# Patient Record
Sex: MALE | Race: BLACK OR AFRICAN AMERICAN | NOT HISPANIC OR LATINO | URBAN - METROPOLITAN AREA
[De-identification: names, ages, dates, MRNs, and addresses within clinical notes are randomized per-mention and may not be internally consistent; named-entity substitution may affect disease eponyms.]

---

## 2019-11-21 ENCOUNTER — EMERGENCY (EMERGENCY)
Facility: HOSPITAL | Age: 74
LOS: 1 days | Discharge: HOME | End: 2019-11-21
Attending: EMERGENCY MEDICINE | Admitting: EMERGENCY MEDICINE
Payer: SELF-PAY

## 2019-11-21 VITALS
HEART RATE: 93 BPM | DIASTOLIC BLOOD PRESSURE: 72 MMHG | RESPIRATION RATE: 16 BRPM | OXYGEN SATURATION: 100 % | TEMPERATURE: 98 F | SYSTOLIC BLOOD PRESSURE: 115 MMHG

## 2019-11-21 LAB
ALBUMIN SERPL ELPH-MCNC: 4.6 G/DL — SIGNIFICANT CHANGE UP (ref 3.3–5)
ALP SERPL-CCNC: 94 U/L — SIGNIFICANT CHANGE UP (ref 40–120)
ALT FLD-CCNC: 15 U/L — SIGNIFICANT CHANGE UP (ref 4–41)
AMPHET UR-MCNC: NEGATIVE — SIGNIFICANT CHANGE UP
ANION GAP SERPL CALC-SCNC: 18 MMO/L — HIGH (ref 7–14)
APAP SERPL-MCNC: < 15 UG/ML — LOW (ref 15–25)
APPEARANCE UR: CLEAR — SIGNIFICANT CHANGE UP
AST SERPL-CCNC: 23 U/L — SIGNIFICANT CHANGE UP (ref 4–40)
BARBITURATES UR SCN-MCNC: NEGATIVE — SIGNIFICANT CHANGE UP
BASE EXCESS BLDV CALC-SCNC: -6.8 MMOL/L — SIGNIFICANT CHANGE UP
BASOPHILS # BLD AUTO: 0.01 K/UL — SIGNIFICANT CHANGE UP (ref 0–0.2)
BASOPHILS NFR BLD AUTO: 0.1 % — SIGNIFICANT CHANGE UP (ref 0–2)
BENZODIAZ UR-MCNC: NEGATIVE — SIGNIFICANT CHANGE UP
BILIRUB SERPL-MCNC: 0.3 MG/DL — SIGNIFICANT CHANGE UP (ref 0.2–1.2)
BILIRUB UR-MCNC: NEGATIVE — SIGNIFICANT CHANGE UP
BLOOD GAS VENOUS - CREATININE: 1.42 MG/DL — HIGH (ref 0.5–1.3)
BLOOD UR QL VISUAL: NEGATIVE — SIGNIFICANT CHANGE UP
BUN SERPL-MCNC: 31 MG/DL — HIGH (ref 7–23)
CALCIUM SERPL-MCNC: 9.8 MG/DL — SIGNIFICANT CHANGE UP (ref 8.4–10.5)
CANNABINOIDS UR-MCNC: NEGATIVE — SIGNIFICANT CHANGE UP
CHLORIDE BLDV-SCNC: 106 MMOL/L — SIGNIFICANT CHANGE UP (ref 96–108)
CHLORIDE SERPL-SCNC: 101 MMOL/L — SIGNIFICANT CHANGE UP (ref 98–107)
CO2 SERPL-SCNC: 17 MMOL/L — LOW (ref 22–31)
COCAINE METAB.OTHER UR-MCNC: NEGATIVE — SIGNIFICANT CHANGE UP
COLOR SPEC: COLORLESS — SIGNIFICANT CHANGE UP
CREAT SERPL-MCNC: 1.47 MG/DL — HIGH (ref 0.5–1.3)
EOSINOPHIL # BLD AUTO: 0.14 K/UL — SIGNIFICANT CHANGE UP (ref 0–0.5)
EOSINOPHIL NFR BLD AUTO: 1.9 % — SIGNIFICANT CHANGE UP (ref 0–6)
ETHANOL BLD-MCNC: 309 MG/DL — HIGH
GAS PNL BLDV: 139 MMOL/L — SIGNIFICANT CHANGE UP (ref 136–146)
GLUCOSE BLDV-MCNC: 50 MG/DL — LOW (ref 70–99)
GLUCOSE SERPL-MCNC: 53 MG/DL — LOW (ref 70–99)
GLUCOSE UR-MCNC: 300 — HIGH
HCO3 BLDV-SCNC: 18 MMOL/L — LOW (ref 20–27)
HCT VFR BLD CALC: 34.1 % — LOW (ref 39–50)
HCT VFR BLDV CALC: 36.7 % — LOW (ref 39–51)
HGB BLD-MCNC: 11.2 G/DL — LOW (ref 13–17)
HGB BLDV-MCNC: 11.9 G/DL — LOW (ref 13–17)
IMM GRANULOCYTES NFR BLD AUTO: 0.4 % — SIGNIFICANT CHANGE UP (ref 0–1.5)
KETONES UR-MCNC: NEGATIVE — SIGNIFICANT CHANGE UP
LACTATE BLDV-MCNC: 5.5 MMOL/L — CRITICAL HIGH (ref 0.5–2)
LEUKOCYTE ESTERASE UR-ACNC: NEGATIVE — SIGNIFICANT CHANGE UP
LYMPHOCYTES # BLD AUTO: 1.31 K/UL — SIGNIFICANT CHANGE UP (ref 1–3.3)
LYMPHOCYTES # BLD AUTO: 17.4 % — SIGNIFICANT CHANGE UP (ref 13–44)
MCHC RBC-ENTMCNC: 32.8 % — SIGNIFICANT CHANGE UP (ref 32–36)
MCHC RBC-ENTMCNC: 33.7 PG — SIGNIFICANT CHANGE UP (ref 27–34)
MCV RBC AUTO: 102.7 FL — HIGH (ref 80–100)
METHADONE UR-MCNC: NEGATIVE — SIGNIFICANT CHANGE UP
MONOCYTES # BLD AUTO: 0.52 K/UL — SIGNIFICANT CHANGE UP (ref 0–0.9)
MONOCYTES NFR BLD AUTO: 6.9 % — SIGNIFICANT CHANGE UP (ref 2–14)
NEUTROPHILS # BLD AUTO: 5.52 K/UL — SIGNIFICANT CHANGE UP (ref 1.8–7.4)
NEUTROPHILS NFR BLD AUTO: 73.3 % — SIGNIFICANT CHANGE UP (ref 43–77)
NITRITE UR-MCNC: NEGATIVE — SIGNIFICANT CHANGE UP
NRBC # FLD: 0 K/UL — SIGNIFICANT CHANGE UP (ref 0–0)
OPIATES UR-MCNC: NEGATIVE — SIGNIFICANT CHANGE UP
OXYCODONE UR-MCNC: NEGATIVE — SIGNIFICANT CHANGE UP
PCO2 BLDV: 41 MMHG — SIGNIFICANT CHANGE UP (ref 41–51)
PCP UR-MCNC: NEGATIVE — SIGNIFICANT CHANGE UP
PH BLDV: 7.28 PH — LOW (ref 7.32–7.43)
PH UR: 5.5 — SIGNIFICANT CHANGE UP (ref 5–8)
PLATELET # BLD AUTO: 298 K/UL — SIGNIFICANT CHANGE UP (ref 150–400)
PMV BLD: 8.9 FL — SIGNIFICANT CHANGE UP (ref 7–13)
PO2 BLDV: 37 MMHG — SIGNIFICANT CHANGE UP (ref 35–40)
POTASSIUM BLDV-SCNC: 3.6 MMOL/L — SIGNIFICANT CHANGE UP (ref 3.4–4.5)
POTASSIUM SERPL-MCNC: 3.8 MMOL/L — SIGNIFICANT CHANGE UP (ref 3.5–5.3)
POTASSIUM SERPL-SCNC: 3.8 MMOL/L — SIGNIFICANT CHANGE UP (ref 3.5–5.3)
PROT SERPL-MCNC: 7.7 G/DL — SIGNIFICANT CHANGE UP (ref 6–8.3)
PROT UR-MCNC: NEGATIVE — SIGNIFICANT CHANGE UP
RBC # BLD: 3.32 M/UL — LOW (ref 4.2–5.8)
RBC # FLD: 11.9 % — SIGNIFICANT CHANGE UP (ref 10.3–14.5)
SALICYLATES SERPL-MCNC: < 5 MG/DL — LOW (ref 15–30)
SAO2 % BLDV: 53.1 % — LOW (ref 60–85)
SODIUM SERPL-SCNC: 136 MMOL/L — SIGNIFICANT CHANGE UP (ref 135–145)
SP GR SPEC: 1.01 — SIGNIFICANT CHANGE UP (ref 1–1.04)
TROPONIN T, HIGH SENSITIVITY: 8 NG/L — SIGNIFICANT CHANGE UP (ref ?–14)
UROBILINOGEN FLD QL: NORMAL — SIGNIFICANT CHANGE UP
WBC # BLD: 7.53 K/UL — SIGNIFICANT CHANGE UP (ref 3.8–10.5)
WBC # FLD AUTO: 7.53 K/UL — SIGNIFICANT CHANGE UP (ref 3.8–10.5)

## 2019-11-21 PROCEDURE — 71045 X-RAY EXAM CHEST 1 VIEW: CPT | Mod: 26

## 2019-11-21 PROCEDURE — 99285 EMERGENCY DEPT VISIT HI MDM: CPT

## 2019-11-21 RX ORDER — SODIUM CHLORIDE 9 MG/ML
1000 INJECTION, SOLUTION INTRAVENOUS
Refills: 0 | Status: DISCONTINUED | OUTPATIENT
Start: 2019-11-21 | End: 2019-11-22

## 2019-11-21 RX ORDER — DEXTROSE 50 % IN WATER 50 %
50 SYRINGE (ML) INTRAVENOUS ONCE
Refills: 0 | Status: COMPLETED | OUTPATIENT
Start: 2019-11-21 | End: 2019-11-21

## 2019-11-21 RX ORDER — SODIUM CHLORIDE 9 MG/ML
500 INJECTION INTRAMUSCULAR; INTRAVENOUS; SUBCUTANEOUS ONCE
Refills: 0 | Status: COMPLETED | OUTPATIENT
Start: 2019-11-21 | End: 2019-11-21

## 2019-11-21 RX ADMIN — SODIUM CHLORIDE 500 MILLILITER(S): 9 INJECTION INTRAMUSCULAR; INTRAVENOUS; SUBCUTANEOUS at 23:52

## 2019-11-21 RX ADMIN — Medication 50 MILLILITER(S): at 21:37

## 2019-11-21 NOTE — ED PROVIDER NOTE - PHYSICAL EXAMINATION
Dr Rodríguez  pt alert to name, AOB, no sign of head/facial trauma. no bruising of chest/back or abdomen. no lac. nontender chest wall. normal S1-S2 poor inspiratory effort. no retractions. soft nt abdomen. no grimace or response to deep palpation. Dr Rodríguez  pt alert to name, AOB, no sign of head/facial trauma. no bruising of chest/back or abdomen. no lac. nontender chest wall. normal S1-S2 poor inspiratory effort. no retractions. soft nt abdomen. no grimace or response to deep palpation.    PHYSICAL EXAM:   General: +alcohol on breath  HEENT: airway patent, +L beating nystagmus, not cooperating with looking to R  Cardiovascular: regular rate   Respiratory:  nonlabored respirations  Abdominal: soft, nontender, nondistended, no rebound, guarding or rigidity  Neuro: Nystagmus as above, +RUE drift, +b/l lower extremity drift.   Psychiatric: perseverating on leaving and asking why he is here  -Christal Gleason PGY-2 Dr Rodríguez  pt alert to name, AOB, no sign of head/facial trauma. no lac of tongue/lips no bruising of chest/back or abdomen. no lac. nontender chest wall. normal S1-S2 poor inspiratory effort. no retractions. soft nt abdomen. no grimace or response to deep palpation. moves all ext without difficulty. strong  bl. follows simple commands. cursing at staff.     PHYSICAL EXAM:   General: +alcohol on breath  HEENT: airway patent, +L beating nystagmus, not cooperating with looking to R  Cardiovascular: regular rate   Respiratory:  nonlabored respirations  Abdominal: soft, nontender, nondistended, no rebound, guarding or rigidity  Neuro: Nystagmus as above, +RUE drift, +b/l lower extremity drift.   Psychiatric: perseverating on leaving and asking why he is here  -Christal Gleason PGY-2

## 2019-11-21 NOTE — ED PROVIDER NOTE - OBJECTIVE STATEMENT
Dr Rodríguez  73yo M hx of DM, HTN, BIBEMS AMS found in street. pt FS 53 altered, asking why he is here. unable to provide any further hx. no family w pt. pt denies any complaints. neg ROS will medicate w dextrose and re assess . Dr Rodríguez  73yo M hx of DM, HTN, BIBEMS AMS found in street. pt FS 53 altered, asking why he is here. unable to provide any further hx. no family w pt. pt denies any complaints. neg ROS will medicate w dextrose and re assess.    Christal Gleason MD PGY-2 75 yo F with hx DM, HTN BIBEMS for hypoglycemia. Reportedly per triage note, pt hypoglycemic to 40s, found lying on street, given oral glucose, with rpt FS 60, FS in triage 58  and unable to swallow juice. Patient appears intoxicated and is a poor historian at this time, perseverating on asking why he is here and if he can go home. Not endorsing pain at this time. +alcohol on breath

## 2019-11-21 NOTE — ED ADULT NURSE NOTE - CHIEF COMPLAINT QUOTE
pt arrives by EMS founding lying in the street. pt fs in field noted to be in 40s given oral glucose rpt fs in field 60. pt arrives aa0x2 no complaints of pain but not answering questions appropriately fs in triage 58 pt unable to swallow juice. Charge RN aware. ekg in progress

## 2019-11-21 NOTE — ED ADULT NURSE NOTE - NSIMPLEMENTINTERV_GEN_ALL_ED
Implemented All Fall Risk Interventions:  [place] to call system. Call bell, personal items and telephone within reach. Instruct patient to call for assistance. Room bathroom lighting operational. Non-slip footwear when patient is off stretcher. Physically safe environment: no spills, clutter or unnecessary equipment. Stretcher in lowest position, wheels locked, appropriate side rails in place. Provide visual cue, wrist band, yellow gown, etc. Monitor gait and stability. Monitor for mental status changes and reorient to person, place, and time. Review medications for side effects contributing to fall risk. Reinforce activity limits and safety measures with patient and family.

## 2019-11-21 NOTE — ED PROVIDER NOTE - NSFOLLOWUPINSTRUCTIONS_ED_ALL_ED_FT
You were seen in the Emergency Department (ED) for hypoglycemia.     Please take your daily medications as prescribed.    Please follow up with your primary care doctor at [facility] in the next 72 hours.    Please return to the ED if you experience any new or concerning symptoms, such as: confusion, chest pain, difficulty breathing, passing out, unable to eat or drink, unable to move or feel part of your body, fever, chills.     Thank you for choosing a [facility] ED.

## 2019-11-21 NOTE — ED PROVIDER NOTE - PROGRESS NOTE DETAILS
DW Daughter Monica, [phone], states he drinks ETOH daily, hx of DM and HTN. NKDA. Will not come to hospital to pick him up. DW Daughter Monica, [phone], states he drinks ETOH daily, hx of DM and HTN. NKDA. Will not come to hospital to pick him up. He drinks daily, comes home late, non adherent to meds as per family. pt placed on observation for safety. pending ct to be done, repeat lactic and sober up. Endorsed to Dr Patel Christal Gleason MD PGY-2 pt now getting up, requiring enhanced supervision, trying to get out of bed. states he does not know why he is here. lactate trending down, pt awake enough to PO challenge, will give food and drink and repeat lactate. Simran Upton M.D. Resident  Pt signed out to me pending re-eval for ETOH. PT lactate downtrending with fluids, given he is pain free, AOx3, with no complaints, likely due to dehydration. Pt now awake, tolerating PO, AOx3, no tremors, or tongue fasciculations, able to stand and ambulate normally without assistance. States today is Friday and payday, so he needs to get to work in [employer] at 8:30AM in order to get pain. Pt states he feels fine, would like to leave, and will follow up with his PMD at [facility].

## 2019-11-21 NOTE — ED ADULT NURSE NOTE - OBJECTIVE STATEMENT
received pt to room 27 aox1 brought in by EMS for hypogkly received pt to room 27 aox1 brought in by EMS for hypoglycemia, found on street altered. Pt was given oral glucose by EMS FS in triage 58 pt appears intoxicative refusing to cooperate with interview, repeats same statement that he wants to leave. Pt breaths equal and unlabored VSS 20 G IV L arm placed labs sent pt medicated as per order rpt  EKG completed pt placed on enhanced for safety climbing out of bed belongings collected. PCA bedside will continue to monitor

## 2019-11-21 NOTE — ED PROVIDER NOTE - PATIENT PORTAL LINK FT
You can access the FollowMyHealth Patient Portal offered by [facility] by registering at the following website: [email or web address]. By joining [employer]’s FollowMyHealth portal, you will also be able to view your health information using other applications (apps) compatible with our system.

## 2019-11-21 NOTE — ED ADULT TRIAGE NOTE - CHIEF COMPLAINT QUOTE
pt arrives from lying int he street by EMS. pt fs in field noted to be in 40s given oral glucose rpt fs in field 60. pt arrives aa0x2 no complaints of pain but not answering questions appropriately fs in triage 58 pt unable to swallow juice. Charge RN aware. ekg in progress pt arrives by EMS founding lying in the street. pt fs in field noted to be in 40s given oral glucose rpt fs in field 60. pt arrives aa0x2 no complaints of pain but not answering questions appropriately fs in triage 58 pt unable to swallow juice. Charge RN aware. ekg in progress

## 2019-11-21 NOTE — ED PROVIDER NOTE - ATTENDING CONTRIBUTION TO CARE
Attending Statement: I have personally seen and examined this patient. I have fully participated in the care of this patient. I have reviewed all pertinent clinical information, including history physical exam, plan and the Resident's note and agree except as noted   see HPI/pe

## 2019-11-22 VITALS
OXYGEN SATURATION: 99 % | TEMPERATURE: 97 F | HEART RATE: 94 BPM | SYSTOLIC BLOOD PRESSURE: 131 MMHG | DIASTOLIC BLOOD PRESSURE: 79 MMHG | RESPIRATION RATE: 18 BRPM

## 2019-11-22 LAB
BASE EXCESS BLDV CALC-SCNC: -7 MMOL/L — SIGNIFICANT CHANGE UP
BASE EXCESS BLDV CALC-SCNC: -7.8 MMOL/L — SIGNIFICANT CHANGE UP
BLOOD GAS VENOUS - CREATININE: 1.22 MG/DL — SIGNIFICANT CHANGE UP (ref 0.5–1.3)
BLOOD GAS VENOUS - CREATININE: 1.43 MG/DL — HIGH (ref 0.5–1.3)
BLOOD GAS VENOUS - FIO2: 21 — SIGNIFICANT CHANGE UP
BLOOD GAS VENOUS - FIO2: 21 — SIGNIFICANT CHANGE UP
CHLORIDE BLDV-SCNC: 109 MMOL/L — HIGH (ref 96–108)
CHLORIDE BLDV-SCNC: 113 MMOL/L — HIGH (ref 96–108)
GAS PNL BLDV: 142 MMOL/L — SIGNIFICANT CHANGE UP (ref 136–146)
GAS PNL BLDV: 142 MMOL/L — SIGNIFICANT CHANGE UP (ref 136–146)
GLUCOSE BLDV-MCNC: 89 MG/DL — SIGNIFICANT CHANGE UP (ref 70–99)
GLUCOSE BLDV-MCNC: 97 MG/DL — SIGNIFICANT CHANGE UP (ref 70–99)
HCO3 BLDV-SCNC: 17 MMOL/L — LOW (ref 20–27)
HCO3 BLDV-SCNC: 18 MMOL/L — LOW (ref 20–27)
HCT VFR BLDV CALC: 36.8 % — LOW (ref 39–51)
HCT VFR BLDV CALC: 37.2 % — LOW (ref 39–51)
HGB BLDV-MCNC: 12 G/DL — LOW (ref 13–17)
HGB BLDV-MCNC: 12.1 G/DL — LOW (ref 13–17)
LACTATE BLDV-MCNC: 4 MMOL/L — CRITICAL HIGH (ref 0.5–2)
LACTATE BLDV-MCNC: 5.2 MMOL/L — CRITICAL HIGH (ref 0.5–2)
LIDOCAIN IGE QN: 93.6 U/L — HIGH (ref 7–60)
PCO2 BLDV: 42 MMHG — SIGNIFICANT CHANGE UP (ref 41–51)
PCO2 BLDV: 42 MMHG — SIGNIFICANT CHANGE UP (ref 41–51)
PH BLDV: 7.26 PH — LOW (ref 7.32–7.43)
PH BLDV: 7.27 PH — LOW (ref 7.32–7.43)
PO2 BLDV: 35 MMHG — SIGNIFICANT CHANGE UP (ref 35–40)
PO2 BLDV: 44 MMHG — HIGH (ref 35–40)
POTASSIUM BLDV-SCNC: 3.6 MMOL/L — SIGNIFICANT CHANGE UP (ref 3.4–4.5)
POTASSIUM BLDV-SCNC: 3.6 MMOL/L — SIGNIFICANT CHANGE UP (ref 3.4–4.5)
SAO2 % BLDV: 50.4 % — LOW (ref 60–85)
SAO2 % BLDV: 63.8 % — SIGNIFICANT CHANGE UP (ref 60–85)

## 2019-11-22 PROCEDURE — 70450 CT HEAD/BRAIN W/O DYE: CPT | Mod: 26

## 2019-11-22 PROCEDURE — 72125 CT NECK SPINE W/O DYE: CPT | Mod: 26

## 2019-11-22 RX ORDER — SODIUM CHLORIDE 9 MG/ML
1000 INJECTION INTRAMUSCULAR; INTRAVENOUS; SUBCUTANEOUS ONCE
Refills: 0 | Status: COMPLETED | OUTPATIENT
Start: 2019-11-22 | End: 2019-11-22

## 2019-11-22 RX ORDER — SODIUM CHLORIDE 9 MG/ML
1000 INJECTION, SOLUTION INTRAVENOUS
Refills: 0 | Status: DISCONTINUED | OUTPATIENT
Start: 2019-11-22 | End: 2019-11-28

## 2019-11-22 RX ADMIN — SODIUM CHLORIDE 75 MILLILITER(S): 9 INJECTION, SOLUTION INTRAVENOUS at 03:42

## 2019-11-22 RX ADMIN — SODIUM CHLORIDE 75 MILLILITER(S): 9 INJECTION, SOLUTION INTRAVENOUS at 00:48

## 2019-11-22 RX ADMIN — Medication 2 MILLIGRAM(S): at 00:19

## 2019-11-22 RX ADMIN — SODIUM CHLORIDE 1000 MILLILITER(S): 9 INJECTION INTRAMUSCULAR; INTRAVENOUS; SUBCUTANEOUS at 04:23

## 2019-11-22 NOTE — ED ADULT NURSE REASSESSMENT NOTE - NS ED NURSE REASSESS COMMENT FT1
pt becoming increasingly agitated agressive with staff and attempting to elope and pull out IV, MD aware pt medicated as per order

## 2021-07-09 ENCOUNTER — INPATIENT (INPATIENT)
Facility: HOSPITAL | Age: 76
LOS: 19 days | Discharge: NURSING, REHAB OR LONG-TERM CARE | DRG: 82 | End: 2021-07-29
Attending: INTERNAL MEDICINE | Admitting: SURGERY
Payer: MEDICARE

## 2021-07-09 VITALS
OXYGEN SATURATION: 96 % | HEART RATE: 84 BPM | SYSTOLIC BLOOD PRESSURE: 166 MMHG | RESPIRATION RATE: 18 BRPM | HEIGHT: 69 IN | WEIGHT: 169.98 LBS | TEMPERATURE: 98 F | DIASTOLIC BLOOD PRESSURE: 100 MMHG

## 2021-07-09 DIAGNOSIS — S06.6X9A TRAUMATIC SUBARACHNOID HEMORRHAGE WITH LOSS OF CONSCIOUSNESS OF UNSPECIFIED DURATION, INITIAL ENCOUNTER: ICD-10-CM

## 2021-07-09 LAB
ALBUMIN SERPL ELPH-MCNC: 4.4 G/DL — SIGNIFICANT CHANGE UP (ref 3.3–5)
ALP SERPL-CCNC: 127 U/L — HIGH (ref 40–120)
ALT FLD-CCNC: 13 U/L — SIGNIFICANT CHANGE UP (ref 10–45)
ANION GAP SERPL CALC-SCNC: 16 MMOL/L — SIGNIFICANT CHANGE UP (ref 5–17)
APTT BLD: 27.9 SEC — SIGNIFICANT CHANGE UP (ref 27.5–35.5)
AST SERPL-CCNC: 29 U/L — SIGNIFICANT CHANGE UP (ref 10–40)
BASE EXCESS BLDV CALC-SCNC: -3.7 MMOL/L — LOW (ref -2–2)
BASOPHILS # BLD AUTO: 0.03 K/UL — SIGNIFICANT CHANGE UP (ref 0–0.2)
BASOPHILS NFR BLD AUTO: 0.4 % — SIGNIFICANT CHANGE UP (ref 0–2)
BILIRUB SERPL-MCNC: 0.2 MG/DL — SIGNIFICANT CHANGE UP (ref 0.2–1.2)
BLD GP AB SCN SERPL QL: NEGATIVE — SIGNIFICANT CHANGE UP
BUN SERPL-MCNC: 19 MG/DL — SIGNIFICANT CHANGE UP (ref 7–23)
CA-I SERPL-SCNC: 1.14 MMOL/L — SIGNIFICANT CHANGE UP (ref 1.12–1.3)
CALCIUM SERPL-MCNC: 9.6 MG/DL — SIGNIFICANT CHANGE UP (ref 8.4–10.5)
CHLORIDE BLDV-SCNC: 109 MMOL/L — HIGH (ref 96–108)
CHLORIDE SERPL-SCNC: 103 MMOL/L — SIGNIFICANT CHANGE UP (ref 96–108)
CO2 BLDV-SCNC: 24 MMOL/L — SIGNIFICANT CHANGE UP (ref 22–30)
CO2 SERPL-SCNC: 17 MMOL/L — LOW (ref 22–31)
CREAT SERPL-MCNC: 1.23 MG/DL — SIGNIFICANT CHANGE UP (ref 0.5–1.3)
EOSINOPHIL # BLD AUTO: 0.28 K/UL — SIGNIFICANT CHANGE UP (ref 0–0.5)
EOSINOPHIL NFR BLD AUTO: 3.8 % — SIGNIFICANT CHANGE UP (ref 0–6)
ETHANOL SERPL-MCNC: 333 MG/DL — HIGH (ref 0–10)
GAS PNL BLDV: 138 MMOL/L — SIGNIFICANT CHANGE UP (ref 135–145)
GAS PNL BLDV: SIGNIFICANT CHANGE UP
GLUCOSE BLDV-MCNC: 201 MG/DL — HIGH (ref 70–99)
GLUCOSE SERPL-MCNC: 194 MG/DL — HIGH (ref 70–99)
HCO3 BLDV-SCNC: 22 MMOL/L — SIGNIFICANT CHANGE UP (ref 21–29)
HCT VFR BLD CALC: 35.1 % — LOW (ref 39–50)
HCT VFR BLDA CALC: 37 % — LOW (ref 39–50)
HGB BLD CALC-MCNC: 12 G/DL — LOW (ref 13–17)
HGB BLD-MCNC: 11.5 G/DL — LOW (ref 13–17)
IMM GRANULOCYTES NFR BLD AUTO: 0.3 % — SIGNIFICANT CHANGE UP (ref 0–1.5)
INR BLD: 1.04 RATIO — SIGNIFICANT CHANGE UP (ref 0.88–1.16)
LACTATE BLDV-MCNC: 3.8 MMOL/L — HIGH (ref 0.7–2)
LIDOCAIN IGE QN: 63 U/L — HIGH (ref 7–60)
LYMPHOCYTES # BLD AUTO: 2.93 K/UL — SIGNIFICANT CHANGE UP (ref 1–3.3)
LYMPHOCYTES # BLD AUTO: 39.5 % — SIGNIFICANT CHANGE UP (ref 13–44)
MCHC RBC-ENTMCNC: 32.8 GM/DL — SIGNIFICANT CHANGE UP (ref 32–36)
MCHC RBC-ENTMCNC: 32.8 PG — SIGNIFICANT CHANGE UP (ref 27–34)
MCV RBC AUTO: 100 FL — SIGNIFICANT CHANGE UP (ref 80–100)
MONOCYTES # BLD AUTO: 0.77 K/UL — SIGNIFICANT CHANGE UP (ref 0–0.9)
MONOCYTES NFR BLD AUTO: 10.4 % — SIGNIFICANT CHANGE UP (ref 2–14)
NEUTROPHILS # BLD AUTO: 3.39 K/UL — SIGNIFICANT CHANGE UP (ref 1.8–7.4)
NEUTROPHILS NFR BLD AUTO: 45.6 % — SIGNIFICANT CHANGE UP (ref 43–77)
NRBC # BLD: 0 /100 WBCS — SIGNIFICANT CHANGE UP (ref 0–0)
PCO2 BLDV: 47 MMHG — SIGNIFICANT CHANGE UP (ref 35–50)
PH BLDV: 7.3 — LOW (ref 7.35–7.45)
PLATELET # BLD AUTO: 256 K/UL — SIGNIFICANT CHANGE UP (ref 150–400)
PO2 BLDV: 39 MMHG — SIGNIFICANT CHANGE UP (ref 25–45)
POTASSIUM BLDV-SCNC: 3.9 MMOL/L — SIGNIFICANT CHANGE UP (ref 3.5–5.3)
POTASSIUM SERPL-MCNC: 5 MMOL/L — SIGNIFICANT CHANGE UP (ref 3.5–5.3)
POTASSIUM SERPL-SCNC: 5 MMOL/L — SIGNIFICANT CHANGE UP (ref 3.5–5.3)
PROT SERPL-MCNC: 7.5 G/DL — SIGNIFICANT CHANGE UP (ref 6–8.3)
PROTHROM AB SERPL-ACNC: 12.5 SEC — SIGNIFICANT CHANGE UP (ref 10.6–13.6)
RBC # BLD: 3.51 M/UL — LOW (ref 4.2–5.8)
RBC # FLD: 12 % — SIGNIFICANT CHANGE UP (ref 10.3–14.5)
RH IG SCN BLD-IMP: POSITIVE — SIGNIFICANT CHANGE UP
SAO2 % BLDV: 59 % — LOW (ref 67–88)
SODIUM SERPL-SCNC: 136 MMOL/L — SIGNIFICANT CHANGE UP (ref 135–145)
WBC # BLD: 7.42 K/UL — SIGNIFICANT CHANGE UP (ref 3.8–10.5)
WBC # FLD AUTO: 7.42 K/UL — SIGNIFICANT CHANGE UP (ref 3.8–10.5)

## 2021-07-09 PROCEDURE — 99291 CRITICAL CARE FIRST HOUR: CPT

## 2021-07-09 PROCEDURE — 72125 CT NECK SPINE W/O DYE: CPT | Mod: 26,MA

## 2021-07-09 PROCEDURE — 72170 X-RAY EXAM OF PELVIS: CPT | Mod: 26

## 2021-07-09 PROCEDURE — 70450 CT HEAD/BRAIN W/O DYE: CPT | Mod: 26,MA

## 2021-07-09 PROCEDURE — 93010 ELECTROCARDIOGRAM REPORT: CPT

## 2021-07-09 PROCEDURE — 71045 X-RAY EXAM CHEST 1 VIEW: CPT | Mod: 26

## 2021-07-09 RX ORDER — SODIUM CHLORIDE 9 MG/ML
1000 INJECTION INTRAMUSCULAR; INTRAVENOUS; SUBCUTANEOUS ONCE
Refills: 0 | Status: COMPLETED | OUTPATIENT
Start: 2021-07-09 | End: 2021-07-09

## 2021-07-09 RX ADMIN — SODIUM CHLORIDE 2000 MILLILITER(S): 9 INJECTION INTRAMUSCULAR; INTRAVENOUS; SUBCUTANEOUS at 22:48

## 2021-07-09 NOTE — ED PROVIDER NOTE - CONSULTANT FREE TEXT FOR MDM DISCUSSED CASE WITH QUESTION
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
## 2021-07-09 NOTE — ED PROVIDER NOTE - CLINICAL SUMMARY MEDICAL DECISION MAKING FREE TEXT BOX
Concern for head bleed/intox, vitals reassuring, primary intact, GCS 10, pt trauma 2, will get CT's, Xrays, tox. Reassess.
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
## 2021-07-09 NOTE — H&P ADULT - NSHPPHYSICALEXAM_GEN_ALL_CORE
General: intoxicated, NAD  HEENT: Normocephalic, left external ear canal blood noted; posterior scalp laceration, right orbital swelling, EOMI, PEERL  Neck: Soft, midline trachea, C-collar in place  Chest: No chest wall tenderness, thorax stable, no ecchymosis.   Cardiac: S1, S2, RRR.   Respiratory: Bilateral breath sounds, clear and equal bilaterally.   Abdomen: Soft, non-distended, non-tender, no rebound, no guarding, no ecchymosis.   Pelvis: Stable, non-tender.   Ext: palp radial b/l UE, b/l DP palp in Lower Extrem.   Back: no TTP, no palpable stepoff/deformity, no abrasions.   Rectal: No abhi blood

## 2021-07-09 NOTE — CONSULT NOTE ADULT - SUBJECTIVE AND OBJECTIVE BOX
HISTORY OF PRESENT ILLNESS:  TITO LINDA is a 76y Male with unknown PMHx who was [place] today [place] bus stop. A Level II trauma was activated upon his arrival due to his altered mental status.     PAST MEDICAL HISTORY:       PAST SURGICAL HISTORY:       FAMILY HISTORY:       CODE STATUS:       HOME MEDICATIONS:    ALLERGIES:   Allergy Status Unknown      VITAL SIGNS:  ICU Vital Signs Last 24 Hrs  T(C): 36.5 (09 Jul 2021 22:47), Max: 36.7 (09 Jul 2021 22:09)  T(F): 97.7 (09 Jul 2021 22:47), Max: 98.1 (09 Jul 2021 22:09)  HR: 90 (09 Jul 2021 22:47) (84 - 90)  BP: 171/107 (09 Jul 2021 22:47) (166/100 - 171/107)  BP(mean): --  ABP: --  ABP(mean): --  RR: 20 (09 Jul 2021 22:47) (18 - 20)  SpO2: 99% (09 Jul 2021 22:47) (96% - 99%)      NEURO  Exam:  Medications:      RESPIRATORY  Exam:   Mechanical Ventilation:     Labs:    Medications:      CARDIOVASCULAR  Exam:  Cardiac Rhythm:  Labs:   VBG - ( 09 Jul 2021 22:33 )  pH: 7.30  /  pCO2: 47    /  pO2: 39    / HCO3: 22    / Base Excess: -3.7  /  SaO2: 59     Lactate: 3.8              Medications:       GI/NUTRITION  Exam:  Diet:      GENITOURINARY/RENAL  Exam:   Meds:       I/Os:      Weight:  Weight (kg): 77.1 (07-09 @ 22:09)    Labs:          [ ] Muñoz catheter, indication: urine output monitoring in critically ill patient    HEMATOLOGIC  [ ] VTE Prophylaxis:    Medications:      Labs:                        11.5   7.42  )-----------( 256      ( 09 Jul 2021 22:33 )             35.1     PT/INR - ( 09 Jul 2021 22:33 )   PT: 12.5 sec;   INR: 1.04 ratio         PTT - ( 09 Jul 2021 22:33 )  PTT:27.9 sec    Transfusion: [ ] PRBC	[ ] Platelets	[ ] FFP	[ ] Cryoprecipitate      INFECTIOUS DISEASES:  Medications:      Recent Cultures:  RECENT CULTURES:      ENDOCRINE  Glucose: CAPILLARY BLOOD GLUCOSE      POCT Blood Glucose.: 227 mg/dL (09 Jul 2021 22:12)    Medications:         PATIENT CARE ACCESS DEVICES:  [ ] Peripheral IV  [ ] Central Venous Line	[ ] R	[ ] L	[ ] IJ	[ ] Fem	[ ] SC	Placed:   [ ] Arterial Line		[ ] R	[ ] L	[ ] Fem	[ ] Rad	[ ] Ax	Placed:   [ ] PICC:					[ ] Mediport  [ ] Urinary Catheter, Date Placed:   [x] Necessity of urinary, arterial, and venous catheters discussed    OTHER MEDICATIONS:     IMAGING STUDIES: HISTORY OF PRESENT ILLNESS:  TITO LINDA is a 76y Male with Hx DM2 and alcohol abuse who was found down today at a bus stop. A Level II trauma was activated upon his arrival due to his altered mental status. GCS on arrival was 10 (E3, V2, M5). Primary survey intact. Secondary survey was significant for blood at the L external ear canal, a posterior scalp laceration, and R orbital swelling. CT scan on admission demonstrated bifrontal SAH, a R SDH, a L parietal SAH with pneumocephalus, a L possible parietal non-displaced skull fracture, and a C6 inferior endplate Fx into disc space with air into the canal. SICU consulted for q1 neuro checks in the setting of intracranial hemorrhages.     PAST MEDICAL HISTORY:   - DM (diabetes mellitus)  - Alcohol abuse    PAST SURGICAL HISTORY:   - None    CODE STATUS:   - Full code    HOME MEDICATIONS:  · 	Janumet XR 50 mg-1000 mg oral tablet, extended release: Last Dose Taken:  , 1 tab(s) orally once a day  · 	enalapril 20 mg oral tablet: Last Dose Taken:  , 1 tab(s) orally once a day  · 	Vitamin D2 50,000 intl units (1.25 mg) oral capsule: Last Dose Taken:  , 1 cap(s) orally once a day  · 	glipiZIDE 10 mg oral tablet: Last Dose Taken:  , 1 tab(s) orally once a day  · 	atorvastatin 40 mg oral tablet: Last Dose Taken:  , 1 tab(s) orally once a day  · 	timolol hemihydrate 0.5% ophthalmic solution: Last Dose Taken:  , 1 drop(s) to each affected eye 2 times a day    ALLERGIES:   Allergy Status Unknown      PHYSICAL EXAM:  VITAL SIGNS:  ICU Vital Signs Last 24 Hrs  T(C): 36.5 (09 Jul 2021 22:47), Max: 36.7 (09 Jul 2021 22:09)  T(F): 97.7 (09 Jul 2021 22:47), Max: 98.1 (09 Jul 2021 22:09)  HR: 90 (09 Jul 2021 22:47) (84 - 90)  BP: 171/107 (09 Jul 2021 22:47) (166/100 - 171/107)  RR: 20 (09 Jul 2021 22:47) (18 - 20)  SpO2: 99% (09 Jul 2021 22:47) (96% - 99%)      NEURO  Exam:  Medications:      RESPIRATORY  Exam:   Mechanical Ventilation:     Labs:    Medications:      CARDIOVASCULAR  Exam:  Cardiac Rhythm:  Labs:   VBG - ( 09 Jul 2021 22:33 )  pH: 7.30  /  pCO2: 47    /  pO2: 39    / HCO3: 22    / Base Excess: -3.7  /  SaO2: 59     Lactate: 3.8              Medications:       GI/NUTRITION  Exam:  Diet:      GENITOURINARY/RENAL  Exam:   Meds:       I/Os:      Weight:  Weight (kg): 77.1 (07-09 @ 22:09)    Labs:          [ ] Muñoz catheter, indication: urine output monitoring in critically ill patient    HEMATOLOGIC  [ ] VTE Prophylaxis:    Medications:      Labs:                        11.5   7.42  )-----------( 256      ( 09 Jul 2021 22:33 )             35.1     PT/INR - ( 09 Jul 2021 22:33 )   PT: 12.5 sec;   INR: 1.04 ratio         PTT - ( 09 Jul 2021 22:33 )  PTT:27.9 sec    Transfusion: [ ] PRBC	[ ] Platelets	[ ] FFP	[ ] Cryoprecipitate      INFECTIOUS DISEASES:  Medications:      Recent Cultures:  RECENT CULTURES:      ENDOCRINE  Glucose: CAPILLARY BLOOD GLUCOSE      POCT Blood Glucose.: 227 mg/dL (09 Jul 2021 22:12)    Medications:         PATIENT CARE ACCESS DEVICES:  [ ] Peripheral IV  [ ] Central Venous Line	[ ] R	[ ] L	[ ] IJ	[ ] Fem	[ ] SC	Placed:   [ ] Arterial Line		[ ] R	[ ] L	[ ] Fem	[ ] Rad	[ ] Ax	Placed:   [ ] PICC:					[ ] Mediport  [ ] Urinary Catheter, Date Placed:   [x] Necessity of urinary, arterial, and venous catheters discussed    OTHER MEDICATIONS:     IMAGING STUDIES: HISTORY OF PRESENT ILLNESS:  TITO LINDA is a 76y Male with Hx DM2 and alcohol abuse who was found down today at a bus stop. A Level II trauma was activated upon his arrival due to his altered mental status. GCS on arrival was 10 (E3, V2, M5). Primary survey intact. Secondary survey was significant for blood at the L external ear canal, a posterior scalp laceration, and R orbital swelling. CT scan on admission demonstrated bifrontal SAH, a R SDH, a L parietal SAH with pneumocephalus, a L possible parietal non-displaced skull fracture, and a C6 inferior endplate Fx into disc space with air into the canal. SICU consulted for q1 neuro checks in the setting of intracranial hemorrhages.       PAST MEDICAL HISTORY:   - DM (diabetes mellitus)  - Alcohol abuse    PAST SURGICAL HISTORY:   - None    CODE STATUS:   - Full code    HOME MEDICATIONS:  · 	Janumet XR 50 mg-1000 mg oral tablet, extended release: Last Dose Taken:  , 1 tab(s) orally once a day  · 	enalapril 20 mg oral tablet: Last Dose Taken:  , 1 tab(s) orally once a day  · 	Vitamin D2 50,000 intl units (1.25 mg) oral capsule: Last Dose Taken:  , 1 cap(s) orally once a day  · 	glipiZIDE 10 mg oral tablet: Last Dose Taken:  , 1 tab(s) orally once a day  · 	atorvastatin 40 mg oral tablet: Last Dose Taken:  , 1 tab(s) orally once a day  · 	timolol hemihydrate 0.5% ophthalmic solution: Last Dose Taken:  , 1 drop(s) to each affected eye 2 times a day    ALLERGIES:   Allergy Status Unknown      PHYSICAL EXAM:  VITAL SIGNS:  ICU Vital Signs Last 24 Hrs  T(C): 36.5 (09 Jul 2021 22:47), Max: 36.7 (09 Jul 2021 22:09)  T(F): 97.7 (09 Jul 2021 22:47), Max: 98.1 (09 Jul 2021 22:09)  HR: 90 (09 Jul 2021 22:47) (84 - 90)  BP: 171/107 (09 Jul 2021 22:47) (166/100 - 171/107)  RR: 20 (09 Jul 2021 22:47) (18 - 20)  SpO2: 99% (09 Jul 2021 22:47) (96% - 99%)      NEURO  Exam: intoxicated, belligerent, no acute distress  Medications:     levETIRAcetam  IVPB 500 milliGRAM(s) IV Intermittent every 12 hours    RESPIRATORY  Exam: speaking in complete sentences without increased WOB  Mechanical Ventilation:   Labs:  Medications:      CARDIOVASCULAR  Exam:  Cardiac Rhythm:  Labs:   VBG - ( 09 Jul 2021 22:33 )  pH: 7.30  /  pCO2: 47    /  pO2: 39    / HCO3: 22    / Base Excess: -3.7  /  SaO2: 59     Lactate: 3.8              Medications:       GI/NUTRITION  Exam:  Diet:      GENITOURINARY/RENAL  Exam:   Meds:   folic acid Injectable 1 milliGRAM(s) IV Push daily  lactated ringers. 1000 milliLiter(s) IV Continuous <Continuous>  thiamine Injectable 100 milliGRAM(s) IV Push daily      I/Os:    07-09 @ 07:01  -  07-10 @ 00:46  --------------------------------------------------------  IN:    Lactated Ringers: 250 mL  Total IN: 250 mL    OUT:  Total OUT: 0 mL    Total NET: 250 mL          Weight:  Weight (kg): 77.1 (07-09 @ 22:09)    Labs:  07-09    136  |  103  |  19  ----------------------------<  194<H>  5.0   |  17<L>  |  1.23    Ca    9.6      09 Jul 2021 22:33    TPro  7.5  /  Alb  4.4  /  TBili  0.2  /  DBili  x   /  AST  29  /  ALT  13  /  AlkPhos  127<H>  07-09      [ ] Muñoz catheter, indication: urine output monitoring in critically ill patient    HEMATOLOGIC  [ ] VTE Prophylaxis:    Medications:      Labs:                        11.1   10.57 )-----------( 237      ( 10 Jul 2021 00:14 )             34.1     PT/INR - ( 09 Jul 2021 22:33 )   PT: 12.5 sec;   INR: 1.04 ratio         PTT - ( 09 Jul 2021 22:33 )  PTT:27.9 sec    Transfusion: [ ] PRBC	[ ] Platelets	[ ] FFP	[ ] Cryoprecipitate      INFECTIOUS DISEASES:  Medications:      Recent Cultures:  RECENT CULTURES:      ENDOCRINE  Glucose: CAPILLARY BLOOD GLUCOSE      POCT Blood Glucose.: 227 mg/dL (09 Jul 2021 22:12)    Medications:   insulin lispro (ADMELOG) corrective regimen sliding scale   SubCutaneous every 6 hours        PATIENT CARE ACCESS DEVICES:  [ ] Peripheral IV  [ ] Central Venous Line	[ ] R	[ ] L	[ ] IJ	[ ] Fem	[ ] SC	Placed:   [ ] Arterial Line		[ ] R	[ ] L	[ ] Fem	[ ] Rad	[ ] Ax	Placed:   [ ] PICC:					[ ] Mediport  [ ] Urinary Catheter, Date Placed:   [x] Necessity of urinary, arterial, and venous catheters discussed    OTHER MEDICATIONS:     IMAGING STUDIES:      Prelim   - L lamboid suture non-displaced fracture  - small focus of pneumocephalus in adjacent subdural space  - Coup-contracoup injury   - SDH 0.7cm maximum thickness  - hemorrhagic contusions in bilateral frontal lobes  - C-spine fracture; L posterolateral C6  - Small focus of air in spine -- vacuum phenomenon    HISTORY OF PRESENT ILLNESS:  TITO LINDA is a 76y Male with Hx DM2 and alcohol abuse who was found down today at a bus stop. A Level II trauma was activated upon his arrival due to his altered mental status. GCS on arrival was 10 (E3, V2, M5). Primary survey intact. Secondary survey was significant for blood at the L external ear canal, a posterior scalp laceration, and R orbital swelling. CT scan on admission demonstrated bifrontal SAH, a R SDH, a L parietal SAH with pneumocephalus, a L possible parietal non-displaced skull fracture, and a C6 inferior endplate Fx into disc space with air into the canal. SICU consulted for q1 neuro checks in the setting of intracranial hemorrhages.       PAST MEDICAL HISTORY:   - DM (diabetes mellitus)  - Alcohol abuse    PAST SURGICAL HISTORY:   - None    CODE STATUS:   - Full code    HOME MEDICATIONS:  · 	Janumet XR 50 mg-1000 mg oral tablet, extended release: Last Dose Taken:  , 1 tab(s) orally once a day  · 	enalapril 20 mg oral tablet: Last Dose Taken:  , 1 tab(s) orally once a day  · 	Vitamin D2 50,000 intl units (1.25 mg) oral capsule: Last Dose Taken:  , 1 cap(s) orally once a day  · 	glipiZIDE 10 mg oral tablet: Last Dose Taken:  , 1 tab(s) orally once a day  · 	atorvastatin 40 mg oral tablet: Last Dose Taken:  , 1 tab(s) orally once a day  · 	timolol hemihydrate 0.5% ophthalmic solution: Last Dose Taken:  , 1 drop(s) to each affected eye 2 times a day    ALLERGIES:   Allergy Status Unknown      PHYSICAL EXAM:  VITAL SIGNS:  ICU Vital Signs Last 24 Hrs  T(C): 36.5 (09 Jul 2021 22:47), Max: 36.7 (09 Jul 2021 22:09)  T(F): 97.7 (09 Jul 2021 22:47), Max: 98.1 (09 Jul 2021 22:09)  HR: 90 (09 Jul 2021 22:47) (84 - 90)  BP: 171/107 (09 Jul 2021 22:47) (166/100 - 171/107)  RR: 20 (09 Jul 2021 22:47) (18 - 20)  SpO2: 99% (09 Jul 2021 22:47) (96% - 99%)      NEURO  Exam: intoxicated, belligerent, no acute distress  Medications:     levETIRAcetam  IVPB 500 milliGRAM(s) IV Intermittent every 12 hours    RESPIRATORY  Exam: speaking in complete sentences without increased WOB on 2L NC  Labs:  Medications:      CARDIOVASCULAR  Exam: normal rate and rhythm noted on   Cardiac Rhythm:  Labs:   VBG - ( 09 Jul 2021 22:33 )  pH: 7.30  /  pCO2: 47    /  pO2: 39    / HCO3: 22    / Base Excess: -3.7  /  SaO2: 59     Lactate: 3.8              Medications:       GI/NUTRITION  Exam:  Diet:      GENITOURINARY/RENAL  Exam:   Meds:   folic acid Injectable 1 milliGRAM(s) IV Push daily  lactated ringers. 1000 milliLiter(s) IV Continuous <Continuous>  thiamine Injectable 100 milliGRAM(s) IV Push daily      I/Os:    07-09 @ 07:01  -  07-10 @ 00:46  --------------------------------------------------------  IN:    Lactated Ringers: 250 mL  Total IN: 250 mL    OUT:  Total OUT: 0 mL    Total NET: 250 mL          Weight:  Weight (kg): 77.1 (07-09 @ 22:09)    Labs:  07-09    136  |  103  |  19  ----------------------------<  194<H>  5.0   |  17<L>  |  1.23    Ca    9.6      09 Jul 2021 22:33    TPro  7.5  /  Alb  4.4  /  TBili  0.2  /  DBili  x   /  AST  29  /  ALT  13  /  AlkPhos  127<H>  07-09      [ ] Muñoz catheter, indication: urine output monitoring in critically ill patient    HEMATOLOGIC  [ ] VTE Prophylaxis:    Medications:      Labs:                        11.1   10.57 )-----------( 237      ( 10 Jul 2021 00:14 )             34.1     PT/INR - ( 09 Jul 2021 22:33 )   PT: 12.5 sec;   INR: 1.04 ratio         PTT - ( 09 Jul 2021 22:33 )  PTT:27.9 sec    Transfusion: [ ] PRBC	[ ] Platelets	[ ] FFP	[ ] Cryoprecipitate      INFECTIOUS DISEASES:  Medications:      Recent Cultures:  RECENT CULTURES:      ENDOCRINE  Glucose: CAPILLARY BLOOD GLUCOSE      POCT Blood Glucose.: 227 mg/dL (09 Jul 2021 22:12)    Medications:   insulin lispro (ADMELOG) corrective regimen sliding scale   SubCutaneous every 6 hours        PATIENT CARE ACCESS DEVICES:  [ ] Peripheral IV  [ ] Central Venous Line	[ ] R	[ ] L	[ ] IJ	[ ] Fem	[ ] SC	Placed:   [ ] Arterial Line		[ ] R	[ ] L	[ ] Fem	[ ] Rad	[ ] Ax	Placed:   [ ] PICC:					[ ] Mediport  [ ] Urinary Catheter, Date Placed:   [x] Necessity of urinary, arterial, and venous catheters discussed    OTHER MEDICATIONS:     IMAGING STUDIES:      Prelim   - L lamboid suture non-displaced fracture  - small focus of pneumocephalus in adjacent subdural space  - Coup-contracoup injury   - SDH 0.7cm maximum thickness  - hemorrhagic contusions in bilateral frontal lobes  - C-spine fracture; L posterolateral C6  - Small focus of air in spine -- vacuum phenomenon    HISTORY OF PRESENT ILLNESS:  TITO LINDA is a 76y Male with Hx DM2 and alcohol abuse who was found down today at a bus stop. A Level II trauma was activated upon his arrival due to his altered mental status. GCS on arrival was 10 (E3, V2, M5). Primary survey intact. Secondary survey was significant for blood at the L external ear canal, a posterior scalp laceration, and R orbital swelling. CT scan on admission demonstrated bifrontal SAH, a R SDH, a L parietal SAH with pneumocephalus, a L possible parietal non-displaced skull fracture, and a C6 inferior endplate Fx into disc space with air into the canal. SICU consulted for q1 neuro checks in the setting of intracranial hemorrhages.       PAST MEDICAL HISTORY:   - DM (diabetes mellitus)  - Alcohol abuse    PAST SURGICAL HISTORY:   - None    CODE STATUS:   - Full code    HOME MEDICATIONS:  · 	Janumet XR 50 mg-1000 mg oral tablet, extended release: Last Dose Taken:  , 1 tab(s) orally once a day  · 	enalapril 20 mg oral tablet: Last Dose Taken:  , 1 tab(s) orally once a day  · 	Vitamin D2 50,000 intl units (1.25 mg) oral capsule: Last Dose Taken:  , 1 cap(s) orally once a day  · 	glipiZIDE 10 mg oral tablet: Last Dose Taken:  , 1 tab(s) orally once a day  · 	atorvastatin 40 mg oral tablet: Last Dose Taken:  , 1 tab(s) orally once a day  · 	timolol hemihydrate 0.5% ophthalmic solution: Last Dose Taken:  , 1 drop(s) to each affected eye 2 times a day    ALLERGIES:   Allergy Status Unknown      PHYSICAL EXAM:  VITAL SIGNS:  ICU Vital Signs Last 24 Hrs  T(C): 36.5 (09 Jul 2021 22:47), Max: 36.7 (09 Jul 2021 22:09)  T(F): 97.7 (09 Jul 2021 22:47), Max: 98.1 (09 Jul 2021 22:09)  HR: 90 (09 Jul 2021 22:47) (84 - 90)  BP: 171/107 (09 Jul 2021 22:47) (166/100 - 171/107)  RR: 20 (09 Jul 2021 22:47) (18 - 20)  SpO2: 99% (09 Jul 2021 22:47) (96% - 99%)      NEURO  Exam: intoxicated, belligerent, no acute distress; R orbital swelling noted; posterior scalp lacteration; EOMI, PERRL  Medications:     levETIRAcetam  IVPB 500 milliGRAM(s) IV Intermittent every 12 hours    RESPIRATORY  Exam: speaking in complete sentences without increased WOB on 2L NC  Labs: none  Medications: none      CARDIOVASCULAR  Exam: normal rate and rhythm noted on cardiac monitor  Cardiac Rhythm: normal sinus rhythm  Labs:   VBG - ( 09 Jul 2021 22:33 )  pH: 7.30  /  pCO2: 47    /  pO2: 39    / HCO3: 22    / Base Excess: -3.7  /  SaO2: 59     Lactate: 3.8    Medications: none      GI/NUTRITION  Exam: abd soft, non-distended, non-tender  Diet: NPO      GENITOURINARY/RENAL  Exam: condom cath in place  Meds:   folic acid Injectable 1 milliGRAM(s) IV Push daily  lactated ringers. 1000 milliLiter(s) IV Continuous <Continuous>  thiamine Injectable 100 milliGRAM(s) IV Push daily    I/Os:  07-09 @ 07:01  -  07-10 @ 00:46  --------------------------------------------------------  IN:    Lactated Ringers: 250 mL  Total IN: 250 mL    OUT:  Total OUT: 0 mL  Total NET: 250 mL    Weight (kg): 77.1 (07-09 @ 22:09)    Labs:    136  |  103  |  19  ----------------------------<  194<H>  5.0   |  17<L>  |  1.23    Ca    9.6      09 Jul 2021 22:33    TPro  7.5  /  Alb  4.4  /  TBili  0.2  /  DBili  x   /  AST  29  /  ALT  13  /  AlkPhos  127<H>  07-09    [ ] Muñoz catheter, indication: urine output monitoring in critically ill patient      HEMATOLOGIC  [ ] VTE Prophylaxis: none due to active brain bleeds  Medications: none    Labs:             11.1   10.57 )-----------( 237      ( 10 Jul 2021 00:14 )             34.1     PT/INR - ( 09 Jul 2021 22:33 )   PT: 12.5 sec;   INR: 1.04 ratio    PTT - ( 09 Jul 2021 22:33 )  PTT:27.9 sec    Transfusion: [ ] PRBC	[ ] Platelets	[ ] FFP	[ ] Cryoprecipitate      INFECTIOUS DISEASES:  Medications: none  Recent Cultures: none      ENDOCRINE  POCT Blood Glucose.: 227 mg/dL (09 Jul 2021 22:12)  Medications:      insulin lispro (ADMELOG) corrective regimen sliding scale   SubCutaneous every 6 hours        PATIENT CARE ACCESS DEVICES:  [2] Peripheral IV  [ ] Central Venous Line	[ ] R	[ ] L	[ ] IJ	[ ] Fem	[ ] SC	Placed:   [ ] Arterial Line		[ ] R	[ ] L	[ ] Fem	[ ] Rad	[ ] Ax	Placed:   [ ] PICC:					[ ] Mediport  [ ] Urinary Catheter, Date Placed:   [x] Necessity of urinary, arterial, and venous catheters discussed    OTHER MEDICATIONS:     IMAGING STUDIES:  CTH - 7/9:     Prelim   - L lamboid suture non-displaced fracture  - small focus of pneumocephalus in adjacent subdural space  - Coup-contracoup injury   - SDH 0.7cm maximum thickness  - hemorrhagic contusions in bilateral frontal lobes  - C-spine fracture; L posterolateral C6  - Small focus of air in spine -- vacuum phenomenon    HISTORY OF PRESENT ILLNESS:  TITO LINDA is a 76y Male with Hx DM2 and alcohol abuse who was found down today at a bus stop. A Level II trauma was activated upon his arrival due to his altered mental status. GCS on arrival was 10 (E3, V2, M5). Primary survey intact. Secondary survey was significant for blood at the L external ear canal, a posterior scalp laceration, and R orbital swelling. CT scan on admission demonstrated bifrontal SAH, a R SDH, a L parietal SAH with pneumocephalus, a L possible parietal non-displaced skull fracture, and a C6 inferior endplate Fx into disc space with air into the canal. SICU consulted for q1 neuro checks in the setting of intracranial hemorrhages.       PAST MEDICAL HISTORY:   - DM (diabetes mellitus)  - Alcohol abuse    PAST SURGICAL HISTORY:   - None    CODE STATUS:   - Full code    HOME MEDICATIONS:  · 	Janumet XR 50 mg-1000 mg oral tablet, extended release: Last Dose Taken:  , 1 tab(s) orally once a day  · 	enalapril 20 mg oral tablet: Last Dose Taken:  , 1 tab(s) orally once a day  · 	Vitamin D2 50,000 intl units (1.25 mg) oral capsule: Last Dose Taken:  , 1 cap(s) orally once a day  · 	glipiZIDE 10 mg oral tablet: Last Dose Taken:  , 1 tab(s) orally once a day  · 	atorvastatin 40 mg oral tablet: Last Dose Taken:  , 1 tab(s) orally once a day  · 	timolol hemihydrate 0.5% ophthalmic solution: Last Dose Taken:  , 1 drop(s) to each affected eye 2 times a day    ALLERGIES:   Allergy Status Unknown      PHYSICAL EXAM:  VITAL SIGNS:  ICU Vital Signs Last 24 Hrs  T(C): 36.5 (09 Jul 2021 22:47), Max: 36.7 (09 Jul 2021 22:09)  T(F): 97.7 (09 Jul 2021 22:47), Max: 98.1 (09 Jul 2021 22:09)  HR: 90 (09 Jul 2021 22:47) (84 - 90)  BP: 171/107 (09 Jul 2021 22:47) (166/100 - 171/107)  RR: 20 (09 Jul 2021 22:47) (18 - 20)  SpO2: 99% (09 Jul 2021 22:47) (96% - 99%)      NEURO  Exam: intoxicated, belligerent, no acute distress; R orbital swelling noted; posterior scalp lacteration; EOMI, PERRL  Medications:     levETIRAcetam  IVPB 500 milliGRAM(s) IV Intermittent every 12 hours    RESPIRATORY  Exam: speaking in complete sentences without increased WOB on 2L NC  Labs: none  Medications: none      CARDIOVASCULAR  Exam: normal rate and rhythm noted on cardiac monitor  Cardiac Rhythm: normal sinus rhythm  Labs:   VBG - ( 09 Jul 2021 22:33 )  pH: 7.30  /  pCO2: 47    /  pO2: 39    / HCO3: 22    / Base Excess: -3.7  /  SaO2: 59     Lactate: 3.8    Medications: none      GI/NUTRITION  Exam: abd soft, non-distended, non-tender  Diet: NPO      GENITOURINARY/RENAL  Exam: condom cath in place  Meds:   folic acid Injectable 1 milliGRAM(s) IV Push daily  lactated ringers. 1000 milliLiter(s) IV Continuous <Continuous>  thiamine Injectable 100 milliGRAM(s) IV Push daily    I/Os:  07-09 @ 07:01  -  07-10 @ 00:46  --------------------------------------------------------  IN:    Lactated Ringers: 250 mL  Total IN: 250 mL    OUT:  Total OUT: 0 mL  Total NET: 250 mL    Weight (kg): 77.1 (07-09 @ 22:09)    Labs:    136  |  103  |  19  ----------------------------<  194<H>  5.0   |  17<L>  |  1.23    Ca    9.6      09 Jul 2021 22:33    TPro  7.5  /  Alb  4.4  /  TBili  0.2  /  DBili  x   /  AST  29  /  ALT  13  /  AlkPhos  127<H>  07-09    [ ] Muñoz catheter, indication: urine output monitoring in critically ill patient      HEMATOLOGIC  [ ] VTE Prophylaxis: none due to active brain bleeds  Medications: none    Labs:             11.1   10.57 )-----------( 237      ( 10 Jul 2021 00:14 )             34.1     PT/INR - ( 09 Jul 2021 22:33 )   PT: 12.5 sec;   INR: 1.04 ratio    PTT - ( 09 Jul 2021 22:33 )  PTT:27.9 sec    Transfusion: [ ] PRBC	[ ] Platelets	[ ] FFP	[ ] Cryoprecipitate      INFECTIOUS DISEASES:  Medications: none  Recent Cultures: none      ENDOCRINE  POCT Blood Glucose.: 227 mg/dL (09 Jul 2021 22:12)  Medications:      insulin lispro (ADMELOG) corrective regimen sliding scale   SubCutaneous every 6 hours        PATIENT CARE ACCESS DEVICES:  [2] Peripheral IV  [ ] Central Venous Line	[ ] R	[ ] L	[ ] IJ	[ ] Fem	[ ] SC	Placed:   [ ] Arterial Line		[ ] R	[ ] L	[ ] Fem	[ ] Rad	[ ] Ax	Placed:   [ ] PICC:					[ ] Mediport  [ ] Urinary Catheter, Date Placed:   [x] Necessity of urinary, arterial, and venous catheters discussed    OTHER MEDICATIONS:     IMAGING STUDIES:  CTH / CT C-Spine - 7/9:    FINDINGS:    HEAD:  Small foci of pneumocephalus noted in the right posterior parietal and occipital subdural space (3:14-18). There is a nondisplaced calvarial fracture along the left lamboid suture (4:15-17) with small overlying scalp hematoma.    Subdural hemorrhage in the region the right frontal and temporal lobes with maximal thickness measuring 0.7 cm. There is question of trace effacement of the frontal horn of the right lateral ventricle. There are subarachnoid hemorrhage involving the bilateral frontal lobes, anterior right temporal lobe, and left anterior temporal lobe/sylvian fissure. Also subarachnoid hemorrhage located at the left posterior parietal/occipital lobe at the site of calvarial fracture.    There is no midline shift central herniation or hydrocephalus. There is cerebral volume loss with secondary prominence of ventricles and sulci. There is mild patchy white matter hypoattenuation which is nonspecific in etiology but likely related to chronic microvascular changes.    The visualized paranasal sinuses are and mastoid air cells are clear. The orbits are unremarkable.    CERVICAL SPINE:  Possible nondisplaced fracture through the left posterolateral inferior endplate of C6 (6:34). There is an adjacent small foci of air in the anterior spinal canal, likely secondary to fracture through C6-C7 disc with vacuum phenomenon.    There is no evidence for traumatic subluxation. The prevertebral soft tissues are unremarkable. The vertebral body heights are maintained. Multilevel degenerative osteoarthritis and degenerative disc disease are present. Findings include facet joint arthrosis, intervertebral disc height space narrowing, and hypertrophic osteophytes at multiple levels. There is multilevel neuroforaminal narrowing. There is no spinal canal narrowing. The intraspinal contents are difficult to evaulate on CT.    Visualized portions of the lungs are clear. Benign intramuscular lipoma in the right cervical neck paraspinal musculature. Otherwise the surrounding structures of the neck are unremarkable.      IMPRESSION:  Right frontal and temporal subdural hemorrhage with maximum thickness of 0.7 cm. Bilateral frontal lobe and temporal lobe subarachnoid hemorrhages. Small subarachnoid hemorrhage in the left posterior parietal/occipital lobe. No significant mass effect or midline shift. Basilar cisterns are preserved.    Nondisplaced mildly depressed calvarial fracture along the left lambdoid suture with small foci of adjacent pneumocephalus and overlying small scalp hematoma.    Possible nondisplaced fracture along the left posterolateral inferior endplate of C6. There is a small adjacent foci of air in the anterior spinal canal, likely secondary to fracture through C6/C7 disc space with vacuum phenomenon. Cervical degenerative spondylosis, as described above.

## 2021-07-09 NOTE — H&P ADULT - NSHPSOCIALHISTORY_GEN_ALL_CORE
No cigarette smoking, heavy alcohol use (3-4 glasses of white rum + beer), marijuana use per daughter Monica Huizar

## 2021-07-09 NOTE — H&P ADULT - NSHPLABSRESULTS_GEN_ALL_CORE
LABS:                        11.5   7.42  )-----------( 256      ( 09 Jul 2021 22:33 )             35.1     07-09    136  |  103  |  19  ----------------------------<  194<H>  5.0   |  17<L>  |  1.23    Ca    9.6      09 Jul 2021 22:33    TPro  7.5  /  Alb  4.4  /  TBili  0.2  /  DBili  x   /  AST  29  /  ALT  13  /  AlkPhos  127<H>  07-09    PT/INR - ( 09 Jul 2021 22:33 )   PT: 12.5 sec;   INR: 1.04 ratio    PTT - ( 09 Jul 2021 22:33 )  PTT:27.9 sec

## 2021-07-09 NOTE — ED ADULT TRIAGE NOTE - CHIEF COMPLAINT QUOTE
[place], possible [place]. Patient snoring during amb. triage. Laceration on back of head and blood on left ear.
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
## 2021-07-09 NOTE — ED ADULT NURSE NOTE - NSIMPLEMENTINTERV_GEN_ALL_ED
Implemented All Fall with Harm Risk Interventions:  [place] to call system. Call bell, personal items and telephone within reach. Instruct patient to call for assistance. Room bathroom lighting operational. Non-slip footwear when patient is off stretcher. Physically safe environment: no spills, clutter or unnecessary equipment. Stretcher in lowest position, wheels locked, appropriate side rails in place. Provide visual cue, wrist band, yellow gown, etc. Monitor gait and stability. Monitor for mental status changes and reorient to person, place, and time. Review medications for side effects contributing to fall risk. Reinforce activity limits and safety measures with patient and family. Provide visual clues: red socks.

## 2021-07-09 NOTE — ED PROVIDER NOTE - PHYSICAL EXAMINATION
GENERAL: not arousable  HEENT: in c collar, sluggishly reactive R pupil, blood coming from external L ear canal  CARDIAC: regular rate and regular rhythm, 2+ pulses all extremities  PULM: clear to ascultation bilaterally, sats 99% on RA, no increased work of breathing  GI: abdomen nondistended, soft  NEURO: GCS 10 (3/2/5), R pupil sluggishly reactive, otherwise hard to get exam   MSK: no visible deformities, no peripheral edema, no stepoffs to spine  SKIN: small lac to posterior aspect head  PSYCH: appropriate mood and affect

## 2021-07-10 LAB
A1C WITH ESTIMATED AVERAGE GLUCOSE RESULT: 7.3 % — HIGH (ref 4–5.6)
AMMONIA BLD-MCNC: 19 UMOL/L — SIGNIFICANT CHANGE UP (ref 11–55)
AMPHET UR-MCNC: NEGATIVE — SIGNIFICANT CHANGE UP
ANION GAP SERPL CALC-SCNC: 15 MMOL/L — SIGNIFICANT CHANGE UP (ref 5–17)
ANION GAP SERPL CALC-SCNC: 16 MMOL/L — SIGNIFICANT CHANGE UP (ref 5–17)
ANION GAP SERPL CALC-SCNC: 20 MMOL/L — HIGH (ref 5–17)
APPEARANCE UR: CLEAR — SIGNIFICANT CHANGE UP
APTT BLD: 27.5 SEC — SIGNIFICANT CHANGE UP (ref 27.5–35.5)
BARBITURATES UR SCN-MCNC: NEGATIVE — SIGNIFICANT CHANGE UP
BENZODIAZ UR-MCNC: NEGATIVE — SIGNIFICANT CHANGE UP
BILIRUB UR-MCNC: NEGATIVE — SIGNIFICANT CHANGE UP
BLD GP AB SCN SERPL QL: NEGATIVE — SIGNIFICANT CHANGE UP
BUN SERPL-MCNC: 16 MG/DL — SIGNIFICANT CHANGE UP (ref 7–23)
BUN SERPL-MCNC: 17 MG/DL — SIGNIFICANT CHANGE UP (ref 7–23)
BUN SERPL-MCNC: 19 MG/DL — SIGNIFICANT CHANGE UP (ref 7–23)
CALCIUM SERPL-MCNC: 8.6 MG/DL — SIGNIFICANT CHANGE UP (ref 8.4–10.5)
CALCIUM SERPL-MCNC: 8.9 MG/DL — SIGNIFICANT CHANGE UP (ref 8.4–10.5)
CALCIUM SERPL-MCNC: 9.2 MG/DL — SIGNIFICANT CHANGE UP (ref 8.4–10.5)
CHLORIDE SERPL-SCNC: 100 MMOL/L — SIGNIFICANT CHANGE UP (ref 96–108)
CHLORIDE SERPL-SCNC: 101 MMOL/L — SIGNIFICANT CHANGE UP (ref 96–108)
CHLORIDE SERPL-SCNC: 103 MMOL/L — SIGNIFICANT CHANGE UP (ref 96–108)
CK SERPL-CCNC: 136 U/L — SIGNIFICANT CHANGE UP (ref 30–200)
CO2 SERPL-SCNC: 14 MMOL/L — LOW (ref 22–31)
CO2 SERPL-SCNC: 16 MMOL/L — LOW (ref 22–31)
CO2 SERPL-SCNC: 16 MMOL/L — LOW (ref 22–31)
COCAINE METAB.OTHER UR-MCNC: NEGATIVE — SIGNIFICANT CHANGE UP
COLOR SPEC: COLORLESS — SIGNIFICANT CHANGE UP
COVID-19 SPIKE DOMAIN AB INTERP: POSITIVE
COVID-19 SPIKE DOMAIN ANTIBODY RESULT: 158 U/ML — HIGH
CREAT SERPL-MCNC: 0.93 MG/DL — SIGNIFICANT CHANGE UP (ref 0.5–1.3)
CREAT SERPL-MCNC: 1 MG/DL — SIGNIFICANT CHANGE UP (ref 0.5–1.3)
CREAT SERPL-MCNC: 1.09 MG/DL — SIGNIFICANT CHANGE UP (ref 0.5–1.3)
DIFF PNL FLD: NEGATIVE — SIGNIFICANT CHANGE UP
ESTIMATED AVERAGE GLUCOSE: 163 MG/DL — HIGH (ref 68–114)
GAS PNL BLDA: SIGNIFICANT CHANGE UP
GLUCOSE BLDC GLUCOMTR-MCNC: 118 MG/DL — HIGH (ref 70–99)
GLUCOSE BLDC GLUCOMTR-MCNC: 141 MG/DL — HIGH (ref 70–99)
GLUCOSE BLDC GLUCOMTR-MCNC: 99 MG/DL — SIGNIFICANT CHANGE UP (ref 70–99)
GLUCOSE SERPL-MCNC: 111 MG/DL — HIGH (ref 70–99)
GLUCOSE SERPL-MCNC: 200 MG/DL — HIGH (ref 70–99)
GLUCOSE SERPL-MCNC: 221 MG/DL — HIGH (ref 70–99)
GLUCOSE UR QL: ABNORMAL
HCT VFR BLD CALC: 32.1 % — LOW (ref 39–50)
HCT VFR BLD CALC: 34 % — LOW (ref 39–50)
HCT VFR BLD CALC: 34.1 % — LOW (ref 39–50)
HCV AB S/CO SERPL IA: 0.11 S/CO — SIGNIFICANT CHANGE UP (ref 0–0.99)
HCV AB SERPL-IMP: SIGNIFICANT CHANGE UP
HEPARINASE TEG R TIME: 4.3 MIN — SIGNIFICANT CHANGE UP (ref 4.3–8.3)
HGB BLD-MCNC: 10.2 G/DL — LOW (ref 13–17)
HGB BLD-MCNC: 11.1 G/DL — LOW (ref 13–17)
HGB BLD-MCNC: 11.3 G/DL — LOW (ref 13–17)
INR BLD: 1.07 RATIO — SIGNIFICANT CHANGE UP (ref 0.88–1.16)
KETONES UR-MCNC: SIGNIFICANT CHANGE UP
LACTATE SERPL-SCNC: 2.7 MMOL/L — HIGH (ref 0.7–2)
LEUKOCYTE ESTERASE UR-ACNC: NEGATIVE — SIGNIFICANT CHANGE UP
MAGNESIUM SERPL-MCNC: 1.3 MG/DL — LOW (ref 1.6–2.6)
MAGNESIUM SERPL-MCNC: 1.6 MG/DL — SIGNIFICANT CHANGE UP (ref 1.6–2.6)
MAGNESIUM SERPL-MCNC: 1.6 MG/DL — SIGNIFICANT CHANGE UP (ref 1.6–2.6)
MCHC RBC-ENTMCNC: 31.8 GM/DL — LOW (ref 32–36)
MCHC RBC-ENTMCNC: 32.2 PG — SIGNIFICANT CHANGE UP (ref 27–34)
MCHC RBC-ENTMCNC: 32.6 GM/DL — SIGNIFICANT CHANGE UP (ref 32–36)
MCHC RBC-ENTMCNC: 32.9 PG — SIGNIFICANT CHANGE UP (ref 27–34)
MCHC RBC-ENTMCNC: 33.1 PG — SIGNIFICANT CHANGE UP (ref 27–34)
MCHC RBC-ENTMCNC: 33.2 GM/DL — SIGNIFICANT CHANGE UP (ref 32–36)
MCV RBC AUTO: 101.2 FL — HIGH (ref 80–100)
MCV RBC AUTO: 101.3 FL — HIGH (ref 80–100)
MCV RBC AUTO: 99.7 FL — SIGNIFICANT CHANGE UP (ref 80–100)
METHADONE UR-MCNC: NEGATIVE — SIGNIFICANT CHANGE UP
NITRITE UR-MCNC: NEGATIVE — SIGNIFICANT CHANGE UP
NRBC # BLD: 0 /100 WBCS — SIGNIFICANT CHANGE UP (ref 0–0)
OPIATES UR-MCNC: NEGATIVE — SIGNIFICANT CHANGE UP
OXYCODONE UR-MCNC: NEGATIVE — SIGNIFICANT CHANGE UP
PCP SPEC-MCNC: SIGNIFICANT CHANGE UP
PCP UR-MCNC: NEGATIVE — SIGNIFICANT CHANGE UP
PH UR: 6.5 — SIGNIFICANT CHANGE UP (ref 5–8)
PHOSPHATE SERPL-MCNC: 2.3 MG/DL — LOW (ref 2.5–4.5)
PHOSPHATE SERPL-MCNC: 2.9 MG/DL — SIGNIFICANT CHANGE UP (ref 2.5–4.5)
PHOSPHATE SERPL-MCNC: 3.6 MG/DL — SIGNIFICANT CHANGE UP (ref 2.5–4.5)
PLATELET # BLD AUTO: 231 K/UL — SIGNIFICANT CHANGE UP (ref 150–400)
PLATELET # BLD AUTO: 237 K/UL — SIGNIFICANT CHANGE UP (ref 150–400)
PLATELET # BLD AUTO: 259 K/UL — SIGNIFICANT CHANGE UP (ref 150–400)
PLATELET MAPPING ACTF MAX AMPLITUDE: 7.8 MM — SIGNIFICANT CHANGE UP (ref 2–19)
PLATELET MAPPING ADP MAXIMUM AMPLITUDE: 19.9 MM (ref 45–69)
PLATELET MAPPING ADP PERCENT INHIBITION: 77.2 % (ref 0–17)
PLATELET MAPPING HKH MAXIMUM AMPLITUDE: 60.8 MM — SIGNIFICANT CHANGE UP (ref 53–68)
POTASSIUM SERPL-MCNC: 3.8 MMOL/L — SIGNIFICANT CHANGE UP (ref 3.5–5.3)
POTASSIUM SERPL-MCNC: 4.3 MMOL/L — SIGNIFICANT CHANGE UP (ref 3.5–5.3)
POTASSIUM SERPL-MCNC: 4.8 MMOL/L — SIGNIFICANT CHANGE UP (ref 3.5–5.3)
POTASSIUM SERPL-SCNC: 3.8 MMOL/L — SIGNIFICANT CHANGE UP (ref 3.5–5.3)
POTASSIUM SERPL-SCNC: 4.3 MMOL/L — SIGNIFICANT CHANGE UP (ref 3.5–5.3)
POTASSIUM SERPL-SCNC: 4.8 MMOL/L — SIGNIFICANT CHANGE UP (ref 3.5–5.3)
PROT UR-MCNC: NEGATIVE — SIGNIFICANT CHANGE UP
PROTHROM AB SERPL-ACNC: 12.8 SEC — SIGNIFICANT CHANGE UP (ref 10.6–13.6)
RAPIDTEG MAXIMUM AMPLITUDE: 60.3 MM — SIGNIFICANT CHANGE UP (ref 52–70)
RBC # BLD: 3.17 M/UL — LOW (ref 4.2–5.8)
RBC # BLD: 3.37 M/UL — LOW (ref 4.2–5.8)
RBC # BLD: 3.41 M/UL — LOW (ref 4.2–5.8)
RBC # FLD: 11.9 % — SIGNIFICANT CHANGE UP (ref 10.3–14.5)
RBC # FLD: 11.9 % — SIGNIFICANT CHANGE UP (ref 10.3–14.5)
RBC # FLD: 12.1 % — SIGNIFICANT CHANGE UP (ref 10.3–14.5)
RH IG SCN BLD-IMP: POSITIVE — SIGNIFICANT CHANGE UP
SARS-COV-2 IGG+IGM SERPL QL IA: 158 U/ML — HIGH
SARS-COV-2 IGG+IGM SERPL QL IA: POSITIVE
SARS-COV-2 RNA SPEC QL NAA+PROBE: SIGNIFICANT CHANGE UP
SODIUM SERPL-SCNC: 132 MMOL/L — LOW (ref 135–145)
SODIUM SERPL-SCNC: 134 MMOL/L — LOW (ref 135–145)
SODIUM SERPL-SCNC: 135 MMOL/L — SIGNIFICANT CHANGE UP (ref 135–145)
SP GR SPEC: 1.01 — LOW (ref 1.01–1.02)
TEG FUNCTIONAL FIBRINOGEN: 17.9 MM — SIGNIFICANT CHANGE UP (ref 15–32)
TEG MAXIMUM AMPLITUDE: 59.6 MM — SIGNIFICANT CHANGE UP (ref 52–69)
TEG REACTION TIME: 4.2 MIN (ref 4.6–9.1)
THC UR QL: NEGATIVE — SIGNIFICANT CHANGE UP
UROBILINOGEN FLD QL: NEGATIVE — SIGNIFICANT CHANGE UP
WBC # BLD: 10.57 K/UL — HIGH (ref 3.8–10.5)
WBC # BLD: 9.68 K/UL — SIGNIFICANT CHANGE UP (ref 3.8–10.5)
WBC # BLD: 9.92 K/UL — SIGNIFICANT CHANGE UP (ref 3.8–10.5)
WBC # FLD AUTO: 10.57 K/UL — HIGH (ref 3.8–10.5)
WBC # FLD AUTO: 9.68 K/UL — SIGNIFICANT CHANGE UP (ref 3.8–10.5)
WBC # FLD AUTO: 9.92 K/UL — SIGNIFICANT CHANGE UP (ref 3.8–10.5)

## 2021-07-10 PROCEDURE — 99253 IP/OBS CNSLTJ NEW/EST LOW 45: CPT

## 2021-07-10 PROCEDURE — 99221 1ST HOSP IP/OBS SF/LOW 40: CPT

## 2021-07-10 PROCEDURE — 70450 CT HEAD/BRAIN W/O DYE: CPT | Mod: 26

## 2021-07-10 PROCEDURE — 99291 CRITICAL CARE FIRST HOUR: CPT

## 2021-07-10 RX ORDER — INSULIN LISPRO 100/ML
VIAL (ML) SUBCUTANEOUS EVERY 6 HOURS
Refills: 0 | Status: DISCONTINUED | OUTPATIENT
Start: 2021-07-10 | End: 2021-07-10

## 2021-07-10 RX ORDER — CHLORHEXIDINE GLUCONATE 213 G/1000ML
1 SOLUTION TOPICAL
Refills: 0 | Status: DISCONTINUED | OUTPATIENT
Start: 2021-07-10 | End: 2021-07-11

## 2021-07-10 RX ORDER — HYDRALAZINE HCL 50 MG
5 TABLET ORAL ONCE
Refills: 0 | Status: COMPLETED | OUTPATIENT
Start: 2021-07-10 | End: 2021-07-10

## 2021-07-10 RX ORDER — ATORVASTATIN CALCIUM 80 MG/1
40 TABLET, FILM COATED ORAL AT BEDTIME
Refills: 0 | Status: DISCONTINUED | OUTPATIENT
Start: 2021-07-10 | End: 2021-07-29

## 2021-07-10 RX ORDER — SODIUM CHLORIDE 9 MG/ML
1000 INJECTION, SOLUTION INTRAVENOUS
Refills: 0 | Status: DISCONTINUED | OUTPATIENT
Start: 2021-07-10 | End: 2021-07-10

## 2021-07-10 RX ORDER — THIAMINE MONONITRATE (VIT B1) 100 MG
100 TABLET ORAL DAILY
Refills: 0 | Status: DISCONTINUED | OUTPATIENT
Start: 2021-07-10 | End: 2021-07-16

## 2021-07-10 RX ORDER — TIMOLOL 0.5 %
1 DROPS OPHTHALMIC (EYE) AT BEDTIME
Refills: 0 | Status: DISCONTINUED | OUTPATIENT
Start: 2021-07-10 | End: 2021-07-29

## 2021-07-10 RX ORDER — ONDANSETRON 8 MG/1
4 TABLET, FILM COATED ORAL ONCE
Refills: 0 | Status: COMPLETED | OUTPATIENT
Start: 2021-07-10 | End: 2021-07-10

## 2021-07-10 RX ORDER — PHENOBARBITAL 60 MG
32.4 TABLET ORAL EVERY 8 HOURS
Refills: 0 | Status: DISCONTINUED | OUTPATIENT
Start: 2021-07-10 | End: 2021-07-10

## 2021-07-10 RX ORDER — DEXMEDETOMIDINE HYDROCHLORIDE IN 0.9% SODIUM CHLORIDE 4 UG/ML
0.3 INJECTION INTRAVENOUS
Qty: 200 | Refills: 0 | Status: DISCONTINUED | OUTPATIENT
Start: 2021-07-10 | End: 2021-07-12

## 2021-07-10 RX ORDER — SODIUM CHLORIDE 9 MG/ML
1000 INJECTION INTRAMUSCULAR; INTRAVENOUS; SUBCUTANEOUS
Refills: 0 | Status: DISCONTINUED | OUTPATIENT
Start: 2021-07-10 | End: 2021-07-10

## 2021-07-10 RX ORDER — MAGNESIUM SULFATE 500 MG/ML
2 VIAL (ML) INJECTION ONCE
Refills: 0 | Status: COMPLETED | OUTPATIENT
Start: 2021-07-10 | End: 2021-07-10

## 2021-07-10 RX ORDER — DEXMEDETOMIDINE HYDROCHLORIDE IN 0.9% SODIUM CHLORIDE 4 UG/ML
0.2 INJECTION INTRAVENOUS
Qty: 200 | Refills: 0 | Status: DISCONTINUED | OUTPATIENT
Start: 2021-07-10 | End: 2021-07-10

## 2021-07-10 RX ORDER — PHENOBARBITAL 60 MG
32.4 TABLET ORAL EVERY 8 HOURS
Refills: 0 | Status: DISCONTINUED | OUTPATIENT
Start: 2021-07-10 | End: 2021-07-11

## 2021-07-10 RX ORDER — VALPROIC ACID (AS SODIUM SALT) 250 MG/5ML
500 SOLUTION, ORAL ORAL EVERY 8 HOURS
Refills: 0 | Status: DISCONTINUED | OUTPATIENT
Start: 2021-07-10 | End: 2021-07-10

## 2021-07-10 RX ORDER — CHLORHEXIDINE GLUCONATE 213 G/1000ML
1 SOLUTION TOPICAL
Refills: 0 | Status: DISCONTINUED | OUTPATIENT
Start: 2021-07-10 | End: 2021-07-13

## 2021-07-10 RX ORDER — INSULIN LISPRO 100/ML
VIAL (ML) SUBCUTANEOUS
Refills: 0 | Status: DISCONTINUED | OUTPATIENT
Start: 2021-07-10 | End: 2021-07-29

## 2021-07-10 RX ORDER — LEVETIRACETAM 250 MG/1
500 TABLET, FILM COATED ORAL EVERY 12 HOURS
Refills: 0 | Status: DISCONTINUED | OUTPATIENT
Start: 2021-07-10 | End: 2021-07-10

## 2021-07-10 RX ORDER — SODIUM CHLORIDE 9 MG/ML
1000 INJECTION, SOLUTION INTRAVENOUS
Refills: 0 | Status: DISCONTINUED | OUTPATIENT
Start: 2021-07-10 | End: 2021-07-11

## 2021-07-10 RX ORDER — FOLIC ACID 0.8 MG
1 TABLET ORAL DAILY
Refills: 0 | Status: DISCONTINUED | OUTPATIENT
Start: 2021-07-10 | End: 2021-07-25

## 2021-07-10 RX ADMIN — SODIUM CHLORIDE 100 MILLILITER(S): 9 INJECTION, SOLUTION INTRAVENOUS at 10:25

## 2021-07-10 RX ADMIN — SODIUM CHLORIDE 100 MILLILITER(S): 9 INJECTION, SOLUTION INTRAVENOUS at 17:20

## 2021-07-10 RX ADMIN — DEXMEDETOMIDINE HYDROCHLORIDE IN 0.9% SODIUM CHLORIDE 4.8 MICROGRAM(S)/KG/HR: 4 INJECTION INTRAVENOUS at 10:25

## 2021-07-10 RX ADMIN — Medication 1 MILLIGRAM(S): at 11:33

## 2021-07-10 RX ADMIN — Medication 4: at 05:51

## 2021-07-10 RX ADMIN — Medication 32.4 MILLIGRAM(S): at 13:25

## 2021-07-10 RX ADMIN — SODIUM CHLORIDE 100 MILLILITER(S): 9 INJECTION, SOLUTION INTRAVENOUS at 13:11

## 2021-07-10 RX ADMIN — Medication 32.4 MILLIGRAM(S): at 22:44

## 2021-07-10 RX ADMIN — ATORVASTATIN CALCIUM 40 MILLIGRAM(S): 80 TABLET, FILM COATED ORAL at 22:44

## 2021-07-10 RX ADMIN — Medication 100 MILLIGRAM(S): at 11:33

## 2021-07-10 RX ADMIN — LEVETIRACETAM 400 MILLIGRAM(S): 250 TABLET, FILM COATED ORAL at 01:11

## 2021-07-10 RX ADMIN — ONDANSETRON 4 MILLIGRAM(S): 8 TABLET, FILM COATED ORAL at 10:36

## 2021-07-10 RX ADMIN — Medication 1 DROP(S): at 22:44

## 2021-07-10 RX ADMIN — CHLORHEXIDINE GLUCONATE 1 APPLICATION(S): 213 SOLUTION TOPICAL at 22:44

## 2021-07-10 RX ADMIN — CHLORHEXIDINE GLUCONATE 1 APPLICATION(S): 213 SOLUTION TOPICAL at 05:31

## 2021-07-10 RX ADMIN — Medication 5 MILLIGRAM(S): at 17:05

## 2021-07-10 RX ADMIN — Medication 50 GRAM(S): at 22:43

## 2021-07-10 RX ADMIN — DEXMEDETOMIDINE HYDROCHLORIDE IN 0.9% SODIUM CHLORIDE 4.8 MICROGRAM(S)/KG/HR: 4 INJECTION INTRAVENOUS at 17:20

## 2021-07-10 NOTE — PROGRESS NOTE ADULT - SUBJECTIVE AND OBJECTIVE BOX
Tertiary Trauma Survey (TTS)    Patient remains confused and is asking to go home and drink. Has no complaints. States he is feeling well.    Vital Signs Last 24 Hrs  T(C): 36.8 (10 Jul 2021 15:00), Max: 36.8 (10 Jul 2021 15:00)  T(F): 98.2 (10 Jul 2021 15:00), Max: 98.2 (10 Jul 2021 15:00)  HR: 77 (10 Jul 2021 15:00) (77 - 98)  BP: 120/72 (10 Jul 2021 15:00) (110/63 - 173/103)  BP(mean): 85 (10 Jul 2021 15:00) (81 - 132)  RR: 16 (10 Jul 2021 15:00) (11 - 24)  SpO2: 97% (10 Jul 2021 15:00) (91% - 100%)  Drug Dosing Weight  Height (cm): 170.2 (2021 23:50)  Weight (kg): 64 (2021 23:50)  BMI (kg/m2): 22.1 (2021 23:50)  BSA (m2): 1.74 (2021 23:50)    Exam:  Eyes: EOMI  Neuro: Able to follow commands, GCS 14  Neck: C-collar in place. soft, supple. Trachea midline.  Chest: normal work of breathing, chest expansion  Pulm: comfortable, no accessory muscle use  Abd: Soft, NTND  Ext: WWP, no edema. No TTP                          10.2   9.68  )-----------( 231      ( 10 Jul 2021 05:38 )             32.1     07-10    135  |  101  |  16  ----------------------------<  200<H>  3.8   |  14<L>  |  1.00    Ca    8.6      10 Jul 2021 05:38  Phos  2.9     07-10  Mg     1.6     07-10    TPro  7.5  /  Alb  4.4  /  TBili  0.2  /  DBili  x   /  AST  29  /  ALT  13  /  AlkPhos  127<H>  07-09    PT/INR - ( 10 Jul 2021 05:38 )   PT: 12.8 sec;   INR: 1.07 ratio         PTT - ( 10 Jul 2021 05:38 )  PTT:27.5 sec  Urinalysis Basic - ( 10 Jul 2021 01:10 )    Color: Colorless / Appearance: Clear / S.009 / pH: x  Gluc: x / Ketone: Trace  / Bili: Negative / Urobili: Negative   Blood: x / Protein: Negative / Nitrite: Negative   Leuk Esterase: Negative / RBC: x / WBC x   Sq Epi: x / Non Sq Epi: x / Bacteria: x        List Injuries Identified to Date:    R SDH  Bilaterl SAH  Left temporal bone fracture  L ear laceration  Scalp lac  C6 endplate fracture      List Operative and Interventional Radiological Procedures:     RADIOLOGICAL FINDINGS REVIEW:  CXR:    Pelvis Films:     C-Spine Films:    T/L/S Spine Films:    Extremity Films:    Head CT:  Interval worsening with marked enlargement of the right holohemispheric subdural hematoma as compared to the initial study of 2021. Also there is bilateral extracerebral blood in the subarachnoid spaces anteriorly, which are more prominent as well.    A left temporal bone fracture is suggested with pneumocephalus in the left temporal bone region. High-resolution temporal bone imaging is therefore recommended as well.    C-Spine CT:    Neck CT:    Chest CT:    ABD/Pelvis CT:    Other:    Interpretation of Findings: 76M with worsening SDH, confused, but without localizing signs. Plan for drainage via [place] hole on urgent (vs emergent) basis unless neurologic deterioration. C6 neck fracture will require c-collar until MRI performed.    Care per NSx.  No additional trauma surgery intervention at this time  Please call if any questions/concerns    Marciano Reyes, PGY5  [phone]
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
## 2021-07-10 NOTE — CHART NOTE - NSCHARTNOTEFT_GEN_A_CORE
CAPRINI SCORE [CLOT] Score on Admission for     AGE RELATED RISK FACTORS                                                       MOBILITY RELATED FACTORS  [ ] Age 41-60 years                                            (1 Point)                  [ ] Bed rest                                                        (1 Point)  [x ] Age: 61-74 years                                           (2 Points)                 [ ] Plaster cast                                                   (2 Points)  [ ] Age= 75 years                                              (3 Points)                 [ ] Bed bound for more than 72 hours                 (2 Points)    DISEASE RELATED RISK FACTORS                                               GENDER SPECIFIC FACTORS  [ ] Edema in the lower extremities                       (1 Point)                  [ ] Pregnancy                                                     (1 Point)  [ ] Varicose veins                                               (1 Point)                  [ ] Post-partum < 6 weeks                                   (1 Point)             [ ] BMI > 25 Kg/m2                                            (1 Point)                  [ ] Hormonal therapy  or oral contraception          (1 Point)                 [ ] Sepsis (in the previous month)                        (1 Point)                  [ ] History of pregnancy complications                 (1 point)  [ ] Pneumonia or serious lung disease                                               [ ] Unexplained or recurrent                     (1 Point)           (in the previous month)                               (1 Point)  [ ] Abnormal pulmonary function test                     (1 Point)                 SURGERY RELATED RISK FACTORS (include planned surgeries)  [ ] Acute myocardial infarction                              (1 Point)                 [ ]  Section                                             (1 Point)  [ ] Congestive heart failure (in the previous month)  (1 Point)         [ ] Minor surgery                                                  (1 Point)   [ ] Inflammatory bowel disease                             (1 Point)                 [ ] Arthroscopic surgery                                        (2 Points)  [ ] Central venous access                                      (2 Points)                [x ] General surgery lasting more than 45 minutes   (2 Points)       [ ] Stroke (in the previous month)                          (5 Points)               [ ] Elective arthroplasty                                         (5 Points)            [ ] current or past malignancy                              (2 Points)                                                                                                       HEMATOLOGY RELATED FACTORS                                                 TRAUMA RELATED RISK FACTORS  [ ] Prior episodes of VTE                                     (3 Points)                [ ] Fracture of the hip, pelvis, or leg                       (5 Points)  [ ] Positive family history for VTE                         (3 Points)                 [ ] Acute spinal cord injury (in the previous month)  (5 Points)  [ ] Prothrombin 16294 A                                     (3 Points)                 [ ] Paralysis  (less than 1 month)                             (5 Points)  [ ] Factor V Leiden                                             (3 Points)                  [ ] Multiple Trauma within 1 month                        (5 Points)  [ ] Lupus anticoagulants                                     (3 Points)                                                           [ ] Anticardiolipin antibodies                               (3 Points)                                                       [ ] High homocysteine in the blood                      (3 Points)                                             [ ] Other congenital or acquired thrombophilia      (3 Points)                                                [ ] Heparin induced thrombocytopenia                  (3 Points)                                          Total Score [    4      ]    Risk:  Very low 0   Low 1 to 2   Moderate 3 to 4   High =5       VTE Prophylasix Recommednations:  [ x] mechanical pneumatic compression devices                                      [ ] contraindicated: _____________________  [ ] chemo prophylasix                                                                                   [x ] contraindicated ____Bleeding Risk________________    **** HIGH LIKELIHOOD DVT PRESENT ON ADMISSION  [x ] (please order LE dopplers within 24 hours of admission)

## 2021-07-10 NOTE — OCCUPATIONAL THERAPY INITIAL EVALUATION ADULT - PRECAUTIONS/LIMITATIONS, REHAB EVAL
Primary survey intact. Secondary survey was significant for blood at the L external ear canal, a posterior scalp laceration, and R orbital swelling. CT scan on admission demonstrated bifrontal SAH, a R SDH, a L parietal SAH with pneumocephalus, a L possible parietal non-displaced skull fracture, and a C6 inferior endplate Fx into disc space with air into the canal. SICU consulted for q1 neuro checks in the setting of intracranial hemorrhages./fall precautions Primary survey intact. Secondary survey was significant for blood at the L external ear canal, a posterior scalp laceration, and R orbital swelling. CT scan on admission demonstrated bifrontal SAH, a R SDH, a L parietal SAH with pneumocephalus, a L possible parietal non-displaced skull fracture, and a C6 inferior endplate Fx into disc space with air into the canal. SICU consulted for q1 neuro checks in the setting of intracranial hemorrhages./fall precautions/spinal precautions
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
## 2021-07-10 NOTE — OCCUPATIONAL THERAPY INITIAL EVALUATION ADULT - ADDITIONAL COMMENTS
CT brain/spine 7/9-Right frontal and temporal subdural hemorrhage with maximum thickness of 0.7 cm. Bilateral frontal lobe and temporal lobe subarachnoid hemorrhages. Small subarachnoid hemorrhage in the left posterior parietal/occipital lobe. No significant mass effect or midline shift. Basilar cisterns are preserved. Nondisplaced mildly depressed calvarial fracture along the left lambdoid suture with small foci of adjacent pneumocephalus and overlying small scalp hematoma. Possible nondisplaced fracture along the left posterolateral inferior endplate of C6. There is a small adjacent foci of air in the anterior spinal canal, likely secondary to fracture through C6/C7 disc space with vacuum phenomenon. Cervical degenerative spondylosis, as described above.  xray pelvis 7/9-No hip fractures or dislocations.

## 2021-07-10 NOTE — PROGRESS NOTE ADULT - SUBJECTIVE AND OBJECTIVE BOX
Patient seen and examined  Opening eyes to voice, localizing pain, confused words.  GCS=12  NAVARRETE  oxygenation ok   Hemodynamically stable    U-tox negative  Lactate=5.9    Repeat CT-head (to my eye) = Right SDH with significant expansion.  Now 10-11 mm of maximal width, holohemispheric, and with mass effect on right ventricle    - GCS stable to my exam  - Increased lactate with stable hemodynamics.  Will continue with fluid resuscitation  - Imaging with significant worsening of SDH.  Neurosurgical service aware.  Continued close monitoring and will need another CT-head in approximately 6 hours.
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
## 2021-07-10 NOTE — OCCUPATIONAL THERAPY INITIAL EVALUATION ADULT - COGNITIVE, VISUAL PERCEPTUAL, OT EVAL
Pt will be oriented x4 100% of the time within 2 weeks. Pt will recall 3/3 words after 5 min within 2 weeks.
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
## 2021-07-10 NOTE — CONSULT NOTE ADULT - SUBJECTIVE AND OBJECTIVE BOX
p ([phone])     HPI:  HPI:   Patient is a 76 year old male with DM2, not on AC, who presents as a Level 2 Trauma after found down at bus stop for altered mental status. Patient appears intoxicated, open eyes to voice, incomprehensible speech, localizing pain. History obtained from family due to patient's condition    Primary Survey:   A - airway intact  B - bilateral breath sounds and good chest rise  C - initial BP: 171/107 (07-09-21 @ 22:47), HR: 90 (07-09-21 @ 22:47) , palpable pulses in all extremities  D - GCS 10 (E3 V2 M5)  Exposure obtained    Secondary Survey:   General: intoxicated, NAD  HEENT: Normocephalic, left external ear canal blood noted; posterior scalp laceration, right orbital swelling, EOMI, PEERL  Neck: Soft, midline trachea, C-collar in place  Chest: No chest wall tenderness, thorax stable, no ecchymosis.   Cardiac: S1, S2, RRR.   Respiratory: Bilateral breath sounds, clear and equal bilaterally.   Abdomen: Soft, non-distended, non-tender, no rebound, no guarding, no ecchymosis.   Pelvis: Stable, non-tender.   Ext: palp radial b/l UE, b/l DP palp in Lower Extrem.   Back: no TTP, no palpable stepoff/deformity, no abrasions.   Rectal: No abhi blood (09 Jul 2021 23:16)      Exam: Awake, appears intoxicated, Pupils 5R B/L, EOV, says "I'm good man", follows some commands, NAVARRETE spontaneously. No head lac     CTH bifrontal tSAH & small contusions, R temporal contusion, R thin SDH, L parietal tSAH with pneumocephalus. L possible parietal nondisplaced skull fx. CT c-spine shows C6 inferior endplate fx into disc space with air into the canal.  --Anticoagulation:    =====================  PAST MEDICAL HISTORY   DM (diabetes mellitus)      PAST SURGICAL HISTORY         MEDICATIONS:  Antibiotics:    Neuro:    Other:  lactated ringers. 1000 milliLiter(s) IV Continuous <Continuous>      SOCIAL HISTORY:   Occupation:   Marital Status:     FAMILY HISTORY:      ROS: Negative except per HPI    LABS:  PT/INR - ( 09 Jul 2021 22:33 )   PT: 12.5 sec;   INR: 1.04 ratio         PTT - ( 09 Jul 2021 22:33 )  PTT:27.9 sec                        11.5   7.42  )-----------( 256      ( 09 Jul 2021 22:33 )             35.1     07-09    136  |  103  |  19  ----------------------------<  194<H>  5.0   |  17<L>  |  1.23    Ca    9.6      09 Jul 2021 22:33    TPro  7.5  /  Alb  4.4  /  TBili  0.2  /  DBili  x   /  AST  29  /  ALT  13  /  AlkPhos  127<H>  07-09

## 2021-07-10 NOTE — OCCUPATIONAL THERAPY INITIAL EVALUATION ADULT - NS ASR FOLLOW COMMAND OT EVAL
decreased attention span/100% of the time/able to follow single-step instructions/unable to follow multi-step instructions

## 2021-07-10 NOTE — PROGRESS NOTE ADULT - ASSESSMENT
76M DMT2, ETOH abuse admitted for traumatic ICH, SDH, SAH    -Neurocheck q2  -repeat CT in AM   -goal euglycemia 100-180  -supplement lytes  -high risk for ETOH withdrawal; on phenobarb 32.4 q8 for 7 days  -Precedex PRN  -start feeds consistent carb -reduce IVF to 50cc/hr   -DVT ppx on hold in setting of ICH, SCDs for now

## 2021-07-10 NOTE — PROGRESS NOTE ADULT - SUBJECTIVE AND OBJECTIVE BOX
INTERVAL HISTORY: HPI:  HPI:   Patient is a 76 year old male with DM2, not on AC, who presents as a Level 2 Trauma after found [place] bus stop for altered mental status. Patient appears intoxicated, open eyes to voice, incomprehensible speech, localizing pain. History obtained from family due to patient's condition    MEDICATIONS  (STANDING):  atorvastatin 40 milliGRAM(s) Oral at bedtime  chlorhexidine 2% Cloths 1 Application(s) Topical <User Schedule>  chlorhexidine 4% Liquid 1 Application(s) Topical <User Schedule>  dexMEDEtomidine Infusion 0.3 MICROgram(s)/kG/Hr (4.8 mL/Hr) IV Continuous <Continuous>  folic acid Injectable 1 milliGRAM(s) IV Push daily  insulin lispro (ADMELOG) corrective regimen sliding scale   SubCutaneous every 6 hours  lactated ringers. 1000 milliLiter(s) (100 mL/Hr) IV Continuous <Continuous>  PHENobarbital 32.4 milliGRAM(s) Oral every 8 hours  thiamine Injectable 100 milliGRAM(s) IV Push daily  timolol 0.25% Solution 1 Drop(s) Both EYES at bedtime    MEDICATIONS  (PRN):      Drug Dosing Weight  Height (cm): 170.2 (2021 23:50)  Weight (kg): 64 (2021 23:50)  BMI (kg/m2): 22.1 (2021 23:50)  BSA (m2): 1.74 (2021 23:50)    PAST MEDICAL & SURGICAL HISTORY:  DM (diabetes mellitus)        REVIEW OF SYSTEMS: [ ] Unable to Assess due to neurologic exam   [ ] All ROS addressed below are non-contributory, except:  Neuro: [ ] Headache [ ] Back pain [ ] Numbness [ ] Weakness [ ] Ataxia [ ] Dizziness [ ] Aphasia [ ] Dysarthria [ ] Visual disturbance  Resp: [ ] Shortness of breath/dyspnea, [ ] Orthopnea [ ] Cough  CV: [ ] Chest pain [ ] Palpitation [ ] Lightheadedness [ ] Syncope  Renal: [ ] Thirst [ ] Edema  GI: [ ] Nausea [ ] Emesis [ ] Abdominal pain [ ] Constipation [ ] Diarrhea  Hem: [ ] Hematemesis [ ] bright red blood per rectum  ID: [ ] Fever [ ] Chills [ ] Dysuria  ENT: [ ] Rhinorrhea    PHYSICAL EXAM:    General: No Acute Distress     Neurological: Awake, alert oriented to person, place and time, Following Commands, PERRL, EOMI, Face Symmetrical, Speech Fluent, Moving all extremities, Muscle Strength normal in all four extremities, No Drift, Sensation to Light Touch Intact    Pulmonary: Clear to Auscultation, No Rales, No Rhonchi, No Wheezes     Cardiovascular: S1, S2, Regular Rate and Rhythm     Gastrointestinal: Soft, Nontender, Nondistended     Extremities: No calf tenderness     Incision:     ICU Vital Signs Last 24 Hrs  T(C): 36.8 (10 Jul 2021 19:00), Max: 36.8 (10 Jul 2021 15:00)  T(F): 98.2 (10 Jul 2021 19:00), Max: 98.2 (10 Jul 2021 15:00)  HR: 86 (10 Jul 2021 20:00) (76 - 98)  BP: 142/74 (10 Jul 2021 20:00) (110/63 - 189/92)  BP(mean): 93 (10 Jul 2021 20:00) (80 - 132)  ABP: --  ABP(mean): --  RR: 19 (10 Jul 2021 20:00) (10 - 24)  SpO2: 99% (10 Jul 2021 20:00) (91% - 100%)    ABG - ( 10 Jul 2021 05:44 )  pH, Arterial: 7.30  pH, Blood: x     /  pCO2: 34    /  pO2: 92    / HCO3: 16    / Base Excess: -9.1  /  SaO2: 96                I&O's Detail    2021 07:01  -  10 Jul 2021 07:00  --------------------------------------------------------  IN:    Dexmedetomidine: 3.2 mL    IV PiggyBack: 100 mL    sodium chloride 0.9%: 875 mL  Total IN: 978.2 mL    OUT:    Incontinent per Condom Catheter (mL): 1100 mL  Total OUT: 1100 mL    Total NET: -121.8 mL      10 Jul 2021 07:01  -  10 Jul 2021 20:56  --------------------------------------------------------  IN:    Dexmedetomidine: 88 mL    Lactated Ringers: 1200 mL    Oral Fluid: 100 mL    Platelets - Single Donor: 225 mL    sodium chloride 0.9%: 250 mL  Total IN: 1863 mL    OUT:    Incontinent per Condom Catheter (mL): 850 mL  Total OUT: 850 mL    Total NET: 1013 mL      LABS:  CBC Full  -  ( 10 Jul 2021 20:05 )  WBC Count : 9.92 K/uL  RBC Count : 3.41 M/uL  Hemoglobin : 11.3 g/dL  Hematocrit : 34.0 %  Platelet Count - Automated : 259 K/uL  Mean Cell Volume : 99.7 fl  Mean Cell Hemoglobin : 33.1 pg  Mean Cell Hemoglobin Concentration : 33.2 gm/dL  Auto Neutrophil # : x  Auto Lymphocyte # : x  Auto Monocyte # : x  Auto Eosinophil # : x  Auto Basophil # : x  Auto Neutrophil % : x  Auto Lymphocyte % : x  Auto Monocyte % : x  Auto Eosinophil % : x  Auto Basophil % : x    07-10    132<L>  |  100  |  19  ----------------------------<  111<H>  4.3   |  16<L>  |  0.93    Ca    9.2      10 Jul 2021 20:05  Phos  3.6     07-10  Mg     1.3     07-10    TPro  7.5  /  Alb  4.4  /  TBili  0.2  /  DBili  x   /  AST  29  /  ALT  13  /  AlkPhos  127<H>  07-09    PT/INR - ( 10 Jul 2021 05:38 )   PT: 12.8 sec;   INR: 1.07 ratio         PTT - ( 10 Jul 2021 05:38 )  PTT:27.5 sec  Urinalysis Basic - ( 10 Jul 2021 01:10 )    Color: Colorless / Appearance: Clear / S.009 / pH: x  Gluc: x / Ketone: Trace  / Bili: Negative / Urobili: Negative   Blood: x / Protein: Negative / Nitrite: Negative   Leuk Esterase: Negative / RBC: x / WBC x   Sq Epi: x / Non Sq Epi: x / Bacteria: x        RADIOLOGY & ADDITIONAL STUDIES:  < from: CT Head No Cont (07.10.21 @ 08:26) >  FINDINGS:    CT HEAD from 7/10/2021 at 4:57 AM:    Right hyperdense extra-axial fluid collection measuring a maximum of 1 cm along the right parietal lobe, previously 0.7 cm and a maximum of 2.6 cm at in the temporal lobe, previously 0.7 cm. Progressive extension into the right tentorium and posterior dural falx. Bifrontal, bitemporal, and left parietal subarachnoid hemorrhages, mildly increased. New leftward 5 mm midline shift and increased mass effect on the right lateral ventricle.    Acute, nondisplaced fracture in the region of the left temporal bone.. Adjacent left parieto-occipital extra-axial pneumocephalus with interval extension into the left frontal extra-axial space. Left parietal soft tissue swelling of the scalp, unchanged.    CT HEAD from 8:11 AM:    Redemonstrated right subdural hematoma measuring a maximum of 2.6 cm in the right temporal lobe, unchanged. Associated mass effect on the right lateral ventricle and left ordered 5 mm midline shift, unchanged. Bifrontal, bitemporal and left parietal lobe subarachnoid hemorrhages, unchanged. Extra-axial pneumocephalus within the left parieto-occipital and frontal lobes, unchanged.    IMPRESSION:    Interval worsening with marked enlargement of the right holohemispheric subdural hematoma as compared to the initial study of 2021. Also there is bilateral extracerebral blood in the subarachnoid spaces anteriorly, which are more prominent as well.  A left temporal bone fracture is suggested with pneumocephalus in the left temporal bone region. High-resolution temporal bone imaging is therefore recommended as well.  < end of copied text >

## 2021-07-11 LAB
ANION GAP SERPL CALC-SCNC: 12 MMOL/L — SIGNIFICANT CHANGE UP (ref 5–17)
BUN SERPL-MCNC: 18 MG/DL — SIGNIFICANT CHANGE UP (ref 7–23)
CALCIUM SERPL-MCNC: 8.8 MG/DL — SIGNIFICANT CHANGE UP (ref 8.4–10.5)
CHLORIDE SERPL-SCNC: 99 MMOL/L — SIGNIFICANT CHANGE UP (ref 96–108)
CO2 SERPL-SCNC: 21 MMOL/L — LOW (ref 22–31)
CREAT SERPL-MCNC: 1.1 MG/DL — SIGNIFICANT CHANGE UP (ref 0.5–1.3)
GLUCOSE BLDC GLUCOMTR-MCNC: 137 MG/DL — HIGH (ref 70–99)
GLUCOSE BLDC GLUCOMTR-MCNC: 183 MG/DL — HIGH (ref 70–99)
GLUCOSE BLDC GLUCOMTR-MCNC: 225 MG/DL — HIGH (ref 70–99)
GLUCOSE SERPL-MCNC: 197 MG/DL — HIGH (ref 70–99)
HCT VFR BLD CALC: 28.3 % — LOW (ref 39–50)
HGB BLD-MCNC: 9.5 G/DL — LOW (ref 13–17)
MAGNESIUM SERPL-MCNC: 2.3 MG/DL — SIGNIFICANT CHANGE UP (ref 1.6–2.6)
MCHC RBC-ENTMCNC: 32.5 PG — SIGNIFICANT CHANGE UP (ref 27–34)
MCHC RBC-ENTMCNC: 33.6 GM/DL — SIGNIFICANT CHANGE UP (ref 32–36)
MCV RBC AUTO: 96.9 FL — SIGNIFICANT CHANGE UP (ref 80–100)
NRBC # BLD: 0 /100 WBCS — SIGNIFICANT CHANGE UP (ref 0–0)
PHOSPHATE SERPL-MCNC: 2.3 MG/DL — LOW (ref 2.5–4.5)
PLATELET # BLD AUTO: 245 K/UL — SIGNIFICANT CHANGE UP (ref 150–400)
POTASSIUM SERPL-MCNC: 3.9 MMOL/L — SIGNIFICANT CHANGE UP (ref 3.5–5.3)
POTASSIUM SERPL-SCNC: 3.9 MMOL/L — SIGNIFICANT CHANGE UP (ref 3.5–5.3)
RBC # BLD: 2.92 M/UL — LOW (ref 4.2–5.8)
RBC # FLD: 11.9 % — SIGNIFICANT CHANGE UP (ref 10.3–14.5)
SODIUM SERPL-SCNC: 132 MMOL/L — LOW (ref 135–145)
WBC # BLD: 7.53 K/UL — SIGNIFICANT CHANGE UP (ref 3.8–10.5)
WBC # FLD AUTO: 7.53 K/UL — SIGNIFICANT CHANGE UP (ref 3.8–10.5)

## 2021-07-11 PROCEDURE — 99222 1ST HOSP IP/OBS MODERATE 55: CPT

## 2021-07-11 PROCEDURE — 99291 CRITICAL CARE FIRST HOUR: CPT

## 2021-07-11 PROCEDURE — 70450 CT HEAD/BRAIN W/O DYE: CPT | Mod: 26

## 2021-07-11 RX ORDER — POLYETHYLENE GLYCOL 3350 17 G/17G
17 POWDER, FOR SOLUTION ORAL DAILY
Refills: 0 | Status: DISCONTINUED | OUTPATIENT
Start: 2021-07-11 | End: 2021-07-29

## 2021-07-11 RX ORDER — PHENOBARBITAL 60 MG
TABLET ORAL
Refills: 0 | Status: DISCONTINUED | OUTPATIENT
Start: 2021-07-11 | End: 2021-07-12

## 2021-07-11 RX ORDER — MAGNESIUM SULFATE 500 MG/ML
2 VIAL (ML) INJECTION ONCE
Refills: 0 | Status: COMPLETED | OUTPATIENT
Start: 2021-07-11 | End: 2021-07-11

## 2021-07-11 RX ORDER — HEPARIN SODIUM 5000 [USP'U]/ML
5000 INJECTION INTRAVENOUS; SUBCUTANEOUS EVERY 12 HOURS
Refills: 0 | Status: DISCONTINUED | OUTPATIENT
Start: 2021-07-11 | End: 2021-07-12

## 2021-07-11 RX ORDER — HALOPERIDOL DECANOATE 100 MG/ML
5 INJECTION INTRAMUSCULAR ONCE
Refills: 0 | Status: COMPLETED | OUTPATIENT
Start: 2021-07-11 | End: 2021-07-11

## 2021-07-11 RX ORDER — HALOPERIDOL DECANOATE 100 MG/ML
5 INJECTION INTRAMUSCULAR ONCE
Refills: 0 | Status: COMPLETED | OUTPATIENT
Start: 2021-07-11 | End: 2021-07-12

## 2021-07-11 RX ORDER — PHENOBARBITAL 60 MG
64.8 TABLET ORAL
Refills: 0 | Status: DISCONTINUED | OUTPATIENT
Start: 2021-07-12 | End: 2021-07-12

## 2021-07-11 RX ORDER — QUETIAPINE FUMARATE 200 MG/1
25 TABLET, FILM COATED ORAL ONCE
Refills: 0 | Status: DISCONTINUED | OUTPATIENT
Start: 2021-07-11 | End: 2021-07-11

## 2021-07-11 RX ORDER — PHENOBARBITAL 60 MG
64.8 TABLET ORAL THREE TIMES A DAY
Refills: 0 | Status: DISCONTINUED | OUTPATIENT
Start: 2021-07-11 | End: 2021-07-12

## 2021-07-11 RX ORDER — QUETIAPINE FUMARATE 200 MG/1
25 TABLET, FILM COATED ORAL EVERY 12 HOURS
Refills: 0 | Status: DISCONTINUED | OUTPATIENT
Start: 2021-07-11 | End: 2021-07-12

## 2021-07-11 RX ORDER — PHENOBARBITAL 60 MG
64.8 TABLET ORAL THREE TIMES A DAY
Refills: 0 | Status: DISCONTINUED | OUTPATIENT
Start: 2021-07-11 | End: 2021-07-11

## 2021-07-11 RX ADMIN — HALOPERIDOL DECANOATE 5 MILLIGRAM(S): 100 INJECTION INTRAMUSCULAR at 22:30

## 2021-07-11 RX ADMIN — QUETIAPINE FUMARATE 25 MILLIGRAM(S): 200 TABLET, FILM COATED ORAL at 16:28

## 2021-07-11 RX ADMIN — ATORVASTATIN CALCIUM 40 MILLIGRAM(S): 80 TABLET, FILM COATED ORAL at 22:58

## 2021-07-11 RX ADMIN — HEPARIN SODIUM 5000 UNIT(S): 5000 INJECTION INTRAVENOUS; SUBCUTANEOUS at 17:41

## 2021-07-11 RX ADMIN — Medication 64.8 MILLIGRAM(S): at 22:58

## 2021-07-11 RX ADMIN — SODIUM CHLORIDE 50 MILLILITER(S): 9 INJECTION, SOLUTION INTRAVENOUS at 06:24

## 2021-07-11 RX ADMIN — Medication 50 GRAM(S): at 11:51

## 2021-07-11 RX ADMIN — Medication 4: at 18:03

## 2021-07-11 RX ADMIN — Medication 4: at 23:01

## 2021-07-11 RX ADMIN — Medication 32.4 MILLIGRAM(S): at 06:22

## 2021-07-11 RX ADMIN — POLYETHYLENE GLYCOL 3350 17 GRAM(S): 17 POWDER, FOR SOLUTION ORAL at 12:31

## 2021-07-11 RX ADMIN — Medication 1 DROP(S): at 22:58

## 2021-07-11 RX ADMIN — Medication 2: at 08:24

## 2021-07-11 RX ADMIN — Medication 32.4 MILLIGRAM(S): at 13:27

## 2021-07-11 RX ADMIN — Medication 1 MILLIGRAM(S): at 11:41

## 2021-07-11 RX ADMIN — Medication 100 MILLIGRAM(S): at 11:41

## 2021-07-11 RX ADMIN — CHLORHEXIDINE GLUCONATE 1 APPLICATION(S): 213 SOLUTION TOPICAL at 22:57

## 2021-07-11 NOTE — BH CONSULTATION LIAISON ASSESSMENT NOTE - CURRENT MEDICATION
MEDICATIONS  (STANDING):  atorvastatin 40 milliGRAM(s) Oral at bedtime  chlorhexidine 4% Liquid 1 Application(s) Topical <User Schedule>  dexMEDEtomidine Infusion 0.3 MICROgram(s)/kG/Hr (4.8 mL/Hr) IV Continuous <Continuous>  folic acid Injectable 1 milliGRAM(s) IV Push daily  heparin   Injectable 5000 Unit(s) SubCutaneous every 12 hours  insulin lispro (ADMELOG) corrective regimen sliding scale   SubCutaneous Before meals and at bedtime  PHENobarbital 32.4 milliGRAM(s) Oral every 8 hours  polyethylene glycol 3350 17 Gram(s) Oral daily  QUEtiapine 25 milliGRAM(s) Oral every 12 hours  thiamine Injectable 100 milliGRAM(s) IV Push daily  timolol 0.25% Solution 1 Drop(s) Both EYES at bedtime    MEDICATIONS  (PRN):

## 2021-07-11 NOTE — PROGRESS NOTE ADULT - ASSESSMENT
ASSESSMENT: Traumatic brain injury (traumatic subarachnoid hemorrhage, subdural hematoma and contusions) with brain compression (clinically significant)    Neuro:  Monitor for worsening brain compression and need for decompression; with notable brain atrophy so conservative management may be sufficient  Agitation: monitor for EtOH w/d; phenobarb taper for withdrawal, dexmedetomidine, PRN antipsychotic; delirium precautions  Thiamine, folic acid    Pulm:  Aspiration precautions    CV:  -180mmHg, avoid hypotension  HLD: statin  HTN: Metoprolol     GI:  CCD diet, monitor for dysphagia    Renal:  Monitor Na+    Endo:  Euglycemia, RISS    Heme:  Monitor H/H    ID:  Monitor for fever    At risk of death/neuro decline: EtOH w/d     ASSESSMENT: Traumatic brain injury (traumatic subarachnoid hemorrhage, subdural hematoma and contusions) with brain compression (clinically significant)    Neuro:  Monitor for worsening brain compression and need for decompression; with notable brain atrophy so conservative management may be sufficient  Agitation: monitor for EtOH w/d; phenobarb taper for withdrawal, dexmedetomidine, PRN antipsychotic; delirium precautions  Seizure prophylaxis: phenobarbital, check level, check LFTs; monitor for seizures   Thiamine, folic acid    Pulm:  Aspiration precautions    CV:  -180mmHg, avoid hypotension  HLD: statin  HTN: Metoprolol     GI:  CCD diet, monitor for dysphagia    Renal:  Monitor Na+    Endo:  Euglycemia, RISS    Heme:  Monitor H/H    ID:  Monitor for fever    At risk of death/neuro decline: EtOH w/d

## 2021-07-11 NOTE — PHYSICAL THERAPY INITIAL EVALUATION ADULT - GENERAL OBSERVATIONS, REHAB EVAL
Pt received supine in bed, +tele, +IVL-disconnected by RN KATLIN Warren&Ox3, agreeable to physical therapy jay lazcano 30 min.

## 2021-07-11 NOTE — PHYSICAL THERAPY INITIAL EVALUATION ADULT - PERTINENT HX OF CURRENT PROBLEM, REHAB EVAL
75 y/o Male with Hx DM2 and alcohol abuse who was found down for unknown period of time. A Level II trauma was activated upon his arrival due to his altered mental status. GCS on arrival was 10. Primary survey intact. Secondary survey was significant for blood at the L external ear canal, a posterior scalp laceration, and R orbital swelling.

## 2021-07-11 NOTE — PROGRESS NOTE ADULT - SUBJECTIVE AND OBJECTIVE BOX
Patient seen and examined    T(C): 36.8 (07-10-21 @ 19:00), Max: 36.8 (07-10-21 @ 15:00)  HR: 74 (07-10-21 @ 23:00) (74 - 98)  BP: 122/71 (07-10-21 @ 23:00) (110/63 - 189/92)  RR: 14 (07-10-21 @ 23:00) (10 - 20)  SpO2: 100% (07-10-21 @ 23:00) (91% - 100%)    Overnight events:  christiano, exam stable  Exam:  Awake, Ox3, EOMI, pupils 4 R b/l, FC, NAVARRETE 5/5, no drift

## 2021-07-11 NOTE — BH CONSULTATION LIAISON ASSESSMENT NOTE - NSBHCONSULTRECOMMENDOTHER_PSY_A_CORE FT
Pt does not have capacity to leave AMA or refuse critical care    recommend Thiamine 500mg IV TID x9 doses, MVI, folate    Check TSH, B12, RPR, HIV    SW for substance abuse referral resources

## 2021-07-11 NOTE — BH CONSULTATION LIAISON ASSESSMENT NOTE - SUMMARY
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, no prior SA or psych admissions, alcohol abuse per chart, occasional cannabis use per pt, with PMH significant for DM2, not on AC, who presents as a Level 2 Trauma after found down at bus stop for AMS,  on arrival, intoxicated, found to have Right frontal and temporal subdural hemorrhage, b/l frontal lobe and temporal lobe subarachnoid hemorrhages, small subarachnoid hemorrhage in the left posterior parietal/occipital lobe, nondisplaced mildly depressed calvarial fracture along the left lambdoid suture with small foci of adjacent pneumocephalus and overlying small scalp hematoma, admitted to NSICU, psychiatry consulted to evaluate pt's capacity to leave AMA.  Pt AOx2 (off on date), limited cooperation, was unable to demonstrate understanding of his condition or risks of leaving despite being explained.  Pt does not have capacity to leave AMA at present time.  On phenobarbital per NSICU team, no current sx of withdrawal noted.  Denies SI/HI.

## 2021-07-11 NOTE — PHYSICAL THERAPY INITIAL EVALUATION ADULT - ADDITIONAL COMMENTS
Prior to admission pt reports being independent of all ADL's & functional mobility without AD. Pt resides in house with spouse and adult son. Prior to admission pt reports being independent of all ADL's & functional mobility without AD. Pt resides in house with spouse and adult son on first floor no steps to enter. (-) driving. (+) glasses. Pt works full time at [employer].

## 2021-07-11 NOTE — PROGRESS NOTE ADULT - ASSESSMENT
A/P:  traumatic subdural hematoma with midline shift and brain compression and contusion     Neuro: neuro checks q 1 hr, repeat CT head today AM ,  keppra 500 mg BID for seizure prophylaxis, urine tox neg   alcoholic monitor for DT, now on precedex & phenobarb, CIWA protocol   thiamine, multivitamins    no plan to operate at this point in time, will monitor if he deteriorates, might need emergent surgery   C-spine Possible nondisplaced fracture along the left posterolateral inferior endplate of C6.    Respiratory: RA     CV: NSR, TTE, -180 mmhg     Endocrine: DM , finger sticks q6hrs, ISS , keep sugar 120-180 mmhg     Heme/Onc: INR <1.5, Plt>100 s/p 1 units of platelets    DVT ppx: SCD, if CT head stable can start lovenox sq    Renal: LR @50 ml/hr    ID: afebrile    GI: NPO, colace     Social/Family: updated at bedside  Discharge planning: ICU    Code Status: [x] Full Code [] DNR [] DNI [] Goals of Care:   Disposition: [x] ICU [] Stroke Unit [] RCU []PCU []Floor [] Discharge Home     Patient at high risk for neurologic deterioration, seizures, brain herniation  critical care time, excluding procedures: 40 minutes                  A/P:  traumatic subdural hematoma with midline shift and brain compression and contusion     Neuro: neuro checks q 4 hr  repeat CT head today stable  pending official report    keppra 500 mg BID for seizure prophylaxis,  alcoholic monitor for DT, on  phenobarb for seizure prophylaxis   d/c precedex   thiamine, multivitamins    C-spine Possible nondisplaced fracture along the left posterolateral inferior endplate of C6., NS cleared his cervical spine and removed the collar   patients wants to go home, he couldn't tell me why he is in the hospital and doesn't understand the risk of going home, he needs PT  OT thinks he is unstable   it is unsfae to go home, will talk to his family     Respiratory: RA     CV: NSR  ECG sinus rhythm with first degree AV block   -180 mmhg     Endocrine: DM , finger sticks q6hrs, ISS , keep sugar 120-180 mmhg     Heme/Onc: INR <1.5, Plt>100   DVT ppx: SCD,  can start heparin 5000 units q 12 hr   , CT head stable     Renal:   SIADH , hyponatremia   IVL   will give 2 g of mag     ID: afebrile    GI: regular diet   colace     Social/Family: updated at bedside  Discharge planning: ICU    Code Status: [x] Full Code [] DNR [] DNI [] Goals of Care:   Disposition: [x] ICU [] Stroke Unit [] RCU []PCU []Floor [] Discharge Home     30 critical care time, at increased risk of hematoma expansion, brain herniation, DT

## 2021-07-11 NOTE — BH CONSULTATION LIAISON ASSESSMENT NOTE - NSBHCHARTREVIEWINVESTIGATE_PSY_A_CORE FT
< from: 12 Lead ECG (07.09.21 @ 22:18) >      Ventricular Rate 91 BPM    Atrial Rate 91 BPM    P-R Interval 314 ms    QRS Duration 96 ms    Q-T Interval 350 ms    QTC Calculation(Bazett) 430 ms    P Axis 66 degrees    R Axis 54 degrees    T Axis 71 degrees    Diagnosis Line SINUS RHYTHM WITH 1ST DEGREE A-V BLOCK  POSSIBLE LEFT ATRIAL ENLARGEMENT  NONSPECIFIC T WAVE ABNORMALITY  ABNORMAL ECG  NO PREVIOUS ECGS AVAILABLE  Confirmed by MD FELIPE JONATHAN ([id number]) on 7/10/2021 1:59:44 PM    < end of copied text >

## 2021-07-11 NOTE — BH CONSULTATION LIAISON ASSESSMENT NOTE - HPI (INCLUDE ILLNESS QUALITY, SEVERITY, DURATION, TIMING, CONTEXT, MODIFYING FACTORS, ASSOCIATED SIGNS AND SYMPTOMS)
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, no prior SA or psych admissions, alcohol abuse per chart, occasional cannabis use per pt, with PMH significant for DM2, not on AC, who presents as a Level 2 Trauma after found down at bus stop for AMS,  on arrival, intoxicated, found to have Right frontal and temporal subdural hemorrhage, b/l frontal lobe and temporal lobe subarachnoid hemorrhages, small subarachnoid hemorrhage in the left posterior parietal/occipital lobe, nondisplaced mildly depressed calvarial fracture along the left lambdoid suture with small foci of adjacent pneumocephalus and overlying small scalp hematoma, admitted to NSICU, psychiatry consulted to evaluate pt's capacity to leave AMA.    On interview pt is mildly agitated in bed, demanding a food tray, in restraints, limited cooperation with interview. Met with pt and Romeo VIVAS to discuss pt's plan of care.  Pt oriented to self, being in a hospital, off on date.  Pt was explained that he has ICH, needs ongoing ICU level monitoring with repeat imaging, as well as concerns for acute rehab/PT needs. He was explained risk of deterioration and death if leaving AMA.  Pt was dismissive of the above, stating "I feel fine, I know nothing is wrong with my head," insistent that he did not hit his head at the bus stop.  Pt was unable to verbalize understanding of the need for ongoing monitoring or show appreciation of consequences of leaving.  Pt minimizing ETOH use; states he only had "one beer," on day of arrival, denies h/o dependence or withdrawal.  Endorses occasional cannabis use.  Denies prior psych hx, mood d/o, or SIIP/HIIP, denies AVH or paranoia.  Lives with wife, has 8 kids, still works repairing cars.

## 2021-07-11 NOTE — PHYSICAL THERAPY INITIAL EVALUATION ADULT - DISCHARGE DISPOSITION, PT EVAL
Acute TBI Rehab; if home, pt would require 24/7 assist with ALL ADL's and functional mobility and home PT. Pt with decreased insight into deficits with decreased safety awareness and unsteady gait. Neurosx ORTEGA Manzano aware./rehabilitation facility

## 2021-07-11 NOTE — PHYSICAL THERAPY INITIAL EVALUATION ADULT - PRECAUTIONS/LIMITATIONS, REHAB EVAL
CONT:  CT scan on admission demonstrated bifrontal SAH, a R SDH, a L parietal SAH with pneumocephalus, a L possible parietal non-displaced skull fracture, and a C6 inferior endplate Fx into disc space with air into the canal. Ortho spine consulted for possible C6 fracture. Per ortho WBAT in cervical collar, may wear for comfort. Unclear regarding head strike per patient, but suspected given head trauma. Localizing pain to neck/head. Denies radiation of pain elsewhere. Denies pain down legs, denies numbness/tingling/paresthesias/weakness, denies incontinence of bowel/bladder.  Denies having any other pain elsewhere. Denies any previous orthopaedic history. No other orthopaedic concerns at this time.  CT head 7/11: Bilateral subdural and subarachnoid hemorrhages and hemorrhagic contusions, overall similar to prior exam./fall precautions

## 2021-07-11 NOTE — BH CONSULTATION LIAISON ASSESSMENT NOTE - NSBHCHARTREVIEWVS_PSY_A_CORE FT
Vital Signs Last 24 Hrs  T(C): 36.9 (11 Jul 2021 15:00), Max: 36.9 (11 Jul 2021 07:00)  T(F): 98.5 (11 Jul 2021 15:00), Max: 98.5 (11 Jul 2021 07:00)  HR: 96 (11 Jul 2021 16:00) (71 - 97)  BP: 151/90 (11 Jul 2021 16:00) (108/60 - 189/92)  BP(mean): 100 (11 Jul 2021 16:00) (73 - 121)  RR: 17 (11 Jul 2021 16:00) (12 - 22)  SpO2: 99% (11 Jul 2021 16:00) (98% - 100%)
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
## 2021-07-11 NOTE — BH CONSULTATION LIAISON ASSESSMENT NOTE - NSBHCHARTREVIEWLAB_PSY_A_CORE FT
11.3   9.92  )-----------( 259      ( 10 Jul 2021 20:05 )             34.0     07-10    132<L>  |  100  |  19  ----------------------------<  111<H>  4.3   |  16<L>  |  0.93    Ca    9.2      10 Jul 2021 20:05  Phos  3.6     07-10  Mg     1.3     07-10    TPro  7.5  /  Alb  4.4  /  TBili  0.2  /  DBili  x   /  AST  29  /  ALT  13  /  AlkPhos  127<H>  07-09    Urinalysis Basic - ( 10 Jul 2021 01:10 )    Color: Colorless / Appearance: Clear / S.009 / pH: x  Gluc: x / Ketone: Trace  / Bili: Negative / Urobili: Negative   Blood: x / Protein: Negative / Nitrite: Negative   Leuk Esterase: Negative / RBC: x / WBC x   Sq Epi: x / Non Sq Epi: x / Bacteria: x

## 2021-07-11 NOTE — BH CONSULTATION LIAISON ASSESSMENT NOTE - RISK ASSESSMENT
Risk factors: active substance abuse, acute medical issues    Protective factors: no current SIIP/HIIP, no h/o SA/SIB, no h/o psych admissions, engaged in work, domiciled, intact marriage, social supports    Pt is at chronically elevated risk mitigated by multiple protective factors and does not meet criteria for psychiatric admission.
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
## 2021-07-12 LAB
ALBUMIN SERPL ELPH-MCNC: 3.6 G/DL — SIGNIFICANT CHANGE UP (ref 3.3–5)
ALP SERPL-CCNC: 112 U/L — SIGNIFICANT CHANGE UP (ref 40–120)
ALT FLD-CCNC: 11 U/L — SIGNIFICANT CHANGE UP (ref 10–45)
ANION GAP SERPL CALC-SCNC: 16 MMOL/L — SIGNIFICANT CHANGE UP (ref 5–17)
AST SERPL-CCNC: 19 U/L — SIGNIFICANT CHANGE UP (ref 10–40)
BILIRUB DIRECT SERPL-MCNC: 0.1 MG/DL — SIGNIFICANT CHANGE UP (ref 0–0.2)
BILIRUB INDIRECT FLD-MCNC: 0.3 MG/DL — SIGNIFICANT CHANGE UP (ref 0.2–1)
BILIRUB SERPL-MCNC: 0.4 MG/DL — SIGNIFICANT CHANGE UP (ref 0.2–1.2)
BUN SERPL-MCNC: 13 MG/DL — SIGNIFICANT CHANGE UP (ref 7–23)
CALCIUM SERPL-MCNC: 9.2 MG/DL — SIGNIFICANT CHANGE UP (ref 8.4–10.5)
CHLORIDE SERPL-SCNC: 98 MMOL/L — SIGNIFICANT CHANGE UP (ref 96–108)
CO2 SERPL-SCNC: 18 MMOL/L — LOW (ref 22–31)
CREAT SERPL-MCNC: 1.04 MG/DL — SIGNIFICANT CHANGE UP (ref 0.5–1.3)
GLUCOSE BLDC GLUCOMTR-MCNC: 119 MG/DL — HIGH (ref 70–99)
GLUCOSE BLDC GLUCOMTR-MCNC: 140 MG/DL — HIGH (ref 70–99)
GLUCOSE BLDC GLUCOMTR-MCNC: 142 MG/DL — HIGH (ref 70–99)
GLUCOSE BLDC GLUCOMTR-MCNC: 198 MG/DL — HIGH (ref 70–99)
GLUCOSE BLDC GLUCOMTR-MCNC: 203 MG/DL — HIGH (ref 70–99)
GLUCOSE BLDC GLUCOMTR-MCNC: 231 MG/DL — HIGH (ref 70–99)
GLUCOSE BLDC GLUCOMTR-MCNC: 259 MG/DL — HIGH (ref 70–99)
GLUCOSE SERPL-MCNC: 139 MG/DL — HIGH (ref 70–99)
HCT VFR BLD CALC: 29.6 % — LOW (ref 39–50)
HCT VFR BLD CALC: 30.8 % — LOW (ref 39–50)
HGB BLD-MCNC: 10.1 G/DL — LOW (ref 13–17)
HGB BLD-MCNC: 9.7 G/DL — LOW (ref 13–17)
LACTATE SERPL-SCNC: 1.1 MMOL/L — SIGNIFICANT CHANGE UP (ref 0.7–2)
MAGNESIUM SERPL-MCNC: 1.9 MG/DL — SIGNIFICANT CHANGE UP (ref 1.6–2.6)
MCHC RBC-ENTMCNC: 31.9 PG — SIGNIFICANT CHANGE UP (ref 27–34)
MCHC RBC-ENTMCNC: 32.1 PG — SIGNIFICANT CHANGE UP (ref 27–34)
MCHC RBC-ENTMCNC: 32.8 GM/DL — SIGNIFICANT CHANGE UP (ref 32–36)
MCHC RBC-ENTMCNC: 32.8 GM/DL — SIGNIFICANT CHANGE UP (ref 32–36)
MCV RBC AUTO: 97.4 FL — SIGNIFICANT CHANGE UP (ref 80–100)
MCV RBC AUTO: 97.8 FL — SIGNIFICANT CHANGE UP (ref 80–100)
NRBC # BLD: 0 /100 WBCS — SIGNIFICANT CHANGE UP (ref 0–0)
NRBC # BLD: 0 /100 WBCS — SIGNIFICANT CHANGE UP (ref 0–0)
OSMOLALITY SERPL: 284 MOSMOL/KG — SIGNIFICANT CHANGE UP (ref 280–301)
PHENOBARB SERPL-MCNC: 6.2 UG/ML — LOW (ref 15–40)
PHOSPHATE SERPL-MCNC: 2.3 MG/DL — LOW (ref 2.5–4.5)
PLATELET # BLD AUTO: 227 K/UL — SIGNIFICANT CHANGE UP (ref 150–400)
PLATELET # BLD AUTO: 272 K/UL — SIGNIFICANT CHANGE UP (ref 150–400)
POTASSIUM SERPL-MCNC: 3.8 MMOL/L — SIGNIFICANT CHANGE UP (ref 3.5–5.3)
POTASSIUM SERPL-SCNC: 3.8 MMOL/L — SIGNIFICANT CHANGE UP (ref 3.5–5.3)
PROT SERPL-MCNC: 6.9 G/DL — SIGNIFICANT CHANGE UP (ref 6–8.3)
RBC # BLD: 3.04 M/UL — LOW (ref 4.2–5.8)
RBC # BLD: 3.15 M/UL — LOW (ref 4.2–5.8)
RBC # FLD: 11.9 % — SIGNIFICANT CHANGE UP (ref 10.3–14.5)
RBC # FLD: 12 % — SIGNIFICANT CHANGE UP (ref 10.3–14.5)
SODIUM SERPL-SCNC: 132 MMOL/L — LOW (ref 135–145)
WBC # BLD: 6.63 K/UL — SIGNIFICANT CHANGE UP (ref 3.8–10.5)
WBC # BLD: 8.04 K/UL — SIGNIFICANT CHANGE UP (ref 3.8–10.5)
WBC # FLD AUTO: 6.63 K/UL — SIGNIFICANT CHANGE UP (ref 3.8–10.5)
WBC # FLD AUTO: 8.04 K/UL — SIGNIFICANT CHANGE UP (ref 3.8–10.5)

## 2021-07-12 PROCEDURE — 93970 EXTREMITY STUDY: CPT | Mod: 26

## 2021-07-12 PROCEDURE — 99231 SBSQ HOSP IP/OBS SF/LOW 25: CPT | Mod: GC

## 2021-07-12 PROCEDURE — 93306 TTE W/DOPPLER COMPLETE: CPT | Mod: 26

## 2021-07-12 PROCEDURE — 99232 SBSQ HOSP IP/OBS MODERATE 35: CPT

## 2021-07-12 RX ORDER — PHENOBARBITAL 60 MG
64 TABLET ORAL
Refills: 0 | Status: DISCONTINUED | OUTPATIENT
Start: 2021-07-13 | End: 2021-07-13

## 2021-07-12 RX ORDER — HYDRALAZINE HCL 50 MG
10 TABLET ORAL ONCE
Refills: 0 | Status: COMPLETED | OUTPATIENT
Start: 2021-07-12 | End: 2021-07-12

## 2021-07-12 RX ORDER — SODIUM CHLORIDE 9 MG/ML
1000 INJECTION, SOLUTION INTRAVENOUS
Refills: 0 | Status: DISCONTINUED | OUTPATIENT
Start: 2021-07-12 | End: 2021-07-12

## 2021-07-12 RX ORDER — SODIUM CHLORIDE 9 MG/ML
1000 INJECTION INTRAMUSCULAR; INTRAVENOUS; SUBCUTANEOUS
Refills: 0 | Status: DISCONTINUED | OUTPATIENT
Start: 2021-07-12 | End: 2021-07-12

## 2021-07-12 RX ORDER — HYDRALAZINE HCL 50 MG
5 TABLET ORAL ONCE
Refills: 0 | Status: COMPLETED | OUTPATIENT
Start: 2021-07-12 | End: 2021-07-12

## 2021-07-12 RX ORDER — HALOPERIDOL DECANOATE 100 MG/ML
5 INJECTION INTRAMUSCULAR ONCE
Refills: 0 | Status: COMPLETED | OUTPATIENT
Start: 2021-07-12 | End: 2021-07-12

## 2021-07-12 RX ORDER — PHENOBARBITAL 60 MG
64 TABLET ORAL THREE TIMES A DAY
Refills: 0 | Status: DISCONTINUED | OUTPATIENT
Start: 2021-07-12 | End: 2021-07-12

## 2021-07-12 RX ORDER — PHENOBARBITAL 60 MG
32 TABLET ORAL
Refills: 0 | Status: DISCONTINUED | OUTPATIENT
Start: 2021-07-14 | End: 2021-07-16

## 2021-07-12 RX ORDER — POTASSIUM PHOSPHATE, MONOBASIC POTASSIUM PHOSPHATE, DIBASIC 236; 224 MG/ML; MG/ML
15 INJECTION, SOLUTION INTRAVENOUS ONCE
Refills: 0 | Status: COMPLETED | OUTPATIENT
Start: 2021-07-12 | End: 2021-07-12

## 2021-07-12 RX ADMIN — QUETIAPINE FUMARATE 25 MILLIGRAM(S): 200 TABLET, FILM COATED ORAL at 10:52

## 2021-07-12 RX ADMIN — Medication 1 MILLIGRAM(S): at 16:05

## 2021-07-12 RX ADMIN — Medication 5 MILLIGRAM(S): at 09:48

## 2021-07-12 RX ADMIN — Medication 1 MILLIGRAM(S): at 18:45

## 2021-07-12 RX ADMIN — Medication 4: at 09:37

## 2021-07-12 RX ADMIN — Medication 64 MILLIGRAM(S): at 21:13

## 2021-07-12 RX ADMIN — HEPARIN SODIUM 5000 UNIT(S): 5000 INJECTION INTRAVENOUS; SUBCUTANEOUS at 06:08

## 2021-07-12 RX ADMIN — Medication 64 MILLIGRAM(S): at 14:24

## 2021-07-12 RX ADMIN — HALOPERIDOL DECANOATE 5 MILLIGRAM(S): 100 INJECTION INTRAMUSCULAR at 04:01

## 2021-07-12 RX ADMIN — HEPARIN SODIUM 5000 UNIT(S): 5000 INJECTION INTRAVENOUS; SUBCUTANEOUS at 17:35

## 2021-07-12 RX ADMIN — POTASSIUM PHOSPHATE, MONOBASIC POTASSIUM PHOSPHATE, DIBASIC 62.5 MILLIMOLE(S): 236; 224 INJECTION, SOLUTION INTRAVENOUS at 00:49

## 2021-07-12 RX ADMIN — Medication 10 MILLIGRAM(S): at 04:15

## 2021-07-12 RX ADMIN — Medication 2: at 13:00

## 2021-07-12 RX ADMIN — SODIUM CHLORIDE 75 MILLILITER(S): 9 INJECTION INTRAMUSCULAR; INTRAVENOUS; SUBCUTANEOUS at 21:14

## 2021-07-12 RX ADMIN — ATORVASTATIN CALCIUM 40 MILLIGRAM(S): 80 TABLET, FILM COATED ORAL at 21:14

## 2021-07-12 RX ADMIN — Medication 1 DROP(S): at 21:14

## 2021-07-12 RX ADMIN — HALOPERIDOL DECANOATE 5 MILLIGRAM(S): 100 INJECTION INTRAMUSCULAR at 04:15

## 2021-07-12 RX ADMIN — Medication 100 MILLIGRAM(S): at 12:56

## 2021-07-12 RX ADMIN — Medication 64 MILLIGRAM(S): at 05:20

## 2021-07-12 RX ADMIN — Medication 1 MILLIGRAM(S): at 12:56

## 2021-07-12 RX ADMIN — CHLORHEXIDINE GLUCONATE 1 APPLICATION(S): 213 SOLUTION TOPICAL at 21:14

## 2021-07-12 RX ADMIN — Medication 1 MILLIGRAM(S): at 22:00

## 2021-07-12 NOTE — PROGRESS NOTE ADULT - ASSESSMENT
Patient seen and examined at bedside.  Exam:  Awake, Ox2, pupils 4 R b/l, FC, NAVARRETE strongly, agitated  HCT in am, today CTH read stable however some concern for interval increase in R frontal contusion  CIWA-on phenobarb  Collar for comfort per ortho

## 2021-07-12 NOTE — PATIENT PROFILE ADULT - NSPROPOAURINARYCATHETER_GEN_A_NUR
Hemal 18 FF     Patient:  Baby Boy Daina Madera PCP:  [facility] ([facility] - [facility]    MRN:  [id number] Hospital Provider:  Michael Adler Physician   Infant Name after D/C: Jaqueline Avalos Date of Note:  2021     YOB: 2019  2:03 PM  Birth Wt: Birth Weight: 8 lb 2.9 oz (3.71 kg) Most Recent Wt:  Weight - Scale: 7 lb 8.3 oz (3.41 kg) Percent loss since birth weight:  -8%    Information for the patient's mother:  Radha Simmons [[id number]]   39w0d       Birth Length:  Length: 20\" (50.8 cm) (Filed from Delivery Summary)  Birth Head Circumference:  Birth Head Circumference: 35.6 cm (14\")    Last Serum Bilirubin:   Total Bilirubin   Date/Time Value Ref Range Status   2021 06:00 AM 18.6 (HH) 0.0 - 10.3 mg/dL Final     Last Transcutaneous Bilirubin:   Time Taken: 1685 (21 0455)    Transcutaneous Bilirubin Result: 10.3     Screening and Immunization:   Hearing Screen:     Screening 1 Results: Right Ear Pass, Left Ear Pass                                            [place] Metabolic Screen:    PKU Form #: [id number] (21 1634)   Congenital Heart Screen 1:  Date: 21  Time: 1600  Pulse Ox Saturation of Right Hand: 98 %  Pulse Ox Saturation of Foot: 98 %  Difference (Right Hand-Foot): 0 %  Screening  Result: Pass  Congenital Heart Screen 2:  NA     Congenital Heart Screen 3: NA     Immunizations:   Immunization History   Administered Date(s) Administered    Hepatitis B Ped/Adol (Engerix-B, Recombivax HB) 2021         Maternal Data:    Information for the patient's mother:  Radha Simmons [[id number]]   28 y.o. Information for the patient's mother:  Radha Simmons [[id number]]   39w0d       /Para:   Information for the patient's mother:  Radha Simmons [[id number]]   [id number]        Prenatal History & Labs:   Information for the patient's mother:  Radha Simmons [[id number]]     Lab Results   Component Value Date    82 [address] RUIZ POS 2021 LABANTI NEG 2021    HBSAGI Non-reactive 2021    RUBELABIGG 300.0 2021      HIV:   Information for the patient's mother:  Rohini Lei [[id number]]     Lab Results   Component Value Date    HIVAG/AB Non-Reactive 2021      COVID-19:   Information for the patient's mother:  Rohini Lei [[id number]]     Lab Results   Component Value Date    COVID19 Not Detected 2021    COVID19 Not Detected 06/01/2020      Admission RPR:   Information for the patient's mother:  Rohini Lei [[id number]]     Lab Results   Component Value Date    [address] Non-Reactive 2021       Hepatitis C:   Information for the patient's mother:  Rohini Lei [[id number]]     Lab Results   Component Value Date    HCVABI Non-reactive 2021      GBS status:    Information for the patient's mother:  Rohini Lei [[id number]]     Lab Results   Component Value Date    GBSEXTERN not detected  2021             GBS treatment:  NA  GC and Chlamydia:   Information for the patient's mother:  Rohini Lei [[id number]]   No results found for: Griselda Bong, [facility], [address] [facility], [address] [facility], [address] [place] [address]     Maternal Toxicology:     Information for the patient's mother:  Rohini Lei [[id number]]     Lab Results   Component Value Date    [address] Neg 2021    BARBSCNU Neg 2021    LABBENZ Neg 2021    CANSU Neg 2021    BUPRENUR Neg 2021    COCAIMETSCRU Neg 2021    OPIATESCREENURINE Neg 2021    PHENCYCLIDINESCREENURINE Neg 2021    LABMETH Neg 2021    PROPOX Neg 2021      Information for the patient's mother:  Rohini Lei [[id number]]     Lab Results   Component Value Date    OXYCODONEUR Neg 2021      Information for the patient's mother:  Rohini Lei [[id number]]     Past Medical History:   Diagnosis Date    Anemia     iron     Infertility, female     Preeclampsia     1st baby       Other significant maternal history:  none  Maternal ultrasounds:  Normal per mother.  Information:  Information for the patient's mother:  Jayden Kang [[id number]]   Membrane Status: Intact (21 1229)     : 2021  2:03 PM   (ROM at delivery)       Delivery Method: , Low Transverse  Rupture date:  2021  Rupture time:  2:02 PM    Additional  Information:  Complications:  None   Information for the patient's mother:  Jayden Kang [[id number]]         Reason for  section (if applicable):Breech presentation    Apgars:   APGAR One: 8;  APGAR Five: 9;  APGAR Ten: N/A  Resuscitation: Bulb Suction [20]; Stimulation [25]    Objective:   Reviewed pregnancy & family history as well as nursing notes & vitals. Physical Exam:    Pulse 130   Temp 98.4 °F (36.9 °C)   Resp 48   Ht 20\" (50.8 cm) Comment: Filed from Delivery Summary  Wt 7 lb 8.3 oz (3.41 kg)   HC 35.6 cm (14\") Comment: Filed from Delivery Summary  BMI 13.21 kg/m²     Constitutional: VSS. Alert and appropriate to exam, vigorous. No distress. Head: Fontanelles are open, soft and flat. No facial anomaly noted. No significant molding present. Breech molding with small amount of reduntant tissue to posterior neck  Ears:  External ears normal.   Nose: Nostrils without airway obstruction. Nose appears visually straight   Mouth/Throat:  Mucous membranes are moist. No cleft palate palpated. Eyes: Red reflex is present bilaterally on admission exam.   Cardiovascular: Normal rate, regular rhythm, S1 & S2 normal.  Distal  pulses are palpable. No murmur noted. Pulmonary/Chest: Effort normal.  Breath sounds equal and normal. No respiratory distress - no nasal flaring, stridor, grunting or retraction. No chest deformity noted. Abdominal: Soft. Bowel sounds are normal. No tenderness. No distension, mass or organomegaly. Umbilicus appears grossly normal     Genitourinary: Normal male external genitalia.     Musculoskeletal: Normal ROM. Neg- [address]. Clavicles & spine intact. Neurological: . Tone normal for gestation. Suck & root normal. Symmetric and full [place]. Symmetric grasp & movement. Skin:  Skin is warm & dry. Capillary refill less than 3 seconds. No cyanosis or pallor. Jaundice present to abdomen    Recent Labs:   Recent Results (from the past 120 hour(s))   SPECIMEN REJECTION    Collection Time: 21  4:57 PM   Result Value Ref Range    Rejected Test bilfn     Reason for Rejection see below    Bilirubin Total Direct & Indirect    Collection Time: 21  5:15 PM   Result Value Ref Range    Total Bilirubin 8.4 (H) 0.0 - 5.1 mg/dL    Bilirubin, Direct 0.4 0.0 - 0.6 mg/dL    Bilirubin, Indirect 8.0 0.6 - 10.5 mg/dL   Bilirubin Total Direct & Indirect    Collection Time: 21  5:00 AM   Result Value Ref Range    Total Bilirubin 12.6 (H) 0.0 - 7.2 mg/dL    Bilirubin, Direct 0.3 0.0 - 0.6 mg/dL    Bilirubin, Indirect 12.3 (H) 0.6 - 10.5 mg/dL   Bilirubin, Total    Collection Time: 21  6:25 PM   Result Value Ref Range    Total Bilirubin 14.9 (H) 0.0 - 7.2 mg/dL   Bilirubin Total Direct & Indirect    Collection Time: 21  6:03 AM   Result Value Ref Range    Total Bilirubin 15.8 (HH) 0.0 - 10.3 mg/dL    Bilirubin, Direct 0.6 0.0 - 0.6 mg/dL    Bilirubin, Indirect 15.2 (H) 0.6 - 10.5 mg/dL   Bilirubin, Total    Collection Time: 21  4:30 PM   Result Value Ref Range    Total Bilirubin 17.2 (HH) 0.0 - 10.3 mg/dL   Bilirubin, Total    Collection Time: 21  6:00 AM   Result Value Ref Range    Total Bilirubin 18.6 (HH) 0.0 - 10.3 mg/dL      Medications   Vitamin K and Erythromycin Opthalmic Ointment given at delivery.     Assessment:     Patient Active Problem List   Diagnosis Code     infant of 44 completed weeks of gestation Z39.4    Liveborn infant, born in hospital, delivered by  Z38.01    [place] affected by breech presentation P01.7     hyperbilirubinemia P59.9       Feeding Method: Feeding Method Used: Breastfeeding, latching 15-40 min Q3H, now supplementing with 15-30mL EBM after latching. Mom worked with [facility] with shallow latch and bruised nipples, latch improved with repositioning. Mom has copious milk and milk is in. Discussed minimum output requirements, signs of dehydration and when to call pediatrician. Urine output:  x7 established   Stool output:  x5 established  Percent weight change from birth:  -8%    Maternal labs pending: None    MBT A+, NAZIA neg. TSB 8.4 at 27 hours (HIRZ, LL 12)  TSB 12.6 at 39 hours (HRZ, LL 13.9, ROR 0.35) - will start bili blanket and recheck at 1800 and 0600  TSB 14.9 at 52 hours (HRZ, LL 15.6, ROR 0.18) - on biliblanket  TSB 15.8 at 64 hours (HRZ, LL 16.8, ROR 0.075) - trial off biliblanket  TSB 17.2 at 74 hours (HRZ, LL 17.8, ROR 0.14) - restart biliblanket, start supplement with 15-20mL EBM after latches  TSB 18.6 at 88 hours (HRZ, LL 19.1, ROR 0.1) - start overhead light x 1, continue supplement  Will recheck TSB tonight and in the AM and wean phototherapy as tolerated     Breech - hips stable on exam, recommend hip US at 4-6 weeks. Plan:   Excess nuchal skin - this can be a marker for genetic abnormality. Infant otherwise well appearing with no other markers. Recommend close follow-up with pediatrician. Parents are Lithuanian speaking but decline   NCA book given and reviewed. Questions answered. Routine  care. [facility] , appointment scheduled for  - encouraged parents to reschedule for 12/10.      Kurtis Prasad MD no
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
## 2021-07-12 NOTE — DIETITIAN INITIAL EVALUATION ADULT. - WEIGHT FOR BMI (LBS)
Kidney Stone with Pain    The sharp cramping pain on either side of your lower back and nausea/vomiting that you have are because of a small stone that has formed in the kidney. It is now passing down a narrow tube (ureter) on its way to your bladder. Once the stone reaches your bladder, the pain will often stop. But it may come back as the stone continues to pass out of the bladder and through the urethra. The stone may pass in your urine stream in one piece. The size may be 1/16 inch to 1/4 inch (1 mm to 6 mm). Or, the stone may break up into chong fragments that you may not even notice.  Once you have had a kidney stone, you are at risk of getting another one in the future. There are 4 types of kidney stones. Eighty percent are calcium stones--mostly calcium oxalate but also some with calcium phosphate. The other 3 types include uric acid stones, struvite stones (from a preceding infection), and rarely, cystine stones.  Most stones will pass on their own, but may take from a few hours to a few days. Sometimes the stone is too large to pass by itself. In that case, the healthcare provider will need to use other ways to remove the stone. These techniques include:  · Lithotripsy. This uses ultrasound waves to break up the stone.  · Ureteroscopy. This pushes a basket-like instrument through the urethra and bladder and into the ureter to pull out the stone.  · Various types of direct surgery through the skin  Home care  The following are general care guidelines:  · Drink plenty of fluids. This means at least 12, 8-ounce glasses of fluid--mostly water--a day.  · Each time you urinate, do so in a jar. Pour the urine from the jar through the strainer and into the toilet. Continue doing this until 24 hours after your pain stops. By then, if there was a kidney stone, it should pass from your bladder. Some stones dissolve into sand-like particles and pass right through the strainer. In that case, you won’t ever see a  stone.  · Save any stone that you find in the strainer and bring it to your healthcare provider to look at. It may be possible to stop certain types of stones from forming. For this reason, it is important to know what kind of stone you have.  · Try to stay as active as possible. This will help the stone pass. Don't stay in bed unless your pain keeps you from getting up. You may notice a red, pink, or brown color to your urine. This is normal while passing a kidney stone.  · If you develop pain, you may take ibuprofen or naproxen for pain, unless another medicine was prescribed. If you have chronic liver or kidney disease, talk with your healthcare provider before taking these medicines. Also talk with your provider if you've had a stomach ulcer or GI bleeding.  Preventing stones  Each year for the next 5 to 7 years, you are at risk that a new stone will form. Your risk is a 50% chance over this time period. The risk is higher if you have a family history of kidney stones or have certain chronic illnesses like hypertension, obesity, or diabetes. But you can make changes to your lifestyle and diet that can lower your risk for another stone.  Most kidney stones are made of calcium. The following is advice for preventing another calcium stone. If you don’t know the type of stone you have, follow this advice until the cause of your stone is found.  Things that help:  · The most important thing you can do is to drink plenty of fluids each day. See home care above.   · Eat foods that contain phytates. These include wheat, rice, rye, barley, and beans. Phytates are substances that may lower your risk for any type of stone to form.  · Eat more fruits and vegetables. Choose those that are high in potassium.  · Eat foods high in natural citrate like fruit and low-sugar fruit juices.  · Having too little calcium in your diet can put you at risk for calcium kidney stones. Eat a normal amount of calcium in your diet and  talk with your healthcare provider if you are taking calcium supplements. Cutting back on your calcium intake may raise your risk. New research shows that eating calcium-rich and oxalate-rich foods together lowers your risk for stones by binding the minerals in the stomach and intestines before they can reach the kidneys.    · Limit salt intake to 2 grams (1 teaspoon) per day. Use limited amounts when cooking, and don’t add salt at the table. Processed and canned foods are usually high in salt.   · Spinach, rhubarb, peanuts, cashews, almonds, grapefruit, and grapefruit juice are all high oxalate foods. You should limit how much of these you eat. Or eat them with calcium-rich foods. These include dairy products, dark leafy greens, soy products, and calcium-enriched foods.  · Reducing the amount of animal meat and high protein foods in your diet may lower your risk for uric acid stones.  · Avoid excess sugar (sucrose) and fructose (sweetener in many soft drinks) in your diet.   · If you take vitamin C as a supplement, don't take more than 1,000 mg a day.  · A dietitian or your healthcare provider can give you information about changes in your diet that will help prevent more kidney stones from forming.  Follow-up care  Follow up with your healthcare provider, or as advised, if the pain lasts more than 48 hours. Talk with your provider about urine and blood tests to find out the cause of your stone. If you had an X-ray, CT scan, or other diagnostic test, you will be told of any new findings that may affect your care.  Call 911  Call 911 if you have any of these:  · Weakness, dizziness, or fainting  When to seek medical advice  Call your healthcare provider right away if any of these occur:  · Pain that is not controlled by the medicine given  · Repeated vomiting or unable to keep down fluids  · Fever of 100.4ºF (38ºC) or higher, or as directed by your healthcare provider  · Passage of solid red or brown urine (can't  see through it) or urine with lots of blood clots  · Foul-smelling or cloudy urine  · Unable to pass urine for 8 hours and increasing bladder pressure  Date Last Reviewed: 10/1/2016  © 9387-6568 [employer]. [address] [place] [address], [place] [address]. All rights reserved. This information is not intended as a substitute for professional medical care. Always follow your healthcare professional's instructions.         141.1

## 2021-07-12 NOTE — PATIENT PROFILE ADULT - .
Aminata is here for a med check- non fasting.        Pre-visit Screening:  Immunizations:  not up to date - pneum 23 today, td at pharmacy  Colonoscopy:  is up to date  Mammogram: is due and to be scheduled by patient for later completion  Asthma Action Test/Plan:  NA  PHQ9:  Done today  GAD7:  Done today  Questioned patient about current smoking habits Pt. has never smoked.  Ok to leave detailed message on voice mail for today's visit only Yes, phone # [phone]      
16-Jul-2021 01:10:22

## 2021-07-12 NOTE — PROGRESS NOTE ADULT - ASSESSMENT
ASSESSMENT: Traumatic brain injury (traumatic subarachnoid hemorrhage, subdural hematoma and contusions) with brain compression (clinically significant)    Neuro:  Monitor for worsening brain compression and need for decompression; with notable brain atrophy so conservative management may be sufficient  Agitation: monitor for EtOH w/d; phenobarb taper for withdrawal, PRN antipsychotic/benzos, delirium precautions  Seizure prophylaxis: phenobarbital, check level, check LFTs; monitor for seizures   Thiamine, folic acid    Pulm:  Aspiration precautions    CV:  -180mmHg, avoid hypotension  HLD: statin  HTN: Metoprolol     GI:  CCD diet, monitor for dysphagia  encourage PO intake    Renal:  Monitor Na+, f/u urine lytes, likely SIADH  continue salt tabs  IVL    Endo:  Euglycemia, RISS    Heme:  Monitor H/H    ID:  Monitor for fever    At risk of death/neuro decline: EtOH w/d, traumatic SDH/SAH     ASSESSMENT: Traumatic brain injury (traumatic subarachnoid hemorrhage, subdural hematoma and contusions) with brain compression (clinically significant)    Neuro:  Monitor for worsening brain compression and need for decompression; with notable brain atrophy so conservative management may be sufficient  Agitation: monitor for EtOH w/d; phenobarb taper for withdrawal, PRN antipsychotic/benzos, delirium precautions  Seizure prophylaxis: phenobarbital, check level, check LFTs; monitor for seizures   Thiamine, folic acid    Pulm:  Aspiration precautions    CV:  -180mmHg, avoid hypotension  HLD: statin  HTN: Metoprolol     GI:  CCD diet, monitor for dysphagia  encourage PO intake    Renal:  Monitor Na+, f/u urine lytes, likely SIADH  continue salt tabs  IVL    Endo:  Euglycemia, RISS    Heme:  Monitor H/H    ID:  Monitor for fever    likely tx to fl in am

## 2021-07-12 NOTE — DIETITIAN INITIAL EVALUATION ADULT. - OTHER INFO
Dosing wt (7/9): 140.8 lbs.     Pt currently prescribed a consistent carbohydrate diet (no snacks). A1c 7.3%; indicative of poor glycemic control PTA. Continues on insulin lispro sliding scale to aid in management of elevated BG. Per home medication list via H&P, pt with hx of taking Janumet, Glipizide. Additionally, pt with hx of taking vitamin D2. In-house, pt continues on thiamine, folic acid in context of EtOH use/withdrawal.     Skin: no pressure injuries noted  Edema: none noted  GI: last BM (7/11): x 1. On bowel regimen (Miralax) Dosing wt (7/9): 140.8 lbs. UBW unknown at this time.     Pt currently prescribed a consistent carbohydrate diet (no snacks). Breakfast tray noted at bedside, untouched at time of RD visit. A1c 7.3%; indicative of poor glycemic control PTA. Continues on insulin lispro sliding scale to aid in management of elevated BG. Per home medication list via H&P, pt with hx of taking Janumet, Glipizide. Additionally, pt with hx of taking vitamin D2. In-house, pt continues on thiamine, folic acid in context of EtOH use/withdrawal.     Skin: no pressure injuries noted  Edema: none noted  GI: last BM (7/11): x 1. On bowel regimen (Miralax)
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
## 2021-07-12 NOTE — DIETITIAN INITIAL EVALUATION ADULT. - ORAL INTAKE PTA/DIET HISTORY
Pt observed resting in bed. Pt observed resting in bed; unable to participate in nutrition evaluation despite encouragement by writer. Per RN, pt drowsy: not fully alert, but has sustained awakening. Previously on Precedex. Unsuccessful attempt x 2 at calling spouse. Baseline tolerance to chewing/swallowing and baseline provision of energy/nutrient intake unclear. No known food allergies.

## 2021-07-12 NOTE — DIETITIAN INITIAL EVALUATION ADULT. - PERTINENT LABORATORY DATA
(7/12): Hgb 9.7, Hct 29.6, POCT Blood Glucose 231, 259, 119  (7/11): Na 132, Glu 197, Phos 2.3  (7/10): A1c 7.3%, Estimated Average Glucose 163

## 2021-07-12 NOTE — DIETITIAN INITIAL EVALUATION ADULT. - REASON
mild body fat depletion to orbitals (based on visual observation). Unable to conduct Nutrition Focused Physical Assessment at this time.

## 2021-07-12 NOTE — DIETITIAN INITIAL EVALUATION ADULT. - ADD RECOMMEND
1. Obtain further subjective wt/diet history from pt/family as able. 2. Continue consistent carbohydrate diet; defer consistency to medical team, SLP. 3. Continue thiamine, folic acid; recommend multivitamin. 4. Monitor and encourage PO intake; staff to provide menu, feeding assistance PRN. 5. RD to add mighty shakes 3x daily with meals.

## 2021-07-12 NOTE — DIETITIAN INITIAL EVALUATION ADULT. - PERTINENT MEDS FT
Heparin, Hibiclens, Insulin Lispro Sliding Scale, Phenobarbital, Miralax, Seroquel, Lipitor, Precedex, Folic Acid, Thiamine

## 2021-07-12 NOTE — PROGRESS NOTE ADULT - SUBJECTIVE AND OBJECTIVE BOX
Patient seen and examined      Vital Signs Last 24 Hrs  T(C): 36.7 (11 Jul 2021 19:00), Max: 36.9 (11 Jul 2021 07:00)  T(F): 98.1 (11 Jul 2021 19:00), Max: 98.5 (11 Jul 2021 07:00)  HR: 75 (11 Jul 2021 20:00) (72 - 103)  BP: 108/63 (11 Jul 2021 20:00) (108/63 - 160/83)  BP(mean): 77 (11 Jul 2021 20:00) (73 - 104)  RR: 19 (11 Jul 2021 20:00) (12 - 22)  SpO2: 100% (11 Jul 2021 20:00) (98% - 100%)    Overnight events:  agitation  Exam:  Awake, Ox2, pupils 4 R b/l, FC, NAVARRETE strongly, agitated

## 2021-07-12 NOTE — PROGRESS NOTE ADULT - SUBJECTIVE AND OBJECTIVE BOX
HPI:  HPI:   Patient is a 76 year old male with DM2, not on AC, who presents as a Level 2 Trauma after [place] bus stop for altered mental status. Patient appears intoxicated, open eyes to voice, incomprehensible speech, localizing pain. History obtained from family due to patient's condition    Primary Survey:   A - airway intact  B - bilateral breath sounds and good chest rise  C - initial BP: 171/107 (07-09-21 @ 22:47), HR: 90 (07-09-21 @ 22:47) , palpable pulses in all extremities  D - GCS 10 (E3 V2 M5)  Exposure obtained    Secondary Survey:   General: intoxicated, NAD  HEENT: Normocephalic, left external ear canal blood noted; posterior scalp laceration, right orbital swelling, EOMI, PEERL  Neck: Soft, midline trachea, C-collar in place  Chest: No chest wall tenderness, thorax stable, no ecchymosis.   Cardiac: S1, S2, RRR.   Respiratory: Bilateral breath sounds, clear and equal bilaterally.   Abdomen: Soft, non-distended, non-tender, no rebound, no guarding, no ecchymosis.   Pelvis: Stable, non-tender.   Ext: palp radial b/l UE, b/l DP palp in Lower Extrem.   Back: no TTP, no palpable stepoff/deformity, no abrasions.   Rectal: No abhi blood (09 Jul 2021 23:16)    PAST MEDICAL & SURGICAL HISTORY:  DM (diabetes mellitus)    Allergies    Allergy Status Unknown    Intolerances            REVIEW OF SYSTEMS: [ ] Unable to Assess due to neurologic exam   [x ] All ROS addressed below are non-contributory, except:  Neuro: [ ] Headache [ ] Back pain [ ] Numbness [ ] Weakness [ ] Ataxia [ ] Dizziness [ ] Aphasia [ ] Dysarthria [ ] Visual disturbance  Resp: [ ] Shortness of breath/dyspnea, [ ] Orthopnea [ ] Cough  CV: [ ] Chest pain [ ] Palpitation [ ] Lightheadedness [ ] Syncope  Renal: [ ] Thirst [ ] Edema  GI: [ ] Nausea [ ] Emesis [ ] Abdominal pain [ ] Constipation [ ] Diarrhea  Hem: [ ] Hematemesis [ ] bright red blood per rectum  ID: [ ] Fever [ ] Chills [ ] Dysuria  ENT: [ ] Rhinorrhea        EVENTS:         ICU Vital Signs Last 24 Hrs  T(C): 36.8 (12 Jul 2021 03:00), Max: 36.9 (11 Jul 2021 07:00)  T(F): 98.2 (12 Jul 2021 03:00), Max: 98.5 (11 Jul 2021 07:00)  HR: 68 (12 Jul 2021 06:00) (68 - 103)  BP: 143/79 (12 Jul 2021 06:00) (108/63 - 194/96)  BP(mean): 97 (12 Jul 2021 06:00) (77 - 126)  ABP: --  ABP(mean): --  RR: 13 (12 Jul 2021 06:00) (13 - 22)  SpO2: 100% (12 Jul 2021 06:00) (96% - 100%)     07-10 @ 07:01  -  07-11 @ 07:00  --------------------------------------------------------  IN: 2824.2 mL / OUT: 1550 mL / NET: 1274.2 mL    07-11 @ 07:01  -  07-12 @ 06:51  --------------------------------------------------------  IN: 810.7 mL / OUT: 1900 mL / NET: -1089.3 mL                             9.5    7.53  )-----------( 245      ( 11 Jul 2021 23:34 )             28.3    07-11    132<L>  |  99  |  18  ----------------------------<  197<H>  3.9   |  21<L>  |  1.10    Ca    8.8      11 Jul 2021 23:34  Phos  2.3     07-11  Mg     2.3     07-11              PHYSICAL EXAM:    General: No Acute Distress     Neurological: Awake,  Following Commands, PERRL, EOMI, Face Symmetrical, Speech Fluent, Moving all extremities, Muscle Strength normal in all four extremities, No Drift, Sensation to Light Touch Intact    Pulmonary: Clear to Auscultation, No Rales, No Rhonchi, No Wheezes     Cardiovascular: S1, S2, Regular Rate and Rhythm     Gastrointestinal: Soft, Nontender, Nondistended     Extremities: No calf tenderness     Incision:       MEDICATIONS:  Antibiotics:      Neurological:   dexMEDEtomidine Infusion 0.3 MICROgram(s)/kG/Hr IV Continuous <Continuous>  PHENobarbital Injectable 64 milliGRAM(s) IV Push three times a day  QUEtiapine 25 milliGRAM(s) Oral every 12 hours    Cardiac:   hydrALAZINE Injectable 10 milliGRAM(s) IV Push once    Pulm:    Heme:   heparin   Injectable 5000 Unit(s) SubCutaneous every 12 hours    Other:   atorvastatin 40 milliGRAM(s) Oral at bedtime  chlorhexidine 4% Liquid 1 Application(s) Topical <User Schedule>  folic acid Injectable 1 milliGRAM(s) IV Push daily  insulin lispro (ADMELOG) corrective regimen sliding scale   SubCutaneous Before meals and at bedtime  polyethylene glycol 3350 17 Gram(s) Oral daily  thiamine Injectable 100 milliGRAM(s) IV Push daily  timolol 0.25% Solution 1 Drop(s) Both EYES at bedtime       DEVICES: [] Restraints [] MARITZA/HMV []LD [] ET tube [] Trach [] Chest Tube [] A-line [] Muñoz [] NGT [] Rectal Tube       A/P:  HPI:  HPI:   Patient is a 76 year old male with traumatic subdural hematoma and now with alcohol withdrawal       Neuro:   Respiratory:   CV:  Endocrine: DM   Heme/Onc:             DVT ppx:   Renal:   ID:   GI:   Social/Family:   Discharge planning:     Code Status: [] Full Code [] DNR [] DNI [] Goals of Care:   Disposition: [] ICU [] Stroke Unit [] RCU []PCU []Floor [] Discharge Home     Patient at high risk for neurologic deterioration, critical care time, excluding procedures: 45 minutes                    HPI:  HPI:   Patient is a 76 year old male with DM2, not on AC, who presents as a Level 2 Trauma after found [place] bus stop for altered mental status. Patient appears intoxicated, open eyes to voice, incomprehensible speech, localizing pain. History obtained from family due to patient's condition    Primary Survey:   A - airway intact  B - bilateral breath sounds and good chest rise  C - initial BP: 171/107 (07-09-21 @ 22:47), HR: 90 (07-09-21 @ 22:47) , palpable pulses in all extremities  D - GCS 10 (E3 V2 M5)  Exposure obtained    Secondary Survey:   General: intoxicated, NAD  HEENT: Normocephalic, left external ear canal blood noted; posterior scalp laceration, right orbital swelling, EOMI, PEERL  Neck: Soft, midline trachea, C-collar in place  Chest: No chest wall tenderness, thorax stable, no ecchymosis.   Cardiac: S1, S2, RRR.   Respiratory: Bilateral breath sounds, clear and equal bilaterally.   Abdomen: Soft, non-distended, non-tender, no rebound, no guarding, no ecchymosis.   Pelvis: Stable, non-tender.   Ext: palp radial b/l UE, b/l DP palp in Lower Extrem.   Back: no TTP, no palpable stepoff/deformity, no abrasions.   Rectal: No abhi blood (09 Jul 2021 23:16)    PAST MEDICAL & SURGICAL HISTORY:  DM (diabetes mellitus)    Allergies    Allergy Status Unknown    Intolerances            REVIEW OF SYSTEMS: [x ] Unable to Assess due to neurologic exam   [] All ROS addressed below are non-contributory, except:  Neuro: [ ] Headache [ ] Back pain [ ] Numbness [ ] Weakness [ ] Ataxia [ ] Dizziness [ ] Aphasia [ ] Dysarthria [ ] Visual disturbance  Resp: [ ] Shortness of breath/dyspnea, [ ] Orthopnea [ ] Cough  CV: [ ] Chest pain [ ] Palpitation [ ] Lightheadedness [ ] Syncope  Renal: [ ] Thirst [ ] Edema  GI: [ ] Nausea [ ] Emesis [ ] Abdominal pain [ ] Constipation [ ] Diarrhea  Hem: [ ] Hematemesis [ ] bright red blood per rectum  ID: [ ] Fever [ ] Chills [ ] Dysuria  ENT: [ ] Rhinorrhea        EVENTS:   aggressive overnight, placed on precedex       ICU Vital Signs Last 24 Hrs  T(C): 36.8 (12 Jul 2021 03:00), Max: 36.9 (11 Jul 2021 07:00)  T(F): 98.2 (12 Jul 2021 03:00), Max: 98.5 (11 Jul 2021 07:00)  HR: 68 (12 Jul 2021 06:00) (68 - 103)  BP: 143/79 (12 Jul 2021 06:00) (108/63 - 194/96)  BP(mean): 97 (12 Jul 2021 06:00) (77 - 126)  ABP: --  ABP(mean): --  RR: 13 (12 Jul 2021 06:00) (13 - 22)  SpO2: 100% (12 Jul 2021 06:00) (96% - 100%)     07-10 @ 07:01  -  07-11 @ 07:00  --------------------------------------------------------  IN: 2824.2 mL / OUT: 1550 mL / NET: 1274.2 mL    07-11 @ 07:01  -  07-12 @ 06:51  --------------------------------------------------------  IN: 810.7 mL / OUT: 1900 mL / NET: -1089.3 mL                             9.5    7.53  )-----------( 245      ( 11 Jul 2021 23:34 )             28.3    07-11    132<L>  |  99  |  18  ----------------------------<  197<H>  3.9   |  21<L>  |  1.10    Ca    8.8      11 Jul 2021 23:34  Phos  2.3     07-11  Mg     2.3     07-11              PHYSICAL EXAM:    General: No Acute Distress     Neurological: obtunded,  Following simple Commands, PERRL, EOMI, Face Symmetrical, Speech Fluent, Moving all extremities, Muscle Strength  at least antigravity   Pulmonary: Clear to Auscultation, No Rales, No Rhonchi, No Wheezes     Cardiovascular: S1, S2, Regular Rate and Rhythm     Gastrointestinal: Soft, Nontender, Nondistended     Extremities: No calf tenderness     Incision:       MEDICATIONS:  Antibiotics:      Neurological:   dexMEDEtomidine Infusion 0.3 MICROgram(s)/kG/Hr IV Continuous <Continuous>  PHENobarbital Injectable 64 milliGRAM(s) IV Push three times a day  QUEtiapine 25 milliGRAM(s) Oral every 12 hours    Cardiac:   hydrALAZINE Injectable 10 milliGRAM(s) IV Push once    Pulm:    Heme:   heparin   Injectable 5000 Unit(s) SubCutaneous every 12 hours    Other:   atorvastatin 40 milliGRAM(s) Oral at bedtime  chlorhexidine 4% Liquid 1 Application(s) Topical <User Schedule>  folic acid Injectable 1 milliGRAM(s) IV Push daily  insulin lispro (ADMELOG) corrective regimen sliding scale   SubCutaneous Before meals and at bedtime  polyethylene glycol 3350 17 Gram(s) Oral daily  thiamine Injectable 100 milliGRAM(s) IV Push daily  timolol 0.25% Solution 1 Drop(s) Both EYES at bedtime       DEVICES: [] Restraints [] MARITZA/HMV []LD [] ET tube [] Trach [] Chest Tube [] A-line [] Muñoz [] NGT [] Rectal Tube       A/P:  Patient is a 76 year old male with traumatic subdural hematoma with brain compression  and now with alcohol withdrawal     Neuro: neuro checks q 4 hr   phenobarbital taper over 7 days   CIWA   seroquel 25 mg q 12 hr   d/c precedex   thiamine and multivitamins   Respiratory: RA  CV: SBP goal 100 -180 mmhg      TTE pending   Endocrine: DM hyperglycemia   finger sticks ISS  if sugar remains elevated will start lantus   Heme/Onc:   cbc          DVT ppx: SCD, heparin sc 40 mg   Renal: LR 75 ml/hr   ID: afebrile   GI: advance diet as tolerated   senna and miralax  Social/Family:   Discharge planning:     Code Status: [x] Full Code [] DNR [] DNI [] Goals of Care:   Disposition: [x] ICU [] Stroke Unit [] RCU []PCU []Floor [] Discharge Home     moderate complex medical decision                     HPI:  HPI:   Patient is a 76 year old male with DM2, not on AC, who presents as a Level 2 Trauma after found [place] bus stop for altered mental status. Patient appears intoxicated, open eyes to voice, incomprehensible speech, localizing pain. History obtained from family due to patient's condition    Primary Survey:   A - airway intact  B - bilateral breath sounds and good chest rise  C - initial BP: 171/107 (07-09-21 @ 22:47), HR: 90 (07-09-21 @ 22:47) , palpable pulses in all extremities  D - GCS 10 (E3 V2 M5)  Exposure obtained    Secondary Survey:   General: intoxicated, NAD  HEENT: Normocephalic, left external ear canal blood noted; posterior scalp laceration, right orbital swelling, EOMI, PEERL  Neck: Soft, midline trachea, C-collar in place  Chest: No chest wall tenderness, thorax stable, no ecchymosis.   Cardiac: S1, S2, RRR.   Respiratory: Bilateral breath sounds, clear and equal bilaterally.   Abdomen: Soft, non-distended, non-tender, no rebound, no guarding, no ecchymosis.   Pelvis: Stable, non-tender.   Ext: palp radial b/l UE, b/l DP palp in Lower Extrem.   Back: no TTP, no palpable stepoff/deformity, no abrasions.   Rectal: No abhi blood (09 Jul 2021 23:16)    PAST MEDICAL & SURGICAL HISTORY:  DM (diabetes mellitus)    Allergies    Allergy Status Unknown    Intolerances            REVIEW OF SYSTEMS: [x ] Unable to Assess due to neurologic exam   [] All ROS addressed below are non-contributory, except:  Neuro: [ ] Headache [ ] Back pain [ ] Numbness [ ] Weakness [ ] Ataxia [ ] Dizziness [ ] Aphasia [ ] Dysarthria [ ] Visual disturbance  Resp: [ ] Shortness of breath/dyspnea, [ ] Orthopnea [ ] Cough  CV: [ ] Chest pain [ ] Palpitation [ ] Lightheadedness [ ] Syncope  Renal: [ ] Thirst [ ] Edema  GI: [ ] Nausea [ ] Emesis [ ] Abdominal pain [ ] Constipation [ ] Diarrhea  Hem: [ ] Hematemesis [ ] bright red blood per rectum  ID: [ ] Fever [ ] Chills [ ] Dysuria  ENT: [ ] Rhinorrhea        EVENTS:   aggressive overnight, placed on precedex       ICU Vital Signs Last 24 Hrs  T(C): 36.8 (12 Jul 2021 03:00), Max: 36.9 (11 Jul 2021 07:00)  T(F): 98.2 (12 Jul 2021 03:00), Max: 98.5 (11 Jul 2021 07:00)  HR: 68 (12 Jul 2021 06:00) (68 - 103)  BP: 143/79 (12 Jul 2021 06:00) (108/63 - 194/96)  BP(mean): 97 (12 Jul 2021 06:00) (77 - 126)  ABP: --  ABP(mean): --  RR: 13 (12 Jul 2021 06:00) (13 - 22)  SpO2: 100% (12 Jul 2021 06:00) (96% - 100%)     07-10 @ 07:01  -  07-11 @ 07:00  --------------------------------------------------------  IN: 2824.2 mL / OUT: 1550 mL / NET: 1274.2 mL    07-11 @ 07:01  -  07-12 @ 06:51  --------------------------------------------------------  IN: 810.7 mL / OUT: 1900 mL / NET: -1089.3 mL                             9.5    7.53  )-----------( 245      ( 11 Jul 2021 23:34 )             28.3    07-11    132<L>  |  99  |  18  ----------------------------<  197<H>  3.9   |  21<L>  |  1.10    Ca    8.8      11 Jul 2021 23:34  Phos  2.3     07-11  Mg     2.3     07-11              PHYSICAL EXAM:    General: No Acute Distress     Neurological: obtunded,  Following simple Commands, PERRL, EOMI, Face Symmetrical, Speech Fluent, Moving all extremities, Muscle Strength  at least antigravity   Pulmonary: Clear to Auscultation, No Rales, No Rhonchi, No Wheezes     Cardiovascular: S1, S2, Regular Rate and Rhythm     Gastrointestinal: Soft, Nontender, Nondistended     Extremities: No calf tenderness     Incision:       MEDICATIONS:  Antibiotics:      Neurological:   dexMEDEtomidine Infusion 0.3 MICROgram(s)/kG/Hr IV Continuous <Continuous>  PHENobarbital Injectable 64 milliGRAM(s) IV Push three times a day  QUEtiapine 25 milliGRAM(s) Oral every 12 hours    Cardiac:   hydrALAZINE Injectable 10 milliGRAM(s) IV Push once    Pulm:    Heme:   heparin   Injectable 5000 Unit(s) SubCutaneous every 12 hours    Other:   atorvastatin 40 milliGRAM(s) Oral at bedtime  chlorhexidine 4% Liquid 1 Application(s) Topical <User Schedule>  folic acid Injectable 1 milliGRAM(s) IV Push daily  insulin lispro (ADMELOG) corrective regimen sliding scale   SubCutaneous Before meals and at bedtime  polyethylene glycol 3350 17 Gram(s) Oral daily  thiamine Injectable 100 milliGRAM(s) IV Push daily  timolol 0.25% Solution 1 Drop(s) Both EYES at bedtime       DEVICES: [] Restraints [] MARITZA/HMV []LD [] ET tube [] Trach [] Chest Tube [] A-line [] Muñoz [] NGT [] Rectal Tube       A/P:  Patient is a 76 year old male with traumatic subdural hematoma with brain compression  and now with alcohol withdrawal     Neuro: neuro checks q 4 hr   phenobarbital taper over 7 days   CIWA   seroquel 25 mg q 12 hr   d/c precedex   thiamine and multivitamins   Respiratory: RA  CV: SBP goal 100 -180 mmhg      TTE pending   Endocrine: DM hyperglycemia   finger sticks ISS  if sugar remains elevated will start lantus   Heme/Onc:   cbc          DVT ppx: SCD, heparin sc 40 mg   Renal: SIADH  NS 75 ml/hr as he is NPO  restrict free water intake to <1L   ID: afebrile   GI: advance diet as tolerated   senna and miralax  Social/Family:   Discharge planning:     Code Status: [x] Full Code [] DNR [] DNI [] Goals of Care:   Disposition: [x] ICU [] Stroke Unit [] RCU []PCU []Floor [] Discharge Home     moderate complex medical decision

## 2021-07-12 NOTE — PATIENT PROFILE ADULT - NSPRESCRUSEDDRG_GEN_A_NUR
Elly returns call to clinic.  Nurse is unavailable.  Please try calling again.      pt A&Ox1- unable to respond

## 2021-07-13 DIAGNOSIS — Z29.9 ENCOUNTER FOR PROPHYLACTIC MEASURES, UNSPECIFIED: ICD-10-CM

## 2021-07-13 DIAGNOSIS — E87.1 HYPO-OSMOLALITY AND HYPONATREMIA: ICD-10-CM

## 2021-07-13 DIAGNOSIS — F10.239 ALCOHOL DEPENDENCE WITH WITHDRAWAL, UNSPECIFIED: ICD-10-CM

## 2021-07-13 DIAGNOSIS — S06.9X9A UNSPECIFIED INTRACRANIAL INJURY WITH LOSS OF CONSCIOUSNESS OF UNSPECIFIED DURATION, INITIAL ENCOUNTER: ICD-10-CM

## 2021-07-13 DIAGNOSIS — I10 ESSENTIAL (PRIMARY) HYPERTENSION: ICD-10-CM

## 2021-07-13 DIAGNOSIS — Z79.899 OTHER LONG TERM (CURRENT) DRUG THERAPY: ICD-10-CM

## 2021-07-13 DIAGNOSIS — E11.9 TYPE 2 DIABETES MELLITUS WITHOUT COMPLICATIONS: ICD-10-CM

## 2021-07-13 LAB
ANION GAP SERPL CALC-SCNC: 16 MMOL/L — SIGNIFICANT CHANGE UP (ref 5–17)
BUN SERPL-MCNC: 15 MG/DL — SIGNIFICANT CHANGE UP (ref 7–23)
CALCIUM SERPL-MCNC: 9.3 MG/DL — SIGNIFICANT CHANGE UP (ref 8.4–10.5)
CHLORIDE SERPL-SCNC: 99 MMOL/L — SIGNIFICANT CHANGE UP (ref 96–108)
CO2 SERPL-SCNC: 19 MMOL/L — LOW (ref 22–31)
CREAT SERPL-MCNC: 1.02 MG/DL — SIGNIFICANT CHANGE UP (ref 0.5–1.3)
GLUCOSE BLDC GLUCOMTR-MCNC: 177 MG/DL — HIGH (ref 70–99)
GLUCOSE BLDC GLUCOMTR-MCNC: 179 MG/DL — HIGH (ref 70–99)
GLUCOSE BLDC GLUCOMTR-MCNC: 192 MG/DL — HIGH (ref 70–99)
GLUCOSE BLDC GLUCOMTR-MCNC: 209 MG/DL — HIGH (ref 70–99)
GLUCOSE BLDC GLUCOMTR-MCNC: 227 MG/DL — HIGH (ref 70–99)
GLUCOSE SERPL-MCNC: 161 MG/DL — HIGH (ref 70–99)
MAGNESIUM SERPL-MCNC: 2.1 MG/DL — SIGNIFICANT CHANGE UP (ref 1.6–2.6)
OSMOLALITY SERPL: 299 MOSMOL/KG — SIGNIFICANT CHANGE UP (ref 280–301)
OSMOLALITY UR: 555 MOS/KG — SIGNIFICANT CHANGE UP (ref 300–900)
PHOSPHATE SERPL-MCNC: 3.1 MG/DL — SIGNIFICANT CHANGE UP (ref 2.5–4.5)
POTASSIUM SERPL-MCNC: 4.3 MMOL/L — SIGNIFICANT CHANGE UP (ref 3.5–5.3)
POTASSIUM SERPL-SCNC: 4.3 MMOL/L — SIGNIFICANT CHANGE UP (ref 3.5–5.3)
SODIUM SERPL-SCNC: 134 MMOL/L — LOW (ref 135–145)
SODIUM UR-SCNC: 70 MMOL/L — SIGNIFICANT CHANGE UP

## 2021-07-13 PROCEDURE — 99233 SBSQ HOSP IP/OBS HIGH 50: CPT

## 2021-07-13 PROCEDURE — 99223 1ST HOSP IP/OBS HIGH 75: CPT

## 2021-07-13 PROCEDURE — 99222 1ST HOSP IP/OBS MODERATE 55: CPT

## 2021-07-13 PROCEDURE — 70450 CT HEAD/BRAIN W/O DYE: CPT | Mod: 26

## 2021-07-13 RX ORDER — LABETALOL HCL 100 MG
10 TABLET ORAL ONCE
Refills: 0 | Status: COMPLETED | OUTPATIENT
Start: 2021-07-13 | End: 2021-07-13

## 2021-07-13 RX ORDER — LABETALOL HCL 100 MG
5 TABLET ORAL ONCE
Refills: 0 | Status: COMPLETED | OUTPATIENT
Start: 2021-07-13 | End: 2021-07-13

## 2021-07-13 RX ORDER — ONDANSETRON 8 MG/1
8 TABLET, FILM COATED ORAL
Refills: 0 | Status: DISCONTINUED | OUTPATIENT
Start: 2021-07-13 | End: 2021-07-22

## 2021-07-13 RX ORDER — SODIUM CHLORIDE 9 MG/ML
1000 INJECTION INTRAMUSCULAR; INTRAVENOUS; SUBCUTANEOUS
Refills: 0 | Status: DISCONTINUED | OUTPATIENT
Start: 2021-07-13 | End: 2021-07-23

## 2021-07-13 RX ORDER — HEPARIN SODIUM 5000 [USP'U]/ML
5000 INJECTION INTRAVENOUS; SUBCUTANEOUS EVERY 12 HOURS
Refills: 0 | Status: DISCONTINUED | OUTPATIENT
Start: 2021-07-13 | End: 2021-07-29

## 2021-07-13 RX ORDER — HYDRALAZINE HCL 50 MG
10 TABLET ORAL ONCE
Refills: 0 | Status: DISCONTINUED | OUTPATIENT
Start: 2021-07-13 | End: 2021-07-13

## 2021-07-13 RX ORDER — POTASSIUM PHOSPHATE, MONOBASIC POTASSIUM PHOSPHATE, DIBASIC 236; 224 MG/ML; MG/ML
15 INJECTION, SOLUTION INTRAVENOUS ONCE
Refills: 0 | Status: COMPLETED | OUTPATIENT
Start: 2021-07-13 | End: 2021-07-13

## 2021-07-13 RX ADMIN — Medication 64 MILLIGRAM(S): at 06:06

## 2021-07-13 RX ADMIN — Medication 10 MILLIGRAM(S): at 11:27

## 2021-07-13 RX ADMIN — HEPARIN SODIUM 5000 UNIT(S): 5000 INJECTION INTRAVENOUS; SUBCUTANEOUS at 17:48

## 2021-07-13 RX ADMIN — Medication 2: at 09:01

## 2021-07-13 RX ADMIN — Medication 1 MILLIGRAM(S): at 17:00

## 2021-07-13 RX ADMIN — POTASSIUM PHOSPHATE, MONOBASIC POTASSIUM PHOSPHATE, DIBASIC 62.5 MILLIMOLE(S): 236; 224 INJECTION, SOLUTION INTRAVENOUS at 06:05

## 2021-07-13 RX ADMIN — Medication 100 MILLIGRAM(S): at 12:21

## 2021-07-13 RX ADMIN — Medication 4: at 21:16

## 2021-07-13 RX ADMIN — Medication 1 MILLIGRAM(S): at 11:05

## 2021-07-13 RX ADMIN — SODIUM CHLORIDE 50 MILLILITER(S): 9 INJECTION INTRAMUSCULAR; INTRAVENOUS; SUBCUTANEOUS at 21:05

## 2021-07-13 RX ADMIN — Medication 2: at 12:21

## 2021-07-13 RX ADMIN — Medication 4: at 17:47

## 2021-07-13 RX ADMIN — Medication 1 MILLIGRAM(S): at 12:21

## 2021-07-13 RX ADMIN — Medication 64 MILLIGRAM(S): at 17:48

## 2021-07-13 RX ADMIN — Medication 1 MILLIGRAM(S): at 08:05

## 2021-07-13 RX ADMIN — Medication 5 MILLIGRAM(S): at 21:05

## 2021-07-13 RX ADMIN — Medication 1 DROP(S): at 21:17

## 2021-07-13 NOTE — PROGRESS NOTE ADULT - SUBJECTIVE AND OBJECTIVE BOX
HPI:  HPI:   Patient is a 76 year old male with DM2, not on AC, who presents as a Level 2 Trauma after [place] bus stop for altered mental status. Patient appears intoxicated, open eyes to voice, incomprehensible speech, localizing pain. History obtained from family due to patient's condition    Primary Survey:   A - airway intact  B - bilateral breath sounds and good chest rise  C - initial BP: 171/107 (07-09-21 @ 22:47), HR: 90 (07-09-21 @ 22:47) , palpable pulses in all extremities  D - GCS 10 (E3 V2 M5)  Exposure obtained    Secondary Survey:   General: intoxicated, NAD  HEENT: Normocephalic, left external ear canal blood noted; posterior scalp laceration, right orbital swelling, EOMI, PEERL  Neck: Soft, midline trachea, C-collar in place  Chest: No chest wall tenderness, thorax stable, no ecchymosis.   Cardiac: S1, S2, RRR.   Respiratory: Bilateral breath sounds, clear and equal bilaterally.   Abdomen: Soft, non-distended, non-tender, no rebound, no guarding, no ecchymosis.   Pelvis: Stable, non-tender.   Ext: palp radial b/l UE, b/l DP palp in Lower Extrem.   Back: no TTP, no palpable stepoff/deformity, no abrasions.   Rectal: No abhi blood (09 Jul 2021 23:16)    OVERNIGHT EVENTS:   No acute events overnight.    VITALS:  T(C): , Max: 37.3 (07-13-21 @ 03:00)  HR:  (74 - 109)  BP:  (138/83 - 176/88)  ABP: --  RR:  (12 - 23)  SpO2:  (95% - 100%)  Wt(kg): --      07-12-21 @ 07:01  -  07-13-21 @ 07:00  --------------------------------------------------------  IN: 844.1 mL / OUT: 1750 mL / NET: -905.9 mL      LABS:  Na: 132 (07-12 @ 22:39), 132 (07-11 @ 23:34), 132 (07-10 @ 20:05)  K: 3.8 (07-12 @ 22:39), 3.9 (07-11 @ 23:34), 4.3 (07-10 @ 20:05)  Cl: 98 (07-12 @ 22:39), 99 (07-11 @ 23:34), 100 (07-10 @ 20:05)  CO2: 18 (07-12 @ 22:39), 21 (07-11 @ 23:34), 16 (07-10 @ 20:05)  BUN: 13 (07-12 @ 22:39), 18 (07-11 @ 23:34), 19 (07-10 @ 20:05)  Cr: 1.04 (07-12 @ 22:39), 1.10 (07-11 @ 23:34), 0.93 (07-10 @ 20:05)  Glu: 139(07-12 @ 22:39), 197(07-11 @ 23:34), 111(07-10 @ 20:05)    Hgb: 10.1 (07-12 @ 22:39), 9.7 (07-12 @ 06:35), 9.5 (07-11 @ 23:34), 11.3 (07-10 @ 20:05)  Hct: 30.8 (07-12 @ 22:39), 29.6 (07-12 @ 06:35), 28.3 (07-11 @ 23:34), 34.0 (07-10 @ 20:05)  WBC: 8.04 (07-12 @ 22:39), 6.63 (07-12 @ 06:35), 7.53 (07-11 @ 23:34), 9.92 (07-10 @ 20:05)  Plt: 272 (07-12 @ 22:39), 227 (07-12 @ 06:35), 245 (07-11 @ 23:34), 259 (07-10 @ 20:05)    INR:   PTT:     IMAGING:   Recent imaging studies were reviewed.    MEDICATIONS:  atorvastatin 40 milliGRAM(s) Oral at bedtime  chlorhexidine 4% Liquid 1 Application(s) Topical <User Schedule>  folic acid Injectable 1 milliGRAM(s) IV Push daily  insulin lispro (ADMELOG) corrective regimen sliding scale   SubCutaneous Before meals and at bedtime  LORazepam   Injectable 1 milliGRAM(s) IV Push every 2 hours PRN  PHENobarbital Injectable 64 milliGRAM(s) IV Push two times a day  polyethylene glycol 3350 17 Gram(s) Oral daily  thiamine Injectable 100 milliGRAM(s) IV Push daily  timolol 0.25% Solution 1 Drop(s) Both EYES at bedtime    EXAMINATION:  General: No Acute Distress     Neurological: obtunded,  Following simple Commands, PERRL, EOMI, Face Symmetrical, Speech Fluent, Moving all extremities, Muscle Strength  at least antigravity   Pulmonary: Clear to Auscultation, No Rales, No Rhonchi, No Wheezes     Cardiovascular: S1, S2, Regular Rate and Rhythm     Gastrointestinal: Soft, Nontender, Nondistended     Extremities: No calf tenderness

## 2021-07-13 NOTE — CONSULT NOTE ADULT - ASSESSMENT
Patient is a 76 year old male with PMH of Diabetes Mellitus type 2, initially presented as a Level 2 Trauma after found down at a bus stop with altered mental status.  CT scan on admission demonstrated bifrontal SAH, a R SDH, a L parietal SAH with pneumocephalus, a L possible parietal non-displaced skull fracture, and a C6 inferior endplate fracture into disc space with air into the canal. Patient was monitored in the NSICU, now being transferred to the floors.

## 2021-07-13 NOTE — CONSULT NOTE ADULT - PROBLEM SELECTOR RECOMMENDATION 9
Off precedex  Continue phenobarb taper  CIWA protocol  Continue thiamine, MV, folic acid qd   Continue to monitor  -Psych eval 7/11: does not have capacity to leave AMA or refuse critical care
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
## 2021-07-13 NOTE — CONSULT NOTE ADULT - PROBLEM SELECTOR RECOMMENDATION 6
DVT PPX: SCDs, heparin subcu  PT eval DVT PPX: SCDs, heparin subcu  PT eval. Fall precautions, aspiration precautions  Bowel regimen    Discussed with NSICU team DVT PPX: SCDs, heparin subcu  PT eval. PM&R rec acute rehab  Fall precautions, aspiration precautions  Bowel regimen    Discussed with NSICU team

## 2021-07-13 NOTE — PROGRESS NOTE ADULT - ASSESSMENT
Patient seen and examined at bedside.  Exam:  Awake, Ox2, pupils 4 R b/l, FC, NAVARRETE strongly, agitated  HCT in am, if stable restart DVT ppx  CIWA-on phenobarb  Collar for comfort per ortho  agitation mgmt prior to xfer floor

## 2021-07-13 NOTE — CONSULT NOTE ADULT - PROBLEM SELECTOR RECOMMENDATION 7
Per daughter patient gets medications from [facility]. Per pharmacy current home meds include:  Janumet XR  1 tab bid  Glipizide 10 ER 1 tab bid  Enalapril 20mg po qd  Atorvastatin 40mg po hs  Timolol eye drops 1 drop each eye qd

## 2021-07-13 NOTE — PROGRESS NOTE ADULT - ASSESSMENT
A/P:  Patient is a 76 year old male with traumatic subdural hematoma with brain compression  and now with alcohol withdrawal     Neuro: neuro checks q 4 hr   phenobarbital taper over 7 days   CIWA   thiamine and multivitamins   Respiratory: RA  CV: SBP goal 100 -180 mmhg      TTE pending   Endocrine: DM hyperglycemia   finger sticks ISS  Heme/Onc:   cbc          DVT ppx: SCD, heparin sc 40 mg   Renal: SIADH  NS 50 ml/hr   ID: afebrile   GI: CCD  senna and miralax  Social/Family:   Discharge planning:     Code Status: [x] Full Code [] DNR [] DNI [] Goals of Care:   Disposition: [] ICU [] Stroke Unit [] RCU []PCU [X]Floor [] Discharge Home     moderate complex medical decision

## 2021-07-13 NOTE — PROGRESS NOTE ADULT - ATTENDING COMMENTS
Repeat imaging, [facility] protocol.
Stable scans, no neurosurgical intervention required.
repeat CT in AM to eval for heme progression.  precedex GTT as needed for agitation.

## 2021-07-13 NOTE — PROGRESS NOTE ADULT - THIS PATIENT HAS THE FOLLOWING CONDITION(S)/DIAGNOSES ON THIS ADMISSION:
Brain Compression / Herniation
Cerebral Edema/Brain Compression / Herniation
Brain Compression / Herniation
Brain Compression / Herniation
None
Cerebral Edema/Brain Compression / Herniation
None

## 2021-07-13 NOTE — CONSULT NOTE ADULT - PROBLEM SELECTOR RECOMMENDATION 5
SBP goal 100-180  Continue home med Lipitor   Consider resuming home med Enalapril if above goal   Continue to monitor
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
## 2021-07-13 NOTE — CONSULT NOTE ADULT - PROBLEM SELECTOR RECOMMENDATION 2
#R acute SDH, R temporal SDH, bilateral frontal lobe SAH, bilateral temporal SAH, L posterior parietal/occipital SAH, calvarial fracture  #Cervical spine injury -Possible non-displaced Fx along L posterolateral inferior endplate of C6  - Likely fall while intoxicated  - Seen by ortho. Continue cervical collar   - Continue care per neurosurgery team  - Neuro checks q4h. Monitor closely for worsening brain compression and need for decompression  - Outpatient f/u after discharge with Dr. Vickers, Dr. Mckeon

## 2021-07-13 NOTE — PROGRESS NOTE ADULT - SUBJECTIVE AND OBJECTIVE BOX
Patient seen and examined      Vital Signs Last 24 Hrs  T(C): 37 (12 Jul 2021 23:00), Max: 37 (12 Jul 2021 11:00)  T(F): 98.6 (12 Jul 2021 23:00), Max: 98.6 (12 Jul 2021 11:00)  HR: 98 (12 Jul 2021 23:00) (62 - 107)  BP: 138/83 (12 Jul 2021 23:00) (115/68 - 194/96)  BP(mean): 99 (12 Jul 2021 23:00) (82 - 128)  RR: 19 (12 Jul 2021 23:00) (12 - 23)  SpO2: 100% (12 Jul 2021 23:00) (95% - 100%)      Exam:  Awake, Ox2, pupils 4 R b/l, FC, NAVARRETE strongly, agitated

## 2021-07-14 LAB
ANION GAP SERPL CALC-SCNC: 19 MMOL/L — HIGH (ref 5–17)
BUN SERPL-MCNC: 13 MG/DL — SIGNIFICANT CHANGE UP (ref 7–23)
CALCIUM SERPL-MCNC: 9.7 MG/DL — SIGNIFICANT CHANGE UP (ref 8.4–10.5)
CHLORIDE SERPL-SCNC: 100 MMOL/L — SIGNIFICANT CHANGE UP (ref 96–108)
CO2 SERPL-SCNC: 17 MMOL/L — LOW (ref 22–31)
CREAT SERPL-MCNC: 1.01 MG/DL — SIGNIFICANT CHANGE UP (ref 0.5–1.3)
GLUCOSE BLDC GLUCOMTR-MCNC: 172 MG/DL — HIGH (ref 70–99)
GLUCOSE BLDC GLUCOMTR-MCNC: 210 MG/DL — HIGH (ref 70–99)
GLUCOSE BLDC GLUCOMTR-MCNC: 216 MG/DL — HIGH (ref 70–99)
GLUCOSE BLDC GLUCOMTR-MCNC: 220 MG/DL — HIGH (ref 70–99)
GLUCOSE BLDC GLUCOMTR-MCNC: 259 MG/DL — HIGH (ref 70–99)
GLUCOSE SERPL-MCNC: 226 MG/DL — HIGH (ref 70–99)
HCT VFR BLD CALC: 32.7 % — LOW (ref 39–50)
HGB BLD-MCNC: 10.5 G/DL — LOW (ref 13–17)
MAGNESIUM SERPL-MCNC: 1.9 MG/DL — SIGNIFICANT CHANGE UP (ref 1.6–2.6)
MCHC RBC-ENTMCNC: 31.8 PG — SIGNIFICANT CHANGE UP (ref 27–34)
MCHC RBC-ENTMCNC: 32.1 GM/DL — SIGNIFICANT CHANGE UP (ref 32–36)
MCV RBC AUTO: 99.1 FL — SIGNIFICANT CHANGE UP (ref 80–100)
NRBC # BLD: 0 /100 WBCS — SIGNIFICANT CHANGE UP (ref 0–0)
PHOSPHATE SERPL-MCNC: 2.9 MG/DL — SIGNIFICANT CHANGE UP (ref 2.5–4.5)
PLATELET # BLD AUTO: 274 K/UL — SIGNIFICANT CHANGE UP (ref 150–400)
POTASSIUM SERPL-MCNC: 4.1 MMOL/L — SIGNIFICANT CHANGE UP (ref 3.5–5.3)
POTASSIUM SERPL-SCNC: 4.1 MMOL/L — SIGNIFICANT CHANGE UP (ref 3.5–5.3)
RBC # BLD: 3.3 M/UL — LOW (ref 4.2–5.8)
RBC # FLD: 11.9 % — SIGNIFICANT CHANGE UP (ref 10.3–14.5)
SODIUM SERPL-SCNC: 136 MMOL/L — SIGNIFICANT CHANGE UP (ref 135–145)
T4 FREE SERPL-MCNC: 1.5 NG/DL — SIGNIFICANT CHANGE UP (ref 0.9–1.8)
TSH SERPL-MCNC: 0.61 UIU/ML — SIGNIFICANT CHANGE UP (ref 0.27–4.2)
WBC # BLD: 7.05 K/UL — SIGNIFICANT CHANGE UP (ref 3.8–10.5)
WBC # FLD AUTO: 7.05 K/UL — SIGNIFICANT CHANGE UP (ref 3.8–10.5)

## 2021-07-14 PROCEDURE — 99233 SBSQ HOSP IP/OBS HIGH 50: CPT

## 2021-07-14 RX ORDER — LISINOPRIL 2.5 MG/1
40 TABLET ORAL DAILY
Refills: 0 | Status: DISCONTINUED | OUTPATIENT
Start: 2021-07-14 | End: 2021-07-16

## 2021-07-14 RX ADMIN — SODIUM CHLORIDE 50 MILLILITER(S): 9 INJECTION INTRAMUSCULAR; INTRAVENOUS; SUBCUTANEOUS at 17:44

## 2021-07-14 RX ADMIN — HEPARIN SODIUM 5000 UNIT(S): 5000 INJECTION INTRAVENOUS; SUBCUTANEOUS at 17:43

## 2021-07-14 RX ADMIN — Medication 32 MILLIGRAM(S): at 21:00

## 2021-07-14 RX ADMIN — ONDANSETRON 8 MILLIGRAM(S): 8 TABLET, FILM COATED ORAL at 17:43

## 2021-07-14 RX ADMIN — Medication 100 MILLIGRAM(S): at 11:07

## 2021-07-14 RX ADMIN — Medication 1 DROP(S): at 21:01

## 2021-07-14 RX ADMIN — Medication 1 MILLIGRAM(S): at 11:06

## 2021-07-14 RX ADMIN — Medication 4: at 08:23

## 2021-07-14 RX ADMIN — HEPARIN SODIUM 5000 UNIT(S): 5000 INJECTION INTRAVENOUS; SUBCUTANEOUS at 06:54

## 2021-07-14 RX ADMIN — Medication 4: at 17:43

## 2021-07-14 RX ADMIN — Medication 2: at 22:18

## 2021-07-14 RX ADMIN — ATORVASTATIN CALCIUM 40 MILLIGRAM(S): 80 TABLET, FILM COATED ORAL at 21:01

## 2021-07-14 RX ADMIN — ONDANSETRON 8 MILLIGRAM(S): 8 TABLET, FILM COATED ORAL at 06:54

## 2021-07-14 RX ADMIN — Medication 32 MILLIGRAM(S): at 08:16

## 2021-07-14 RX ADMIN — POLYETHYLENE GLYCOL 3350 17 GRAM(S): 17 POWDER, FOR SOLUTION ORAL at 12:46

## 2021-07-14 RX ADMIN — Medication 6: at 12:45

## 2021-07-14 RX ADMIN — LISINOPRIL 40 MILLIGRAM(S): 2.5 TABLET ORAL at 16:25

## 2021-07-14 NOTE — PROGRESS NOTE ADULT - SUBJECTIVE AND OBJECTIVE BOX
Patient is a 76y old  Male who presents with a chief complaint of Intracranial hemorrhage (14 Jul 2021 14:15)      INTERVAL HPI/OVERNIGHT EVENTS: seen and examined, easily arousable , but confuse and lithargic   T(C): 37.1 (07-14-21 @ 20:18), Max: 37.2 (07-14-21 @ 06:45)  HR: 100 (07-14-21 @ 20:18) (85 - 103)  BP: 183/91 (07-14-21 @ 20:18) (163/87 - 200/99)  RR: 18 (07-14-21 @ 20:18) (17 - 20)  SpO2: 98% (07-14-21 @ 20:18) (95% - 100%)  Wt(kg): --  I&O's Summary    13 Jul 2021 07:01  -  14 Jul 2021 07:00  --------------------------------------------------------  IN: 900 mL / OUT: 800 mL / NET: 100 mL    14 Jul 2021 07:01  -  14 Jul 2021 20:52  --------------------------------------------------------  IN: 795 mL / OUT: 700 mL / NET: 95 mL        PAST MEDICAL & SURGICAL HISTORY:  DM (diabetes mellitus)        SOCIAL HISTORY  Alcohol:  Tobacco:  Illicit substance use:    FAMILY HISTORY:    REVIEW OF SYSTEMS:  CONSTITUTIONAL: No fever, weight loss, or fatigue  EYES: No eye pain, visual disturbances, or discharge  ENMT:  No difficulty hearing, tinnitus, vertigo; No sinus or throat pain  NECK: No pain or stiffness  RESPIRATORY: No cough, wheezing, chills or hemoptysis; No shortness of breath  CARDIOVASCULAR: No chest pain, palpitations, dizziness, or leg swelling  GASTROINTESTINAL: No abdominal or epigastric pain. No nausea, vomiting, or hematemesis; No diarrhea or constipation. No melena or hematochezia.  GENITOURINARY: No dysuria, frequency, hematuria, or incontinence  NEUROLOGICAL: No headaches, memory loss, loss of strength, numbness, or tremors  SKIN: No itching, burning, rashes, or lesions   LYMPH NODES: No enlarged glands  ENDOCRINE: No heat or cold intolerance; No hair loss  MUSCULOSKELETAL: No joint pain or swelling; No muscle, back, or extremity pain  PSYCHIATRIC: No depression, anxiety, mood swings, or difficulty sleeping  HEME/LYMPH: No easy bruising, or bleeding gums  ALLERY AND IMMUNOLOGIC: No hives or eczema    RADIOLOGY & ADDITIONAL TESTS:    Imaging Personally Reviewed:  [ ] YES  [ ] NO    Consultant(s) Notes Reviewed:  [ ] YES  [ ] NO    PHYSICAL EXAM:  GENERAL: NAD, well-groomed, well-developed  HEAD:  Atraumatic, Normocephalic  EYES: EOMI, PERRLA, conjunctiva and sclera clear  ENMT: No tonsillar erythema, exudates, or enlargement; Moist mucous membranes, Good dentition, No lesions  NECK: Supple, No JVD, Normal thyroid  NERVOUS SYSTEM:  Alert & Oriented X3, Good concentration; Motor Strength 5/5 B/L upper and lower extremities; DTRs 2+ intact and symmetric  CHEST/LUNG: Clear to percussion bilaterally; No rales, rhonchi, wheezing, or rubs  HEART: Regular rate and rhythm; No murmurs, rubs, or gallops  ABDOMEN: Soft, Nontender, Nondistended; Bowel sounds present  EXTREMITIES:  2+ Peripheral Pulses, No clubbing, cyanosis, or edema  LYMPH: No lymphadenopathy noted  SKIN: No rashes or lesions    LABS:                        10.5   7.05  )-----------( 274      ( 14 Jul 2021 10:08 )             32.7     07-14    136  |  100  |  13  ----------------------------<  226<H>  4.1   |  17<L>  |  1.01    Ca    9.7      14 Jul 2021 10:08  Phos  2.9     07-14  Mg     1.9     07-14    TPro  6.9  /  Alb  3.6  /  TBili  0.4  /  DBili  0.1  /  AST  19  /  ALT  11  /  AlkPhos  112  07-12        CAPILLARY BLOOD GLUCOSE      POCT Blood Glucose.: 220 mg/dL (14 Jul 2021 17:17)  POCT Blood Glucose.: 259 mg/dL (14 Jul 2021 12:31)  POCT Blood Glucose.: 216 mg/dL (14 Jul 2021 08:22)  POCT Blood Glucose.: 210 mg/dL (14 Jul 2021 06:23)  POCT Blood Glucose.: 177 mg/dL (13 Jul 2021 23:55)  POCT Blood Glucose.: 227 mg/dL (13 Jul 2021 21:14)            MEDICATIONS  (STANDING):  atorvastatin 40 milliGRAM(s) Oral at bedtime  folic acid Injectable 1 milliGRAM(s) IV Push daily  heparin   Injectable 5000 Unit(s) SubCutaneous every 12 hours  insulin lispro (ADMELOG) corrective regimen sliding scale   SubCutaneous Before meals and at bedtime  lisinopril 40 milliGRAM(s) Oral daily  ondansetron Injectable 8 milliGRAM(s) IV Push two times a day  PHENobarbital Injectable 32 milliGRAM(s) IV Push two times a day  polyethylene glycol 3350 17 Gram(s) Oral daily  sodium chloride 0.9%. 1000 milliLiter(s) (50 mL/Hr) IV Continuous <Continuous>  thiamine Injectable 100 milliGRAM(s) IV Push daily  timolol 0.25% Solution 1 Drop(s) Both EYES at bedtime    MEDICATIONS  (PRN):      Care Discussed with Consultants/Other Providers [ ] YES  [ ] NO

## 2021-07-14 NOTE — PROGRESS NOTE ADULT - SUBJECTIVE AND OBJECTIVE BOX
patient on [address], awake with confusion     REVIEW OF SYSTEMS  Constitutional - No fever,  No fatigue  HEENT - No vertigo, No neck pain  Neurological - No headaches, + memory loss, No loss of strength, No numbness, No tremors  Skin - No rashes, No lesions   Musculoskeletal - No joint pain, No joint swelling, No muscle pain  Psychiatric - No depression, No anxiety    FUNCTIONAL PROGRESS  7/12 PT  bed mobility min assist  follows commands  limited by agitation     7/10 OT  bed mobility contact guard  transfers contact guard      VITALS  T(C): 37.1 (07-14-21 @ 07:27), Max: 37.2 (07-14-21 @ 06:45)  HR: 89 (07-14-21 @ 13:58) (85 - 116)  BP: 187/100 (07-14-21 @ 13:59) (154/84 - 200/99)  RR: 17 (07-14-21 @ 13:58) (17 - 27)  SpO2: 99% (07-14-21 @ 13:58) (95% - 100%)  Wt(kg): --    MEDICATIONS   atorvastatin 40 milliGRAM(s) at bedtime  folic acid Injectable 1 milliGRAM(s) daily  heparin   Injectable 5000 Unit(s) every 12 hours  insulin lispro (ADMELOG) corrective regimen sliding scale   Before meals and at bedtime  ondansetron Injectable 8 milliGRAM(s) two times a day  PHENobarbital Injectable 32 milliGRAM(s) two times a day  polyethylene glycol 3350 17 Gram(s) daily  sodium chloride 0.9%. 1000 milliLiter(s) <Continuous>  thiamine Injectable 100 milliGRAM(s) daily  timolol 0.25% Solution 1 Drop(s) at bedtime      RECENT LABS - Reviewed                        10.5   7.05  )-----------( 274      ( 14 Jul 2021 10:08 )             32.7     07-14    136  |  100  |  13  ----------------------------<  226<H>  4.1   |  17<L>  |  1.01    Ca    9.7      14 Jul 2021 10:08  Phos  2.9     07-14  Mg     1.9     07-14    TPro  6.9  /  Alb  3.6  /  TBili  0.4  /  DBili  0.1  /  AST  19  /  ALT  11  /  AlkPhos  112  07-12        ----------------------------------------------------------------------------------------  PHYSICAL EXAM  Constitutional - NAD, Comfortable, in bed   Chest - Breathing comfortably, No wheezing  Cardiovascular - S1S2   Abdomen - Soft   Extremities - b/l wrist restraints   Neurologic Exam -  awake, confused                  Cognitive - follows commands         Motor - moves all ext      Sensory - Intact to LT    Psychiatric - stable   ----------------------------------------------------------------------------------------  ASSESSMENT/PLAN  76yMale h/o DM, ETOH with functional deficits after fall, TBI, b/l SAH, right SDH, L parietal SAH with pneumocephalus, a L possible parietal non-displaced skull fracture, and a C6 inferior endplate fracture into disc space with air into the canal  ETOH withdrawal, phenobarb taper, CIWA  mild C6 vertebral body fracture, cervical collar for comfort   Pain - Tylenol  DVT PPX - SCDs, heparin SQ  Rehab - Will continue to follow for ongoing rehab needs and recommendations.   continue bedside PT/OT   Recommend ACUTE/TBI inpatient rehabilitation for the functional deficits consisting of 3 hours of therapy/day & 24 hour RN/daily PMR physician for comorbid medical management. Patient will be able to tolerate 3 hours a day.
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
## 2021-07-15 LAB
GLUCOSE BLDC GLUCOMTR-MCNC: 178 MG/DL — HIGH (ref 70–99)
GLUCOSE BLDC GLUCOMTR-MCNC: 208 MG/DL — HIGH (ref 70–99)
GLUCOSE BLDC GLUCOMTR-MCNC: 212 MG/DL — HIGH (ref 70–99)
GLUCOSE BLDC GLUCOMTR-MCNC: 225 MG/DL — HIGH (ref 70–99)
HCT VFR BLD CALC: 32.3 % — LOW (ref 39–50)
HGB BLD-MCNC: 10.8 G/DL — LOW (ref 13–17)
MCHC RBC-ENTMCNC: 32.3 PG — SIGNIFICANT CHANGE UP (ref 27–34)
MCHC RBC-ENTMCNC: 33.4 GM/DL — SIGNIFICANT CHANGE UP (ref 32–36)
MCV RBC AUTO: 96.7 FL — SIGNIFICANT CHANGE UP (ref 80–100)
NRBC # BLD: 0 /100 WBCS — SIGNIFICANT CHANGE UP (ref 0–0)
PLATELET # BLD AUTO: 313 K/UL — SIGNIFICANT CHANGE UP (ref 150–400)
RAPID RVP RESULT: SIGNIFICANT CHANGE UP
RBC # BLD: 3.34 M/UL — LOW (ref 4.2–5.8)
RBC # FLD: 11.7 % — SIGNIFICANT CHANGE UP (ref 10.3–14.5)
SARS-COV-2 RNA SPEC QL NAA+PROBE: SIGNIFICANT CHANGE UP
WBC # BLD: 6.8 K/UL — SIGNIFICANT CHANGE UP (ref 3.8–10.5)
WBC # FLD AUTO: 6.8 K/UL — SIGNIFICANT CHANGE UP (ref 3.8–10.5)

## 2021-07-15 PROCEDURE — 71045 X-RAY EXAM CHEST 1 VIEW: CPT | Mod: 26

## 2021-07-15 RX ORDER — ACETAMINOPHEN 500 MG
650 TABLET ORAL ONCE
Refills: 0 | Status: COMPLETED | OUTPATIENT
Start: 2021-07-15 | End: 2021-07-15

## 2021-07-15 RX ADMIN — Medication 1 DROP(S): at 21:44

## 2021-07-15 RX ADMIN — HEPARIN SODIUM 5000 UNIT(S): 5000 INJECTION INTRAVENOUS; SUBCUTANEOUS at 05:22

## 2021-07-15 RX ADMIN — ATORVASTATIN CALCIUM 40 MILLIGRAM(S): 80 TABLET, FILM COATED ORAL at 21:44

## 2021-07-15 RX ADMIN — Medication 650 MILLIGRAM(S): at 00:16

## 2021-07-15 RX ADMIN — Medication 100 MILLIGRAM(S): at 13:13

## 2021-07-15 RX ADMIN — Medication 1 MILLIGRAM(S): at 14:39

## 2021-07-15 RX ADMIN — Medication 4: at 09:00

## 2021-07-15 RX ADMIN — ONDANSETRON 8 MILLIGRAM(S): 8 TABLET, FILM COATED ORAL at 05:22

## 2021-07-15 RX ADMIN — HEPARIN SODIUM 5000 UNIT(S): 5000 INJECTION INTRAVENOUS; SUBCUTANEOUS at 17:53

## 2021-07-15 RX ADMIN — ONDANSETRON 8 MILLIGRAM(S): 8 TABLET, FILM COATED ORAL at 17:54

## 2021-07-15 RX ADMIN — Medication 2: at 13:13

## 2021-07-15 RX ADMIN — Medication 650 MILLIGRAM(S): at 19:46

## 2021-07-15 RX ADMIN — LISINOPRIL 40 MILLIGRAM(S): 2.5 TABLET ORAL at 05:22

## 2021-07-15 RX ADMIN — SODIUM CHLORIDE 50 MILLILITER(S): 9 INJECTION INTRAMUSCULAR; INTRAVENOUS; SUBCUTANEOUS at 12:01

## 2021-07-15 RX ADMIN — Medication 650 MILLIGRAM(S): at 20:00

## 2021-07-15 RX ADMIN — Medication 32 MILLIGRAM(S): at 09:43

## 2021-07-15 RX ADMIN — Medication 4: at 22:15

## 2021-07-15 RX ADMIN — Medication 650 MILLIGRAM(S): at 01:16

## 2021-07-15 RX ADMIN — Medication 32 MILLIGRAM(S): at 21:44
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
## 2021-07-15 NOTE — CHART NOTE - NSCHARTNOTEFT_GEN_A_CORE
Called by RN at 1730, informed that pt has fever, pt seen and examined , in NAD, blood cultures were sent over night with results pending. Unclear source of fever will add UA, chest x-ray, RVP to look for infectious source, D/W Dr Antony, Plan to hold ABX for now and follow up results.
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
## 2021-07-16 LAB
ANION GAP SERPL CALC-SCNC: 18 MMOL/L — HIGH (ref 5–17)
BASOPHILS # BLD AUTO: 0.03 K/UL — SIGNIFICANT CHANGE UP (ref 0–0.2)
BASOPHILS NFR BLD AUTO: 0.3 % — SIGNIFICANT CHANGE UP (ref 0–2)
BUN SERPL-MCNC: 16 MG/DL — SIGNIFICANT CHANGE UP (ref 7–23)
CALCIUM SERPL-MCNC: 9.5 MG/DL — SIGNIFICANT CHANGE UP (ref 8.4–10.5)
CHLORIDE SERPL-SCNC: 96 MMOL/L — SIGNIFICANT CHANGE UP (ref 96–108)
CO2 SERPL-SCNC: 20 MMOL/L — LOW (ref 22–31)
CREAT SERPL-MCNC: 1.01 MG/DL — SIGNIFICANT CHANGE UP (ref 0.5–1.3)
EOSINOPHIL # BLD AUTO: 0.17 K/UL — SIGNIFICANT CHANGE UP (ref 0–0.5)
EOSINOPHIL NFR BLD AUTO: 1.9 % — SIGNIFICANT CHANGE UP (ref 0–6)
GLUCOSE BLDC GLUCOMTR-MCNC: 187 MG/DL — HIGH (ref 70–99)
GLUCOSE BLDC GLUCOMTR-MCNC: 194 MG/DL — HIGH (ref 70–99)
GLUCOSE BLDC GLUCOMTR-MCNC: 201 MG/DL — HIGH (ref 70–99)
GLUCOSE BLDC GLUCOMTR-MCNC: 212 MG/DL — HIGH (ref 70–99)
GLUCOSE SERPL-MCNC: 202 MG/DL — HIGH (ref 70–99)
HCT VFR BLD CALC: 33.1 % — LOW (ref 39–50)
HGB BLD-MCNC: 11.1 G/DL — LOW (ref 13–17)
IMM GRANULOCYTES NFR BLD AUTO: 0.6 % — SIGNIFICANT CHANGE UP (ref 0–1.5)
LYMPHOCYTES # BLD AUTO: 0.69 K/UL — LOW (ref 1–3.3)
LYMPHOCYTES # BLD AUTO: 7.9 % — LOW (ref 13–44)
MCHC RBC-ENTMCNC: 32.6 PG — SIGNIFICANT CHANGE UP (ref 27–34)
MCHC RBC-ENTMCNC: 33.5 GM/DL — SIGNIFICANT CHANGE UP (ref 32–36)
MCV RBC AUTO: 97.4 FL — SIGNIFICANT CHANGE UP (ref 80–100)
MONOCYTES # BLD AUTO: 1.01 K/UL — HIGH (ref 0–0.9)
MONOCYTES NFR BLD AUTO: 11.6 % — SIGNIFICANT CHANGE UP (ref 2–14)
NEUTROPHILS # BLD AUTO: 6.78 K/UL — SIGNIFICANT CHANGE UP (ref 1.8–7.4)
NEUTROPHILS NFR BLD AUTO: 77.7 % — HIGH (ref 43–77)
NRBC # BLD: 0 /100 WBCS — SIGNIFICANT CHANGE UP (ref 0–0)
PLATELET # BLD AUTO: 325 K/UL — SIGNIFICANT CHANGE UP (ref 150–400)
POTASSIUM SERPL-MCNC: 3.6 MMOL/L — SIGNIFICANT CHANGE UP (ref 3.5–5.3)
POTASSIUM SERPL-SCNC: 3.6 MMOL/L — SIGNIFICANT CHANGE UP (ref 3.5–5.3)
RBC # BLD: 3.4 M/UL — LOW (ref 4.2–5.8)
RBC # FLD: 11.6 % — SIGNIFICANT CHANGE UP (ref 10.3–14.5)
SODIUM SERPL-SCNC: 134 MMOL/L — LOW (ref 135–145)
WBC # BLD: 8.73 K/UL — SIGNIFICANT CHANGE UP (ref 3.8–10.5)
WBC # FLD AUTO: 8.73 K/UL — SIGNIFICANT CHANGE UP (ref 3.8–10.5)

## 2021-07-16 PROCEDURE — 99233 SBSQ HOSP IP/OBS HIGH 50: CPT

## 2021-07-16 RX ORDER — SENNA PLUS 8.6 MG/1
2 TABLET ORAL AT BEDTIME
Refills: 0 | Status: DISCONTINUED | OUTPATIENT
Start: 2021-07-16 | End: 2021-07-29

## 2021-07-16 RX ORDER — PHENOBARBITAL 60 MG
32 TABLET ORAL EVERY 12 HOURS
Refills: 0 | Status: DISCONTINUED | OUTPATIENT
Start: 2021-07-16 | End: 2021-07-23

## 2021-07-16 RX ORDER — THIAMINE MONONITRATE (VIT B1) 100 MG
400 TABLET ORAL ONCE
Refills: 0 | Status: COMPLETED | OUTPATIENT
Start: 2021-07-16 | End: 2021-07-16

## 2021-07-16 RX ORDER — THIAMINE MONONITRATE (VIT B1) 100 MG
500 TABLET ORAL THREE TIMES A DAY
Refills: 0 | Status: COMPLETED | OUTPATIENT
Start: 2021-07-17 | End: 2021-07-19

## 2021-07-16 RX ORDER — ACETAMINOPHEN 500 MG
650 TABLET ORAL ONCE
Refills: 0 | Status: COMPLETED | OUTPATIENT
Start: 2021-07-16 | End: 2021-07-16

## 2021-07-16 RX ORDER — LANOLIN ALCOHOL/MO/W.PET/CERES
3 CREAM (GRAM) TOPICAL AT BEDTIME
Refills: 0 | Status: DISCONTINUED | OUTPATIENT
Start: 2021-07-16 | End: 2021-07-29

## 2021-07-16 RX ORDER — MULTIVIT-MIN/FERROUS GLUCONATE 9 MG/15 ML
1 LIQUID (ML) ORAL DAILY
Refills: 0 | Status: DISCONTINUED | OUTPATIENT
Start: 2021-07-16 | End: 2021-07-29

## 2021-07-16 RX ORDER — CEFTRIAXONE 500 MG/1
1000 INJECTION, POWDER, FOR SOLUTION INTRAMUSCULAR; INTRAVENOUS EVERY 24 HOURS
Refills: 0 | Status: COMPLETED | OUTPATIENT
Start: 2021-07-16 | End: 2021-07-18

## 2021-07-16 RX ORDER — HALOPERIDOL DECANOATE 100 MG/ML
0.5 INJECTION INTRAMUSCULAR ONCE
Refills: 0 | Status: COMPLETED | OUTPATIENT
Start: 2021-07-16 | End: 2021-07-16

## 2021-07-16 RX ORDER — MAGNESIUM HYDROXIDE 400 MG/1
30 TABLET, CHEWABLE ORAL DAILY
Refills: 0 | Status: DISCONTINUED | OUTPATIENT
Start: 2021-07-16 | End: 2021-07-29

## 2021-07-16 RX ADMIN — Medication 2: at 22:26

## 2021-07-16 RX ADMIN — Medication 104 MILLIGRAM(S): at 20:28

## 2021-07-16 RX ADMIN — Medication 4: at 13:45

## 2021-07-16 RX ADMIN — HEPARIN SODIUM 5000 UNIT(S): 5000 INJECTION INTRAVENOUS; SUBCUTANEOUS at 18:09

## 2021-07-16 RX ADMIN — Medication 4: at 08:51

## 2021-07-16 RX ADMIN — Medication 401.96 MILLIGRAM(S): at 14:51

## 2021-07-16 RX ADMIN — ONDANSETRON 8 MILLIGRAM(S): 8 TABLET, FILM COATED ORAL at 06:03

## 2021-07-16 RX ADMIN — HEPARIN SODIUM 5000 UNIT(S): 5000 INJECTION INTRAVENOUS; SUBCUTANEOUS at 06:03

## 2021-07-16 RX ADMIN — Medication 2.5 MILLIGRAM(S): at 18:10

## 2021-07-16 RX ADMIN — Medication 1 DROP(S): at 21:45

## 2021-07-16 RX ADMIN — ONDANSETRON 8 MILLIGRAM(S): 8 TABLET, FILM COATED ORAL at 18:10

## 2021-07-16 RX ADMIN — Medication 3 MILLIGRAM(S): at 21:49

## 2021-07-16 RX ADMIN — SENNA PLUS 2 TABLET(S): 8.6 TABLET ORAL at 21:48

## 2021-07-16 RX ADMIN — HALOPERIDOL DECANOATE 0.5 MILLIGRAM(S): 100 INJECTION INTRAMUSCULAR at 19:03

## 2021-07-16 RX ADMIN — Medication 2.5 MILLIGRAM(S): at 13:46

## 2021-07-16 RX ADMIN — Medication 2: at 18:09

## 2021-07-16 RX ADMIN — CEFTRIAXONE 100 MILLIGRAM(S): 500 INJECTION, POWDER, FOR SOLUTION INTRAMUSCULAR; INTRAVENOUS at 21:49

## 2021-07-16 RX ADMIN — Medication 1 MILLIGRAM(S): at 14:44

## 2021-07-16 RX ADMIN — Medication 100 MILLIGRAM(S): at 13:46

## 2021-07-16 RX ADMIN — SODIUM CHLORIDE 50 MILLILITER(S): 9 INJECTION INTRAMUSCULAR; INTRAVENOUS; SUBCUTANEOUS at 06:02

## 2021-07-16 RX ADMIN — Medication 2.5 MILLIGRAM(S): at 21:45

## 2021-07-16 RX ADMIN — ATORVASTATIN CALCIUM 40 MILLIGRAM(S): 80 TABLET, FILM COATED ORAL at 21:45

## 2021-07-16 RX ADMIN — Medication 260 MILLIGRAM(S): at 20:05

## 2021-07-16 NOTE — PROGRESS NOTE ADULT - SUBJECTIVE AND OBJECTIVE BOX
Patient is a 76y old  Male who presents with a chief complaint of Intracranial hemorrhage (15 Jul 2021 21:18)      INTERVAL HPI/OVERNIGHT EVENTS: seen and examined, remain confused , febrile     T(C): 37.3 (07-17-21 @ 00:00), Max: 38.5 (07-16-21 @ 09:02)  HR: 92 (07-17-21 @ 01:30) (79 - 99)  BP: 177/77 (07-17-21 @ 01:30) (162/90 - 197/94)  RR: 18 (07-17-21 @ 00:00) (18 - 19)  SpO2: 97% (07-17-21 @ 00:00) (94% - 97%)  Wt(kg): --  I&O's Summary    15 Jul 2021 07:01  -  16 Jul 2021 07:00  --------------------------------------------------------  IN: 900 mL / OUT: 500 mL / NET: 400 mL    16 Jul 2021 07:01  -  17 Jul 2021 01:45  --------------------------------------------------------  IN: 832 mL / OUT: 200 mL / NET: 632 mL        PAST MEDICAL & SURGICAL HISTORY:  DM (diabetes mellitus)        SOCIAL HISTORY  Alcohol:  Tobacco:  Illicit substance use:    FAMILY HISTORY:    REVIEW OF SYSTEMS:  CONSTITUTIONAL: No fever, weight loss, or fatigue  EYES: No eye pain, visual disturbances, or discharge  ENMT:  No difficulty hearing, tinnitus, vertigo; No sinus or throat pain  NECK: No pain or stiffness  RESPIRATORY: No cough, wheezing, chills or hemoptysis; No shortness of breath  CARDIOVASCULAR: No chest pain, palpitations, dizziness, or leg swelling  GASTROINTESTINAL: No abdominal or epigastric pain. No nausea, vomiting, or hematemesis; No diarrhea or constipation. No melena or hematochezia.  GENITOURINARY: No dysuria, frequency, hematuria, or incontinence  NEUROLOGICAL: No headaches, memory loss, loss of strength, numbness, or tremors  SKIN: No itching, burning, rashes, or lesions   LYMPH NODES: No enlarged glands  ENDOCRINE: No heat or cold intolerance; No hair loss  MUSCULOSKELETAL: No joint pain or swelling; No muscle, back, or extremity pain  PSYCHIATRIC: No depression, anxiety, mood swings, or difficulty sleeping  HEME/LYMPH: No easy bruising, or bleeding gums  ALLERY AND IMMUNOLOGIC: No hives or eczema    RADIOLOGY & ADDITIONAL TESTS:    Imaging Personally Reviewed:  [ ] YES  [ ] NO    Consultant(s) Notes Reviewed:  [ ] YES  [ ] NO    PHYSICAL EXAM:  GENERAL: NAD, well-groomed, well-developed  HEAD:  Atraumatic, Normocephalic  EYES: EOMI, PERRLA, conjunctiva and sclera clear  ENMT: No tonsillar erythema, exudates, or enlargement; Moist mucous membranes, Good dentition, No lesions  NECK: Supple, No JVD, Normal thyroid  NERVOUS SYSTEM:  Alert & Oriented X3, Good concentration; Motor Strength 5/5 B/L upper and lower extremities; DTRs 2+ intact and symmetric  CHEST/LUNG: Clear to percussion bilaterally; No rales, rhonchi, wheezing, or rubs  HEART: Regular rate and rhythm; No murmurs, rubs, or gallops  ABDOMEN: Soft, Nontender, Nondistended; Bowel sounds present  EXTREMITIES:  2+ Peripheral Pulses, No clubbing, cyanosis, or edema  LYMPH: No lymphadenopathy noted  SKIN: No rashes or lesions    LABS:                        11.1   8.73  )-----------( 325      ( 16 Jul 2021 07:00 )             33.1     07-16    134<L>  |  96  |  16  ----------------------------<  202<H>  3.6   |  20<L>  |  1.01    Ca    9.5      16 Jul 2021 07:00          CAPILLARY BLOOD GLUCOSE      POCT Blood Glucose.: 187 mg/dL (16 Jul 2021 22:14)  POCT Blood Glucose.: 194 mg/dL (16 Jul 2021 17:20)  POCT Blood Glucose.: 201 mg/dL (16 Jul 2021 12:56)  POCT Blood Glucose.: 212 mg/dL (16 Jul 2021 08:50)            MEDICATIONS  (STANDING):  atorvastatin 40 milliGRAM(s) Oral at bedtime  cefTRIAXone   IVPB 1000 milliGRAM(s) IV Intermittent every 24 hours  enalaprilat Injectable 2.5 milliGRAM(s) IV Push every 6 hours  folic acid Injectable 1 milliGRAM(s) IV Push daily  heparin   Injectable 5000 Unit(s) SubCutaneous every 12 hours  insulin lispro (ADMELOG) corrective regimen sliding scale   SubCutaneous Before meals and at bedtime  melatonin 3 milliGRAM(s) Oral at bedtime  multivitamin/minerals 1 Tablet(s) Oral daily  ondansetron Injectable 8 milliGRAM(s) IV Push two times a day  PHENobarbital IVPB 32 milliGRAM(s) IV Intermittent every 12 hours  polyethylene glycol 3350 17 Gram(s) Oral daily  senna 2 Tablet(s) Oral at bedtime  sodium chloride 0.9%. 1000 milliLiter(s) (50 mL/Hr) IV Continuous <Continuous>  thiamine IVPB 500 milliGRAM(s) IV Intermittent three times a day  timolol 0.25% Solution 1 Drop(s) Both EYES at bedtime    MEDICATIONS  (PRN):  magnesium hydroxide Suspension 30 milliLiter(s) Oral daily PRN Constipation      Care Discussed with Consultants/Other Providers [ ] YES  [ ] NO
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
## 2021-07-17 LAB
-  AMIKACIN: SIGNIFICANT CHANGE UP
-  AMOXICILLIN/CLAVULANIC ACID: SIGNIFICANT CHANGE UP
-  AMPICILLIN/SULBACTAM: SIGNIFICANT CHANGE UP
-  AMPICILLIN: SIGNIFICANT CHANGE UP
-  AZTREONAM: SIGNIFICANT CHANGE UP
-  CEFAZOLIN: SIGNIFICANT CHANGE UP
-  CEFEPIME: SIGNIFICANT CHANGE UP
-  CEFOXITIN: SIGNIFICANT CHANGE UP
-  CEFTRIAXONE: SIGNIFICANT CHANGE UP
-  CIPROFLOXACIN: SIGNIFICANT CHANGE UP
-  ERTAPENEM: SIGNIFICANT CHANGE UP
-  GENTAMICIN: SIGNIFICANT CHANGE UP
-  IMIPENEM: SIGNIFICANT CHANGE UP
-  LEVOFLOXACIN: SIGNIFICANT CHANGE UP
-  MEROPENEM: SIGNIFICANT CHANGE UP
-  NITROFURANTOIN: SIGNIFICANT CHANGE UP
-  PIPERACILLIN/TAZOBACTAM: SIGNIFICANT CHANGE UP
-  TIGECYCLINE: SIGNIFICANT CHANGE UP
-  TOBRAMYCIN: SIGNIFICANT CHANGE UP
-  TRIMETHOPRIM/SULFAMETHOXAZOLE: SIGNIFICANT CHANGE UP
APPEARANCE UR: CLEAR — SIGNIFICANT CHANGE UP
BACTERIA # UR AUTO: NEGATIVE — SIGNIFICANT CHANGE UP
BILIRUB UR-MCNC: NEGATIVE — SIGNIFICANT CHANGE UP
COLOR SPEC: YELLOW — SIGNIFICANT CHANGE UP
CULTURE RESULTS: SIGNIFICANT CHANGE UP
DIFF PNL FLD: NEGATIVE — SIGNIFICANT CHANGE UP
EPI CELLS # UR: 0 — SIGNIFICANT CHANGE UP
GLUCOSE BLDC GLUCOMTR-MCNC: 164 MG/DL — HIGH (ref 70–99)
GLUCOSE BLDC GLUCOMTR-MCNC: 192 MG/DL — HIGH (ref 70–99)
GLUCOSE BLDC GLUCOMTR-MCNC: 230 MG/DL — HIGH (ref 70–99)
GLUCOSE BLDC GLUCOMTR-MCNC: 243 MG/DL — HIGH (ref 70–99)
GLUCOSE BLDC GLUCOMTR-MCNC: 346 MG/DL — HIGH (ref 70–99)
GLUCOSE UR QL: ABNORMAL
HYALINE CASTS # UR AUTO: 1 /LPF — SIGNIFICANT CHANGE UP (ref 0–7)
KETONES UR-MCNC: ABNORMAL
LEUKOCYTE ESTERASE UR-ACNC: NEGATIVE — SIGNIFICANT CHANGE UP
METHOD TYPE: SIGNIFICANT CHANGE UP
NITRITE UR-MCNC: NEGATIVE — SIGNIFICANT CHANGE UP
ORGANISM # SPEC MICROSCOPIC CNT: SIGNIFICANT CHANGE UP
ORGANISM # SPEC MICROSCOPIC CNT: SIGNIFICANT CHANGE UP
PH UR: 6 — SIGNIFICANT CHANGE UP (ref 5–8)
PROCALCITONIN SERPL-MCNC: 0.08 NG/ML — SIGNIFICANT CHANGE UP (ref 0.02–0.1)
PROT UR-MCNC: ABNORMAL
RBC CASTS # UR COMP ASSIST: 0 /HPF — SIGNIFICANT CHANGE UP (ref 0–4)
SP GR SPEC: 1.02 — SIGNIFICANT CHANGE UP (ref 1.01–1.02)
SPECIMEN SOURCE: SIGNIFICANT CHANGE UP
UROBILINOGEN FLD QL: NEGATIVE — SIGNIFICANT CHANGE UP
WBC UR QL: 1 /HPF — SIGNIFICANT CHANGE UP (ref 0–5)

## 2021-07-17 PROCEDURE — 99223 1ST HOSP IP/OBS HIGH 75: CPT

## 2021-07-17 RX ORDER — ACETAMINOPHEN 500 MG
650 TABLET ORAL EVERY 6 HOURS
Refills: 0 | Status: DISCONTINUED | OUTPATIENT
Start: 2021-07-17 | End: 2021-07-29

## 2021-07-17 RX ADMIN — Medication 8: at 13:42

## 2021-07-17 RX ADMIN — Medication 2.5 MILLIGRAM(S): at 11:55

## 2021-07-17 RX ADMIN — ATORVASTATIN CALCIUM 40 MILLIGRAM(S): 80 TABLET, FILM COATED ORAL at 19:40

## 2021-07-17 RX ADMIN — Medication 105 MILLIGRAM(S): at 05:32

## 2021-07-17 RX ADMIN — Medication 401.96 MILLIGRAM(S): at 18:45

## 2021-07-17 RX ADMIN — ONDANSETRON 8 MILLIGRAM(S): 8 TABLET, FILM COATED ORAL at 18:34

## 2021-07-17 RX ADMIN — Medication 105 MILLIGRAM(S): at 21:29

## 2021-07-17 RX ADMIN — Medication 4: at 22:42

## 2021-07-17 RX ADMIN — Medication 4: at 09:33

## 2021-07-17 RX ADMIN — HEPARIN SODIUM 5000 UNIT(S): 5000 INJECTION INTRAVENOUS; SUBCUTANEOUS at 18:33

## 2021-07-17 RX ADMIN — Medication 1 TABLET(S): at 14:10

## 2021-07-17 RX ADMIN — Medication 105 MILLIGRAM(S): at 13:43

## 2021-07-17 RX ADMIN — Medication 1 MILLIGRAM(S): at 13:42

## 2021-07-17 RX ADMIN — Medication 650 MILLIGRAM(S): at 20:40

## 2021-07-17 RX ADMIN — Medication 2.5 MILLIGRAM(S): at 00:08

## 2021-07-17 RX ADMIN — Medication 2.5 MILLIGRAM(S): at 04:45

## 2021-07-17 RX ADMIN — Medication 1 DROP(S): at 21:29

## 2021-07-17 RX ADMIN — Medication 650 MILLIGRAM(S): at 19:40

## 2021-07-17 RX ADMIN — Medication 2: at 18:22

## 2021-07-17 RX ADMIN — POLYETHYLENE GLYCOL 3350 17 GRAM(S): 17 POWDER, FOR SOLUTION ORAL at 11:55

## 2021-07-17 RX ADMIN — HEPARIN SODIUM 5000 UNIT(S): 5000 INJECTION INTRAVENOUS; SUBCUTANEOUS at 05:33

## 2021-07-17 RX ADMIN — CEFTRIAXONE 100 MILLIGRAM(S): 500 INJECTION, POWDER, FOR SOLUTION INTRAMUSCULAR; INTRAVENOUS at 19:41

## 2021-07-17 RX ADMIN — Medication 2.5 MILLIGRAM(S): at 18:34

## 2021-07-17 RX ADMIN — ONDANSETRON 8 MILLIGRAM(S): 8 TABLET, FILM COATED ORAL at 05:34

## 2021-07-17 RX ADMIN — SENNA PLUS 2 TABLET(S): 8.6 TABLET ORAL at 19:40

## 2021-07-17 RX ADMIN — Medication 401.96 MILLIGRAM(S): at 05:33

## 2021-07-17 NOTE — PROGRESS NOTE ADULT - SUBJECTIVE AND OBJECTIVE BOX
Patient is a 76y old  Male who presents with a chief complaint of Intracranial hemorrhage (2021 08:18)      INTERVAL HPI/OVERNIGHT EVENTS: still spiking fever   T(C): 38 (21 @ 20:34), Max: 39.1 (21 @ 16:31)  HR: 94 (21 @ 20:34) (91 - 105)  BP: 151/80 (21 @ 20:34) (151/80 - 209/93)  RR: 18 (21 @ 20:34) (18 - 19)  SpO2: 98% (21 @ 20:34) (95% - 98%)  Wt(kg): --  I&O's Summary    2021 07:  -  2021 07:00  --------------------------------------------------------  IN: 1507 mL / OUT: 200 mL / NET: 1307 mL    2021 07:01  -  2021 21:48  --------------------------------------------------------  IN: 460 mL / OUT: 0 mL / NET: 460 mL        PAST MEDICAL & SURGICAL HISTORY:  DM (diabetes mellitus)        SOCIAL HISTORY  Alcohol:  Tobacco:  Illicit substance use:    FAMILY HISTORY:    REVIEW OF SYSTEMS:  CONSTITUTIONAL: No fever, weight loss, or fatigue  EYES: No eye pain, visual disturbances, or discharge  ENMT:  No difficulty hearing, tinnitus, vertigo; No sinus or throat pain  NECK: No pain or stiffness  RESPIRATORY: No cough, wheezing, chills or hemoptysis; No shortness of breath  CARDIOVASCULAR: No chest pain, palpitations, dizziness, or leg swelling  GASTROINTESTINAL: No abdominal or epigastric pain. No nausea, vomiting, or hematemesis; No diarrhea or constipation. No melena or hematochezia.  GENITOURINARY: No dysuria, frequency, hematuria, or incontinence  NEUROLOGICAL: No headaches, memory loss, loss of strength, numbness, or tremors  SKIN: No itching, burning, rashes, or lesions   LYMPH NODES: No enlarged glands  ENDOCRINE: No heat or cold intolerance; No hair loss  MUSCULOSKELETAL: No joint pain or swelling; No muscle, back, or extremity pain  PSYCHIATRIC: No depression, anxiety, mood swings, or difficulty sleeping  HEME/LYMPH: No easy bruising, or bleeding gums  ALLERY AND IMMUNOLOGIC: No hives or eczema    RADIOLOGY & ADDITIONAL TESTS:    Imaging Personally Reviewed:  [ ] YES  [ ] NO    Consultant(s) Notes Reviewed:  [ ] YES  [ ] NO    PHYSICAL EXAM:  GENERAL: NAD, well-groomed, well-developed  HEAD:  Atraumatic, Normocephalic  EYES: EOMI, PERRLA, conjunctiva and sclera clear  ENMT: No tonsillar erythema, exudates, or enlargement; Moist mucous membranes, Good dentition, No lesions  NECK: Supple, No JVD, Normal thyroid  NERVOUS SYSTEM:  Alert & Oriented X3, Good concentration; Motor Strength 5/5 B/L upper and lower extremities; DTRs 2+ intact and symmetric  CHEST/LUNG: Clear to percussion bilaterally; No rales, rhonchi, wheezing, or rubs  HEART: Regular rate and rhythm; No murmurs, rubs, or gallops  ABDOMEN: Soft, Nontender, Nondistended; Bowel sounds present  EXTREMITIES:  2+ Peripheral Pulses, No clubbing, cyanosis, or edema  LYMPH: No lymphadenopathy noted  SKIN: No rashes or lesions    LABS:                        11.1   8.73  )-----------( 325      ( 2021 07:00 )             33.1     07-16    134<L>  |  96  |  16  ----------------------------<  202<H>  3.6   |  20<L>  |  1.01    Ca    9.5      2021 07:00        Urinalysis Basic - ( 2021 09:59 )    Color: Yellow / Appearance: Clear / S.024 / pH: x  Gluc: x / Ketone: Large  / Bili: Negative / Urobili: Negative   Blood: x / Protein: 100 mg/dL / Nitrite: Negative   Leuk Esterase: Negative / RBC: 0 /hpf / WBC 1 /HPF   Sq Epi: x / Non Sq Epi: 0 / Bacteria: Negative      CAPILLARY BLOOD GLUCOSE      POCT Blood Glucose.: 164 mg/dL (2021 18:21)  POCT Blood Glucose.: 192 mg/dL (2021 17:14)  POCT Blood Glucose.: 346 mg/dL (2021 13:18)  POCT Blood Glucose.: 230 mg/dL (2021 08:54)  POCT Blood Glucose.: 187 mg/dL (2021 22:14)        Urinalysis Basic - ( 2021 09:59 )    Color: Yellow / Appearance: Clear / S.024 / pH: x  Gluc: x / Ketone: Large  / Bili: Negative / Urobili: Negative   Blood: x / Protein: 100 mg/dL / Nitrite: Negative   Leuk Esterase: Negative / RBC: 0 /hpf / WBC 1 /HPF   Sq Epi: x / Non Sq Epi: 0 / Bacteria: Negative        MEDICATIONS  (STANDING):  atorvastatin 40 milliGRAM(s) Oral at bedtime  cefTRIAXone   IVPB 1000 milliGRAM(s) IV Intermittent every 24 hours  enalapril 2.5 milliGRAM(s) Oral once  enalaprilat Injectable 2.5 milliGRAM(s) IV Push every 6 hours  folic acid Injectable 1 milliGRAM(s) IV Push daily  heparin   Injectable 5000 Unit(s) SubCutaneous every 12 hours  insulin lispro (ADMELOG) corrective regimen sliding scale   SubCutaneous Before meals and at bedtime  melatonin 3 milliGRAM(s) Oral at bedtime  multivitamin/minerals 1 Tablet(s) Oral daily  ondansetron Injectable 8 milliGRAM(s) IV Push two times a day  PHENobarbital IVPB 32 milliGRAM(s) IV Intermittent every 12 hours  polyethylene glycol 3350 17 Gram(s) Oral daily  senna 2 Tablet(s) Oral at bedtime  sodium chloride 0.9%. 1000 milliLiter(s) (50 mL/Hr) IV Continuous <Continuous>  thiamine IVPB 500 milliGRAM(s) IV Intermittent three times a day  timolol 0.25% Solution 1 Drop(s) Both EYES at bedtime    MEDICATIONS  (PRN):  acetaminophen   Tablet .. 650 milliGRAM(s) Oral every 6 hours PRN Temp greater or equal to 38C (100.4F)  magnesium hydroxide Suspension 30 milliLiter(s) Oral daily PRN Constipation      Care Discussed with Consultants/Other Providers [ ] YES  [ ] NO
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
## 2021-07-17 NOTE — CONSULT NOTE ADULT - SUBJECTIVE AND OBJECTIVE BOX
Patient is a 76y old  Male who presents with a chief complaint of Intracranial hemorrhage (16 Jul 2021 20:45)    HPI:  Patient is a 76 year old male with  with Hx DM2 and alcohol abuse who was [place] bus [place]. On arrival to the ED pt was found to be with altered mental status and GCS on arrival was 10 (E3, V2, M5). Pt was found to have blood at the L external ear canal, a posterior scalp laceration, and R orbital swelling. CT scan on admission demonstrated bifrontal SAH, a R SDH, a L parietal SAH with pneumocephalus, a L possible parietal non-displaced skull fracture, and a C6 inferior endplate Fx into disc space with air into the canal and Pt was transferred to SICU for close monitoring. ICU course complicated by alcohol withdrawal treated with precedex gtt and phenobarbitol. Pt very hypertensive since admission, likely 2/2 withdrawal. Pt was transferred out to the medical floor on the 7/13 and was being monitored closely.   Pt was febrile to 100.5 on 7/15 with no leukocytosis. Blood cx and urine cx were sent. RVP: negative. Urinalysis on admission negative for infection, however urinalysis growing Klebsiella and blood cx negative to date. Repeat CXR with no consolidation and procal 0.08. Pt continued to be febrile yesterday with TMax of 101.6 and ID was consulted for the same.       REVIEW OF SYSTEMS  [  ] ROS unobtainable because:    [ x ] All other systems negative except as noted below    Constitutional:  [ ] fever [ ] chills  [ ] weight loss  [ ]night sweat  [ ]poor appetite/PO intake [ ]fatigue   Skin:  [ ] rash [ ] phlebitis	  Eyes: [ ] icterus [ ] pain  [ ] discharge	  ENMT: [ ] sore throat  [ ] thrush [ ] ulcers [ ] exudates [ ]anosmia  Respiratory: [ ] dyspnea [ ] hemoptysis [ ] cough [ ] sputum	  Cardiovascular:  [ ] chest pain [ ] palpitations [ ] edema	  Gastrointestinal:  [ ] nausea [ ] vomiting [ ] diarrhea [ ] constipation [ ] pain	  Genitourinary:  [ ] dysuria [ ] frequency [ ] hematuria [ ] discharge [ ] flank pain  [ ] incontinence  Musculoskeletal:  [ ] myalgias [ ] arthralgias [ ] arthritis  [ ] back pain  Neurological:  [ ] headache [ ] weakness [ ] seizures  [ ] confusion/altered mental status    prior hospital charts reviewed [V]  primary team notes reviewed [V]  other consultant notes reviewed [V]    PAST MEDICAL & SURGICAL HISTORY:  DM (diabetes mellitus)        SOCIAL HISTORY:  - Denied smoking/vaping/alcohol/recreational drug use    FAMILY HISTORY:      Allergies  Allergy Status Unknown        ANTIMICROBIALS:  cefTRIAXone   IVPB 1000 every 24 hours      ANTIMICROBIALS (past 90 days):  MEDICATIONS  (STANDING):  cefTRIAXone   IVPB   100 mL/Hr IV Intermittent (07-16-21 @ 21:49)        OTHER MEDS:   MEDICATIONS  (STANDING):  atorvastatin 40 at bedtime  enalapril 2.5 once  enalaprilat Injectable 2.5 every 6 hours  heparin   Injectable 5000 every 12 hours  insulin lispro (ADMELOG) corrective regimen sliding scale  Before meals and at bedtime  magnesium hydroxide Suspension 30 daily PRN  melatonin 3 at bedtime  ondansetron Injectable 8 two times a day  PHENobarbital IVPB 32 every 12 hours  polyethylene glycol 3350 17 daily  senna 2 at bedtime      VITALS:  Vital Signs Last 24 Hrs  T(F): 99.4 (07-17-21 @ 04:30), Max: 101.3 (07-16-21 @ 09:02)    Vital Signs Last 24 Hrs  HR: 92 (07-17-21 @ 05:35) (79 - 99)  BP: 163/89 (07-17-21 @ 05:35) (162/90 - 209/93)  RR: 18 (07-17-21 @ 04:30)  SpO2: 97% (07-17-21 @ 04:30) (95% - 97%)  Wt(kg): --    EXAM:    GA: NAD, AOx3  HEENT: oral cavity no lesion  CV: nl S1/S2, no RMG  Lungs: CTAB, No distress  Abd: BS+, soft, nontender, no rebounding pain  Ext: no edema  Neuro: No focal deficits  Skin: Intact  IV: no phlebitis    Labs:                        11.1   8.73  )-----------( 325      ( 16 Jul 2021 07:00 )             33.1     07-16    134<L>  |  96  |  16  ----------------------------<  202<H>  3.6   |  20<L>  |  1.01    Ca    9.5      16 Jul 2021 07:00        WBC Trend:  WBC Count: 8.73 (07-16-21 @ 07:00)  WBC Count: 6.80 (07-15-21 @ 09:18)  WBC Count: 7.05 (07-14-21 @ 10:08)  WBC Count: 8.04 (07-12-21 @ 22:39)      Auto Neutrophil #: 6.78 K/uL (07-16-21 @ 07:00)  Auto Neutrophil #: 3.39 K/uL (07-09-21 @ 22:33)      Creatine Trend:  Creatinine, Serum: 1.01 (07-16)  Creatinine, Serum: 1.01 (07-14)  Creatinine, Serum: 1.02 (07-13)  Creatinine, Serum: 1.04 (07-12)      Liver Biochemical Testing Trend:  Alanine Aminotransferase (ALT/SGPT): 11 (07-12)  Alanine Aminotransferase (ALT/SGPT): 13 (07-09)  Aspartate Aminotransferase (AST/SGOT): 19 (07-12-21 @ 22:39)  Aspartate Aminotransferase (AST/SGOT): 29 (07-09-21 @ 22:33)  Bilirubin Direct, Serum: 0.1 (07-12)  Bilirubin Total, Serum: 0.4 (07-12)  Bilirubin Total, Serum: 0.2 (07-09)      Trend LDH      Auto Eosinophil %: 1.9 % (07-16-21 @ 07:00)          MICROBIOLOGY:        Culture - Urine (collected 15 Jul 2021 03:29)  Source: .Urine Clean Catch (Midstream)  Preliminary Report:    >100,000 CFU/ml Klebsiella pneumoniae    Culture - Blood (collected 15 Jul 2021 03:29)  Source: .Blood Blood-Peripheral  Preliminary Report:    No growth to date.    Culture - Blood (collected 15 Jul 2021 03:28)  Source: .Blood Blood-Peripheral  Preliminary Report:    No growth to date.    Rapid RVP Result: NotDetec (07-15 @ 18:25)  COVID-19 PCR: NotDetec (07-09-21 @ 22:51)  COVID-19 Mario Alberto Domain AB Interp: Positive (07-10-21 @ 10:17)  Procalcitonin, Serum: 0.08 (07-17)  A1C with Estimated Average Glucose Result: 7.3 % (07-10-21 @ 10:16)    RADIOLOGY:  imaging below personally reviewed    < from: Xray Chest 1 View- PORTABLE-Urgent (Xray Chest 1 View- PORTABLE-Urgent .) (07.15.21 @ 19:09) >  The cardiomediastinal silhouette is within normal limits.  Lungs are clear. No pleural effusion or pneumothorax.  No acute bony finding.    < end of copied text >    < from: CT Head No Cont (07.13.21 @ 08:20) >  IMPRESSION: Overall stable follow-up CT examination when compared with 7/11/2021 at 8:11 AM.    < end of copied text >    < from: [facility] Duplex Lower Ext Vein Scan, Bilat (07.12.21 @ 12:12) >  No evidence of deep venous thrombosis in either lower extremity.    < end of copied text >    < from: CT Head No Cont (07.11.21 @ 08:33) >  Bilateral subdural and subarachnoid hemorrhages and hemorrhagic contusions, overall similar to prior exam.    < end of copied text >               Patient is a 76y old  Male who presents with a chief complaint of Intracranial hemorrhage (16 Jul 2021 20:45)    HPI:  Patient is a 76 year old male with  with Hx DM2 and alcohol abuse who was found down today at [place] bus stop. On arrival to the ED pt was found to be with altered mental status and GCS on arrival was 10 (E3, V2, M5). Pt was found to have blood at the L external ear canal, a posterior scalp laceration, and R orbital swelling. CT scan on admission demonstrated bifrontal SAH, a R SDH, a L parietal SAH with pneumocephalus, a L possible parietal non-displaced skull fracture, and a C6 inferior endplate Fx into disc space with air into the canal and Pt was transferred to SICU for close monitoring. ICU course complicated by alcohol withdrawal treated with precedex gtt and phenobarbitol. Pt very hypertensive since admission, likely 2/2 withdrawal. Pt was transferred out to the medical floor on the 7/13 and was being monitored closely.   Pt was febrile to 100.5 on 7/15 with no leukocytosis. Blood cx and urine cx were sent. RVP: negative. Urinalysis on admission negative for infection, however urinalysis growing Klebsiella and blood cx negative to date. Repeat CXR with no consolidation and procal 0.08. Pt continued to be febrile yesterday with TMax of 101.6 and ID was consulted for the same. Pt restrained with b/l wrist restraints and Vest restraint. Pt denies any symptoms including dysuria, myalgia, back pain, lower abdominal pain. Rest of ROS was negative for infectious eitiology.       REVIEW OF SYSTEMS  [  ] ROS unobtainable because:    [ x ] All other systems negative except as noted below    Constitutional:  [x ] fever [ ] chills  [ ] weight loss  [ ]night sweat  [ ]poor appetite/PO intake [ ]fatigue   Skin:  [ ] rash [ ] phlebitis	  Eyes: [ ] icterus [ ] pain  [ ] discharge	  ENMT: [ ] sore throat  [ ] thrush [ ] ulcers [ ] exudates [ ]anosmia  Respiratory: [ ] dyspnea [ ] hemoptysis [ ] cough [ ] sputum	  Cardiovascular:  [ ] chest pain [ ] palpitations [ ] edema	  Gastrointestinal:  [ ] nausea [ ] vomiting [ ] diarrhea [ ] constipation [ ] pain	  Genitourinary:  [ ] dysuria [ ] frequency [ ] hematuria [ ] discharge [ ] flank pain  [ ] incontinence  Musculoskeletal:  [ ] myalgias [ ] arthralgias [ ] arthritis  [ ] back pain  Neurological:  [ ] headache [ ] weakness [ ] seizures  [ ] confusion/altered mental status    prior hospital charts reviewed [V]  primary team notes reviewed [V]  other consultant notes reviewed [V]    PAST MEDICAL & SURGICAL HISTORY:  DM (diabetes mellitus)        SOCIAL HISTORY:  - Denied smoking/vaping/alcohol/recreational drug use    FAMILY HISTORY:      Allergies  Allergy Status Unknown        ANTIMICROBIALS:  cefTRIAXone   IVPB 1000 every 24 hours      ANTIMICROBIALS (past 90 days):  MEDICATIONS  (STANDING):  cefTRIAXone   IVPB   100 mL/Hr IV Intermittent (07-16-21 @ 21:49)        OTHER MEDS:   MEDICATIONS  (STANDING):  atorvastatin 40 at bedtime  enalapril 2.5 once  enalaprilat Injectable 2.5 every 6 hours  heparin   Injectable 5000 every 12 hours  insulin lispro (ADMELOG) corrective regimen sliding scale  Before meals and at bedtime  magnesium hydroxide Suspension 30 daily PRN  melatonin 3 at bedtime  ondansetron Injectable 8 two times a day  PHENobarbital IVPB 32 every 12 hours  polyethylene glycol 3350 17 daily  senna 2 at bedtime      VITALS:  Vital Signs Last 24 Hrs  T(F): 99.4 (07-17-21 @ 04:30), Max: 101.3 (07-16-21 @ 09:02)    Vital Signs Last 24 Hrs  HR: 92 (07-17-21 @ 05:35) (79 - 99)  BP: 163/89 (07-17-21 @ 05:35) (162/90 - 209/93)  RR: 18 (07-17-21 @ 04:30)  SpO2: 97% (07-17-21 @ 04:30) (95% - 97%)  Wt(kg): --    EXAM:    GA: NAD, Restrained  HEENT: oral cavity no lesion  CV: nl S1/S2, no RMG  Lungs: CTAB, No distress  Abd: BS+, soft, nontender, no rebounding pain  Ext: no edema  Neuro: No focal deficits, moving all extremities  Back: No cva  tenderness  Skin: Intact  IV: no phlebitis  Psych: Not cooperative    Labs:                        11.1   8.73  )-----------( 325      ( 16 Jul 2021 07:00 )             33.1     07-16    134<L>  |  96  |  16  ----------------------------<  202<H>  3.6   |  20<L>  |  1.01    Ca    9.5      16 Jul 2021 07:00        WBC Trend:  WBC Count: 8.73 (07-16-21 @ 07:00)  WBC Count: 6.80 (07-15-21 @ 09:18)  WBC Count: 7.05 (07-14-21 @ 10:08)  WBC Count: 8.04 (07-12-21 @ 22:39)      Auto Neutrophil #: 6.78 K/uL (07-16-21 @ 07:00)  Auto Neutrophil #: 3.39 K/uL (07-09-21 @ 22:33)      Creatine Trend:  Creatinine, Serum: 1.01 (07-16)  Creatinine, Serum: 1.01 (07-14)  Creatinine, Serum: 1.02 (07-13)  Creatinine, Serum: 1.04 (07-12)      Liver Biochemical Testing Trend:  Alanine Aminotransferase (ALT/SGPT): 11 (07-12)  Alanine Aminotransferase (ALT/SGPT): 13 (07-09)  Aspartate Aminotransferase (AST/SGOT): 19 (07-12-21 @ 22:39)  Aspartate Aminotransferase (AST/SGOT): 29 (07-09-21 @ 22:33)  Bilirubin Direct, Serum: 0.1 (07-12)  Bilirubin Total, Serum: 0.4 (07-12)  Bilirubin Total, Serum: 0.2 (07-09)      Trend LDH      Auto Eosinophil %: 1.9 % (07-16-21 @ 07:00)          MICROBIOLOGY:        Culture - Urine (collected 15 Jul 2021 03:29)  Source: .Urine Clean Catch (Midstream)  Preliminary Report:    >100,000 CFU/ml Klebsiella pneumoniae    Culture - Blood (collected 15 Jul 2021 03:29)  Source: .Blood Blood-Peripheral  Preliminary Report:    No growth to date.    Culture - Blood (collected 15 Jul 2021 03:28)  Source: .Blood Blood-Peripheral  Preliminary Report:    No growth to date.    Rapid RVP Result: NotDetec (07-15 @ 18:25)  COVID-19 PCR: NotDetec (07-09-21 @ 22:51)  COVID-19 Mario Alberto Domain AB Interp: Positive (07-10-21 @ 10:17)  Procalcitonin, Serum: 0.08 (07-17)  A1C with Estimated Average Glucose Result: 7.3 % (07-10-21 @ 10:16)    RADIOLOGY:  imaging below personally reviewed    < from: Xray Chest 1 View- PORTABLE-Urgent (Xray Chest 1 View- PORTABLE-Urgent .) (07.15.21 @ 19:09) >  The cardiomediastinal silhouette is within normal limits.  Lungs are clear. No pleural effusion or pneumothorax.  No acute bony finding.    < end of copied text >    < from: CT Head No Cont (07.13.21 @ 08:20) >  IMPRESSION: Overall stable follow-up CT examination when compared with 7/11/2021 at 8:11 AM.    < end of copied text >    < from: [facility] Duplex Lower Ext Vein Scan, Bilat (07.12.21 @ 12:12) >  No evidence of deep venous thrombosis in either lower extremity.    < end of copied text >    < from: CT Head No Cont (07.11.21 @ 08:33) >  Bilateral subdural and subarachnoid hemorrhages and hemorrhagic contusions, overall similar to prior exam.    < end of copied text >               Patient is a 76y old  Male who presents with a chief complaint of Intracranial hemorrhage (16 Jul 2021 20:45)    HPI:  Patient is a 76 year old male with  with Hx DM2 and alcohol abuse who was [place] bus stop. On arrival to the ED pt was found to be with altered mental status and GCS on arrival was 10 (E3, V2, M5). Pt was found to have blood at the L external ear canal, a posterior scalp laceration, and R orbital swelling. CT scan on admission demonstrated bifrontal SAH, a R SDH, a L parietal SAH with pneumocephalus, a L possible parietal non-displaced skull fracture, and a C6 inferior endplate Fx into disc space with air into the canal and Pt was transferred to SICU for close monitoring. ICU course complicated by alcohol withdrawal treated with precedex gtt and phenobarbitol. Pt very hypertensive since admission, likely 2/2 withdrawal. Pt was transferred out to the medical floor on the 7/13 and was being monitored closely.   Pt was febrile to 100.5 on 7/15 with no leukocytosis. Blood cx and urine cx were sent. RVP: negative. Urinalysis on admission negative for infection, however urinalysis growing Klebsiella and blood cx negative to date. Repeat CXR with no consolidation and procal 0.08. Pt continued to be febrile yesterday with TMax of 101.6 and ID was consulted for the same. Pt restrained with b/l wrist restraints and Vest restraint. Pt denies any symptoms including dysuria, myalgia, back pain, lower abdominal pain. Rest of ROS was negative for infectious eitiology.       REVIEW OF SYSTEMS  [  ] ROS unobtainable because:    [ x ] All other systems negative except as noted below    Constitutional:  [x ] fever [ ] chills  [ ] weight loss  [ ]night sweat  [ ]poor appetite/PO intake [ ]fatigue   Skin:  [ ] rash [ ] phlebitis	  Eyes: [ ] icterus [ ] pain  [ ] discharge	  ENMT: [ ] sore throat  [ ] thrush [ ] ulcers [ ] exudates [ ]anosmia  Respiratory: [ ] dyspnea [ ] hemoptysis [ ] cough [ ] sputum	  Cardiovascular:  [ ] chest pain [ ] palpitations [ ] edema	  Gastrointestinal:  [ ] nausea [ ] vomiting [ ] diarrhea [ ] constipation [ ] pain	  Genitourinary:  [ ] dysuria [ ] frequency [ ] hematuria [ ] discharge [ ] flank pain  [ ] incontinence  Musculoskeletal:  [ ] myalgias [ ] arthralgias [ ] arthritis  [ ] back pain  Neurological:  [ ] headache [ ] weakness [ ] seizures  [ ] confusion/altered mental status    prior hospital charts reviewed [V]  primary team notes reviewed [V]  other consultant notes reviewed [V]    PAST MEDICAL & SURGICAL HISTORY:  DM (diabetes mellitus)        SOCIAL HISTORY:  - Denied smoking/vaping/alcohol/recreational drug use    FAMILY HISTORY: unable to obtain, patient unaware       Allergies  Allergy Status Unknown        ANTIMICROBIALS:  cefTRIAXone   IVPB 1000 every 24 hours      ANTIMICROBIALS (past 90 days):  MEDICATIONS  (STANDING):  cefTRIAXone   IVPB   100 mL/Hr IV Intermittent (07-16-21 @ 21:49)        OTHER MEDS:   MEDICATIONS  (STANDING):  atorvastatin 40 at bedtime  enalapril 2.5 once  enalaprilat Injectable 2.5 every 6 hours  heparin   Injectable 5000 every 12 hours  insulin lispro (ADMELOG) corrective regimen sliding scale  Before meals and at bedtime  magnesium hydroxide Suspension 30 daily PRN  melatonin 3 at bedtime  ondansetron Injectable 8 two times a day  PHENobarbital IVPB 32 every 12 hours  polyethylene glycol 3350 17 daily  senna 2 at bedtime      VITALS:  Vital Signs Last 24 Hrs  T(F): 99.4 (07-17-21 @ 04:30), Max: 101.3 (07-16-21 @ 09:02)    Vital Signs Last 24 Hrs  HR: 92 (07-17-21 @ 05:35) (79 - 99)  BP: 163/89 (07-17-21 @ 05:35) (162/90 - 209/93)  RR: 18 (07-17-21 @ 04:30)  SpO2: 97% (07-17-21 @ 04:30) (95% - 97%)  Wt(kg): --    EXAM:    GA: NAD, Restrained  HEENT: oral cavity no gross lesion  CV: nl S1/S2, no RMG  Lungs: CTAB, No distress  Abd: BS+, soft, nontender, no rebounding pain  Ext: no edema  Neuro: moving all extremities  Back: No cva  tenderness  Skin: grossly Intact  IV: no phlebitis  Psych: Not cooperative    Labs:                        11.1   8.73  )-----------( 325      ( 16 Jul 2021 07:00 )             33.1     07-16    134<L>  |  96  |  16  ----------------------------<  202<H>  3.6   |  20<L>  |  1.01    Ca    9.5      16 Jul 2021 07:00        WBC Trend:  WBC Count: 8.73 (07-16-21 @ 07:00)  WBC Count: 6.80 (07-15-21 @ 09:18)  WBC Count: 7.05 (07-14-21 @ 10:08)  WBC Count: 8.04 (07-12-21 @ 22:39)      Auto Neutrophil #: 6.78 K/uL (07-16-21 @ 07:00)  Auto Neutrophil #: 3.39 K/uL (07-09-21 @ 22:33)      Creatine Trend:  Creatinine, Serum: 1.01 (07-16)  Creatinine, Serum: 1.01 (07-14)  Creatinine, Serum: 1.02 (07-13)  Creatinine, Serum: 1.04 (07-12)      Liver Biochemical Testing Trend:  Alanine Aminotransferase (ALT/SGPT): 11 (07-12)  Alanine Aminotransferase (ALT/SGPT): 13 (07-09)  Aspartate Aminotransferase (AST/SGOT): 19 (07-12-21 @ 22:39)  Aspartate Aminotransferase (AST/SGOT): 29 (07-09-21 @ 22:33)  Bilirubin Direct, Serum: 0.1 (07-12)  Bilirubin Total, Serum: 0.4 (07-12)  Bilirubin Total, Serum: 0.2 (07-09)      Trend LDH      Auto Eosinophil %: 1.9 % (07-16-21 @ 07:00)          MICROBIOLOGY:        Culture - Urine (collected 15 Jul 2021 03:29)  Source: .Urine Clean Catch (Midstream)  Preliminary Report:    >100,000 CFU/ml Klebsiella pneumoniae    Culture - Blood (collected 15 Jul 2021 03:29)  Source: .Blood Blood-Peripheral  Preliminary Report:    No growth to date.    Culture - Blood (collected 15 Jul 2021 03:28)  Source: .Blood Blood-Peripheral  Preliminary Report:    No growth to date.    Rapid RVP Result: NotDetec (07-15 @ 18:25)  COVID-19 PCR: NotDetec (07-09-21 @ 22:51)  COVID-19 Mario Alberto Domain AB Interp: Positive (07-10-21 @ 10:17)  Procalcitonin, Serum: 0.08 (07-17)  A1C with Estimated Average Glucose Result: 7.3 % (07-10-21 @ 10:16)    RADIOLOGY:  imaging below personally reviewed  Xray Chest 1 View- PORTABLE-Urgent (Xray Chest 1 View- PORTABLE-Urgent .) (07.15.21 @ 19:09)   The cardiomediastinal silhouette is within normal limits.  Lungs are clear. No pleural effusion or pneumothorax.  No acute bony finding.    CT Head No Cont (07.13.21 @ 08:20)   IMPRESSION: Overall stable follow-up CT examination when compared with 7/11/2021 at 8:11 AM.    VA Duplex Lower Ext Vein Scan, Bilat (07.12.21 @ 12:12)   No evidence of deep venous thrombosis in either lower extremity.    CT Head No Cont (07.11.21 @ 08:33)   Bilateral subdural and subarachnoid hemorrhages and hemorrhagic contusions, overall similar to prior exam.

## 2021-07-18 LAB
ANION GAP SERPL CALC-SCNC: 14 MMOL/L — SIGNIFICANT CHANGE UP (ref 5–17)
BUN SERPL-MCNC: 21 MG/DL — SIGNIFICANT CHANGE UP (ref 7–23)
CALCIUM SERPL-MCNC: 9.8 MG/DL — SIGNIFICANT CHANGE UP (ref 8.4–10.5)
CHLORIDE SERPL-SCNC: 98 MMOL/L — SIGNIFICANT CHANGE UP (ref 96–108)
CO2 SERPL-SCNC: 23 MMOL/L — SIGNIFICANT CHANGE UP (ref 22–31)
CREAT SERPL-MCNC: 1.14 MG/DL — SIGNIFICANT CHANGE UP (ref 0.5–1.3)
CULTURE RESULTS: SIGNIFICANT CHANGE UP
GLUCOSE BLDC GLUCOMTR-MCNC: 214 MG/DL — HIGH (ref 70–99)
GLUCOSE BLDC GLUCOMTR-MCNC: 275 MG/DL — HIGH (ref 70–99)
GLUCOSE BLDC GLUCOMTR-MCNC: 302 MG/DL — HIGH (ref 70–99)
GLUCOSE BLDC GLUCOMTR-MCNC: 320 MG/DL — HIGH (ref 70–99)
GLUCOSE SERPL-MCNC: 229 MG/DL — HIGH (ref 70–99)
HCT VFR BLD CALC: 32 % — LOW (ref 39–50)
HGB BLD-MCNC: 10.7 G/DL — LOW (ref 13–17)
MAGNESIUM SERPL-MCNC: 1.9 MG/DL — SIGNIFICANT CHANGE UP (ref 1.6–2.6)
MCHC RBC-ENTMCNC: 32.7 PG — SIGNIFICANT CHANGE UP (ref 27–34)
MCHC RBC-ENTMCNC: 33.4 GM/DL — SIGNIFICANT CHANGE UP (ref 32–36)
MCV RBC AUTO: 97.9 FL — SIGNIFICANT CHANGE UP (ref 80–100)
NRBC # BLD: 0 /100 WBCS — SIGNIFICANT CHANGE UP (ref 0–0)
PHOSPHATE SERPL-MCNC: 2.4 MG/DL — LOW (ref 2.5–4.5)
PLATELET # BLD AUTO: 366 K/UL — SIGNIFICANT CHANGE UP (ref 150–400)
POTASSIUM SERPL-MCNC: 3.8 MMOL/L — SIGNIFICANT CHANGE UP (ref 3.5–5.3)
POTASSIUM SERPL-SCNC: 3.8 MMOL/L — SIGNIFICANT CHANGE UP (ref 3.5–5.3)
RBC # BLD: 3.27 M/UL — LOW (ref 4.2–5.8)
RBC # FLD: 11.4 % — SIGNIFICANT CHANGE UP (ref 10.3–14.5)
SODIUM SERPL-SCNC: 135 MMOL/L — SIGNIFICANT CHANGE UP (ref 135–145)
SPECIMEN SOURCE: SIGNIFICANT CHANGE UP
WBC # BLD: 8.72 K/UL — SIGNIFICANT CHANGE UP (ref 3.8–10.5)
WBC # FLD AUTO: 8.72 K/UL — SIGNIFICANT CHANGE UP (ref 3.8–10.5)

## 2021-07-18 PROCEDURE — 99232 SBSQ HOSP IP/OBS MODERATE 35: CPT

## 2021-07-18 PROCEDURE — 74177 CT ABD & PELVIS W/CONTRAST: CPT | Mod: 26

## 2021-07-18 RX ORDER — SODIUM,POTASSIUM PHOSPHATES 278-250MG
2 POWDER IN PACKET (EA) ORAL ONCE
Refills: 0 | Status: COMPLETED | OUTPATIENT
Start: 2021-07-18 | End: 2021-07-18

## 2021-07-18 RX ADMIN — Medication 10 MILLIGRAM(S): at 05:42

## 2021-07-18 RX ADMIN — CEFTRIAXONE 100 MILLIGRAM(S): 500 INJECTION, POWDER, FOR SOLUTION INTRAMUSCULAR; INTRAVENOUS at 19:48

## 2021-07-18 RX ADMIN — Medication 2.5 MILLIGRAM(S): at 05:41

## 2021-07-18 RX ADMIN — Medication 401.96 MILLIGRAM(S): at 17:57

## 2021-07-18 RX ADMIN — Medication 2.5 MILLIGRAM(S): at 00:19

## 2021-07-18 RX ADMIN — Medication 2.5 MILLIGRAM(S): at 17:55

## 2021-07-18 RX ADMIN — MAGNESIUM HYDROXIDE 30 MILLILITER(S): 400 TABLET, CHEWABLE ORAL at 03:02

## 2021-07-18 RX ADMIN — HEPARIN SODIUM 5000 UNIT(S): 5000 INJECTION INTRAVENOUS; SUBCUTANEOUS at 05:41

## 2021-07-18 RX ADMIN — ATORVASTATIN CALCIUM 40 MILLIGRAM(S): 80 TABLET, FILM COATED ORAL at 21:16

## 2021-07-18 RX ADMIN — ONDANSETRON 8 MILLIGRAM(S): 8 TABLET, FILM COATED ORAL at 17:56

## 2021-07-18 RX ADMIN — Medication 0.5 MILLIGRAM(S): at 13:35

## 2021-07-18 RX ADMIN — Medication 105 MILLIGRAM(S): at 06:56

## 2021-07-18 RX ADMIN — Medication 8: at 17:53

## 2021-07-18 RX ADMIN — Medication 4: at 09:28

## 2021-07-18 RX ADMIN — Medication 1 MILLIGRAM(S): at 15:55

## 2021-07-18 RX ADMIN — Medication 1 TABLET(S): at 13:03

## 2021-07-18 RX ADMIN — SENNA PLUS 2 TABLET(S): 8.6 TABLET ORAL at 21:16

## 2021-07-18 RX ADMIN — Medication 3 MILLIGRAM(S): at 21:16

## 2021-07-18 RX ADMIN — Medication 105 MILLIGRAM(S): at 14:58

## 2021-07-18 RX ADMIN — Medication 2.5 MILLIGRAM(S): at 13:03

## 2021-07-18 RX ADMIN — Medication 8: at 21:38

## 2021-07-18 RX ADMIN — Medication 401.96 MILLIGRAM(S): at 06:52

## 2021-07-18 RX ADMIN — HEPARIN SODIUM 5000 UNIT(S): 5000 INJECTION INTRAVENOUS; SUBCUTANEOUS at 17:56

## 2021-07-18 RX ADMIN — Medication 2 PACKET(S): at 17:58

## 2021-07-18 RX ADMIN — Medication 1 DROP(S): at 21:16

## 2021-07-18 RX ADMIN — Medication 6: at 13:58

## 2021-07-18 RX ADMIN — Medication 105 MILLIGRAM(S): at 21:16

## 2021-07-18 RX ADMIN — ONDANSETRON 8 MILLIGRAM(S): 8 TABLET, FILM COATED ORAL at 05:42

## 2021-07-18 RX ADMIN — SODIUM CHLORIDE 50 MILLILITER(S): 9 INJECTION INTRAMUSCULAR; INTRAVENOUS; SUBCUTANEOUS at 06:51

## 2021-07-18 NOTE — PROGRESS NOTE ADULT - ASSESSMENT
76M with DM and alcoholism, admitted 7/9/21 with traumatic SAH and SDH.   Intermittent fevers since 7/15.   No complaints, unremarkable exam and infectious workup unrevealing. I doubt the Klebsiella in the urine is doing anything.   Noninfectious? Procalcitonin low. Related to hematomas? Central? No DVT in either leg. Fevers can be seen with DT but he seems to be past that.     Not sure what we're treating but he's likely at increased risk for infection due to comorbidities, can continue Ceftriaxone for now... might stop soon    Jeronimo Murray MD   Infectious Disease   Pager [phone]   After 5PM and on weekends please page fellow on call or call [phone] 76M with DM and alcoholism, admitted 7/9/21 with traumatic SAH and SDH.   Intermittent fevers since 7/15.   No complaints, unremarkable exam and infectious workup unrevealing. I doubt the Klebsiella in the urine is doing anything.   Noninfectious? Procalcitonin low. Related to hematomas? Central? No DVT in either leg. Fevers can be seen with DT but he seems to be past that.   Day 3 of Ceftriaxone, reasonable to stop and monitor.     Jeronimo Murray MD   Infectious Disease   Pager [phone]   After 5PM and on weekends please page fellow on call or call [phone]

## 2021-07-18 NOTE — PROVIDER CONTACT NOTE (OTHER) - RECOMMENDATIONS
Please come see pt at the bedside
additional laxatives be given
blood cultures and Tylenol be ordered

## 2021-07-18 NOTE — PROGRESS NOTE ADULT - SUBJECTIVE AND OBJECTIVE BOX
Follow Up:  Fevers    Interval History/ROS: He feels great and wants to get out of here. Denies pain, cough, diarrhea, dysuria.     Allergies  Allergy Status Unknown        ANTIMICROBIALS:      OTHER MEDS:  acetaminophen   Tablet .. 650 milliGRAM(s) Oral every 6 hours PRN  atorvastatin 40 milliGRAM(s) Oral at bedtime  enalapril 2.5 milliGRAM(s) Oral once  enalaprilat Injectable 2.5 milliGRAM(s) IV Push every 6 hours  folic acid Injectable 1 milliGRAM(s) IV Push daily  heparin   Injectable 5000 Unit(s) SubCutaneous every 12 hours  insulin lispro (ADMELOG) corrective regimen sliding scale   SubCutaneous Before meals and at bedtime  magnesium hydroxide Suspension 30 milliLiter(s) Oral daily PRN  melatonin 3 milliGRAM(s) Oral at bedtime  multivitamin/minerals 1 Tablet(s) Oral daily  ondansetron Injectable 8 milliGRAM(s) IV Push two times a day  PHENobarbital IVPB 32 milliGRAM(s) IV Intermittent every 12 hours  polyethylene glycol 3350 17 Gram(s) Oral daily  senna 2 Tablet(s) Oral at bedtime  sodium chloride 0.9%. 1000 milliLiter(s) IV Continuous <Continuous>  thiamine IVPB 500 milliGRAM(s) IV Intermittent three times a day  timolol 0.25% Solution 1 Drop(s) Both EYES at bedtime      Vital Signs Last 24 Hrs  T(C): 37.1 (2021 14:11), Max: 38 (2021 20:34)  T(F): 98.8 (2021 14:11), Max: 100.4 (2021 20:34)  HR: 89 (2021 14:11) (83 - 94)  BP: 177/71 (2021 14:11) (151/80 - 184/99)  BP(mean): --  RR: 18 (2021 14:11) (16 - 18)  SpO2: 99% (2021 14:11) (95% - 99%)    Physical Exam:  General: awake, alert, non toxic, in restraints   Head: normocephalic  Eye: normal sclera and conjunctiva  ENT: no cervical lymphadenopathy   Cardio: regular rate and rhythm   Respiratory: nonlabored on room air, clear bilaterally, no wheezing  abd: soft, bowel sounds present, no tenderness  : no suprapubic tenderness   Musculoskeletal: no focal joint swelling, no edema  vascular: no phlebitis   Skin: no rash                          10.7   8.72  )-----------( 366      ( 2021 06:50 )             32.0       07-18    135  |  98  |  21  ----------------------------<  229<H>  3.8   |  23  |  1.14    Ca    9.8      2021 06:50  Phos  2.4       Mg     1.9             Urinalysis Basic - ( 2021 09:59 )    Color: Yellow / Appearance: Clear / S.024 / pH: x  Gluc: x / Ketone: Large  / Bili: Negative / Urobili: Negative   Blood: x / Protein: 100 mg/dL / Nitrite: Negative   Leuk Esterase: Negative / RBC: 0 /hpf / WBC 1 /HPF   Sq Epi: x / Non Sq Epi: 0 / Bacteria: Negative        MICROBIOLOGY:  Culture - Urine (collected 21 @ 03:53)  Source: .Urine Catheterized  Final Report (21 @ 02:19):    <10,000 CFU/mL Normal Urogenital Chitra    Culture - Urine (collected 07-15-21 @ 03:29)  Source: .Urine Clean Catch (Midstream)  Final Report (21 @ 20:11):    >100,000 CFU/ml Klebsiella pneumoniae  Organism: Klebsiella pneumoniae (21 @ 20:11)  Organism: Klebsiella pneumoniae (21 @ 20:11)      -  Amikacin: S <=16      -  Amoxicillin/Clavulanic Acid: S <=8/4      -  Ampicillin: R >16 These ampicillin results predict results for amoxicillin      -  Ampicillin/Sulbactam: I  Enterobacter, Citrobacter, and Serratia may develop resistance during prolonged therapy (3-4 days)      -  Aztreonam: S <=4      -  Cefazolin: S <=2 (MIC_CL_[id number]) For uncomplicated UTI with K. pneumoniae, E. coli, or P. mirablis: ELDER <=16 is sensitive and ELDER >=32 is resistant. This also predicts results for oral agents cefaclor, cefdinir, cefpodoxime, cefprozil, cefuroxime axetil, cephalexin and locarbef for uncomplicated UTI. Note that some isolates may be susceptible to these agents while testing resistant to cefazolin.      -  Cefepime: S <=2      -  Cefoxitin: S <=8      -  Ceftriaxone: S <=1 Enterobacter, Citrobacter, and Serratia may develop resistance during prolonged therapy      -  Ciprofloxacin: S <=0.25      -  Ertapenem: S <=0.5      -  Gentamicin: S <=2      -  Imipenem: S <=1      -  Levofloxacin: S <=0.5      -  Meropenem: S <=1      -  Nitrofurantoin: S <=32 Should not be used to treat pyelonephritis      -  Piperacillin/Tazobactam: S <=8      -  Tigecycline: S <=2      -  Tobramycin: S <=2      -  Trimethoprim/Sulfamethoxazole: S <=0.5/9.5      Method Type: ELDER    Culture - Blood (collected 07-15-21 @ 03:29)  Source: .Blood Blood-Peripheral  Preliminary Report (21 @ 04:01):    No growth to date.    Culture - Blood (collected 07-15-21 @ 03:28)  Source: .Blood Blood-Peripheral  Preliminary Report (21 @ 04:01):    No growth to date.    Rapid RVP Result: NotDetec (07-15 @ 18:25)    RADIOLOGY:  Images below reviewed personally  CT Abdomen and Pelvis w/ IV Cont (21 @ 13:49)   No CT evidence of occult intraperitoneal abscess.  Anterior bladder wall thickening. Correlate with urinalysis.  Indeterminant right kidney lower pole lesion. Nonemergent ultrasound the kidneys may be obtained for further evaluation.    Xray Chest 1 View- PORTABLE-Urgent (Xray Chest 1 View- PORTABLE-Urgent .) (07.15.21 @ 19:09)   The cardiomediastinal silhouette is within normal limits.  Lungs are clear. No pleural effusion or pneumothorax.  No acute bony finding.
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
## 2021-07-19 LAB
ANION GAP SERPL CALC-SCNC: 14 MMOL/L — SIGNIFICANT CHANGE UP (ref 5–17)
BUN SERPL-MCNC: 17 MG/DL — SIGNIFICANT CHANGE UP (ref 7–23)
CALCIUM SERPL-MCNC: 9.4 MG/DL — SIGNIFICANT CHANGE UP (ref 8.4–10.5)
CHLORIDE SERPL-SCNC: 98 MMOL/L — SIGNIFICANT CHANGE UP (ref 96–108)
CO2 SERPL-SCNC: 22 MMOL/L — SIGNIFICANT CHANGE UP (ref 22–31)
CREAT SERPL-MCNC: 1.14 MG/DL — SIGNIFICANT CHANGE UP (ref 0.5–1.3)
GLUCOSE BLDC GLUCOMTR-MCNC: 247 MG/DL — HIGH (ref 70–99)
GLUCOSE BLDC GLUCOMTR-MCNC: 250 MG/DL — HIGH (ref 70–99)
GLUCOSE BLDC GLUCOMTR-MCNC: 293 MG/DL — HIGH (ref 70–99)
GLUCOSE BLDC GLUCOMTR-MCNC: 296 MG/DL — HIGH (ref 70–99)
GLUCOSE SERPL-MCNC: 275 MG/DL — HIGH (ref 70–99)
HCT VFR BLD CALC: 30.8 % — LOW (ref 39–50)
HGB BLD-MCNC: 10.1 G/DL — LOW (ref 13–17)
MCHC RBC-ENTMCNC: 31.9 PG — SIGNIFICANT CHANGE UP (ref 27–34)
MCHC RBC-ENTMCNC: 32.8 GM/DL — SIGNIFICANT CHANGE UP (ref 32–36)
MCV RBC AUTO: 97.2 FL — SIGNIFICANT CHANGE UP (ref 80–100)
NRBC # BLD: 0 /100 WBCS — SIGNIFICANT CHANGE UP (ref 0–0)
PHOSPHATE SERPL-MCNC: 3 MG/DL — SIGNIFICANT CHANGE UP (ref 2.5–4.5)
PLATELET # BLD AUTO: 360 K/UL — SIGNIFICANT CHANGE UP (ref 150–400)
POTASSIUM SERPL-MCNC: 3.4 MMOL/L — LOW (ref 3.5–5.3)
POTASSIUM SERPL-SCNC: 3.4 MMOL/L — LOW (ref 3.5–5.3)
RBC # BLD: 3.17 M/UL — LOW (ref 4.2–5.8)
RBC # FLD: 11.3 % — SIGNIFICANT CHANGE UP (ref 10.3–14.5)
SODIUM SERPL-SCNC: 134 MMOL/L — LOW (ref 135–145)
WBC # BLD: 7.82 K/UL — SIGNIFICANT CHANGE UP (ref 3.8–10.5)
WBC # FLD AUTO: 7.82 K/UL — SIGNIFICANT CHANGE UP (ref 3.8–10.5)

## 2021-07-19 RX ORDER — POTASSIUM CHLORIDE 20 MEQ
20 PACKET (EA) ORAL
Refills: 0 | Status: COMPLETED | OUTPATIENT
Start: 2021-07-19 | End: 2021-07-19

## 2021-07-19 RX ADMIN — Medication 1 TABLET(S): at 12:01

## 2021-07-19 RX ADMIN — Medication 1 DROP(S): at 22:14

## 2021-07-19 RX ADMIN — Medication 401.96 MILLIGRAM(S): at 06:25

## 2021-07-19 RX ADMIN — ATORVASTATIN CALCIUM 40 MILLIGRAM(S): 80 TABLET, FILM COATED ORAL at 22:14

## 2021-07-19 RX ADMIN — ONDANSETRON 8 MILLIGRAM(S): 8 TABLET, FILM COATED ORAL at 17:34

## 2021-07-19 RX ADMIN — HEPARIN SODIUM 5000 UNIT(S): 5000 INJECTION INTRAVENOUS; SUBCUTANEOUS at 17:36

## 2021-07-19 RX ADMIN — Medication 2.5 MILLIGRAM(S): at 22:14

## 2021-07-19 RX ADMIN — Medication 2.5 MILLIGRAM(S): at 17:37

## 2021-07-19 RX ADMIN — Medication 20 MILLIEQUIVALENT(S): at 17:34

## 2021-07-19 RX ADMIN — Medication 401.96 MILLIGRAM(S): at 17:35

## 2021-07-19 RX ADMIN — Medication 2.5 MILLIGRAM(S): at 00:20

## 2021-07-19 RX ADMIN — Medication 3 MILLIGRAM(S): at 22:13

## 2021-07-19 RX ADMIN — HEPARIN SODIUM 5000 UNIT(S): 5000 INJECTION INTRAVENOUS; SUBCUTANEOUS at 05:07

## 2021-07-19 RX ADMIN — Medication 1 MILLIGRAM(S): at 12:01

## 2021-07-19 RX ADMIN — POLYETHYLENE GLYCOL 3350 17 GRAM(S): 17 POWDER, FOR SOLUTION ORAL at 12:04

## 2021-07-19 RX ADMIN — Medication 6: at 12:35

## 2021-07-19 RX ADMIN — Medication 2.5 MILLIGRAM(S): at 05:06

## 2021-07-19 RX ADMIN — SENNA PLUS 2 TABLET(S): 8.6 TABLET ORAL at 22:13

## 2021-07-19 RX ADMIN — Medication 4: at 22:17

## 2021-07-19 RX ADMIN — Medication 20 MILLIEQUIVALENT(S): at 20:09

## 2021-07-19 RX ADMIN — Medication 2.5 MILLIGRAM(S): at 12:01

## 2021-07-19 RX ADMIN — ONDANSETRON 8 MILLIGRAM(S): 8 TABLET, FILM COATED ORAL at 05:06

## 2021-07-19 RX ADMIN — Medication 105 MILLIGRAM(S): at 05:06

## 2021-07-19 RX ADMIN — Medication 4: at 08:39

## 2021-07-19 RX ADMIN — Medication 6: at 17:59

## 2021-07-19 RX ADMIN — Medication 105 MILLIGRAM(S): at 13:07
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
## 2021-07-19 NOTE — PROGRESS NOTE ADULT - SUBJECTIVE AND OBJECTIVE BOX
Patient is a 76y old  Male who presents with a chief complaint of Intracranial hemorrhage (18 Jul 2021 22:50)      INTERVAL HPI/OVERNIGHT EVENTS:  T(C): 37 (07-19-21 @ 12:00), Max: 37.6 (07-19-21 @ 04:27)  HR: 90 (07-19-21 @ 12:00) (84 - 105)  BP: 144/80 (07-19-21 @ 12:00) (144/80 - 193/91)  RR: 18 (07-19-21 @ 12:00) (16 - 18)  SpO2: 96% (07-19-21 @ 12:00) (96% - 96%)  Wt(kg): --  I&O's Summary    18 Jul 2021 07:01  -  19 Jul 2021 07:00  --------------------------------------------------------  IN: 1740 mL / OUT: 0 mL / NET: 1740 mL    19 Jul 2021 07:01  -  19 Jul 2021 20:53  --------------------------------------------------------  IN: 1180 mL / OUT: 400 mL / NET: 780 mL        PAST MEDICAL & SURGICAL HISTORY:  DM (diabetes mellitus)        SOCIAL HISTORY  Alcohol:  Tobacco:  Illicit substance use:    FAMILY HISTORY:    REVIEW OF SYSTEMS:  CONSTITUTIONAL: No fever, weight loss, or fatigue  EYES: No eye pain, visual disturbances, or discharge  ENMT:  No difficulty hearing, tinnitus, vertigo; No sinus or throat pain  NECK: No pain or stiffness  RESPIRATORY: No cough, wheezing, chills or hemoptysis; No shortness of breath  CARDIOVASCULAR: No chest pain, palpitations, dizziness, or leg swelling  GASTROINTESTINAL: No abdominal or epigastric pain. No nausea, vomiting, or hematemesis; No diarrhea or constipation. No melena or hematochezia.  GENITOURINARY: No dysuria, frequency, hematuria, or incontinence  NEUROLOGICAL: No headaches, memory loss, loss of strength, numbness, or tremors  SKIN: No itching, burning, rashes, or lesions   LYMPH NODES: No enlarged glands  ENDOCRINE: No heat or cold intolerance; No hair loss  MUSCULOSKELETAL: No joint pain or swelling; No muscle, back, or extremity pain  PSYCHIATRIC: No depression, anxiety, mood swings, or difficulty sleeping  HEME/LYMPH: No easy bruising, or bleeding gums  ALLERY AND IMMUNOLOGIC: No hives or eczema    RADIOLOGY & ADDITIONAL TESTS:    Imaging Personally Reviewed:  [ ] YES  [ ] NO    Consultant(s) Notes Reviewed:  [ ] YES  [ ] NO    PHYSICAL EXAM:  GENERAL: NAD, well-groomed, well-developed  HEAD:  Atraumatic, Normocephalic  EYES: EOMI, PERRLA, conjunctiva and sclera clear  ENMT: No tonsillar erythema, exudates, or enlargement; Moist mucous membranes, Good dentition, No lesions  NECK: Supple, No JVD, Normal thyroid  NERVOUS SYSTEM:  Alert & Oriented X3, Good concentration; Motor Strength 5/5 B/L upper and lower extremities; DTRs 2+ intact and symmetric  CHEST/LUNG: Clear to percussion bilaterally; No rales, rhonchi, wheezing, or rubs  HEART: Regular rate and rhythm; No murmurs, rubs, or gallops  ABDOMEN: Soft, Nontender, Nondistended; Bowel sounds present  EXTREMITIES:  2+ Peripheral Pulses, No clubbing, cyanosis, or edema  LYMPH: No lymphadenopathy noted  SKIN: No rashes or lesions    LABS:                        10.1   7.82  )-----------( 360      ( 19 Jul 2021 09:37 )             30.8     07-19    134<L>  |  98  |  17  ----------------------------<  275<H>  3.4<L>   |  22  |  1.14    Ca    9.4      19 Jul 2021 09:37  Phos  3.0     07-19  Mg     1.9     07-18          CAPILLARY BLOOD GLUCOSE      POCT Blood Glucose.: 296 mg/dL (19 Jul 2021 17:46)  POCT Blood Glucose.: 293 mg/dL (19 Jul 2021 12:24)  POCT Blood Glucose.: 250 mg/dL (19 Jul 2021 08:29)  POCT Blood Glucose.: 302 mg/dL (18 Jul 2021 21:24)            MEDICATIONS  (STANDING):  atorvastatin 40 milliGRAM(s) Oral at bedtime  enalapril 2.5 milliGRAM(s) Oral once  enalaprilat Injectable 2.5 milliGRAM(s) IV Push every 6 hours  folic acid Injectable 1 milliGRAM(s) IV Push daily  heparin   Injectable 5000 Unit(s) SubCutaneous every 12 hours  insulin lispro (ADMELOG) corrective regimen sliding scale   SubCutaneous Before meals and at bedtime  melatonin 3 milliGRAM(s) Oral at bedtime  multivitamin/minerals 1 Tablet(s) Oral daily  ondansetron Injectable 8 milliGRAM(s) IV Push two times a day  PHENobarbital IVPB 32 milliGRAM(s) IV Intermittent every 12 hours  polyethylene glycol 3350 17 Gram(s) Oral daily  senna 2 Tablet(s) Oral at bedtime  sodium chloride 0.9%. 1000 milliLiter(s) (50 mL/Hr) IV Continuous <Continuous>  timolol 0.25% Solution 1 Drop(s) Both EYES at bedtime    MEDICATIONS  (PRN):  acetaminophen   Tablet .. 650 milliGRAM(s) Oral every 6 hours PRN Temp greater or equal to 38C (100.4F)  magnesium hydroxide Suspension 30 milliLiter(s) Oral daily PRN Constipation      Care Discussed with Consultants/Other Providers [ ] YES  [ ] NO Patient is a 76y old  Male who presents with a chief complaint of Intracranial hemorrhage (18 Jul 2021 22:50)      INTERVAL HPI/OVERNIGHT EVENTS: doing fine , denies any c/o  T(C): 37 (07-19-21 @ 12:00), Max: 37.6 (07-19-21 @ 04:27)  HR: 90 (07-19-21 @ 12:00) (84 - 105)  BP: 144/80 (07-19-21 @ 12:00) (144/80 - 193/91)  RR: 18 (07-19-21 @ 12:00) (16 - 18)  SpO2: 96% (07-19-21 @ 12:00) (96% - 96%)  Wt(kg): --  I&O's Summary    18 Jul 2021 07:01  -  19 Jul 2021 07:00  --------------------------------------------------------  IN: 1740 mL / OUT: 0 mL / NET: 1740 mL    19 Jul 2021 07:01  -  19 Jul 2021 20:53  --------------------------------------------------------  IN: 1180 mL / OUT: 400 mL / NET: 780 mL        PAST MEDICAL & SURGICAL HISTORY:  DM (diabetes mellitus)        SOCIAL HISTORY  Alcohol:  Tobacco:  Illicit substance use:    FAMILY HISTORY:    REVIEW OF SYSTEMS:  CONSTITUTIONAL: No fever, weight loss, or fatigue  EYES: No eye pain, visual disturbances, or discharge  ENMT:  No difficulty hearing, tinnitus, vertigo; No sinus or throat pain  NECK: No pain or stiffness  RESPIRATORY: No cough, wheezing, chills or hemoptysis; No shortness of breath  CARDIOVASCULAR: No chest pain, palpitations, dizziness, or leg swelling  GASTROINTESTINAL: No abdominal or epigastric pain. No nausea, vomiting, or hematemesis; No diarrhea or constipation. No melena or hematochezia.  GENITOURINARY: No dysuria, frequency, hematuria, or incontinence  NEUROLOGICAL: No headaches, memory loss, loss of strength, numbness, or tremors  SKIN: No itching, burning, rashes, or lesions   LYMPH NODES: No enlarged glands  ENDOCRINE: No heat or cold intolerance; No hair loss  MUSCULOSKELETAL: No joint pain or swelling; No muscle, back, or extremity pain  PSYCHIATRIC: No depression, anxiety, mood swings, or difficulty sleeping  HEME/LYMPH: No easy bruising, or bleeding gums  ALLERY AND IMMUNOLOGIC: No hives or eczema    RADIOLOGY & ADDITIONAL TESTS:    Imaging Personally Reviewed:  [ ] YES  [ ] NO    Consultant(s) Notes Reviewed:  [ ] YES  [ ] NO    PHYSICAL EXAM:  GENERAL: NAD, well-groomed, well-developed  HEAD:  Atraumatic, Normocephalic  EYES: EOMI, PERRLA, conjunctiva and sclera clear  ENMT: No tonsillar erythema, exudates, or enlargement; Moist mucous membranes, Good dentition, No lesions  NECK: Supple, No JVD, Normal thyroid  NERVOUS SYSTEM:  Alert & Oriented X3, Good concentration; Motor Strength 5/5 B/L upper and lower extremities; DTRs 2+ intact and symmetric  CHEST/LUNG: Clear to percussion bilaterally; No rales, rhonchi, wheezing, or rubs  HEART: Regular rate and rhythm; No murmurs, rubs, or gallops  ABDOMEN: Soft, Nontender, Nondistended; Bowel sounds present  EXTREMITIES:  2+ Peripheral Pulses, No clubbing, cyanosis, or edema  LYMPH: No lymphadenopathy noted  SKIN: No rashes or lesions    LABS:                        10.1   7.82  )-----------( 360      ( 19 Jul 2021 09:37 )             30.8     07-19    134<L>  |  98  |  17  ----------------------------<  275<H>  3.4<L>   |  22  |  1.14    Ca    9.4      19 Jul 2021 09:37  Phos  3.0     07-19  Mg     1.9     07-18          CAPILLARY BLOOD GLUCOSE      POCT Blood Glucose.: 296 mg/dL (19 Jul 2021 17:46)  POCT Blood Glucose.: 293 mg/dL (19 Jul 2021 12:24)  POCT Blood Glucose.: 250 mg/dL (19 Jul 2021 08:29)  POCT Blood Glucose.: 302 mg/dL (18 Jul 2021 21:24)            MEDICATIONS  (STANDING):  atorvastatin 40 milliGRAM(s) Oral at bedtime  enalapril 2.5 milliGRAM(s) Oral once  enalaprilat Injectable 2.5 milliGRAM(s) IV Push every 6 hours  folic acid Injectable 1 milliGRAM(s) IV Push daily  heparin   Injectable 5000 Unit(s) SubCutaneous every 12 hours  insulin lispro (ADMELOG) corrective regimen sliding scale   SubCutaneous Before meals and at bedtime  melatonin 3 milliGRAM(s) Oral at bedtime  multivitamin/minerals 1 Tablet(s) Oral daily  ondansetron Injectable 8 milliGRAM(s) IV Push two times a day  PHENobarbital IVPB 32 milliGRAM(s) IV Intermittent every 12 hours  polyethylene glycol 3350 17 Gram(s) Oral daily  senna 2 Tablet(s) Oral at bedtime  sodium chloride 0.9%. 1000 milliLiter(s) (50 mL/Hr) IV Continuous <Continuous>  timolol 0.25% Solution 1 Drop(s) Both EYES at bedtime    MEDICATIONS  (PRN):  acetaminophen   Tablet .. 650 milliGRAM(s) Oral every 6 hours PRN Temp greater or equal to 38C (100.4F)  magnesium hydroxide Suspension 30 milliLiter(s) Oral daily PRN Constipation      Care Discussed with Consultants/Other Providers [ ] YES  [ ] NO

## 2021-07-20 LAB
ANION GAP SERPL CALC-SCNC: 14 MMOL/L — SIGNIFICANT CHANGE UP (ref 5–17)
BUN SERPL-MCNC: 16 MG/DL — SIGNIFICANT CHANGE UP (ref 7–23)
CALCIUM SERPL-MCNC: 9.7 MG/DL — SIGNIFICANT CHANGE UP (ref 8.4–10.5)
CHLORIDE SERPL-SCNC: 100 MMOL/L — SIGNIFICANT CHANGE UP (ref 96–108)
CO2 SERPL-SCNC: 22 MMOL/L — SIGNIFICANT CHANGE UP (ref 22–31)
CREAT SERPL-MCNC: 1.18 MG/DL — SIGNIFICANT CHANGE UP (ref 0.5–1.3)
CULTURE RESULTS: SIGNIFICANT CHANGE UP
CULTURE RESULTS: SIGNIFICANT CHANGE UP
GLUCOSE BLDC GLUCOMTR-MCNC: 217 MG/DL — HIGH (ref 70–99)
GLUCOSE BLDC GLUCOMTR-MCNC: 242 MG/DL — HIGH (ref 70–99)
GLUCOSE BLDC GLUCOMTR-MCNC: 250 MG/DL — HIGH (ref 70–99)
GLUCOSE BLDC GLUCOMTR-MCNC: 276 MG/DL — HIGH (ref 70–99)
GLUCOSE BLDC GLUCOMTR-MCNC: 308 MG/DL — HIGH (ref 70–99)
GLUCOSE SERPL-MCNC: 220 MG/DL — HIGH (ref 70–99)
POTASSIUM SERPL-MCNC: 3.7 MMOL/L — SIGNIFICANT CHANGE UP (ref 3.5–5.3)
POTASSIUM SERPL-SCNC: 3.7 MMOL/L — SIGNIFICANT CHANGE UP (ref 3.5–5.3)
SODIUM SERPL-SCNC: 136 MMOL/L — SIGNIFICANT CHANGE UP (ref 135–145)
SPECIMEN SOURCE: SIGNIFICANT CHANGE UP
SPECIMEN SOURCE: SIGNIFICANT CHANGE UP

## 2021-07-20 PROCEDURE — 76775 US EXAM ABDO BACK WALL LIM: CPT | Mod: 26

## 2021-07-20 PROCEDURE — 99232 SBSQ HOSP IP/OBS MODERATE 35: CPT

## 2021-07-20 RX ADMIN — Medication 401.96 MILLIGRAM(S): at 06:27

## 2021-07-20 RX ADMIN — SENNA PLUS 2 TABLET(S): 8.6 TABLET ORAL at 23:24

## 2021-07-20 RX ADMIN — Medication 1 MILLIGRAM(S): at 12:51

## 2021-07-20 RX ADMIN — Medication 1 DROP(S): at 23:24

## 2021-07-20 RX ADMIN — Medication 2.5 MILLIGRAM(S): at 23:25

## 2021-07-20 RX ADMIN — ONDANSETRON 8 MILLIGRAM(S): 8 TABLET, FILM COATED ORAL at 05:17

## 2021-07-20 RX ADMIN — Medication 4: at 09:15

## 2021-07-20 RX ADMIN — Medication 1 TABLET(S): at 12:44

## 2021-07-20 RX ADMIN — Medication 4: at 17:53

## 2021-07-20 RX ADMIN — SODIUM CHLORIDE 50 MILLILITER(S): 9 INJECTION INTRAMUSCULAR; INTRAVENOUS; SUBCUTANEOUS at 09:21

## 2021-07-20 RX ADMIN — ATORVASTATIN CALCIUM 40 MILLIGRAM(S): 80 TABLET, FILM COATED ORAL at 23:24

## 2021-07-20 RX ADMIN — HEPARIN SODIUM 5000 UNIT(S): 5000 INJECTION INTRAVENOUS; SUBCUTANEOUS at 05:17

## 2021-07-20 RX ADMIN — Medication 6: at 12:39

## 2021-07-20 RX ADMIN — Medication 2.5 MILLIGRAM(S): at 05:17

## 2021-07-20 RX ADMIN — Medication 2.5 MILLIGRAM(S): at 17:33

## 2021-07-20 RX ADMIN — SODIUM CHLORIDE 50 MILLILITER(S): 9 INJECTION INTRAMUSCULAR; INTRAVENOUS; SUBCUTANEOUS at 16:45

## 2021-07-20 RX ADMIN — Medication 2.5 MILLIGRAM(S): at 00:32

## 2021-07-20 RX ADMIN — Medication 3 MILLIGRAM(S): at 23:24

## 2021-07-20 RX ADMIN — Medication 2.5 MILLIGRAM(S): at 12:45

## 2021-07-20 RX ADMIN — Medication 8: at 23:26

## 2021-07-20 RX ADMIN — HEPARIN SODIUM 5000 UNIT(S): 5000 INJECTION INTRAVENOUS; SUBCUTANEOUS at 17:32

## 2021-07-20 RX ADMIN — Medication 401.96 MILLIGRAM(S): at 17:30

## 2021-07-20 RX ADMIN — ONDANSETRON 8 MILLIGRAM(S): 8 TABLET, FILM COATED ORAL at 17:32
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
## 2021-07-20 NOTE — PROGRESS NOTE ADULT - SUBJECTIVE AND OBJECTIVE BOX
Patient is a 76y old  Male who presents with a chief complaint of Intracranial hemorrhage (19 Jul 2021 20:53)      INTERVAL HPI/OVERNIGHT EVENTS: alert and awake, remain confused   T(C): 37 (07-20-21 @ 13:19), Max: 37.6 (07-20-21 @ 04:44)  HR: 86 (07-20-21 @ 13:19) (86 - 95)  BP: 135/88 (07-20-21 @ 13:19) (135/88 - 169/84)  RR: 18 (07-20-21 @ 13:19) (18 - 18)  SpO2: 97% (07-20-21 @ 13:19) (95% - 100%)  Wt(kg): --  I&O's Summary    19 Jul 2021 07:01  -  20 Jul 2021 07:00  --------------------------------------------------------  IN: 2260 mL / OUT: 800 mL / NET: 1460 mL    20 Jul 2021 07:01  -  20 Jul 2021 19:00  --------------------------------------------------------  IN: 240 mL / OUT: 0 mL / NET: 240 mL        PAST MEDICAL & SURGICAL HISTORY:  DM (diabetes mellitus)        SOCIAL HISTORY  Alcohol:  Tobacco:  Illicit substance use:    FAMILY HISTORY:    REVIEW OF SYSTEMS:  CONSTITUTIONAL: No fever, weight loss, or fatigue  EYES: No eye pain, visual disturbances, or discharge  ENMT:  No difficulty hearing, tinnitus, vertigo; No sinus or throat pain  NECK: No pain or stiffness  RESPIRATORY: No cough, wheezing, chills or hemoptysis; No shortness of breath  CARDIOVASCULAR: No chest pain, palpitations, dizziness, or leg swelling  GASTROINTESTINAL: No abdominal or epigastric pain. No nausea, vomiting, or hematemesis; No diarrhea or constipation. No melena or hematochezia.  GENITOURINARY: No dysuria, frequency, hematuria, or incontinence  NEUROLOGICAL: No headaches, memory loss, loss of strength, numbness, or tremors  SKIN: No itching, burning, rashes, or lesions   LYMPH NODES: No enlarged glands  ENDOCRINE: No heat or cold intolerance; No hair loss  MUSCULOSKELETAL: No joint pain or swelling; No muscle, back, or extremity pain  PSYCHIATRIC: No depression, anxiety, mood swings, or difficulty sleeping  HEME/LYMPH: No easy bruising, or bleeding gums  ALLERY AND IMMUNOLOGIC: No hives or eczema    RADIOLOGY & ADDITIONAL TESTS:    Imaging Personally Reviewed:  [ ] YES  [ ] NO    Consultant(s) Notes Reviewed:  [ ] YES  [ ] NO    PHYSICAL EXAM:  GENERAL: NAD, well-groomed, well-developed  HEAD:  Atraumatic, Normocephalic  EYES: EOMI, PERRLA, conjunctiva and sclera clear  ENMT: No tonsillar erythema, exudates, or enlargement; Moist mucous membranes, Good dentition, No lesions  NECK: Supple, No JVD, Normal thyroid  NERVOUS SYSTEM:  Alert & Oriented X3, Good concentration; Motor Strength 5/5 B/L upper and lower extremities; DTRs 2+ intact and symmetric  CHEST/LUNG: Clear to percussion bilaterally; No rales, rhonchi, wheezing, or rubs  HEART: Regular rate and rhythm; No murmurs, rubs, or gallops  ABDOMEN: Soft, Nontender, Nondistended; Bowel sounds present  EXTREMITIES:  2+ Peripheral Pulses, No clubbing, cyanosis, or edema  LYMPH: No lymphadenopathy noted  SKIN: No rashes or lesions    LABS:                        10.1   7.82  )-----------( 360      ( 19 Jul 2021 09:37 )             30.8     07-20    136  |  100  |  16  ----------------------------<  220<H>  3.7   |  22  |  1.18    Ca    9.7      20 Jul 2021 07:21  Phos  3.0     07-19          CAPILLARY BLOOD GLUCOSE  242 (20 Jul 2021 17:21)      POCT Blood Glucose.: 242 mg/dL (20 Jul 2021 17:21)  POCT Blood Glucose.: 276 mg/dL (20 Jul 2021 11:46)  POCT Blood Glucose.: 250 mg/dL (20 Jul 2021 08:22)  POCT Blood Glucose.: 247 mg/dL (19 Jul 2021 22:00)            MEDICATIONS  (STANDING):  atorvastatin 40 milliGRAM(s) Oral at bedtime  enalaprilat Injectable 2.5 milliGRAM(s) IV Push every 6 hours  folic acid Injectable 1 milliGRAM(s) IV Push daily  heparin   Injectable 5000 Unit(s) SubCutaneous every 12 hours  insulin lispro (ADMELOG) corrective regimen sliding scale   SubCutaneous Before meals and at bedtime  melatonin 3 milliGRAM(s) Oral at bedtime  multivitamin/minerals 1 Tablet(s) Oral daily  ondansetron Injectable 8 milliGRAM(s) IV Push two times a day  PHENobarbital IVPB 32 milliGRAM(s) IV Intermittent every 12 hours  polyethylene glycol 3350 17 Gram(s) Oral daily  senna 2 Tablet(s) Oral at bedtime  sodium chloride 0.9%. 1000 milliLiter(s) (50 mL/Hr) IV Continuous <Continuous>  timolol 0.25% Solution 1 Drop(s) Both EYES at bedtime    MEDICATIONS  (PRN):  acetaminophen   Tablet .. 650 milliGRAM(s) Oral every 6 hours PRN Temp greater or equal to 38C (100.4F)  magnesium hydroxide Suspension 30 milliLiter(s) Oral daily PRN Constipation      Care Discussed with Consultants/Other Providers [ ] YES  [ ] NO

## 2021-07-21 LAB
GLUCOSE BLDC GLUCOMTR-MCNC: 258 MG/DL — HIGH (ref 70–99)
GLUCOSE BLDC GLUCOMTR-MCNC: 277 MG/DL — HIGH (ref 70–99)
GLUCOSE BLDC GLUCOMTR-MCNC: 284 MG/DL — HIGH (ref 70–99)
GLUCOSE BLDC GLUCOMTR-MCNC: 315 MG/DL — HIGH (ref 70–99)

## 2021-07-21 PROCEDURE — 99231 SBSQ HOSP IP/OBS SF/LOW 25: CPT

## 2021-07-21 RX ADMIN — Medication 3 MILLIGRAM(S): at 21:55

## 2021-07-21 RX ADMIN — HEPARIN SODIUM 5000 UNIT(S): 5000 INJECTION INTRAVENOUS; SUBCUTANEOUS at 18:09

## 2021-07-21 RX ADMIN — POLYETHYLENE GLYCOL 3350 17 GRAM(S): 17 POWDER, FOR SOLUTION ORAL at 12:38

## 2021-07-21 RX ADMIN — Medication 8: at 18:09

## 2021-07-21 RX ADMIN — Medication 2.5 MILLIGRAM(S): at 12:44

## 2021-07-21 RX ADMIN — ONDANSETRON 8 MILLIGRAM(S): 8 TABLET, FILM COATED ORAL at 18:09

## 2021-07-21 RX ADMIN — ONDANSETRON 8 MILLIGRAM(S): 8 TABLET, FILM COATED ORAL at 05:02

## 2021-07-21 RX ADMIN — Medication 1 DROP(S): at 22:03

## 2021-07-21 RX ADMIN — SENNA PLUS 2 TABLET(S): 8.6 TABLET ORAL at 21:55

## 2021-07-21 RX ADMIN — Medication 6: at 21:56

## 2021-07-21 RX ADMIN — HEPARIN SODIUM 5000 UNIT(S): 5000 INJECTION INTRAVENOUS; SUBCUTANEOUS at 05:02

## 2021-07-21 RX ADMIN — Medication 2.5 MILLIGRAM(S): at 18:08

## 2021-07-21 RX ADMIN — Medication 2.5 MILLIGRAM(S): at 05:01

## 2021-07-21 RX ADMIN — Medication 2.5 MILLIGRAM(S): at 23:22

## 2021-07-21 RX ADMIN — Medication 6: at 12:39

## 2021-07-21 RX ADMIN — Medication 1 TABLET(S): at 12:35

## 2021-07-21 RX ADMIN — Medication 401.96 MILLIGRAM(S): at 06:07

## 2021-07-21 RX ADMIN — Medication 1 MILLIGRAM(S): at 12:39

## 2021-07-21 RX ADMIN — ATORVASTATIN CALCIUM 40 MILLIGRAM(S): 80 TABLET, FILM COATED ORAL at 21:56

## 2021-07-21 RX ADMIN — Medication 6: at 08:43

## 2021-07-21 RX ADMIN — Medication 401.96 MILLIGRAM(S): at 18:08

## 2021-07-21 NOTE — PROGRESS NOTE ADULT - SUBJECTIVE AND OBJECTIVE BOX
Patient is a 76y old  Male who presents with a chief complaint of Intracranial hemorrhage (21 Jul 2021 12:24)      INTERVAL HPI/OVERNIGHT EVENTS: stable , afebrile   T(C): 37.4 (07-21-21 @ 09:41), Max: 37.6 (07-20-21 @ 20:32)  HR: 90 (07-21-21 @ 09:41) (82 - 92)  BP: 169/81 (07-21-21 @ 09:41) (128/80 - 169/81)  RR: 18 (07-21-21 @ 09:41) (18 - 18)  SpO2: 97% (07-21-21 @ 09:41) (95% - 98%)  Wt(kg): --  I&O's Summary    20 Jul 2021 07:01  -  21 Jul 2021 07:00  --------------------------------------------------------  IN: 1900 mL / OUT: 500 mL / NET: 1400 mL    21 Jul 2021 07:01  -  21 Jul 2021 14:54  --------------------------------------------------------  IN: 240 mL / OUT: 350 mL / NET: -110 mL        PAST MEDICAL & SURGICAL HISTORY:  DM (diabetes mellitus)        SOCIAL HISTORY  Alcohol:  Tobacco:  Illicit substance use:    FAMILY HISTORY:    REVIEW OF SYSTEMS:  CONSTITUTIONAL: No fever, weight loss, or fatigue  EYES: No eye pain, visual disturbances, or discharge  ENMT:  No difficulty hearing, tinnitus, vertigo; No sinus or throat pain  NECK: No pain or stiffness  RESPIRATORY: No cough, wheezing, chills or hemoptysis; No shortness of breath  CARDIOVASCULAR: No chest pain, palpitations, dizziness, or leg swelling  GASTROINTESTINAL: No abdominal or epigastric pain. No nausea, vomiting, or hematemesis; No diarrhea or constipation. No melena or hematochezia.  GENITOURINARY: No dysuria, frequency, hematuria, or incontinence  NEUROLOGICAL: No headaches, memory loss, loss of strength, numbness, or tremors  SKIN: No itching, burning, rashes, or lesions   LYMPH NODES: No enlarged glands  ENDOCRINE: No heat or cold intolerance; No hair loss  MUSCULOSKELETAL: No joint pain or swelling; No muscle, back, or extremity pain  PSYCHIATRIC: No depression, anxiety, mood swings, or difficulty sleeping  HEME/LYMPH: No easy bruising, or bleeding gums  ALLERY AND IMMUNOLOGIC: No hives or eczema    RADIOLOGY & ADDITIONAL TESTS:    Imaging Personally Reviewed:  [ ] YES  [ ] NO    Consultant(s) Notes Reviewed:  [ ] YES  [ ] NO    PHYSICAL EXAM:  GENERAL: NAD, well-groomed, well-developed  HEAD:  Atraumatic, Normocephalic  EYES: EOMI, PERRLA, conjunctiva and sclera clear  ENMT: No tonsillar erythema, exudates, or enlargement; Moist mucous membranes, Good dentition, No lesions  NECK: Supple, No JVD, Normal thyroid  NERVOUS SYSTEM:  Alert & Oriented X3, Good concentration; Motor Strength 5/5 B/L upper and lower extremities; DTRs 2+ intact and symmetric  CHEST/LUNG: Clear to percussion bilaterally; No rales, rhonchi, wheezing, or rubs  HEART: Regular rate and rhythm; No murmurs, rubs, or gallops  ABDOMEN: Soft, Nontender, Nondistended; Bowel sounds present  EXTREMITIES:  2+ Peripheral Pulses, No clubbing, cyanosis, or edema  LYMPH: No lymphadenopathy noted  SKIN: No rashes or lesions    LABS:    07-20    136  |  100  |  16  ----------------------------<  220<H>  3.7   |  22  |  1.18    Ca    9.7      20 Jul 2021 07:21          CAPILLARY BLOOD GLUCOSE  242 (20 Jul 2021 17:21)      POCT Blood Glucose.: 277 mg/dL (21 Jul 2021 12:21)  POCT Blood Glucose.: 258 mg/dL (21 Jul 2021 08:36)  POCT Blood Glucose.: 308 mg/dL (20 Jul 2021 23:25)  POCT Blood Glucose.: 217 mg/dL (20 Jul 2021 21:42)  POCT Blood Glucose.: 242 mg/dL (20 Jul 2021 17:21)            MEDICATIONS  (STANDING):  atorvastatin 40 milliGRAM(s) Oral at bedtime  enalaprilat Injectable 2.5 milliGRAM(s) IV Push every 6 hours  folic acid Injectable 1 milliGRAM(s) IV Push daily  heparin   Injectable 5000 Unit(s) SubCutaneous every 12 hours  insulin lispro (ADMELOG) corrective regimen sliding scale   SubCutaneous Before meals and at bedtime  melatonin 3 milliGRAM(s) Oral at bedtime  multivitamin/minerals 1 Tablet(s) Oral daily  ondansetron Injectable 8 milliGRAM(s) IV Push two times a day  PHENobarbital IVPB 32 milliGRAM(s) IV Intermittent every 12 hours  polyethylene glycol 3350 17 Gram(s) Oral daily  senna 2 Tablet(s) Oral at bedtime  sodium chloride 0.9%. 1000 milliLiter(s) (50 mL/Hr) IV Continuous <Continuous>  timolol 0.25% Solution 1 Drop(s) Both EYES at bedtime    MEDICATIONS  (PRN):  acetaminophen   Tablet .. 650 milliGRAM(s) Oral every 6 hours PRN Temp greater or equal to 38C (100.4F)  magnesium hydroxide Suspension 30 milliLiter(s) Oral daily PRN Constipation      Care Discussed with Consultants/Other Providers [ ] YES  [ ] NO
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
## 2021-07-21 NOTE — PROGRESS NOTE ADULT - SUBJECTIVE AND OBJECTIVE BOX
Follow Up: Fevers    Interval History/ROS: Afebrile. Denies pain. No cough or chills or diarrhea or dysuria. Tells me to pull his wrist restraints off and let him go home. I told him it's not up to me.     Allergies  Allergy Status Unknown        ANTIMICROBIALS:      OTHER MEDS:  acetaminophen   Tablet .. 650 milliGRAM(s) Oral every 6 hours PRN  atorvastatin 40 milliGRAM(s) Oral at bedtime  enalaprilat Injectable 2.5 milliGRAM(s) IV Push every 6 hours  folic acid Injectable 1 milliGRAM(s) IV Push daily  heparin   Injectable 5000 Unit(s) SubCutaneous every 12 hours  insulin lispro (ADMELOG) corrective regimen sliding scale   SubCutaneous Before meals and at bedtime  magnesium hydroxide Suspension 30 milliLiter(s) Oral daily PRN  melatonin 3 milliGRAM(s) Oral at bedtime  multivitamin/minerals 1 Tablet(s) Oral daily  ondansetron Injectable 8 milliGRAM(s) IV Push two times a day  PHENobarbital IVPB 32 milliGRAM(s) IV Intermittent every 12 hours  polyethylene glycol 3350 17 Gram(s) Oral daily  senna 2 Tablet(s) Oral at bedtime  sodium chloride 0.9%. 1000 milliLiter(s) IV Continuous <Continuous>  timolol 0.25% Solution 1 Drop(s) Both EYES at bedtime      Vital Signs Last 24 Hrs  T(C): 37.4 (21 Jul 2021 09:41), Max: 37.6 (20 Jul 2021 20:32)  T(F): 99.4 (21 Jul 2021 09:41), Max: 99.7 (20 Jul 2021 20:32)  HR: 90 (21 Jul 2021 09:41) (82 - 92)  BP: 169/81 (21 Jul 2021 09:41) (128/80 - 169/81)  BP(mean): --  RR: 18 (21 Jul 2021 09:41) (18 - 18)  SpO2: 97% (21 Jul 2021 09:41) (95% - 98%)    Physical Exam:  General: awake, alert, non toxic  Head: atraumatic, normocephalic  ENT: no cervical lymphadenopathy   Cardio: regular rate   Respiratory: nonlabored on room air, clear bilaterally, no wheezing  abd: soft, bowel sounds present, no tenderness  : no suprapubic tenderness   Musculoskeletal: no focal joint swelling, no edema        07-20    136  |  100  |  16  ----------------------------<  220<H>  3.7   |  22  |  1.18    Ca    9.7      20 Jul 2021 07:21        MICROBIOLOGY:  Culture - Urine (collected 07-17-21 @ 03:53)  Source: .Urine Catheterized  Final Report (07-18-21 @ 02:19):    <10,000 CFU/mL Normal Urogenital Chitra    Culture - Urine (collected 07-15-21 @ 03:29)  Source: .Urine Clean Catch (Midstream)  Final Report (07-17-21 @ 20:11):    >100,000 CFU/ml Klebsiella pneumoniae  Organism: Klebsiella pneumoniae (07-17-21 @ 20:11)  Organism: Klebsiella pneumoniae (07-17-21 @ 20:11)      -  Amikacin: S <=16      -  Amoxicillin/Clavulanic Acid: S <=8/4      -  Ampicillin: R >16 These ampicillin results predict results for amoxicillin      -  Ampicillin/Sulbactam: I 16/8 Enterobacter, Citrobacter, and Serratia may develop resistance during prolonged therapy (3-4 days)      -  Aztreonam: S <=4      -  Cefazolin: S <=2 (MIC_CL_[id number]_[id number]) For uncomplicated UTI with K. pneumoniae, E. coli, or P. mirablis: ELDER <=16 is sensitive and ELDER >=32 is resistant. This also predicts results for oral agents cefaclor, cefdinir, cefpodoxime, cefprozil, cefuroxime axetil, cephalexin and locarbef for uncomplicated UTI. Note that some isolates may be susceptible to these agents while testing resistant to cefazolin.      -  Cefepime: S <=2      -  Cefoxitin: S <=8      -  Ceftriaxone: S <=1 Enterobacter, Citrobacter, and Serratia may develop resistance during prolonged therapy      -  Ciprofloxacin: S <=0.25      -  Ertapenem: S <=0.5      -  Gentamicin: S <=2      -  Imipenem: S <=1      -  Levofloxacin: S <=0.5      -  Meropenem: S <=1      -  Nitrofurantoin: S <=32 Should not be used to treat pyelonephritis      -  Piperacillin/Tazobactam: S <=8      -  Tigecycline: S <=2      -  Tobramycin: S <=2      -  Trimethoprim/Sulfamethoxazole: S <=0.5/9.5      Method Type: ELDER    Culture - Blood (collected 07-15-21 @ 03:29)  Source: .Blood Blood-Peripheral  Final Report (07-20-21 @ 04:01):    No Growth Final    Culture - Blood (collected 07-15-21 @ 03:28)  Source: .Blood Blood-Peripheral  Final Report (07-20-21 @ 04:01):    No Growth Final    Rapid RVP Result: NotDetec (07-15 @ 18:25)    RADIOLOGY:  Images below reviewed personally  US Renal (07.20.21 @ 15:47)   Renal lesion seen at the right lower pole on CT is a cyst with a single septation.  Bilateral renal cortical echogenicity compatible with medical renal disease.    CT Abdomen and Pelvis w/ IV Cont (07.18.21 @ 13:49)   No CT evidence of occult intraperitoneal abscess.  Anterior bladder wall thickening. Correlate with urinalysis.  Indeterminant right kidney lower pole lesion. Nonemergent ultrasound the kidneys may be obtained for further evaluation.    Xray Chest 1 View- PORTABLE-Urgent (Xray Chest 1 View- PORTABLE-Urgent .) (07.15.21 @ 19:09)   The cardiomediastinal silhouette is within normal limits.  Lungs are clear. No pleural effusion or pneumothorax.  No acute bony finding.

## 2021-07-22 LAB
GLUCOSE BLDC GLUCOMTR-MCNC: 192 MG/DL — HIGH (ref 70–99)
GLUCOSE BLDC GLUCOMTR-MCNC: 223 MG/DL — HIGH (ref 70–99)
GLUCOSE BLDC GLUCOMTR-MCNC: 254 MG/DL — HIGH (ref 70–99)
GLUCOSE BLDC GLUCOMTR-MCNC: 310 MG/DL — HIGH (ref 70–99)

## 2021-07-22 PROCEDURE — 93010 ELECTROCARDIOGRAM REPORT: CPT

## 2021-07-22 RX ADMIN — Medication 1 MILLIGRAM(S): at 15:43

## 2021-07-22 RX ADMIN — Medication 4: at 18:07

## 2021-07-22 RX ADMIN — HEPARIN SODIUM 5000 UNIT(S): 5000 INJECTION INTRAVENOUS; SUBCUTANEOUS at 06:05

## 2021-07-22 RX ADMIN — Medication 6: at 08:50

## 2021-07-22 RX ADMIN — ONDANSETRON 8 MILLIGRAM(S): 8 TABLET, FILM COATED ORAL at 06:04

## 2021-07-22 RX ADMIN — Medication 2.5 MILLIGRAM(S): at 17:58

## 2021-07-22 RX ADMIN — Medication 2.5 MILLIGRAM(S): at 23:28

## 2021-07-22 RX ADMIN — Medication 1 TABLET(S): at 12:11

## 2021-07-22 RX ADMIN — Medication 2: at 22:30

## 2021-07-22 RX ADMIN — Medication 2.5 MILLIGRAM(S): at 12:11

## 2021-07-22 RX ADMIN — HEPARIN SODIUM 5000 UNIT(S): 5000 INJECTION INTRAVENOUS; SUBCUTANEOUS at 18:00

## 2021-07-22 RX ADMIN — Medication 8: at 12:12

## 2021-07-22 RX ADMIN — SODIUM CHLORIDE 50 MILLILITER(S): 9 INJECTION INTRAMUSCULAR; INTRAVENOUS; SUBCUTANEOUS at 06:04

## 2021-07-22 RX ADMIN — Medication 2.5 MILLIGRAM(S): at 06:04

## 2021-07-22 RX ADMIN — Medication 401.96 MILLIGRAM(S): at 17:39

## 2021-07-22 RX ADMIN — Medication 401.96 MILLIGRAM(S): at 06:04

## 2021-07-22 RX ADMIN — POLYETHYLENE GLYCOL 3350 17 GRAM(S): 17 POWDER, FOR SOLUTION ORAL at 12:13

## 2021-07-22 NOTE — PROGRESS NOTE ADULT - SUBJECTIVE AND OBJECTIVE BOX
Patient is a 76y old  Male who presents with a chief complaint of Intracranial hemorrhage (21 Jul 2021 14:53)      INTERVAL HPI/OVERNIGHT EVENTS:  T(C): 36.7 (07-22-21 @ 20:00), Max: 37.2 (07-22-21 @ 00:28)  HR: 84 (07-22-21 @ 20:00) (80 - 90)  BP: 163/92 (07-22-21 @ 22:44) (144/78 - 166/84)  RR: 18 (07-22-21 @ 20:00) (18 - 18)  SpO2: 98% (07-22-21 @ 20:00) (97% - 98%)  Wt(kg): --  I&O's Summary    21 Jul 2021 07:01  -  22 Jul 2021 07:00  --------------------------------------------------------  IN: 2790 mL / OUT: 740 mL / NET: 2050 mL    22 Jul 2021 07:01  -  22 Jul 2021 23:52  --------------------------------------------------------  IN: 340 mL / OUT: 200 mL / NET: 140 mL        PAST MEDICAL & SURGICAL HISTORY:  DM (diabetes mellitus)        SOCIAL HISTORY  Alcohol:  Tobacco:  Illicit substance use:    FAMILY HISTORY:    REVIEW OF SYSTEMS:  CONSTITUTIONAL: No fever, weight loss, or fatigue  EYES: No eye pain, visual disturbances, or discharge  ENMT:  No difficulty hearing, tinnitus, vertigo; No sinus or throat pain  NECK: No pain or stiffness  RESPIRATORY: No cough, wheezing, chills or hemoptysis; No shortness of breath  CARDIOVASCULAR: No chest pain, palpitations, dizziness, or leg swelling  GASTROINTESTINAL: No abdominal or epigastric pain. No nausea, vomiting, or hematemesis; No diarrhea or constipation. No melena or hematochezia.  GENITOURINARY: No dysuria, frequency, hematuria, or incontinence  NEUROLOGICAL: No headaches, memory loss, loss of strength, numbness, or tremors  SKIN: No itching, burning, rashes, or lesions   LYMPH NODES: No enlarged glands  ENDOCRINE: No heat or cold intolerance; No hair loss  MUSCULOSKELETAL: No joint pain or swelling; No muscle, back, or extremity pain  PSYCHIATRIC: No depression, anxiety, mood swings, or difficulty sleeping  HEME/LYMPH: No easy bruising, or bleeding gums  ALLERY AND IMMUNOLOGIC: No hives or eczema    RADIOLOGY & ADDITIONAL TESTS:    Imaging Personally Reviewed:  [ ] YES  [ ] NO    Consultant(s) Notes Reviewed:  [ ] YES  [ ] NO    PHYSICAL EXAM:  GENERAL: NAD, well-groomed, well-developed  HEAD:  Atraumatic, Normocephalic  EYES: EOMI, PERRLA, conjunctiva and sclera clear  ENMT: No tonsillar erythema, exudates, or enlargement; Moist mucous membranes, Good dentition, No lesions  NECK: Supple, No JVD, Normal thyroid  NERVOUS SYSTEM:  Alert & Oriented X3, Good concentration; Motor Strength 5/5 B/L upper and lower extremities; DTRs 2+ intact and symmetric  CHEST/LUNG: Clear to percussion bilaterally; No rales, rhonchi, wheezing, or rubs  HEART: Regular rate and rhythm; No murmurs, rubs, or gallops  ABDOMEN: Soft, Nontender, Nondistended; Bowel sounds present  EXTREMITIES:  2+ Peripheral Pulses, No clubbing, cyanosis, or edema  LYMPH: No lymphadenopathy noted  SKIN: No rashes or lesions    LABS:              CAPILLARY BLOOD GLUCOSE      POCT Blood Glucose.: 192 mg/dL (22 Jul 2021 22:04)  POCT Blood Glucose.: 223 mg/dL (22 Jul 2021 18:04)  POCT Blood Glucose.: 310 mg/dL (22 Jul 2021 11:52)  POCT Blood Glucose.: 254 mg/dL (22 Jul 2021 08:33)            MEDICATIONS  (STANDING):  atorvastatin 40 milliGRAM(s) Oral at bedtime  enalaprilat Injectable 2.5 milliGRAM(s) IV Push every 6 hours  folic acid Injectable 1 milliGRAM(s) IV Push daily  heparin   Injectable 5000 Unit(s) SubCutaneous every 12 hours  insulin lispro (ADMELOG) corrective regimen sliding scale   SubCutaneous Before meals and at bedtime  melatonin 3 milliGRAM(s) Oral at bedtime  multivitamin/minerals 1 Tablet(s) Oral daily  PHENobarbital IVPB 32 milliGRAM(s) IV Intermittent every 12 hours  polyethylene glycol 3350 17 Gram(s) Oral daily  senna 2 Tablet(s) Oral at bedtime  sodium chloride 0.9%. 1000 milliLiter(s) (50 mL/Hr) IV Continuous <Continuous>  timolol 0.25% Solution 1 Drop(s) Both EYES at bedtime    MEDICATIONS  (PRN):  acetaminophen   Tablet .. 650 milliGRAM(s) Oral every 6 hours PRN Temp greater or equal to 38C (100.4F)  magnesium hydroxide Suspension 30 milliLiter(s) Oral daily PRN Constipation      Care Discussed with Consultants/Other Providers [ ] YES  [ ] NO Patient is a 76y old  Male who presents with a chief complaint of Intracranial hemorrhage (21 Jul 2021 14:53)      INTERVAL HPI/OVERNIGHT EVENTS: doing fair   T(C): 36.7 (07-22-21 @ 20:00), Max: 37.2 (07-22-21 @ 00:28)  HR: 84 (07-22-21 @ 20:00) (80 - 90)  BP: 163/92 (07-22-21 @ 22:44) (144/78 - 166/84)  RR: 18 (07-22-21 @ 20:00) (18 - 18)  SpO2: 98% (07-22-21 @ 20:00) (97% - 98%)  Wt(kg): --  I&O's Summary    21 Jul 2021 07:01  -  22 Jul 2021 07:00  --------------------------------------------------------  IN: 2790 mL / OUT: 740 mL / NET: 2050 mL    22 Jul 2021 07:01  -  22 Jul 2021 23:52  --------------------------------------------------------  IN: 340 mL / OUT: 200 mL / NET: 140 mL        PAST MEDICAL & SURGICAL HISTORY:  DM (diabetes mellitus)        SOCIAL HISTORY  Alcohol:  Tobacco:  Illicit substance use:    FAMILY HISTORY:    REVIEW OF SYSTEMS:  CONSTITUTIONAL: No fever, weight loss, or fatigue  EYES: No eye pain, visual disturbances, or discharge  ENMT:  No difficulty hearing, tinnitus, vertigo; No sinus or throat pain  NECK: No pain or stiffness  RESPIRATORY: No cough, wheezing, chills or hemoptysis; No shortness of breath  CARDIOVASCULAR: No chest pain, palpitations, dizziness, or leg swelling  GASTROINTESTINAL: No abdominal or epigastric pain. No nausea, vomiting, or hematemesis; No diarrhea or constipation. No melena or hematochezia.  GENITOURINARY: No dysuria, frequency, hematuria, or incontinence  NEUROLOGICAL: No headaches, memory loss, loss of strength, numbness, or tremors  SKIN: No itching, burning, rashes, or lesions   LYMPH NODES: No enlarged glands  ENDOCRINE: No heat or cold intolerance; No hair loss  MUSCULOSKELETAL: No joint pain or swelling; No muscle, back, or extremity pain  PSYCHIATRIC: No depression, anxiety, mood swings, or difficulty sleeping  HEME/LYMPH: No easy bruising, or bleeding gums  ALLERY AND IMMUNOLOGIC: No hives or eczema    RADIOLOGY & ADDITIONAL TESTS:    Imaging Personally Reviewed:  [ ] YES  [ ] NO    Consultant(s) Notes Reviewed:  [ ] YES  [ ] NO    PHYSICAL EXAM:  GENERAL: NAD, well-groomed, well-developed  HEAD:  Atraumatic, Normocephalic  EYES: EOMI, PERRLA, conjunctiva and sclera clear  ENMT: No tonsillar erythema, exudates, or enlargement; Moist mucous membranes, Good dentition, No lesions  NECK: Supple, No JVD, Normal thyroid  NERVOUS SYSTEM:  Alert & Oriented X3, Good concentration; Motor Strength 5/5 B/L upper and lower extremities; DTRs 2+ intact and symmetric  CHEST/LUNG: Clear to percussion bilaterally; No rales, rhonchi, wheezing, or rubs  HEART: Regular rate and rhythm; No murmurs, rubs, or gallops  ABDOMEN: Soft, Nontender, Nondistended; Bowel sounds present  EXTREMITIES:  2+ Peripheral Pulses, No clubbing, cyanosis, or edema  LYMPH: No lymphadenopathy noted  SKIN: No rashes or lesions    LABS:              CAPILLARY BLOOD GLUCOSE      POCT Blood Glucose.: 192 mg/dL (22 Jul 2021 22:04)  POCT Blood Glucose.: 223 mg/dL (22 Jul 2021 18:04)  POCT Blood Glucose.: 310 mg/dL (22 Jul 2021 11:52)  POCT Blood Glucose.: 254 mg/dL (22 Jul 2021 08:33)            MEDICATIONS  (STANDING):  atorvastatin 40 milliGRAM(s) Oral at bedtime  enalaprilat Injectable 2.5 milliGRAM(s) IV Push every 6 hours  folic acid Injectable 1 milliGRAM(s) IV Push daily  heparin   Injectable 5000 Unit(s) SubCutaneous every 12 hours  insulin lispro (ADMELOG) corrective regimen sliding scale   SubCutaneous Before meals and at bedtime  melatonin 3 milliGRAM(s) Oral at bedtime  multivitamin/minerals 1 Tablet(s) Oral daily  PHENobarbital IVPB 32 milliGRAM(s) IV Intermittent every 12 hours  polyethylene glycol 3350 17 Gram(s) Oral daily  senna 2 Tablet(s) Oral at bedtime  sodium chloride 0.9%. 1000 milliLiter(s) (50 mL/Hr) IV Continuous <Continuous>  timolol 0.25% Solution 1 Drop(s) Both EYES at bedtime    MEDICATIONS  (PRN):  acetaminophen   Tablet .. 650 milliGRAM(s) Oral every 6 hours PRN Temp greater or equal to 38C (100.4F)  magnesium hydroxide Suspension 30 milliLiter(s) Oral daily PRN Constipation      Care Discussed with Consultants/Other Providers [ ] YES  [ ] NO
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
## 2021-07-23 LAB
GLUCOSE BLDC GLUCOMTR-MCNC: 190 MG/DL — HIGH (ref 70–99)
GLUCOSE BLDC GLUCOMTR-MCNC: 213 MG/DL — HIGH (ref 70–99)
GLUCOSE BLDC GLUCOMTR-MCNC: 228 MG/DL — HIGH (ref 70–99)
GLUCOSE BLDC GLUCOMTR-MCNC: 299 MG/DL — HIGH (ref 70–99)

## 2021-07-23 PROCEDURE — 99223 1ST HOSP IP/OBS HIGH 75: CPT

## 2021-07-23 RX ORDER — ESCITALOPRAM OXALATE 10 MG/1
5 TABLET, FILM COATED ORAL DAILY
Refills: 0 | Status: DISCONTINUED | OUTPATIENT
Start: 2021-07-23 | End: 2021-07-29

## 2021-07-23 RX ORDER — THIAMINE MONONITRATE (VIT B1) 100 MG
100 TABLET ORAL DAILY
Refills: 0 | Status: DISCONTINUED | OUTPATIENT
Start: 2021-07-23 | End: 2021-07-29

## 2021-07-23 RX ORDER — LEVETIRACETAM 250 MG/1
500 TABLET, FILM COATED ORAL
Refills: 0 | Status: DISCONTINUED | OUTPATIENT
Start: 2021-07-23 | End: 2021-07-27

## 2021-07-23 RX ADMIN — Medication 2: at 12:17

## 2021-07-23 RX ADMIN — Medication 401.96 MILLIGRAM(S): at 05:51

## 2021-07-23 RX ADMIN — HEPARIN SODIUM 5000 UNIT(S): 5000 INJECTION INTRAVENOUS; SUBCUTANEOUS at 17:20

## 2021-07-23 RX ADMIN — ESCITALOPRAM OXALATE 5 MILLIGRAM(S): 10 TABLET, FILM COATED ORAL at 18:38

## 2021-07-23 RX ADMIN — LEVETIRACETAM 500 MILLIGRAM(S): 250 TABLET, FILM COATED ORAL at 18:39

## 2021-07-23 RX ADMIN — Medication 1 MILLIGRAM(S): at 13:27

## 2021-07-23 RX ADMIN — Medication 6: at 09:08

## 2021-07-23 RX ADMIN — Medication 2.5 MILLIGRAM(S): at 12:13

## 2021-07-23 RX ADMIN — Medication 4: at 17:19

## 2021-07-23 RX ADMIN — Medication 1 DROP(S): at 21:13

## 2021-07-23 RX ADMIN — Medication 2.5 MILLIGRAM(S): at 17:19

## 2021-07-23 RX ADMIN — POLYETHYLENE GLYCOL 3350 17 GRAM(S): 17 POWDER, FOR SOLUTION ORAL at 12:16

## 2021-07-23 RX ADMIN — Medication 1 TABLET(S): at 12:14

## 2021-07-23 RX ADMIN — ATORVASTATIN CALCIUM 40 MILLIGRAM(S): 80 TABLET, FILM COATED ORAL at 21:13

## 2021-07-23 RX ADMIN — Medication 2.5 MILLIGRAM(S): at 05:52

## 2021-07-23 RX ADMIN — HEPARIN SODIUM 5000 UNIT(S): 5000 INJECTION INTRAVENOUS; SUBCUTANEOUS at 05:52

## 2021-07-23 RX ADMIN — Medication 3 MILLIGRAM(S): at 21:13

## 2021-07-23 RX ADMIN — SENNA PLUS 2 TABLET(S): 8.6 TABLET ORAL at 21:13

## 2021-07-23 RX ADMIN — Medication 4: at 22:10

## 2021-07-23 NOTE — PROGRESS NOTE ADULT - SUBJECTIVE AND OBJECTIVE BOX
Patient is a 76y old  Male who presents with a chief complaint of Intracranial hemorrhage (23 Jul 2021 06:33)      INTERVAL HPI/OVERNIGHT EVENTS: seen and examined   T(C): 36.6 (07-24-21 @ 00:59), Max: 37 (07-23-21 @ 04:06)  HR: 79 (07-24-21 @ 00:59) (79 - 91)  BP: 157/82 (07-24-21 @ 00:59) (131/90 - 174/90)  RR: 20 (07-24-21 @ 00:59) (18 - 20)  SpO2: 97% (07-24-21 @ 00:59) (91% - 97%)  Wt(kg): --  I&O's Summary    22 Jul 2021 07:01  -  23 Jul 2021 07:00  --------------------------------------------------------  IN: 920 mL / OUT: 200 mL / NET: 720 mL    23 Jul 2021 07:01  -  24 Jul 2021 02:39  --------------------------------------------------------  IN: 720 mL / OUT: 0 mL / NET: 720 mL        PAST MEDICAL & SURGICAL HISTORY:  DM (diabetes mellitus)        SOCIAL HISTORY  Alcohol:  Tobacco:  Illicit substance use:    FAMILY HISTORY:    REVIEW OF SYSTEMS:  CONSTITUTIONAL: No fever, weight loss, or fatigue  EYES: No eye pain, visual disturbances, or discharge  ENMT:  No difficulty hearing, tinnitus, vertigo; No sinus or throat pain  NECK: No pain or stiffness  RESPIRATORY: No cough, wheezing, chills or hemoptysis; No shortness of breath  CARDIOVASCULAR: No chest pain, palpitations, dizziness, or leg swelling  GASTROINTESTINAL: No abdominal or epigastric pain. No nausea, vomiting, or hematemesis; No diarrhea or constipation. No melena or hematochezia.  GENITOURINARY: No dysuria, frequency, hematuria, or incontinence  NEUROLOGICAL: No headaches, memory loss, loss of strength, numbness, or tremors  SKIN: No itching, burning, rashes, or lesions   LYMPH NODES: No enlarged glands  ENDOCRINE: No heat or cold intolerance; No hair loss  MUSCULOSKELETAL: No joint pain or swelling; No muscle, back, or extremity pain  PSYCHIATRIC: No depression, anxiety, mood swings, or difficulty sleeping  HEME/LYMPH: No easy bruising, or bleeding gums  ALLERY AND IMMUNOLOGIC: No hives or eczema    RADIOLOGY & ADDITIONAL TESTS:    Imaging Personally Reviewed:  [ ] YES  [ ] NO    Consultant(s) Notes Reviewed:  [ ] YES  [ ] NO    PHYSICAL EXAM:  GENERAL: NAD, well-groomed, well-developed  HEAD:  Atraumatic, Normocephalic  EYES: EOMI, PERRLA, conjunctiva and sclera clear  ENMT: No tonsillar erythema, exudates, or enlargement; Moist mucous membranes, Good dentition, No lesions  NECK: Supple, No JVD, Normal thyroid  NERVOUS SYSTEM:  Alert & Oriented X3, Good concentration; Motor Strength 5/5 B/L upper and lower extremities; DTRs 2+ intact and symmetric  CHEST/LUNG: Clear to percussion bilaterally; No rales, rhonchi, wheezing, or rubs  HEART: Regular rate and rhythm; No murmurs, rubs, or gallops  ABDOMEN: Soft, Nontender, Nondistended; Bowel sounds present  EXTREMITIES:  2+ Peripheral Pulses, No clubbing, cyanosis, or edema  LYMPH: No lymphadenopathy noted  SKIN: No rashes or lesions    LABS:              CAPILLARY BLOOD GLUCOSE      POCT Blood Glucose.: 228 mg/dL (23 Jul 2021 21:57)  POCT Blood Glucose.: 213 mg/dL (23 Jul 2021 17:02)  POCT Blood Glucose.: 190 mg/dL (23 Jul 2021 12:05)  POCT Blood Glucose.: 299 mg/dL (23 Jul 2021 08:55)            MEDICATIONS  (STANDING):  atorvastatin 40 milliGRAM(s) Oral at bedtime  enalaprilat Injectable 2.5 milliGRAM(s) IV Push every 6 hours  escitalopram 5 milliGRAM(s) Oral daily  folic acid Injectable 1 milliGRAM(s) IV Push daily  heparin   Injectable 5000 Unit(s) SubCutaneous every 12 hours  insulin lispro (ADMELOG) corrective regimen sliding scale   SubCutaneous Before meals and at bedtime  levETIRAcetam 500 milliGRAM(s) Oral two times a day  melatonin 3 milliGRAM(s) Oral at bedtime  multivitamin/minerals 1 Tablet(s) Oral daily  polyethylene glycol 3350 17 Gram(s) Oral daily  senna 2 Tablet(s) Oral at bedtime  thiamine 100 milliGRAM(s) Oral daily  timolol 0.25% Solution 1 Drop(s) Both EYES at bedtime    MEDICATIONS  (PRN):  acetaminophen   Tablet .. 650 milliGRAM(s) Oral every 6 hours PRN Temp greater or equal to 38C (100.4F)  magnesium hydroxide Suspension 30 milliLiter(s) Oral daily PRN Constipation      Care Discussed with Consultants/Other Providers [ ] YES  [ ] NO
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
## 2021-07-23 NOTE — CHART NOTE - NSCHARTNOTEFT_GEN_A_CORE
Please see attending attestation to initial consult note. Start SSRI. Signing off. Call back prn. [phone]. Please see attending attestation to initial consult note. Start SSRI and Keppra 500mg BID. Signing off. Call back prn. [phone].

## 2021-07-23 NOTE — CONSULT NOTE ADULT - CONSULT REQUESTED DATE/TIME
10-Jul-2021 04:20
09-Jul-2021 22:57
13-Jul-2021 13:16
23-Jul-2021 06:33
10-Jul-2021 00:09
17-Jul-2021 08:19
13-Jul-2021 11:20

## 2021-07-23 NOTE — CONSULT NOTE ADULT - REASON FOR ADMISSION
Intracranial hemorrhage
Level 2 Trauma
Intracranial hemorrhage
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
## 2021-07-23 NOTE — CONSULT NOTE ADULT - CONSULT REASON
Rehabilitation consult
[facility]
Possible C6 Fracture
behavioral changes
traumatic hemorrhage
medical management
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
## 2021-07-24 LAB
GLUCOSE BLDC GLUCOMTR-MCNC: 194 MG/DL — HIGH (ref 70–99)
GLUCOSE BLDC GLUCOMTR-MCNC: 267 MG/DL — HIGH (ref 70–99)
GLUCOSE BLDC GLUCOMTR-MCNC: 301 MG/DL — HIGH (ref 70–99)
GLUCOSE BLDC GLUCOMTR-MCNC: 309 MG/DL — HIGH (ref 70–99)
GLUCOSE BLDC GLUCOMTR-MCNC: 340 MG/DL — HIGH (ref 70–99)

## 2021-07-24 RX ORDER — FOLIC ACID 0.8 MG
1 TABLET ORAL DAILY
Refills: 0 | Status: DISCONTINUED | OUTPATIENT
Start: 2021-07-25 | End: 2021-07-29

## 2021-07-24 RX ADMIN — Medication 2: at 12:22

## 2021-07-24 RX ADMIN — ATORVASTATIN CALCIUM 40 MILLIGRAM(S): 80 TABLET, FILM COATED ORAL at 21:44

## 2021-07-24 RX ADMIN — Medication 1 MILLIGRAM(S): at 11:47

## 2021-07-24 RX ADMIN — POLYETHYLENE GLYCOL 3350 17 GRAM(S): 17 POWDER, FOR SOLUTION ORAL at 11:47

## 2021-07-24 RX ADMIN — Medication 8: at 08:23

## 2021-07-24 RX ADMIN — Medication 2.5 MILLIGRAM(S): at 06:22

## 2021-07-24 RX ADMIN — LEVETIRACETAM 500 MILLIGRAM(S): 250 TABLET, FILM COATED ORAL at 17:08

## 2021-07-24 RX ADMIN — Medication 3 MILLIGRAM(S): at 21:44

## 2021-07-24 RX ADMIN — ESCITALOPRAM OXALATE 5 MILLIGRAM(S): 10 TABLET, FILM COATED ORAL at 11:47

## 2021-07-24 RX ADMIN — Medication 6: at 17:08

## 2021-07-24 RX ADMIN — Medication 8: at 22:56

## 2021-07-24 RX ADMIN — SENNA PLUS 2 TABLET(S): 8.6 TABLET ORAL at 21:45

## 2021-07-24 RX ADMIN — LEVETIRACETAM 500 MILLIGRAM(S): 250 TABLET, FILM COATED ORAL at 06:22

## 2021-07-24 RX ADMIN — HEPARIN SODIUM 5000 UNIT(S): 5000 INJECTION INTRAVENOUS; SUBCUTANEOUS at 06:22

## 2021-07-24 RX ADMIN — Medication 2.5 MILLIGRAM(S): at 01:01

## 2021-07-24 RX ADMIN — HEPARIN SODIUM 5000 UNIT(S): 5000 INJECTION INTRAVENOUS; SUBCUTANEOUS at 17:13

## 2021-07-24 RX ADMIN — Medication 100 MILLIGRAM(S): at 11:48

## 2021-07-24 RX ADMIN — Medication 1 TABLET(S): at 11:47

## 2021-07-24 RX ADMIN — Medication 1 DROP(S): at 21:45

## 2021-07-24 NOTE — PROGRESS NOTE ADULT - SUBJECTIVE AND OBJECTIVE BOX
Patient is a 76y old  Male who presents with a chief complaint of Intracranial hemorrhage (23 Jul 2021 20:38)      INTERVAL HPI/OVERNIGHT EVENTS:  T(C): 36.9 (07-24-21 @ 17:26), Max: 36.9 (07-24-21 @ 17:26)  HR: 88 (07-24-21 @ 17:26) (78 - 88)  BP: 140/85 (07-24-21 @ 17:26) (119/73 - 157/82)  RR: 17 (07-24-21 @ 17:26) (17 - 20)  SpO2: 98% (07-24-21 @ 17:26) (96% - 99%)  Wt(kg): --  I&O's Summary    23 Jul 2021 07:01  -  24 Jul 2021 07:00  --------------------------------------------------------  IN: 960 mL / OUT: 200 mL / NET: 760 mL    24 Jul 2021 07:01  -  24 Jul 2021 20:43  --------------------------------------------------------  IN: 1240 mL / OUT: 150 mL / NET: 1090 mL        PAST MEDICAL & SURGICAL HISTORY:  DM (diabetes mellitus)        SOCIAL HISTORY  Alcohol:  Tobacco:  Illicit substance use:    FAMILY HISTORY:    REVIEW OF SYSTEMS:  CONSTITUTIONAL: No fever, weight loss, or fatigue  EYES: No eye pain, visual disturbances, or discharge  ENMT:  No difficulty hearing, tinnitus, vertigo; No sinus or throat pain  NECK: No pain or stiffness  RESPIRATORY: No cough, wheezing, chills or hemoptysis; No shortness of breath  CARDIOVASCULAR: No chest pain, palpitations, dizziness, or leg swelling  GASTROINTESTINAL: No abdominal or epigastric pain. No nausea, vomiting, or hematemesis; No diarrhea or constipation. No melena or hematochezia.  GENITOURINARY: No dysuria, frequency, hematuria, or incontinence  NEUROLOGICAL: No headaches, memory loss, loss of strength, numbness, or tremors  SKIN: No itching, burning, rashes, or lesions   LYMPH NODES: No enlarged glands  ENDOCRINE: No heat or cold intolerance; No hair loss  MUSCULOSKELETAL: No joint pain or swelling; No muscle, back, or extremity pain  PSYCHIATRIC: No depression, anxiety, mood swings, or difficulty sleeping  HEME/LYMPH: No easy bruising, or bleeding gums  ALLERY AND IMMUNOLOGIC: No hives or eczema    RADIOLOGY & ADDITIONAL TESTS:    Imaging Personally Reviewed:  [ ] YES  [ ] NO    Consultant(s) Notes Reviewed:  [ ] YES  [ ] NO    PHYSICAL EXAM:  GENERAL: NAD, well-groomed, well-developed  HEAD:  Atraumatic, Normocephalic  EYES: EOMI, PERRLA, conjunctiva and sclera clear  ENMT: No tonsillar erythema, exudates, or enlargement; Moist mucous membranes, Good dentition, No lesions  NECK: Supple, No JVD, Normal thyroid  NERVOUS SYSTEM:  Alert & Oriented X3, Good concentration; Motor Strength 5/5 B/L upper and lower extremities; DTRs 2+ intact and symmetric  CHEST/LUNG: Clear to percussion bilaterally; No rales, rhonchi, wheezing, or rubs  HEART: Regular rate and rhythm; No murmurs, rubs, or gallops  ABDOMEN: Soft, Nontender, Nondistended; Bowel sounds present  EXTREMITIES:  2+ Peripheral Pulses, No clubbing, cyanosis, or edema  LYMPH: No lymphadenopathy noted  SKIN: No rashes or lesions    LABS:              CAPILLARY BLOOD GLUCOSE      POCT Blood Glucose.: 267 mg/dL (24 Jul 2021 17:07)  POCT Blood Glucose.: 194 mg/dL (24 Jul 2021 12:12)  POCT Blood Glucose.: 340 mg/dL (24 Jul 2021 08:19)  POCT Blood Glucose.: 309 mg/dL (24 Jul 2021 08:17)  POCT Blood Glucose.: 228 mg/dL (23 Jul 2021 21:57)            MEDICATIONS  (STANDING):  atorvastatin 40 milliGRAM(s) Oral at bedtime  enalapril 10 milliGRAM(s) Oral daily  escitalopram 5 milliGRAM(s) Oral daily  folic acid Injectable 1 milliGRAM(s) IV Push daily  heparin   Injectable 5000 Unit(s) SubCutaneous every 12 hours  insulin lispro (ADMELOG) corrective regimen sliding scale   SubCutaneous Before meals and at bedtime  levETIRAcetam 500 milliGRAM(s) Oral two times a day  melatonin 3 milliGRAM(s) Oral at bedtime  multivitamin/minerals 1 Tablet(s) Oral daily  polyethylene glycol 3350 17 Gram(s) Oral daily  senna 2 Tablet(s) Oral at bedtime  thiamine 100 milliGRAM(s) Oral daily  timolol 0.25% Solution 1 Drop(s) Both EYES at bedtime    MEDICATIONS  (PRN):  acetaminophen   Tablet .. 650 milliGRAM(s) Oral every 6 hours PRN Temp greater or equal to 38C (100.4F)  magnesium hydroxide Suspension 30 milliLiter(s) Oral daily PRN Constipation      Care Discussed with Consultants/Other Providers [ ] YES  [ ] NO Patient is a 76y old  Male who presents with a chief complaint of Intracranial hemorrhage (23 Jul 2021 20:38)      INTERVAL HPI/OVERNIGHT EVENTS: still remain confused and need to be restain as he gets agitated and risk of elopement   T(C): 36.9 (07-24-21 @ 17:26), Max: 36.9 (07-24-21 @ 17:26)  HR: 88 (07-24-21 @ 17:26) (78 - 88)  BP: 140/85 (07-24-21 @ 17:26) (119/73 - 157/82)  RR: 17 (07-24-21 @ 17:26) (17 - 20)  SpO2: 98% (07-24-21 @ 17:26) (96% - 99%)  Wt(kg): --  I&O's Summary    23 Jul 2021 07:01  -  24 Jul 2021 07:00  --------------------------------------------------------  IN: 960 mL / OUT: 200 mL / NET: 760 mL    24 Jul 2021 07:01  -  24 Jul 2021 20:43  --------------------------------------------------------  IN: 1240 mL / OUT: 150 mL / NET: 1090 mL        PAST MEDICAL & SURGICAL HISTORY:  DM (diabetes mellitus)        SOCIAL HISTORY  Alcohol:  Tobacco:  Illicit substance use:    FAMILY HISTORY:    REVIEW OF SYSTEMS:  CONSTITUTIONAL: No fever, weight loss, or fatigue  EYES: No eye pain, visual disturbances, or discharge  ENMT:  No difficulty hearing, tinnitus, vertigo; No sinus or throat pain  NECK: No pain or stiffness  RESPIRATORY: No cough, wheezing, chills or hemoptysis; No shortness of breath  CARDIOVASCULAR: No chest pain, palpitations, dizziness, or leg swelling  GASTROINTESTINAL: No abdominal or epigastric pain. No nausea, vomiting, or hematemesis; No diarrhea or constipation. No melena or hematochezia.  GENITOURINARY: No dysuria, frequency, hematuria, or incontinence  NEUROLOGICAL: No headaches, memory loss, loss of strength, numbness, or tremors  SKIN: No itching, burning, rashes, or lesions   LYMPH NODES: No enlarged glands  ENDOCRINE: No heat or cold intolerance; No hair loss  MUSCULOSKELETAL: No joint pain or swelling; No muscle, back, or extremity pain  PSYCHIATRIC: No depression, anxiety, mood swings, or difficulty sleeping  HEME/LYMPH: No easy bruising, or bleeding gums  ALLERY AND IMMUNOLOGIC: No hives or eczema    RADIOLOGY & ADDITIONAL TESTS:    Imaging Personally Reviewed:  [ ] YES  [ ] NO    Consultant(s) Notes Reviewed:  [ ] YES  [ ] NO    PHYSICAL EXAM:  GENERAL: NAD, well-groomed, well-developed  HEAD:  Atraumatic, Normocephalic  EYES: EOMI, PERRLA, conjunctiva and sclera clear  ENMT: No tonsillar erythema, exudates, or enlargement; Moist mucous membranes, Good dentition, No lesions  NECK: Supple, No JVD, Normal thyroid  NERVOUS SYSTEM:  Alert & Oriented X3, Good concentration; Motor Strength 5/5 B/L upper and lower extremities; DTRs 2+ intact and symmetric  CHEST/LUNG: Clear to percussion bilaterally; No rales, rhonchi, wheezing, or rubs  HEART: Regular rate and rhythm; No murmurs, rubs, or gallops  ABDOMEN: Soft, Nontender, Nondistended; Bowel sounds present  EXTREMITIES:  2+ Peripheral Pulses, No clubbing, cyanosis, or edema  LYMPH: No lymphadenopathy noted  SKIN: No rashes or lesions    LABS:              CAPILLARY BLOOD GLUCOSE      POCT Blood Glucose.: 267 mg/dL (24 Jul 2021 17:07)  POCT Blood Glucose.: 194 mg/dL (24 Jul 2021 12:12)  POCT Blood Glucose.: 340 mg/dL (24 Jul 2021 08:19)  POCT Blood Glucose.: 309 mg/dL (24 Jul 2021 08:17)  POCT Blood Glucose.: 228 mg/dL (23 Jul 2021 21:57)            MEDICATIONS  (STANDING):  atorvastatin 40 milliGRAM(s) Oral at bedtime  enalapril 10 milliGRAM(s) Oral daily  escitalopram 5 milliGRAM(s) Oral daily  folic acid Injectable 1 milliGRAM(s) IV Push daily  heparin   Injectable 5000 Unit(s) SubCutaneous every 12 hours  insulin lispro (ADMELOG) corrective regimen sliding scale   SubCutaneous Before meals and at bedtime  levETIRAcetam 500 milliGRAM(s) Oral two times a day  melatonin 3 milliGRAM(s) Oral at bedtime  multivitamin/minerals 1 Tablet(s) Oral daily  polyethylene glycol 3350 17 Gram(s) Oral daily  senna 2 Tablet(s) Oral at bedtime  thiamine 100 milliGRAM(s) Oral daily  timolol 0.25% Solution 1 Drop(s) Both EYES at bedtime    MEDICATIONS  (PRN):  acetaminophen   Tablet .. 650 milliGRAM(s) Oral every 6 hours PRN Temp greater or equal to 38C (100.4F)  magnesium hydroxide Suspension 30 milliLiter(s) Oral daily PRN Constipation      Care Discussed with Consultants/Other Providers [ ] YES  [ ] NO

## 2021-07-25 LAB
GLUCOSE BLDC GLUCOMTR-MCNC: 174 MG/DL — HIGH (ref 70–99)
GLUCOSE BLDC GLUCOMTR-MCNC: 263 MG/DL — HIGH (ref 70–99)
GLUCOSE BLDC GLUCOMTR-MCNC: 268 MG/DL — HIGH (ref 70–99)
GLUCOSE BLDC GLUCOMTR-MCNC: 318 MG/DL — HIGH (ref 70–99)
GLUCOSE BLDC GLUCOMTR-MCNC: 332 MG/DL — HIGH (ref 70–99)

## 2021-07-25 RX ADMIN — Medication 1 DROP(S): at 21:13

## 2021-07-25 RX ADMIN — ESCITALOPRAM OXALATE 5 MILLIGRAM(S): 10 TABLET, FILM COATED ORAL at 12:38

## 2021-07-25 RX ADMIN — Medication 1 TABLET(S): at 12:39

## 2021-07-25 RX ADMIN — LEVETIRACETAM 500 MILLIGRAM(S): 250 TABLET, FILM COATED ORAL at 05:00

## 2021-07-25 RX ADMIN — Medication 6: at 08:13

## 2021-07-25 RX ADMIN — SENNA PLUS 2 TABLET(S): 8.6 TABLET ORAL at 21:07

## 2021-07-25 RX ADMIN — Medication 100 MILLIGRAM(S): at 12:44

## 2021-07-25 RX ADMIN — Medication 2: at 17:11

## 2021-07-25 RX ADMIN — Medication 1 MILLIGRAM(S): at 12:38

## 2021-07-25 RX ADMIN — ATORVASTATIN CALCIUM 40 MILLIGRAM(S): 80 TABLET, FILM COATED ORAL at 21:07

## 2021-07-25 RX ADMIN — HEPARIN SODIUM 5000 UNIT(S): 5000 INJECTION INTRAVENOUS; SUBCUTANEOUS at 17:10

## 2021-07-25 RX ADMIN — POLYETHYLENE GLYCOL 3350 17 GRAM(S): 17 POWDER, FOR SOLUTION ORAL at 12:44

## 2021-07-25 RX ADMIN — LEVETIRACETAM 500 MILLIGRAM(S): 250 TABLET, FILM COATED ORAL at 17:10

## 2021-07-25 RX ADMIN — Medication 3 MILLIGRAM(S): at 21:06

## 2021-07-25 RX ADMIN — Medication 8: at 12:40

## 2021-07-25 RX ADMIN — Medication 10 MILLIGRAM(S): at 05:00

## 2021-07-25 RX ADMIN — Medication 6: at 22:52

## 2021-07-25 RX ADMIN — HEPARIN SODIUM 5000 UNIT(S): 5000 INJECTION INTRAVENOUS; SUBCUTANEOUS at 05:00

## 2021-07-26 LAB
ANION GAP SERPL CALC-SCNC: 17 MMOL/L — SIGNIFICANT CHANGE UP (ref 5–17)
BUN SERPL-MCNC: 15 MG/DL — SIGNIFICANT CHANGE UP (ref 7–23)
CALCIUM SERPL-MCNC: 10.1 MG/DL — SIGNIFICANT CHANGE UP (ref 8.4–10.5)
CHLORIDE SERPL-SCNC: 99 MMOL/L — SIGNIFICANT CHANGE UP (ref 96–108)
CO2 SERPL-SCNC: 23 MMOL/L — SIGNIFICANT CHANGE UP (ref 22–31)
CREAT SERPL-MCNC: 1.14 MG/DL — SIGNIFICANT CHANGE UP (ref 0.5–1.3)
GLUCOSE BLDC GLUCOMTR-MCNC: 218 MG/DL — HIGH (ref 70–99)
GLUCOSE BLDC GLUCOMTR-MCNC: 262 MG/DL — HIGH (ref 70–99)
GLUCOSE BLDC GLUCOMTR-MCNC: 262 MG/DL — HIGH (ref 70–99)
GLUCOSE BLDC GLUCOMTR-MCNC: 265 MG/DL — HIGH (ref 70–99)
GLUCOSE SERPL-MCNC: 198 MG/DL — HIGH (ref 70–99)
HCT VFR BLD CALC: 31.6 % — LOW (ref 39–50)
HGB BLD-MCNC: 10.1 G/DL — LOW (ref 13–17)
MCHC RBC-ENTMCNC: 31.7 PG — SIGNIFICANT CHANGE UP (ref 27–34)
MCHC RBC-ENTMCNC: 32 GM/DL — SIGNIFICANT CHANGE UP (ref 32–36)
MCV RBC AUTO: 99.1 FL — SIGNIFICANT CHANGE UP (ref 80–100)
NRBC # BLD: 0 /100 WBCS — SIGNIFICANT CHANGE UP (ref 0–0)
PLATELET # BLD AUTO: 639 K/UL — HIGH (ref 150–400)
POTASSIUM SERPL-MCNC: 4 MMOL/L — SIGNIFICANT CHANGE UP (ref 3.5–5.3)
POTASSIUM SERPL-SCNC: 4 MMOL/L — SIGNIFICANT CHANGE UP (ref 3.5–5.3)
RBC # BLD: 3.19 M/UL — LOW (ref 4.2–5.8)
RBC # FLD: 11.4 % — SIGNIFICANT CHANGE UP (ref 10.3–14.5)
SODIUM SERPL-SCNC: 139 MMOL/L — SIGNIFICANT CHANGE UP (ref 135–145)
WBC # BLD: 8.83 K/UL — SIGNIFICANT CHANGE UP (ref 3.8–10.5)
WBC # FLD AUTO: 8.83 K/UL — SIGNIFICANT CHANGE UP (ref 3.8–10.5)

## 2021-07-26 RX ADMIN — Medication 100 MILLIGRAM(S): at 12:27

## 2021-07-26 RX ADMIN — Medication 1 DROP(S): at 21:14

## 2021-07-26 RX ADMIN — LEVETIRACETAM 500 MILLIGRAM(S): 250 TABLET, FILM COATED ORAL at 05:43

## 2021-07-26 RX ADMIN — Medication 6: at 21:54

## 2021-07-26 RX ADMIN — ATORVASTATIN CALCIUM 40 MILLIGRAM(S): 80 TABLET, FILM COATED ORAL at 21:15

## 2021-07-26 RX ADMIN — SENNA PLUS 2 TABLET(S): 8.6 TABLET ORAL at 21:15

## 2021-07-26 RX ADMIN — Medication 6: at 08:36

## 2021-07-26 RX ADMIN — Medication 6: at 12:28

## 2021-07-26 RX ADMIN — ESCITALOPRAM OXALATE 5 MILLIGRAM(S): 10 TABLET, FILM COATED ORAL at 12:28

## 2021-07-26 RX ADMIN — MAGNESIUM HYDROXIDE 30 MILLILITER(S): 400 TABLET, CHEWABLE ORAL at 21:16

## 2021-07-26 RX ADMIN — LEVETIRACETAM 500 MILLIGRAM(S): 250 TABLET, FILM COATED ORAL at 17:28

## 2021-07-26 RX ADMIN — Medication 1 MILLIGRAM(S): at 12:55

## 2021-07-26 RX ADMIN — Medication 3 MILLIGRAM(S): at 21:15

## 2021-07-26 RX ADMIN — Medication 10 MILLIGRAM(S): at 05:43

## 2021-07-26 RX ADMIN — HEPARIN SODIUM 5000 UNIT(S): 5000 INJECTION INTRAVENOUS; SUBCUTANEOUS at 17:28

## 2021-07-26 RX ADMIN — Medication 1 TABLET(S): at 12:28

## 2021-07-26 RX ADMIN — HEPARIN SODIUM 5000 UNIT(S): 5000 INJECTION INTRAVENOUS; SUBCUTANEOUS at 05:43

## 2021-07-26 RX ADMIN — Medication 4: at 17:28

## 2021-07-26 RX ADMIN — POLYETHYLENE GLYCOL 3350 17 GRAM(S): 17 POWDER, FOR SOLUTION ORAL at 12:27

## 2021-07-26 NOTE — PROGRESS NOTE ADULT - SUBJECTIVE AND OBJECTIVE BOX
Patient is a 76y old  Male who presents with a chief complaint of Intracranial hemorrhage (24 Jul 2021 20:43)      INTERVAL HPI/OVERNIGHT EVENTS:  T(C): 36.3 (07-27-21 @ 00:15), Max: 36.7 (07-26-21 @ 04:58)  HR: 87 (07-27-21 @ 00:15) (80 - 92)  BP: 118/76 (07-27-21 @ 00:15) (116/74 - 136/71)  RR: 18 (07-27-21 @ 00:15) (16 - 18)  SpO2: 99% (07-27-21 @ 00:15) (98% - 100%)  Wt(kg): --  I&O's Summary    25 Jul 2021 07:01  -  26 Jul 2021 07:00  --------------------------------------------------------  IN: 1530 mL / OUT: 0 mL / NET: 1530 mL    26 Jul 2021 07:01  -  27 Jul 2021 01:26  --------------------------------------------------------  IN: 600 mL / OUT: 200 mL / NET: 400 mL        PAST MEDICAL & SURGICAL HISTORY:  DM (diabetes mellitus)        SOCIAL HISTORY  Alcohol:  Tobacco:  Illicit substance use:    FAMILY HISTORY:    REVIEW OF SYSTEMS:  CONSTITUTIONAL: No fever, weight loss, or fatigue  EYES: No eye pain, visual disturbances, or discharge  ENMT:  No difficulty hearing, tinnitus, vertigo; No sinus or throat pain  NECK: No pain or stiffness  RESPIRATORY: No cough, wheezing, chills or hemoptysis; No shortness of breath  CARDIOVASCULAR: No chest pain, palpitations, dizziness, or leg swelling  GASTROINTESTINAL: No abdominal or epigastric pain. No nausea, vomiting, or hematemesis; No diarrhea or constipation. No melena or hematochezia.  GENITOURINARY: No dysuria, frequency, hematuria, or incontinence  NEUROLOGICAL: No headaches, memory loss, loss of strength, numbness, or tremors  SKIN: No itching, burning, rashes, or lesions   LYMPH NODES: No enlarged glands  ENDOCRINE: No heat or cold intolerance; No hair loss  MUSCULOSKELETAL: No joint pain or swelling; No muscle, back, or extremity pain  PSYCHIATRIC: No depression, anxiety, mood swings, or difficulty sleeping  HEME/LYMPH: No easy bruising, or bleeding gums  ALLERY AND IMMUNOLOGIC: No hives or eczema    RADIOLOGY & ADDITIONAL TESTS:    Imaging Personally Reviewed:  [ ] YES  [ ] NO    Consultant(s) Notes Reviewed:  [ ] YES  [ ] NO    PHYSICAL EXAM:  GENERAL: NAD, well-groomed, well-developed  HEAD:  Atraumatic, Normocephalic  EYES: EOMI, PERRLA, conjunctiva and sclera clear  ENMT: No tonsillar erythema, exudates, or enlargement; Moist mucous membranes, Good dentition, No lesions  NECK: Supple, No JVD, Normal thyroid  NERVOUS SYSTEM:  Alert & Oriented X3, Good concentration; Motor Strength 5/5 B/L upper and lower extremities; DTRs 2+ intact and symmetric  CHEST/LUNG: Clear to percussion bilaterally; No rales, rhonchi, wheezing, or rubs  HEART: Regular rate and rhythm; No murmurs, rubs, or gallops  ABDOMEN: Soft, Nontender, Nondistended; Bowel sounds present  EXTREMITIES:  2+ Peripheral Pulses, No clubbing, cyanosis, or edema  LYMPH: No lymphadenopathy noted  SKIN: No rashes or lesions    LABS:                        10.1   8.83  )-----------( 639      ( 26 Jul 2021 06:54 )             31.6     07-26    139  |  99  |  15  ----------------------------<  198<H>  4.0   |  23  |  1.14    Ca    10.1      26 Jul 2021 06:53          CAPILLARY BLOOD GLUCOSE      POCT Blood Glucose.: 262 mg/dL (26 Jul 2021 21:43)  POCT Blood Glucose.: 218 mg/dL (26 Jul 2021 17:18)  POCT Blood Glucose.: 262 mg/dL (26 Jul 2021 12:16)  POCT Blood Glucose.: 265 mg/dL (26 Jul 2021 08:35)            MEDICATIONS  (STANDING):  atorvastatin 40 milliGRAM(s) Oral at bedtime  enalapril 10 milliGRAM(s) Oral daily  escitalopram 5 milliGRAM(s) Oral daily  folic acid 1 milliGRAM(s) Oral daily  heparin   Injectable 5000 Unit(s) SubCutaneous every 12 hours  insulin lispro (ADMELOG) corrective regimen sliding scale   SubCutaneous Before meals and at bedtime  levETIRAcetam 500 milliGRAM(s) Oral two times a day  melatonin 3 milliGRAM(s) Oral at bedtime  multivitamin/minerals 1 Tablet(s) Oral daily  polyethylene glycol 3350 17 Gram(s) Oral daily  senna 2 Tablet(s) Oral at bedtime  thiamine 100 milliGRAM(s) Oral daily  timolol 0.25% Solution 1 Drop(s) Both EYES at bedtime    MEDICATIONS  (PRN):  acetaminophen   Tablet .. 650 milliGRAM(s) Oral every 6 hours PRN Temp greater or equal to 38C (100.4F)  magnesium hydroxide Suspension 30 milliLiter(s) Oral daily PRN Constipation      Care Discussed with Consultants/Other Providers [ ] YES  [ ] NO Patient is a 76y old  Male who presents with a chief complaint of Intracranial hemorrhage (24 Jul 2021 20:43)      INTERVAL HPI/OVERNIGHT EVENTS: remain confused , not aggressive   T(C): 36.3 (07-27-21 @ 00:15), Max: 36.7 (07-26-21 @ 04:58)  HR: 87 (07-27-21 @ 00:15) (80 - 92)  BP: 118/76 (07-27-21 @ 00:15) (116/74 - 136/71)  RR: 18 (07-27-21 @ 00:15) (16 - 18)  SpO2: 99% (07-27-21 @ 00:15) (98% - 100%)  Wt(kg): --  I&O's Summary    25 Jul 2021 07:01  -  26 Jul 2021 07:00  --------------------------------------------------------  IN: 1530 mL / OUT: 0 mL / NET: 1530 mL    26 Jul 2021 07:01  -  27 Jul 2021 01:26  --------------------------------------------------------  IN: 600 mL / OUT: 200 mL / NET: 400 mL        PAST MEDICAL & SURGICAL HISTORY:  DM (diabetes mellitus)        SOCIAL HISTORY  Alcohol:  Tobacco:  Illicit substance use:    FAMILY HISTORY:    REVIEW OF SYSTEMS:  CONSTITUTIONAL: No fever, weight loss, or fatigue  EYES: No eye pain, visual disturbances, or discharge  ENMT:  No difficulty hearing, tinnitus, vertigo; No sinus or throat pain  NECK: No pain or stiffness  RESPIRATORY: No cough, wheezing, chills or hemoptysis; No shortness of breath  CARDIOVASCULAR: No chest pain, palpitations, dizziness, or leg swelling  GASTROINTESTINAL: No abdominal or epigastric pain. No nausea, vomiting, or hematemesis; No diarrhea or constipation. No melena or hematochezia.  GENITOURINARY: No dysuria, frequency, hematuria, or incontinence  NEUROLOGICAL: No headaches, memory loss, loss of strength, numbness, or tremors  SKIN: No itching, burning, rashes, or lesions   LYMPH NODES: No enlarged glands  ENDOCRINE: No heat or cold intolerance; No hair loss  MUSCULOSKELETAL: No joint pain or swelling; No muscle, back, or extremity pain  PSYCHIATRIC: No depression, anxiety, mood swings, or difficulty sleeping  HEME/LYMPH: No easy bruising, or bleeding gums  ALLERY AND IMMUNOLOGIC: No hives or eczema    RADIOLOGY & ADDITIONAL TESTS:    Imaging Personally Reviewed:  [ ] YES  [ ] NO    Consultant(s) Notes Reviewed:  [ ] YES  [ ] NO    PHYSICAL EXAM:  GENERAL: NAD, well-groomed, well-developed  HEAD:  Atraumatic, Normocephalic  EYES: EOMI, PERRLA, conjunctiva and sclera clear  ENMT: No tonsillar erythema, exudates, or enlargement; Moist mucous membranes, Good dentition, No lesions  NECK: Supple, No JVD, Normal thyroid  NERVOUS SYSTEM:  Alert & Oriented X3, Good concentration; Motor Strength 5/5 B/L upper and lower extremities; DTRs 2+ intact and symmetric  CHEST/LUNG: Clear to percussion bilaterally; No rales, rhonchi, wheezing, or rubs  HEART: Regular rate and rhythm; No murmurs, rubs, or gallops  ABDOMEN: Soft, Nontender, Nondistended; Bowel sounds present  EXTREMITIES:  2+ Peripheral Pulses, No clubbing, cyanosis, or edema  LYMPH: No lymphadenopathy noted  SKIN: No rashes or lesions    LABS:                        10.1   8.83  )-----------( 639      ( 26 Jul 2021 06:54 )             31.6     07-26    139  |  99  |  15  ----------------------------<  198<H>  4.0   |  23  |  1.14    Ca    10.1      26 Jul 2021 06:53          CAPILLARY BLOOD GLUCOSE      POCT Blood Glucose.: 262 mg/dL (26 Jul 2021 21:43)  POCT Blood Glucose.: 218 mg/dL (26 Jul 2021 17:18)  POCT Blood Glucose.: 262 mg/dL (26 Jul 2021 12:16)  POCT Blood Glucose.: 265 mg/dL (26 Jul 2021 08:35)            MEDICATIONS  (STANDING):  atorvastatin 40 milliGRAM(s) Oral at bedtime  enalapril 10 milliGRAM(s) Oral daily  escitalopram 5 milliGRAM(s) Oral daily  folic acid 1 milliGRAM(s) Oral daily  heparin   Injectable 5000 Unit(s) SubCutaneous every 12 hours  insulin lispro (ADMELOG) corrective regimen sliding scale   SubCutaneous Before meals and at bedtime  levETIRAcetam 500 milliGRAM(s) Oral two times a day  melatonin 3 milliGRAM(s) Oral at bedtime  multivitamin/minerals 1 Tablet(s) Oral daily  polyethylene glycol 3350 17 Gram(s) Oral daily  senna 2 Tablet(s) Oral at bedtime  thiamine 100 milliGRAM(s) Oral daily  timolol 0.25% Solution 1 Drop(s) Both EYES at bedtime    MEDICATIONS  (PRN):  acetaminophen   Tablet .. 650 milliGRAM(s) Oral every 6 hours PRN Temp greater or equal to 38C (100.4F)  magnesium hydroxide Suspension 30 milliLiter(s) Oral daily PRN Constipation      Care Discussed with Consultants/Other Providers [ ] YES  [ ] NO

## 2021-07-26 NOTE — PROVIDER CONTACT NOTE (OTHER) - BACKGROUND
pt admitted with Traumatic Subdural hemorrhage. pt febrile 7/15 Blood cx negative and urine cx negative.
Pt admit with ETOH abuse,  CIWA, restraints.
Pt admitted with TBI - confused.  Febrile episodes noted past 3 days.
Patient admitted for Traumatic subarachnoid hemorrhage with loss of consciousness
Pt found unresponsive at a bus stop; CT showed a L parietal SAH and a R SDH.
pt admitted for traumatic subarachnoid hemorrhage
pt admitted s/p fall, etoh abuse
English

## 2021-07-26 NOTE — CHART NOTE - NSCHARTNOTEFT_GEN_A_CORE
Nutrition Follow Up Note  Patient seen for: Consult for Poor PO intake    Chart reviewed, events noted.  Patient is a 76y old  Male who presents with a chief complaint of Intracranial hemorrhage, PMH DM, ETOH abuse      Source: [ X] Patient       [x] EMR        [ x] RN     Team rounds     Diet Order:   Diet, Consistent Carbohydrate/No Snacks (21)    - Is current order appropriate/adequate? [X ] Yes  [ ]  No:     - PO intake :  [ X] <30% very  Poor  -   Nutrition-Related Concerns:  patient has increased confusion with worsening oral intake. He insists he ate already when he has not, he is sipping juice only per RN, wanting for Chinese food per RN. Patient's daughter was told by RN she could bring him in his favorite food. Glucerna ordered for trial  - Micronutrient Supplementation: folic acid 1 milliGRAM(s) Oral daily  multivitamin/minerals 1 Tablet(s) Oral daily  thiamine 100 milliGRAM(s) Oral daily    GI:  Last  ___.   Bowel Regimen? [X ] Yes      current weight  Daily Weight in k.1 ()   Dosing weight 64 kg  Weight loss 13 lbs  BMI=19.8    MEDICATIONS  (STANDING):  atorvastatin  enalapril  escitalopram  folic acid  heparin   Injectable  insulin lispro (ADMELOG) corrective regimen sliding scale  levETIRAcetam  melatonin  multivitamin/minerals  polyethylene glycol 3350  senna  thiamine  timolol 0.25% Solution    Pertinent Labs:  @ 06:53: Na 139, BUN 15, Cr 1.14, <H>, K+ 4.0, Phos --, Mg --, Alk Phos --, ALT/SGPT --, AST/SGOT --, HbA1c --    A1C with Estimated Average Glucose Result: 7.3 % (07-10-21 @ 10:16)    Finger Sticks:  POCT Blood Glucose.: 265 mg/dL ( @ 08:35)  POCT Blood Glucose.: 263 mg/dL ( @ 22:31)  POCT Blood Glucose.: 174 mg/dL ( @ 17:05)  POCT Blood Glucose.: 332 mg/dL ( @ 12:20)  POCT Blood Glucose.: 318 mg/dL ( @ 12:18)      Skin per nursing documentation: no pressure breakdown  Edema: none    Estimated Needs:   [X ] no change since previous assessment  [ ] recalculated:     Previous Nutrition Diagnosis: INCREASED  NUTRIENT NEEDS  Nutrition Diagnosis is: [X ] ongoing      New Nutrition Diagnosis: MALNUTRITION, SEVERE  RELATED TO:  Poor oral intake,  AMS  Evidenced by weight loss 13 lbs in  <,3 weeks, BMI=19.8    Nutrition Care Plan:  [ x] In Progress  [ ] Achieved  [ ] Not applicable    Nutrition Interventions / Recommendations:    1) Discussed consideration for feeding tube on team rounds, per team patient family not likely to prefer    3) Oral Nutrition Supplementation: add glucerna x2 daily    4) Micronutrient Supplementation: continue thiamine, folic acid    Add MVI with minerals  5) continue bowel regimen  6) Monitor/encourage PO intake, assist with menus, tray prep     Monitoring and Evaluation:   Continue to monitor Nutritional intake, Tolerance to diet prescription, weights, labs, skin integrity    ORALIA Ibarra, RD, CDN #[id number]   RD remains available upon request and will follow up per protocol Nutrition Follow Up Note  Patient seen for: Consult for Poor PO intake    Chart reviewed, events noted.  Patient is a 76y old  Male who presents with a chief complaint of Intracranial hemorrhage, PMH DM, ETOH abuse      Source: [ X] Patient       [x] EMR        [ x] RN     Team rounds     Diet Order:   Diet, Consistent Carbohydrate/No Snacks (21)    - Is current order appropriate/adequate? [X ] Yes  [ ]  No:     - PO intake :  [ X] <30% very  Poor  -   Nutrition-Related Concerns:  patient has increased confusion with worsening oral intake. He insists he ate already when he has not, he is sipping juice only per RN, wanting for [place] food per RN. Patient's daughter was told by RN she could bring him in his favorite food. Glucerna ordered for trial  - Micronutrient Supplementation: folic acid 1 milliGRAM(s) Oral daily  multivitamin/minerals 1 Tablet(s) Oral daily  thiamine 100 milliGRAM(s) Oral daily    GI:  Last  ___.   Bowel Regimen? [X ] Yes      current weight  Daily Weight in k.1 ()   Dosing weight 64 kg  Weight loss 13 lbs  BMI=19.8    MEDICATIONS  (STANDING):  atorvastatin  enalapril  escitalopram  folic acid  heparin   Injectable  insulin lispro (ADMELOG) corrective regimen sliding scale  levETIRAcetam  melatonin  multivitamin/minerals  polyethylene glycol 3350  senna  thiamine  timolol 0.25% Solution    Pertinent Labs:  @ 06:53: Na 139, BUN 15, Cr 1.14, <H>, K+ 4.0, Phos --, Mg --, Alk Phos --, ALT/SGPT --, AST/SGOT --, HbA1c --    A1C with Estimated Average Glucose Result: 7.3 % (07-10-21 @ 10:16)    Finger Sticks:  POCT Blood Glucose.: 265 mg/dL ( @ 08:35)  POCT Blood Glucose.: 263 mg/dL ( @ 22:31)  POCT Blood Glucose.: 174 mg/dL ( @ 17:05)  POCT Blood Glucose.: 332 mg/dL ( @ 12:20)  POCT Blood Glucose.: 318 mg/dL ( @ 12:18)      Skin per nursing documentation: no pressure breakdown  Edema: none    Estimated Needs:   [X ] no change since previous assessment  [ ] recalculated:     Previous Nutrition Diagnosis: INCREASED  NUTRIENT NEEDS  Nutrition Diagnosis is: [X ] ongoing      New Nutrition Diagnosis: MALNUTRITION, SEVERE  RELATED TO:  Poor oral intake,  AMS  Evidenced by weight loss 13 lbs in  <,3 weeks, BMI=19.8; NFPE with moderate fat loss in areas of: orbital, triceps, ribs, buccal areas.    Severe muscle loss areas of  temples, clavicles, thigh, calf, dorsal hand    Nutrition Care Plan:  [ x] In Progress  [ ] Achieved  [ ] Not applicable    Nutrition Interventions / Recommendations:    1) Discussed consideration for feeding tube on team rounds, per team patient family not likely to prefer    3) Oral Nutrition Supplementation: add glucerna x2 daily    4) Micronutrient Supplementation: continue thiamine, folic acid    Add MVI with minerals  5) continue bowel regimen  6) Monitor/encourage PO intake, assist with menus, tray prep     Monitoring and Evaluation:   Continue to monitor Nutritional intake, Tolerance to diet prescription, weights, labs, skin integrity    ORALIA Ibarra, RD, CDN #[id number]   RD remains available upon request and will follow up per protocol Nutrition Follow Up Note  Patient seen for: Consult for Poor PO intake    Chart reviewed, events noted.  Patient is a 76y old  Male who presents with a chief complaint of Intracranial hemorrhage, PMH DM, ETOH abuse      Source: [ X] Patient       [x] EMR        [ x] RN     Team rounds     Diet Order:   Diet, Consistent Carbohydrate/No Snacks (21)    - Is current order appropriate/adequate? [X ] Yes  [ ]  No:     - PO intake :  [ X] <30% very  Poor  -   Nutrition-Related Concerns:  patient has increased confusion with worsening oral intake. He insists he ate already when he has not, he is sipping juice only per RN, wanting for [place] food per RN. Patient's daughter was told by RN she could bring him in his favorite food. Glucerna ordered for trial  - Micronutrient Supplementation: folic acid 1 milliGRAM(s) Oral daily  multivitamin/minerals 1 Tablet(s) Oral daily  thiamine 100 milliGRAM(s) Oral daily    GI:  Last  ___.   Bowel Regimen? [X ] Yes      current weight  Daily Weight in k.1 ()   Dosing weight 64 kg  Weight loss 13 lbs  BMI=19.8    MEDICATIONS  (STANDING):  atorvastatin  enalapril  escitalopram  folic acid  heparin   Injectable  insulin lispro (ADMELOG) corrective regimen sliding scale  levETIRAcetam  melatonin  multivitamin/minerals  polyethylene glycol 3350  senna  thiamine  timolol 0.25% Solution    Pertinent Labs:  @ 06:53: Na 139, BUN 15, Cr 1.14, <H>, K+ 4.0, Phos --, Mg --, Alk Phos --, ALT/SGPT --, AST/SGOT --, HbA1c --    A1C with Estimated Average Glucose Result: 7.3 % (07-10-21 @ 10:16)    Finger Sticks:  POCT Blood Glucose.: 265 mg/dL ( @ 08:35)  POCT Blood Glucose.: 263 mg/dL ( @ 22:31)  POCT Blood Glucose.: 174 mg/dL ( @ 17:05)  POCT Blood Glucose.: 332 mg/dL ( @ 12:20)  POCT Blood Glucose.: 318 mg/dL ( @ 12:18)      Skin per nursing documentation: no pressure breakdown  Edema: none    Estimated Needs:   [X ] no change since previous assessment  [ ] recalculated:     Previous Nutrition Diagnosis: INCREASED  NUTRIENT NEEDS  Nutrition Diagnosis is: [X ] ongoing      New Nutrition Diagnosis: MALNUTRITION, SEVERE  RELATED TO:  Poor oral intake,  AMS  Evidenced by weight loss 13 lbs in  <,3 weeks, BMI=19.8; NFPE with moderate fat loss in areas of: orbital, triceps, ribs, buccal areas.    Severe muscle loss areas of  temples, clavicles, thigh, calf, dorsal hand    Nutrition Care Plan:  [ x] In Progress  [ ] Achieved  [ ] Not applicable    Nutrition Interventions / Recommendations:    1) Discussed consideration for feeding tube on team rounds, Calorie count to be initiated for 3 days    3) Oral Nutrition Supplementation: add glucerna x2 daily    4) Micronutrient Supplementation: continue thiamine, folic acid    Add MVI with minerals  5) continue bowel regimen  6) Monitor/encourage PO intake, assist with menus, tray prep     Monitoring and Evaluation:   Continue to monitor Nutritional intake, Tolerance to diet prescription, weights, labs, skin integrity    ORALIA Ibarra, RD, CDN #[phone]   RD remains available upon request and will follow up per protocol

## 2021-07-26 NOTE — PROVIDER CONTACT NOTE (OTHER) - DATE AND TIME:
18-Jul-2021 04:40
26-Jul-2021 06:21
13-Jul-2021 17:00
15-Jul-2021 00:10
16-Jul-2021 20:20
17-Jul-2021 16:45
16-Jul-2021 17:20

## 2021-07-26 NOTE — DIETITIAN NUTRITION RISK NOTIFICATION - TREATMENT: THE FOLLOWING DIET HAS BEEN RECOMMENDED
Diet, Consistent Carbohydrate w/Evening Snack:   Supplement Feeding Modality:  Oral  Glucerna Shake Cans or Servings Per Day:  2       Frequency:  Daily (07-15-21 @ 17:02) [Pending Verification By Attending]  Diet, Consistent Carbohydrate/No Snacks (07-13-21 @ 10:16) [Active]

## 2021-07-26 NOTE — PROVIDER CONTACT NOTE (OTHER) - ASSESSMENT
Patient alert and oriented x1. Patient in posey vest restraint. VSS. Patient yelling and cursing at staff.
Pt A&Ox1.
Pt resting in bed at this time - no s/s of distress noted
Upon assessment, pt left forearm is swollen and the IV site is leaking clear fluid. The patient had NS running at 50/hr through the IV. Pt vital signs are stable and patient is afebrile at 98.4. Pt left radial pulse is 2+ and the patient has rapid capillary refill.
pt is A+Ox2; confused. currently on a ciwa protocol (scoring 4-6). pt in bilateral wrist restraints and posey vest for safety and attempting to climb oob and agitated. pt found to be febrile (temp of 100.5 orally), Hr: 80, BP: 170/100 (manual), RR:18. spO2: 96% RA. no s/s of aspiration, respiratory distress.
pt is A+Ox2/3; confused and currently in restraints. pt is constipated and has not had a bowel movement since 7/11. pt reports he is passing gas. poor po intake. not mobile due to restraints. abdomen nondistended, nontender and +bowel sounds present. currently on senna and given milk of magnesia.
Pt is a&ox1, confused.

## 2021-07-26 NOTE — PROVIDER CONTACT NOTE (OTHER) - ACTION/TREATMENT ORDERED:
Left FA IV removed immediately, limb elevated with warm packs. Continue to monitor.
dulcolax po ordered.
GILL Araiza made aware of BP, discussed Alberts order, pt does not have alberts to be changed.
Per GILL Qiu, chart to be reviewed and orders to follow.
N.P aware - awaiting further instruction.  Will continue to monitor.
NP notified. Will try at a later time. Will continue to monitor.
blood cultures x2 ordered, Urine culture and Tylenol ordered

## 2021-07-26 NOTE — PROVIDER CONTACT NOTE (OTHER) - SITUATION
Pt temp/0ral 102.4  /90 - HR 94 - RESP 18 - O2 sat 98% room air
pt bp 197/94, pt does not have a alberts cath.
pt febrile; temp of 100.5 orally
Pt temp 100.6
Patient states " No ones taking any fucking blood, im leaving"
Upon assessment, pt's left forearm IV site is leaking and swollen.
pt constipated; has not had a bowel movement since 7/11

## 2021-07-27 LAB
GLUCOSE BLDC GLUCOMTR-MCNC: 158 MG/DL — HIGH (ref 70–99)
GLUCOSE BLDC GLUCOMTR-MCNC: 186 MG/DL — HIGH (ref 70–99)
GLUCOSE BLDC GLUCOMTR-MCNC: 233 MG/DL — HIGH (ref 70–99)
GLUCOSE BLDC GLUCOMTR-MCNC: 349 MG/DL — HIGH (ref 70–99)

## 2021-07-27 PROCEDURE — 99233 SBSQ HOSP IP/OBS HIGH 50: CPT

## 2021-07-27 PROCEDURE — 99232 SBSQ HOSP IP/OBS MODERATE 35: CPT

## 2021-07-27 RX ORDER — VALPROIC ACID (AS SODIUM SALT) 250 MG/5ML
500 SOLUTION, ORAL ORAL EVERY 12 HOURS
Refills: 0 | Status: DISCONTINUED | OUTPATIENT
Start: 2021-07-27 | End: 2021-07-29

## 2021-07-27 RX ADMIN — LEVETIRACETAM 500 MILLIGRAM(S): 250 TABLET, FILM COATED ORAL at 16:56

## 2021-07-27 RX ADMIN — HEPARIN SODIUM 5000 UNIT(S): 5000 INJECTION INTRAVENOUS; SUBCUTANEOUS at 06:38

## 2021-07-27 RX ADMIN — HEPARIN SODIUM 5000 UNIT(S): 5000 INJECTION INTRAVENOUS; SUBCUTANEOUS at 16:56

## 2021-07-27 RX ADMIN — Medication 500 MILLIGRAM(S): at 21:04

## 2021-07-27 RX ADMIN — Medication 8: at 22:15

## 2021-07-27 RX ADMIN — Medication 1 DROP(S): at 22:15

## 2021-07-27 RX ADMIN — LEVETIRACETAM 500 MILLIGRAM(S): 250 TABLET, FILM COATED ORAL at 06:38

## 2021-07-27 RX ADMIN — Medication 1 TABLET(S): at 12:03

## 2021-07-27 RX ADMIN — Medication 3 MILLIGRAM(S): at 21:04

## 2021-07-27 RX ADMIN — ESCITALOPRAM OXALATE 5 MILLIGRAM(S): 10 TABLET, FILM COATED ORAL at 12:03

## 2021-07-27 RX ADMIN — Medication 10 MILLIGRAM(S): at 06:38

## 2021-07-27 RX ADMIN — Medication 4: at 08:18

## 2021-07-27 RX ADMIN — Medication 100 MILLIGRAM(S): at 12:03

## 2021-07-27 RX ADMIN — POLYETHYLENE GLYCOL 3350 17 GRAM(S): 17 POWDER, FOR SOLUTION ORAL at 12:03

## 2021-07-27 RX ADMIN — ATORVASTATIN CALCIUM 40 MILLIGRAM(S): 80 TABLET, FILM COATED ORAL at 21:04

## 2021-07-27 RX ADMIN — Medication 1 MILLIGRAM(S): at 12:03

## 2021-07-27 RX ADMIN — Medication 2: at 12:02

## 2021-07-27 NOTE — BH CONSULTATION LIAISON PROGRESS NOTE - NSBHCHARTREVIEWVS_PSY_A_CORE FT
Vital Signs Last 24 Hrs  T(C): 37.7 (16 Jul 2021 13:08), Max: 38.5 (16 Jul 2021 09:02)  T(F): 99.8 (16 Jul 2021 13:08), Max: 101.3 (16 Jul 2021 09:02)  HR: 90 (16 Jul 2021 13:08) (79 - 103)  BP: 186/88 (16 Jul 2021 13:08) (173/93 - 196/91)  BP(mean): --  RR: 19 (16 Jul 2021 13:08) (18 - 19)  SpO2: 96% (16 Jul 2021 13:08) (94% - 97%)
Vital Signs Last 24 Hrs  T(C): 37 (20 Jul 2021 13:19), Max: 37.6 (20 Jul 2021 04:44)  T(F): 98.6 (20 Jul 2021 13:19), Max: 99.6 (20 Jul 2021 04:44)  HR: 86 (20 Jul 2021 13:19) (86 - 95)  BP: 135/88 (20 Jul 2021 13:19) (135/88 - 169/84)  BP(mean): --  RR: 18 (20 Jul 2021 13:19) (18 - 18)  SpO2: 97% (20 Jul 2021 13:19) (95% - 100%)
Vital Signs Last 24 Hrs  T(C): 37 (27 Jul 2021 16:02), Max: 37 (27 Jul 2021 06:32)  T(F): 98.6 (27 Jul 2021 16:02), Max: 98.6 (27 Jul 2021 06:32)  HR: 85 (27 Jul 2021 16:02) (68 - 93)  BP: 122/71 (27 Jul 2021 16:02) (103/69 - 124/74)  BP(mean): --  RR: 17 (27 Jul 2021 16:02) (16 - 18)  SpO2: 98% (27 Jul 2021 16:02) (98% - 100%)
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
## 2021-07-27 NOTE — BH CONSULTATION LIAISON PROGRESS NOTE - NSBHCHARTREVIEWINVESTIGATE_PSY_A_CORE FT
< from: 12 Lead ECG (07.09.21 @ 22:18) >      Ventricular Rate 91 BPM    Atrial Rate 91 BPM    P-R Interval 314 ms    QRS Duration 96 ms    Q-T Interval 350 ms    QTC Calculation(Bazett) 430 ms    P Axis 66 degrees    R Axis 54 degrees    T Axis 71 degrees    Diagnosis Line SINUS RHYTHM WITH 1ST DEGREE A-V BLOCK  POSSIBLE LEFT ATRIAL ENLARGEMENT  NONSPECIFIC T WAVE ABNORMALITY  ABNORMAL ECG  NO PREVIOUS ECGS AVAILABLE  Confirmed by MD FELIPE JONATHAN ([id number]) on 7/10/2021 1:59:44 PM    < end of copied text >    
< from: 12 Lead ECG (07.09.21 @ 22:18) >      Ventricular Rate 91 BPM    Atrial Rate 91 BPM    P-R Interval 314 ms    QRS Duration 96 ms    Q-T Interval 350 ms    QTC Calculation(Bazett) 430 ms    P Axis 66 degrees    R Axis 54 degrees    T Axis 71 degrees    Diagnosis Line SINUS RHYTHM WITH 1ST DEGREE A-V BLOCK  POSSIBLE LEFT ATRIAL ENLARGEMENT  NONSPECIFIC T WAVE ABNORMALITY  ABNORMAL ECG  NO PREVIOUS ECGS AVAILABLE  Confirmed by MD FELIPE JONATHAN ([id number]) on 7/10/2021 1:59:44 PM    < end of copied text >    
< from: 12 Lead ECG (07.09.21 @ 22:18) >      Ventricular Rate 91 BPM    Atrial Rate 91 BPM    P-R Interval 314 ms    QRS Duration 96 ms    Q-T Interval 350 ms    QTC Calculation(Bazett) 430 ms    P Axis 66 degrees    R Axis 54 degrees    T Axis 71 degrees    Diagnosis Line SINUS RHYTHM WITH 1ST DEGREE A-V BLOCK  POSSIBLE LEFT ATRIAL ENLARGEMENT  NONSPECIFIC T WAVE ABNORMALITY  ABNORMAL ECG  NO PREVIOUS ECGS AVAILABLE  Confirmed by MD FELIPE JONATHAN ([id number]) on 7/10/2021 1:59:44 PM    < end of copied text >

## 2021-07-27 NOTE — BH CONSULTATION LIAISON PROGRESS NOTE - NSBHFUPINTERVALHXFT_PSY_A_CORE
Patient seen and evaluated, staff consulted, chart, labs, meds reviewed. Staff report intermittent overnight. Patient demonstrates no changes in clarity of sensorium and orientation. No SI/HI, no delusions or perceptual disturbances, no prominent psych. sx. noted or reported. Patient is oriented to self, but not location or time. No prominent withdrawal signs and sx. noted or reported.
Patient seen and evaluated, staff consulted, chart, labs, meds reviewed. Staff report intermittent agitation. Patient still demonstrates consistent impairment in clarity of sensorium and orientation - confused and disoriented. No SI/HI, no delusions or perceptual disturbances, no prominent psych. sx. noted or reported. Patient is oriented to self, but not location or time. No prominent withdrawal signs and sx. noted or reported.
Patient seen and evaluated, staff consulted, chart, labs, meds reviewed. Staff report intermittent overnight agitation. Patient demonstrates no changes in clarity of sensorium and orientation - still confused and disoriented. No SI/HI, no delusions or perceptual disturbances, no prominent psych. sx. noted or reported. Patient is oriented to self, but not location or time. No prominent withdrawal signs and sx. noted or reported.

## 2021-07-27 NOTE — BH CONSULTATION LIAISON PROGRESS NOTE - NSBHPSYCHOLCOGABN_PSY_A_CORE
disoriented to time/disoriented to situation

## 2021-07-27 NOTE — PROGRESS NOTE ADULT - SUBJECTIVE AND OBJECTIVE BOX
patient wants to get out of bed, remove posey and go home  limited appetite, not eating    REVIEW OF SYSTEMS  Constitutional - No fever,  No fatigue  HEENT - No vertigo, No neck pain  Neurological - No headaches, No memory loss, No loss of strength, No numbness, No tremors  Skin - No rashes, No lesions   Musculoskeletal - No joint pain, No joint swelling, No muscle pain  Psychiatric - No depression, No anxiety    FUNCTIONAL PROGRESS  7/26 PT   bed mobility min assist  transfers mod assist  limited by agitation, adamant about returning to bed     7/26 OT  sleeping/lethargic, refusing therapy     VITALS  T(C): 36.7 (07-27-21 @ 08:01), Max: 37 (07-27-21 @ 06:32)  HR: 90 (07-27-21 @ 08:01) (80 - 93)  BP: 103/69 (07-27-21 @ 08:01) (103/69 - 124/74)  RR: 18 (07-27-21 @ 08:01) (16 - 18)  SpO2: 100% (07-27-21 @ 08:01) (98% - 100%)  Wt(kg): --    MEDICATIONS   acetaminophen   Tablet .. 650 milliGRAM(s) every 6 hours PRN  atorvastatin 40 milliGRAM(s) at bedtime  enalapril 10 milliGRAM(s) daily  escitalopram 5 milliGRAM(s) daily  folic acid 1 milliGRAM(s) daily  heparin   Injectable 5000 Unit(s) every 12 hours  insulin lispro (ADMELOG) corrective regimen sliding scale   Before meals and at bedtime  levETIRAcetam 500 milliGRAM(s) two times a day  magnesium hydroxide Suspension 30 milliLiter(s) daily PRN  melatonin 3 milliGRAM(s) at bedtime  multivitamin/minerals 1 Tablet(s) daily  polyethylene glycol 3350 17 Gram(s) daily  senna 2 Tablet(s) at bedtime  thiamine 100 milliGRAM(s) daily  timolol 0.25% Solution 1 Drop(s) at bedtime      RECENT LABS - Reviewed                        10.1   8.83  )-----------( 639      ( 26 Jul 2021 06:54 )             31.6     07-26    139  |  99  |  15  ----------------------------<  198<H>  4.0   |  23  |  1.14    Ca    10.1      26 Jul 2021 06:53      --------------------------------------------------------------------------------------  PHYSICAL EXAM  Constitutional - NAD, Comfortable, in bed, +posey  Chest - Breathing comfortably, No wheezing  Cardiovascular - S1S2   Abdomen - Soft   Extremities - no edema   Neurologic Exam -                    Cognitive -  awake, alert, responds to some questions, unable to say where he is, year 2020, month july        Motor - moves all ext      Sensory - Intact to LT    Psychiatric - easily agitated  ----------------------------------------------------------------------------------------  ASSESSMENT/PLAN  76yMale h/o DM, ETOH with functional deficits after fall, TBI, b/l SAH, right SDH, L parietal SAH with pneumocephalus, a L possible parietal non-displaced skull fracture, and a C6 inferior endplate fracture into disc space with air into the canal  ETOH withdrawal, phenobarb taper completed  mild C6 vertebral body fracture, cervical collar for comfort   on keppra for seizure prophylaxis   followed by psych, lexapro started for agitation/anxiety  Pain - Tylenol  DVT PPX - SCDs, heparin SQ  Rehab - Will continue to follow for ongoing rehab needs and recommendations.   continue bedside PT/OT   Recommend ACUTE/TBI inpatient rehabilitation for the functional deficits consisting of 3 hours of therapy/day & 24 hour RN/daily PMR physician for comorbid medical management. Patient will be able to tolerate 3 hours a day.             patient wants to get out of bed, remove posey and go home  limited appetite, not eating    REVIEW OF SYSTEMS  Constitutional - No fever,  No fatigue  HEENT - No vertigo, No neck pain  Neurological - No headaches, No memory loss, No loss of strength, No numbness, No tremors  Skin - No rashes, No lesions   Musculoskeletal - No joint pain, No joint swelling, No muscle pain  Psychiatric - No depression, No anxiety    FUNCTIONAL PROGRESS  7/26 PT   bed mobility min assist  transfers mod assist  limited by agitation, adamant about returning to bed     7/26 OT  sleeping/lethargic, refusing therapy     VITALS  T(C): 36.7 (07-27-21 @ 08:01), Max: 37 (07-27-21 @ 06:32)  HR: 90 (07-27-21 @ 08:01) (80 - 93)  BP: 103/69 (07-27-21 @ 08:01) (103/69 - 124/74)  RR: 18 (07-27-21 @ 08:01) (16 - 18)  SpO2: 100% (07-27-21 @ 08:01) (98% - 100%)  Wt(kg): --    MEDICATIONS   acetaminophen   Tablet .. 650 milliGRAM(s) every 6 hours PRN  atorvastatin 40 milliGRAM(s) at bedtime  enalapril 10 milliGRAM(s) daily  escitalopram 5 milliGRAM(s) daily  folic acid 1 milliGRAM(s) daily  heparin   Injectable 5000 Unit(s) every 12 hours  insulin lispro (ADMELOG) corrective regimen sliding scale   Before meals and at bedtime  levETIRAcetam 500 milliGRAM(s) two times a day  magnesium hydroxide Suspension 30 milliLiter(s) daily PRN  melatonin 3 milliGRAM(s) at bedtime  multivitamin/minerals 1 Tablet(s) daily  polyethylene glycol 3350 17 Gram(s) daily  senna 2 Tablet(s) at bedtime  thiamine 100 milliGRAM(s) daily  timolol 0.25% Solution 1 Drop(s) at bedtime      RECENT LABS - Reviewed                        10.1   8.83  )-----------( 639      ( 26 Jul 2021 06:54 )             31.6     07-26    139  |  99  |  15  ----------------------------<  198<H>  4.0   |  23  |  1.14    Ca    10.1      26 Jul 2021 06:53      --------------------------------------------------------------------------------------  PHYSICAL EXAM  Constitutional - NAD, Comfortable, in bed, +posey  Chest - Breathing comfortably, No wheezing  Cardiovascular - S1S2   Abdomen - Soft   Extremities - no edema   Neurologic Exam -                    Cognitive -  awake, alert, responds to some questions, unable to say where he is, year 2020, month july        Motor - moves all ext      Sensory - Intact to LT    Psychiatric - easily agitated  ----------------------------------------------------------------------------------------  ASSESSMENT/PLAN  76yMale h/o DM, ETOH with functional deficits after fall, TBI, b/l SAH, right SDH, L parietal SAH with pneumocephalus, a L possible parietal non-displaced skull fracture, and a C6 inferior endplate fracture into disc space with air into the canal  ETOH withdrawal, phenobarb taper completed  mild C6 vertebral body fracture, cervical collar for comfort   on keppra for seizure prophylaxis   followed by psych, lexapro started for agitation/anxiety  calorie count, patient with decreased oral intake   Pain - Tylenol  DVT PPX - SCDs, heparin SQ  Rehab - Will continue to follow for ongoing rehab needs and recommendations.   continue bedside PT/OT   Recommend ACUTE/TBI inpatient rehabilitation for the functional deficits consisting of 3 hours of therapy/day & 24 hour RN/daily PMR physician for comorbid medical management. Patient will be able to tolerate 3 hours a day.

## 2021-07-27 NOTE — BH CONSULTATION LIAISON PROGRESS NOTE - NSBHCHARTREVIEWLAB_PSY_A_CORE FT
10.1   7.82  )-----------( 360      ( 19 Jul 2021 09:37 )             30.8     07-20    136  |  100  |  16  ----------------------------<  220<H>  3.7   |  22  |  1.18    Ca    9.7      20 Jul 2021 07:21  Phos  3.0     07-19      
                           11.1   8.73  )-----------( 325      ( 16 Jul 2021 07:00 )             33.1     07-16    134<L>  |  96  |  16  ----------------------------<  202<H>  3.6   |  20<L>  |  1.01    Ca    9.5      16 Jul 2021 07:00      
                             10.1   8.83  )-----------( 639      ( 26 Jul 2021 06:54 )             31.6     07-26    139  |  99  |  15  ----------------------------<  198<H>  4.0   |  23  |  1.14    Ca    10.1      26 Jul 2021 06:53

## 2021-07-27 NOTE — BH CONSULTATION LIAISON PROGRESS NOTE - NSBHCONSULTRECOMMENDOTHER_PSY_A_CORE FT
Pt does not have capacity to leave AMA or refuse critical care    Thiamine, MVI, folate    melatonin 3mg p.o. at bedtime    Initiate Haldol 1mg p.o. at bedtime    obtain an EEG
Pt does not have capacity to leave AMA or refuse critical care    recommend Thiamine 500mg IV TID x9 doses, MVI, folate    melatonin 3mg p.o. at bedtime
Pt does not have capacity to leave AMA or refuse critical care    consider replacing Keppra with valproate in equal dose (expect to see improvement in irritability and impulsivity)    melatonin 3mg p.o. at bedtime    obtain an EEG

## 2021-07-27 NOTE — BH CONSULTATION LIAISON PROGRESS NOTE - NSBHMSETHTPROC_PSY_A_CORE
Linear/Illogical/Impaired reasoning

## 2021-07-27 NOTE — PROGRESS NOTE ADULT - SUBJECTIVE AND OBJECTIVE BOX
Patient is a 76y old  Male who presents with a chief complaint of Intracranial hemorrhage (27 Jul 2021 11:14)      INTERVAL HPI/OVERNIGHT EVENTS:  T(C): 37.1 (07-27-21 @ 20:04), Max: 37.1 (07-27-21 @ 20:04)  HR: 91 (07-27-21 @ 20:04) (68 - 93)  BP: 101/68 (07-27-21 @ 20:04) (101/68 - 122/71)  RR: 18 (07-27-21 @ 20:04) (17 - 18)  SpO2: 98% (07-27-21 @ 20:04) (98% - 100%)  Wt(kg): --  I&O's Summary    26 Jul 2021 07:01  -  27 Jul 2021 07:00  --------------------------------------------------------  IN: 840 mL / OUT: 400 mL / NET: 440 mL    27 Jul 2021 07:01  -  27 Jul 2021 23:53  --------------------------------------------------------  IN: 390 mL / OUT: 50 mL / NET: 340 mL        PAST MEDICAL & SURGICAL HISTORY:  DM (diabetes mellitus)        SOCIAL HISTORY  Alcohol:  Tobacco:  Illicit substance use:    FAMILY HISTORY:    REVIEW OF SYSTEMS:  CONSTITUTIONAL: No fever, weight loss, or fatigue  EYES: No eye pain, visual disturbances, or discharge  ENMT:  No difficulty hearing, tinnitus, vertigo; No sinus or throat pain  NECK: No pain or stiffness  RESPIRATORY: No cough, wheezing, chills or hemoptysis; No shortness of breath  CARDIOVASCULAR: No chest pain, palpitations, dizziness, or leg swelling  GASTROINTESTINAL: No abdominal or epigastric pain. No nausea, vomiting, or hematemesis; No diarrhea or constipation. No melena or hematochezia.  GENITOURINARY: No dysuria, frequency, hematuria, or incontinence  NEUROLOGICAL: No headaches, memory loss, loss of strength, numbness, or tremors  SKIN: No itching, burning, rashes, or lesions   LYMPH NODES: No enlarged glands  ENDOCRINE: No heat or cold intolerance; No hair loss  MUSCULOSKELETAL: No joint pain or swelling; No muscle, back, or extremity pain  PSYCHIATRIC: No depression, anxiety, mood swings, or difficulty sleeping  HEME/LYMPH: No easy bruising, or bleeding gums  ALLERY AND IMMUNOLOGIC: No hives or eczema    RADIOLOGY & ADDITIONAL TESTS:    Imaging Personally Reviewed:  [ ] YES  [ ] NO    Consultant(s) Notes Reviewed:  [ ] YES  [ ] NO    PHYSICAL EXAM:  GENERAL: NAD, well-groomed, well-developed  HEAD:  Atraumatic, Normocephalic  EYES: EOMI, PERRLA, conjunctiva and sclera clear  ENMT: No tonsillar erythema, exudates, or enlargement; Moist mucous membranes, Good dentition, No lesions  NECK: Supple, No JVD, Normal thyroid  NERVOUS SYSTEM:  Alert & Oriented X3, Good concentration; Motor Strength 5/5 B/L upper and lower extremities; DTRs 2+ intact and symmetric  CHEST/LUNG: Clear to percussion bilaterally; No rales, rhonchi, wheezing, or rubs  HEART: Regular rate and rhythm; No murmurs, rubs, or gallops  ABDOMEN: Soft, Nontender, Nondistended; Bowel sounds present  EXTREMITIES:  2+ Peripheral Pulses, No clubbing, cyanosis, or edema  LYMPH: No lymphadenopathy noted  SKIN: No rashes or lesions    LABS:                        10.1   8.83  )-----------( 639      ( 26 Jul 2021 06:54 )             31.6     07-26    139  |  99  |  15  ----------------------------<  198<H>  4.0   |  23  |  1.14    Ca    10.1      26 Jul 2021 06:53          CAPILLARY BLOOD GLUCOSE      POCT Blood Glucose.: 349 mg/dL (27 Jul 2021 22:02)  POCT Blood Glucose.: 186 mg/dL (27 Jul 2021 17:07)  POCT Blood Glucose.: 158 mg/dL (27 Jul 2021 12:00)  POCT Blood Glucose.: 233 mg/dL (27 Jul 2021 08:10)            MEDICATIONS  (STANDING):  atorvastatin 40 milliGRAM(s) Oral at bedtime  enalapril 10 milliGRAM(s) Oral daily  escitalopram 5 milliGRAM(s) Oral daily  folic acid 1 milliGRAM(s) Oral daily  heparin   Injectable 5000 Unit(s) SubCutaneous every 12 hours  insulin lispro (ADMELOG) corrective regimen sliding scale   SubCutaneous Before meals and at bedtime  melatonin 3 milliGRAM(s) Oral at bedtime  multivitamin/minerals 1 Tablet(s) Oral daily  polyethylene glycol 3350 17 Gram(s) Oral daily  senna 2 Tablet(s) Oral at bedtime  thiamine 100 milliGRAM(s) Oral daily  timolol 0.25% Solution 1 Drop(s) Both EYES at bedtime  valproic  acid Syrup 500 milliGRAM(s) Oral every 12 hours    MEDICATIONS  (PRN):  acetaminophen   Tablet .. 650 milliGRAM(s) Oral every 6 hours PRN Temp greater or equal to 38C (100.4F)  magnesium hydroxide Suspension 30 milliLiter(s) Oral daily PRN Constipation      Care Discussed with Consultants/Other Providers [ ] YES  [ ] NO Patient is a 76y old  Male who presents with a chief complaint of Intracranial hemorrhage (27 Jul 2021 11:14)      INTERVAL HPI/OVERNIGHT EVENTS: remain confused and has emotional outburst   seen by psych     T(C): 37.1 (07-27-21 @ 20:04), Max: 37.1 (07-27-21 @ 20:04)  HR: 91 (07-27-21 @ 20:04) (68 - 93)  BP: 101/68 (07-27-21 @ 20:04) (101/68 - 122/71)  RR: 18 (07-27-21 @ 20:04) (17 - 18)  SpO2: 98% (07-27-21 @ 20:04) (98% - 100%)  Wt(kg): --  I&O's Summary    26 Jul 2021 07:01  -  27 Jul 2021 07:00  --------------------------------------------------------  IN: 840 mL / OUT: 400 mL / NET: 440 mL    27 Jul 2021 07:01  -  27 Jul 2021 23:53  --------------------------------------------------------  IN: 390 mL / OUT: 50 mL / NET: 340 mL        PAST MEDICAL & SURGICAL HISTORY:  DM (diabetes mellitus)        SOCIAL HISTORY  Alcohol:  Tobacco:  Illicit substance use:    FAMILY HISTORY:    REVIEW OF SYSTEMS:  CONSTITUTIONAL: No fever, weight loss, or fatigue  EYES: No eye pain, visual disturbances, or discharge  ENMT:  No difficulty hearing, tinnitus, vertigo; No sinus or throat pain  NECK: No pain or stiffness  RESPIRATORY: No cough, wheezing, chills or hemoptysis; No shortness of breath  CARDIOVASCULAR: No chest pain, palpitations, dizziness, or leg swelling  GASTROINTESTINAL: No abdominal or epigastric pain. No nausea, vomiting, or hematemesis; No diarrhea or constipation. No melena or hematochezia.  GENITOURINARY: No dysuria, frequency, hematuria, or incontinence  NEUROLOGICAL: No headaches, memory loss, loss of strength, numbness, or tremors  SKIN: No itching, burning, rashes, or lesions   LYMPH NODES: No enlarged glands  ENDOCRINE: No heat or cold intolerance; No hair loss  MUSCULOSKELETAL: No joint pain or swelling; No muscle, back, or extremity pain  PSYCHIATRIC: No depression, anxiety, mood swings, or difficulty sleeping  HEME/LYMPH: No easy bruising, or bleeding gums  ALLERY AND IMMUNOLOGIC: No hives or eczema    RADIOLOGY & ADDITIONAL TESTS:    Imaging Personally Reviewed:  [ ] YES  [ ] NO    Consultant(s) Notes Reviewed:  [ ] YES  [ ] NO    PHYSICAL EXAM:  GENERAL: NAD, well-groomed, well-developed  HEAD:  Atraumatic, Normocephalic  EYES: EOMI, PERRLA, conjunctiva and sclera clear  ENMT: No tonsillar erythema, exudates, or enlargement; Moist mucous membranes, Good dentition, No lesions  NECK: Supple, No JVD, Normal thyroid  NERVOUS SYSTEM:  Alert & Oriented X3, Good concentration; Motor Strength 5/5 B/L upper and lower extremities; DTRs 2+ intact and symmetric  CHEST/LUNG: Clear to percussion bilaterally; No rales, rhonchi, wheezing, or rubs  HEART: Regular rate and rhythm; No murmurs, rubs, or gallops  ABDOMEN: Soft, Nontender, Nondistended; Bowel sounds present  EXTREMITIES:  2+ Peripheral Pulses, No clubbing, cyanosis, or edema  LYMPH: No lymphadenopathy noted  SKIN: No rashes or lesions    LABS:                        10.1   8.83  )-----------( 639      ( 26 Jul 2021 06:54 )             31.6     07-26    139  |  99  |  15  ----------------------------<  198<H>  4.0   |  23  |  1.14    Ca    10.1      26 Jul 2021 06:53          CAPILLARY BLOOD GLUCOSE      POCT Blood Glucose.: 349 mg/dL (27 Jul 2021 22:02)  POCT Blood Glucose.: 186 mg/dL (27 Jul 2021 17:07)  POCT Blood Glucose.: 158 mg/dL (27 Jul 2021 12:00)  POCT Blood Glucose.: 233 mg/dL (27 Jul 2021 08:10)            MEDICATIONS  (STANDING):  atorvastatin 40 milliGRAM(s) Oral at bedtime  enalapril 10 milliGRAM(s) Oral daily  escitalopram 5 milliGRAM(s) Oral daily  folic acid 1 milliGRAM(s) Oral daily  heparin   Injectable 5000 Unit(s) SubCutaneous every 12 hours  insulin lispro (ADMELOG) corrective regimen sliding scale   SubCutaneous Before meals and at bedtime  melatonin 3 milliGRAM(s) Oral at bedtime  multivitamin/minerals 1 Tablet(s) Oral daily  polyethylene glycol 3350 17 Gram(s) Oral daily  senna 2 Tablet(s) Oral at bedtime  thiamine 100 milliGRAM(s) Oral daily  timolol 0.25% Solution 1 Drop(s) Both EYES at bedtime  valproic  acid Syrup 500 milliGRAM(s) Oral every 12 hours    MEDICATIONS  (PRN):  acetaminophen   Tablet .. 650 milliGRAM(s) Oral every 6 hours PRN Temp greater or equal to 38C (100.4F)  magnesium hydroxide Suspension 30 milliLiter(s) Oral daily PRN Constipation      Care Discussed with Consultants/Other Providers [ ] YES  [ ] NO

## 2021-07-27 NOTE — BH CONSULTATION LIAISON PROGRESS NOTE - NSBHASSESSMENTFT_PSY_ALL_CORE
This is a 76-y.o. 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, no prior SA or psych admissions, alcohol abuse per chart, occasional cannabis use per pt, with PMH significant for DM2, not on AC, who presents as a Level 2 Trauma after found down [place] bus stop for AMS,  on arrival, intoxicated, found to have Right frontal and temporal subdural hemorrhage, b/l frontal lobe and temporal lobe subarachnoid hemorrhages, small subarachnoid hemorrhage in the left posterior parietal/occipital lobe, nondisplaced mildly depressed calvarial fracture along the left lambdoid suture with small foci of adjacent pneumocephalus and overlying small scalp hematoma, admitted to NSICU, psychiatry initially consulted to evaluate pt's capacity to leave AMA.    Patient's prolonged AMS may be a function of other underlying causes, not just alcohol withdrawal. At this point, a different etiology of delirium should be investigated.  
This is a 76-y.o. 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, no prior SA or psych admissions, alcohol abuse per chart, occasional cannabis use per pt, with PMH significant for DM2, not on AC, who presents as a Level 2 Trauma after found down at bus stop for AMS,  on arrival, intoxicated, found to have Right frontal and temporal subdural hemorrhage, b/l frontal lobe and temporal lobe subarachnoid hemorrhages, small subarachnoid hemorrhage in the left posterior parietal/occipital lobe, nondisplaced mildly depressed calvarial fracture along the left lambdoid suture with small foci of adjacent pneumocephalus and overlying small scalp hematoma, admitted to NSICU, psychiatry initially consulted to evaluate pt's capacity to leave AMA.    Patient's prolonged AMS may be a function of other underlying causes, not just alcohol withdrawal. Complete phenobarbital regimen, psychiatry will follow.  
This is a 76-y.o. 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, no prior SA or psych admissions, alcohol abuse per chart, occasional cannabis use per pt, with PMH significant for DM2, not on AC, who presents as a Level 2 Trauma after found down at bus stop for AMS,  on arrival, intoxicated, found to have Right frontal and temporal subdural hemorrhage, b/l frontal lobe and temporal lobe subarachnoid hemorrhages, small subarachnoid hemorrhage in the left posterior parietal/occipital lobe, nondisplaced mildly depressed calvarial fracture along the left lambdoid suture with small foci of adjacent pneumocephalus and overlying small scalp hematoma, admitted to NSICU, psychiatry initially consulted to evaluate pt's capacity to leave AMA.    Patient's prolonged AMS is a function of other underlying causes, not alcohol withdrawal. At this point, a different etiology of delirium and, possibly, dementia should be investigated.

## 2021-07-28 LAB
GLUCOSE BLDC GLUCOMTR-MCNC: 168 MG/DL — HIGH (ref 70–99)
GLUCOSE BLDC GLUCOMTR-MCNC: 185 MG/DL — HIGH (ref 70–99)
GLUCOSE BLDC GLUCOMTR-MCNC: 194 MG/DL — HIGH (ref 70–99)
GLUCOSE BLDC GLUCOMTR-MCNC: 237 MG/DL — HIGH (ref 70–99)

## 2021-07-28 RX ADMIN — Medication 2: at 22:11

## 2021-07-28 RX ADMIN — Medication 3 MILLIGRAM(S): at 21:21

## 2021-07-28 RX ADMIN — Medication 2: at 13:16

## 2021-07-28 RX ADMIN — SENNA PLUS 2 TABLET(S): 8.6 TABLET ORAL at 21:21

## 2021-07-28 RX ADMIN — Medication 2: at 17:20

## 2021-07-28 RX ADMIN — Medication 100 MILLIGRAM(S): at 13:13

## 2021-07-28 RX ADMIN — ATORVASTATIN CALCIUM 40 MILLIGRAM(S): 80 TABLET, FILM COATED ORAL at 21:22

## 2021-07-28 RX ADMIN — Medication 10 MILLIGRAM(S): at 06:02

## 2021-07-28 RX ADMIN — HEPARIN SODIUM 5000 UNIT(S): 5000 INJECTION INTRAVENOUS; SUBCUTANEOUS at 06:02

## 2021-07-28 RX ADMIN — Medication 1 TABLET(S): at 13:13

## 2021-07-28 RX ADMIN — ESCITALOPRAM OXALATE 5 MILLIGRAM(S): 10 TABLET, FILM COATED ORAL at 13:14

## 2021-07-28 RX ADMIN — Medication 4: at 09:18

## 2021-07-28 RX ADMIN — HEPARIN SODIUM 5000 UNIT(S): 5000 INJECTION INTRAVENOUS; SUBCUTANEOUS at 17:16

## 2021-07-28 RX ADMIN — Medication 1 DROP(S): at 21:22

## 2021-07-28 RX ADMIN — Medication 500 MILLIGRAM(S): at 13:15

## 2021-07-28 RX ADMIN — Medication 1 MILLIGRAM(S): at 13:14

## 2021-07-28 RX ADMIN — POLYETHYLENE GLYCOL 3350 17 GRAM(S): 17 POWDER, FOR SOLUTION ORAL at 13:13

## 2021-07-28 NOTE — PROGRESS NOTE ADULT - NUTRITIONAL ASSESSMENT
This patient has been assessed with a concern for Malnutrition and has been determined to have a diagnosis/diagnoses of Severe protein-calorie malnutrition.    This patient is being managed with:   Diet Consistent Carbohydrate/No Snacks-  Entered: Jul 13 2021 10:16AM    The following pending diet order is being considered for treatment of Severe protein-calorie malnutrition:  Diet Consistent Carbohydrate w/Evening Snack-  Supplement Feeding Modality:  Oral  Glucerna Shake Cans or Servings Per Day:  2       Frequency:  Daily  Entered: Jul 15 2021  5:02PM  

## 2021-07-28 NOTE — PROGRESS NOTE ADULT - SUBJECTIVE AND OBJECTIVE BOX
Patient is a 76y old  Male who presents with a chief complaint of Intracranial hemorrhage (27 Jul 2021 19:53)      INTERVAL HPI/OVERNIGHT EVENTS:  T(C): 37.1 (07-28-21 @ 20:16), Max: 37.1 (07-28-21 @ 20:16)  HR: 98 (07-28-21 @ 20:16) (84 - 98)  BP: 106/70 (07-28-21 @ 20:16) (106/70 - 116/70)  RR: 18 (07-28-21 @ 20:16) (18 - 18)  SpO2: 99% (07-28-21 @ 20:16) (96% - 100%)  Wt(kg): --  I&O's Summary    27 Jul 2021 07:01  -  28 Jul 2021 07:00  --------------------------------------------------------  IN: 630 mL / OUT: 250 mL / NET: 380 mL    28 Jul 2021 07:01  -  28 Jul 2021 22:15  --------------------------------------------------------  IN: 180 mL / OUT: 150 mL / NET: 30 mL        PAST MEDICAL & SURGICAL HISTORY:  DM (diabetes mellitus)        SOCIAL HISTORY  Alcohol:  Tobacco:  Illicit substance use:    FAMILY HISTORY:    REVIEW OF SYSTEMS:  CONSTITUTIONAL: No fever, weight loss, or fatigue  EYES: No eye pain, visual disturbances, or discharge  ENMT:  No difficulty hearing, tinnitus, vertigo; No sinus or throat pain  NECK: No pain or stiffness  RESPIRATORY: No cough, wheezing, chills or hemoptysis; No shortness of breath  CARDIOVASCULAR: No chest pain, palpitations, dizziness, or leg swelling  GASTROINTESTINAL: No abdominal or epigastric pain. No nausea, vomiting, or hematemesis; No diarrhea or constipation. No melena or hematochezia.  GENITOURINARY: No dysuria, frequency, hematuria, or incontinence  NEUROLOGICAL: No headaches, memory loss, loss of strength, numbness, or tremors  SKIN: No itching, burning, rashes, or lesions   LYMPH NODES: No enlarged glands  ENDOCRINE: No heat or cold intolerance; No hair loss  MUSCULOSKELETAL: No joint pain or swelling; No muscle, back, or extremity pain  PSYCHIATRIC: No depression, anxiety, mood swings, or difficulty sleeping  HEME/LYMPH: No easy bruising, or bleeding gums  ALLERY AND IMMUNOLOGIC: No hives or eczema    RADIOLOGY & ADDITIONAL TESTS:    Imaging Personally Reviewed:  [ ] YES  [ ] NO    Consultant(s) Notes Reviewed:  [ ] YES  [ ] NO    PHYSICAL EXAM:  GENERAL: NAD, well-groomed, well-developed  HEAD:  Atraumatic, Normocephalic  EYES: EOMI, PERRLA, conjunctiva and sclera clear  ENMT: No tonsillar erythema, exudates, or enlargement; Moist mucous membranes, Good dentition, No lesions  NECK: Supple, No JVD, Normal thyroid  NERVOUS SYSTEM:  Alert & Oriented X3, Good concentration; Motor Strength 5/5 B/L upper and lower extremities; DTRs 2+ intact and symmetric  CHEST/LUNG: Clear to percussion bilaterally; No rales, rhonchi, wheezing, or rubs  HEART: Regular rate and rhythm; No murmurs, rubs, or gallops  ABDOMEN: Soft, Nontender, Nondistended; Bowel sounds present  EXTREMITIES:  2+ Peripheral Pulses, No clubbing, cyanosis, or edema  LYMPH: No lymphadenopathy noted  SKIN: No rashes or lesions    LABS:              CAPILLARY BLOOD GLUCOSE      POCT Blood Glucose.: 194 mg/dL (28 Jul 2021 22:08)  POCT Blood Glucose.: 185 mg/dL (28 Jul 2021 17:05)  POCT Blood Glucose.: 168 mg/dL (28 Jul 2021 12:10)  POCT Blood Glucose.: 237 mg/dL (28 Jul 2021 08:49)            MEDICATIONS  (STANDING):  atorvastatin 40 milliGRAM(s) Oral at bedtime  enalapril 10 milliGRAM(s) Oral daily  escitalopram 5 milliGRAM(s) Oral daily  folic acid 1 milliGRAM(s) Oral daily  heparin   Injectable 5000 Unit(s) SubCutaneous every 12 hours  insulin lispro (ADMELOG) corrective regimen sliding scale   SubCutaneous Before meals and at bedtime  melatonin 3 milliGRAM(s) Oral at bedtime  multivitamin/minerals 1 Tablet(s) Oral daily  polyethylene glycol 3350 17 Gram(s) Oral daily  senna 2 Tablet(s) Oral at bedtime  thiamine 100 milliGRAM(s) Oral daily  timolol 0.25% Solution 1 Drop(s) Both EYES at bedtime  valproic  acid Syrup 500 milliGRAM(s) Oral every 12 hours    MEDICATIONS  (PRN):  acetaminophen   Tablet .. 650 milliGRAM(s) Oral every 6 hours PRN Temp greater or equal to 38C (100.4F)  magnesium hydroxide Suspension 30 milliLiter(s) Oral daily PRN Constipation      Care Discussed with Consultants/Other Providers [ ] YES  [ ] NO Patient is a 76y old  Male who presents with a chief complaint of Intracranial hemorrhage (27 Jul 2021 19:53)      INTERVAL HPI/OVERNIGHT EVENTS: doing fair , denies any c/o  T(C): 37.1 (07-28-21 @ 20:16), Max: 37.1 (07-28-21 @ 20:16)  HR: 98 (07-28-21 @ 20:16) (84 - 98)  BP: 106/70 (07-28-21 @ 20:16) (106/70 - 116/70)  RR: 18 (07-28-21 @ 20:16) (18 - 18)  SpO2: 99% (07-28-21 @ 20:16) (96% - 100%)  Wt(kg): --  I&O's Summary    27 Jul 2021 07:01  -  28 Jul 2021 07:00  --------------------------------------------------------  IN: 630 mL / OUT: 250 mL / NET: 380 mL    28 Jul 2021 07:01  -  28 Jul 2021 22:15  --------------------------------------------------------  IN: 180 mL / OUT: 150 mL / NET: 30 mL        PAST MEDICAL & SURGICAL HISTORY:  DM (diabetes mellitus)        SOCIAL HISTORY  Alcohol:  Tobacco:  Illicit substance use:    FAMILY HISTORY:    REVIEW OF SYSTEMS:  CONSTITUTIONAL: No fever, weight loss, or fatigue  EYES: No eye pain, visual disturbances, or discharge  ENMT:  No difficulty hearing, tinnitus, vertigo; No sinus or throat pain  NECK: No pain or stiffness  RESPIRATORY: No cough, wheezing, chills or hemoptysis; No shortness of breath  CARDIOVASCULAR: No chest pain, palpitations, dizziness, or leg swelling  GASTROINTESTINAL: No abdominal or epigastric pain. No nausea, vomiting, or hematemesis; No diarrhea or constipation. No melena or hematochezia.  GENITOURINARY: No dysuria, frequency, hematuria, or incontinence  NEUROLOGICAL: No headaches, memory loss, loss of strength, numbness, or tremors  SKIN: No itching, burning, rashes, or lesions   LYMPH NODES: No enlarged glands  ENDOCRINE: No heat or cold intolerance; No hair loss  MUSCULOSKELETAL: No joint pain or swelling; No muscle, back, or extremity pain  PSYCHIATRIC: No depression, anxiety, mood swings, or difficulty sleeping  HEME/LYMPH: No easy bruising, or bleeding gums  ALLERY AND IMMUNOLOGIC: No hives or eczema    RADIOLOGY & ADDITIONAL TESTS:    Imaging Personally Reviewed:  [ ] YES  [ ] NO    Consultant(s) Notes Reviewed:  [ ] YES  [ ] NO    PHYSICAL EXAM:  GENERAL: NAD, well-groomed, well-developed  HEAD:  Atraumatic, Normocephalic  EYES: EOMI, PERRLA, conjunctiva and sclera clear  ENMT: No tonsillar erythema, exudates, or enlargement; Moist mucous membranes, Good dentition, No lesions  NECK: Supple, No JVD, Normal thyroid  NERVOUS SYSTEM:  Alert & Oriented X3, Good concentration; Motor Strength 5/5 B/L upper and lower extremities; DTRs 2+ intact and symmetric  CHEST/LUNG: Clear to percussion bilaterally; No rales, rhonchi, wheezing, or rubs  HEART: Regular rate and rhythm; No murmurs, rubs, or gallops  ABDOMEN: Soft, Nontender, Nondistended; Bowel sounds present  EXTREMITIES:  2+ Peripheral Pulses, No clubbing, cyanosis, or edema  LYMPH: No lymphadenopathy noted  SKIN: No rashes or lesions    LABS:              CAPILLARY BLOOD GLUCOSE      POCT Blood Glucose.: 194 mg/dL (28 Jul 2021 22:08)  POCT Blood Glucose.: 185 mg/dL (28 Jul 2021 17:05)  POCT Blood Glucose.: 168 mg/dL (28 Jul 2021 12:10)  POCT Blood Glucose.: 237 mg/dL (28 Jul 2021 08:49)            MEDICATIONS  (STANDING):  atorvastatin 40 milliGRAM(s) Oral at bedtime  enalapril 10 milliGRAM(s) Oral daily  escitalopram 5 milliGRAM(s) Oral daily  folic acid 1 milliGRAM(s) Oral daily  heparin   Injectable 5000 Unit(s) SubCutaneous every 12 hours  insulin lispro (ADMELOG) corrective regimen sliding scale   SubCutaneous Before meals and at bedtime  melatonin 3 milliGRAM(s) Oral at bedtime  multivitamin/minerals 1 Tablet(s) Oral daily  polyethylene glycol 3350 17 Gram(s) Oral daily  senna 2 Tablet(s) Oral at bedtime  thiamine 100 milliGRAM(s) Oral daily  timolol 0.25% Solution 1 Drop(s) Both EYES at bedtime  valproic  acid Syrup 500 milliGRAM(s) Oral every 12 hours    MEDICATIONS  (PRN):  acetaminophen   Tablet .. 650 milliGRAM(s) Oral every 6 hours PRN Temp greater or equal to 38C (100.4F)  magnesium hydroxide Suspension 30 milliLiter(s) Oral daily PRN Constipation      Care Discussed with Consultants/Other Providers [ ] YES  [ ] NO

## 2021-07-28 NOTE — PROGRESS NOTE ADULT - NSICDXPILOT_GEN_ALL_CORE
[place]

## 2021-07-28 NOTE — PROGRESS NOTE ADULT - REASON FOR ADMISSION
Intracranial hemorrhage

## 2021-07-28 NOTE — CHART NOTE - NSCHARTNOTEFT_GEN_A_CORE
76 year old male with DM2, not on AC, who presents as a Level 2 Trauma after found down at bus stop for altered mental status. Patient appears intoxicated, open eyes to voice, incomprehensible speech, localizing pain. History obtained from family due to patient's condition.    Found to have on CT head: bifrontal tSAH & small contusions, R temporal contusion, R thin SDH, L parietal tSAH with pneumocephalus. L possible parietal nondisplaced skull fx. CT c-spine shows C6 inferior endplate fx into disc space with air into the canal.    Neurosurgery consulted: The SDH over the convexity is small, max width 7mm, but over the temporal lobe it becomes as large as 1.2cm. The mass effect is mild and there is no herniation. If degree of mass effect remains stable, will treat conservatively. Could require surgical intervention if there is progression hemorrhage or edema. No progression seen on repeat imaging.    Psychiatry: Patient's prolonged AMS is a function of other underlying causes, not alcohol withdrawal. At this point, a different etiology of delirium and, possibly, and dementia should be investigated. Routine observation recommended    B/L wrist restraints has not been required since 7/26; [place] vest restraint had been placed due to patient's desire to leave facility; [place] vest was removed this am; will cont to monitor.    GILL Cheng [id number]

## 2021-07-28 NOTE — PROGRESS NOTE ADULT - PROVIDER SPECIALTY LIST ADULT
Critical Care
Infectious Disease
Internal Medicine
Neurosurgery
Rehab Medicine
SICU
Internal Medicine
Internal Medicine
NSICU
Trauma Surgery
Infectious Disease
Internal Medicine
Internal Medicine
NSICU
Neurosurgery
Neurosurgery
Rehab Medicine
Internal Medicine
NSICU
Internal Medicine
NSICU
Internal Medicine
NSICU

## 2021-07-29 ENCOUNTER — TRANSCRIPTION ENCOUNTER (OUTPATIENT)
Age: 76
End: 2021-07-29

## 2021-07-29 ENCOUNTER — INPATIENT (INPATIENT)
Facility: HOSPITAL | Age: 76
LOS: 15 days | Discharge: HOME HEALTH | DRG: 949 | End: 2021-08-14
Attending: STUDENT IN AN ORGANIZED HEALTH CARE EDUCATION/TRAINING PROGRAM | Admitting: PHYSICAL MEDICINE & REHABILITATION
Payer: MEDICARE

## 2021-07-29 VITALS
HEART RATE: 96 BPM | RESPIRATION RATE: 18 BRPM | OXYGEN SATURATION: 97 % | DIASTOLIC BLOOD PRESSURE: 59 MMHG | TEMPERATURE: 98 F | SYSTOLIC BLOOD PRESSURE: 105 MMHG

## 2021-07-29 VITALS
HEIGHT: 66 IN | HEART RATE: 94 BPM | SYSTOLIC BLOOD PRESSURE: 110 MMHG | WEIGHT: 119.71 LBS | RESPIRATION RATE: 14 BRPM | DIASTOLIC BLOOD PRESSURE: 78 MMHG | TEMPERATURE: 98 F | OXYGEN SATURATION: 100 %

## 2021-07-29 DIAGNOSIS — R45.4 IRRITABILITY AND ANGER: ICD-10-CM

## 2021-07-29 DIAGNOSIS — S06.5X9D TRAUMATIC SUBDURAL HEMORRHAGE WITH LOSS OF CONSCIOUSNESS OF UNSPECIFIED DURATION, SUBSEQUENT ENCOUNTER: ICD-10-CM

## 2021-07-29 DIAGNOSIS — S06.6X9D TRAUMATIC SUBARACHNOID HEMORRHAGE WITH LOSS OF CONSCIOUSNESS OF UNSPECIFIED DURATION, SUBSEQUENT ENCOUNTER: ICD-10-CM

## 2021-07-29 DIAGNOSIS — I10 ESSENTIAL (PRIMARY) HYPERTENSION: ICD-10-CM

## 2021-07-29 DIAGNOSIS — I60.9 NONTRAUMATIC SUBARACHNOID HEMORRHAGE, UNSPECIFIED: ICD-10-CM

## 2021-07-29 DIAGNOSIS — R41.3 OTHER AMNESIA: ICD-10-CM

## 2021-07-29 DIAGNOSIS — S12.590D: ICD-10-CM

## 2021-07-29 DIAGNOSIS — R41.0 DISORIENTATION, UNSPECIFIED: ICD-10-CM

## 2021-07-29 DIAGNOSIS — Z51.89 ENCOUNTER FOR OTHER SPECIFIED AFTERCARE: ICD-10-CM

## 2021-07-29 DIAGNOSIS — F10.10 ALCOHOL ABUSE, UNCOMPLICATED: ICD-10-CM
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
## 2021-07-29 DIAGNOSIS — R41.840 ATTENTION AND CONCENTRATION DEFICIT: ICD-10-CM
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
## 2021-07-29 DIAGNOSIS — E43 UNSPECIFIED SEVERE PROTEIN-CALORIE MALNUTRITION: ICD-10-CM

## 2021-07-29 DIAGNOSIS — R41.842 VISUOSPATIAL DEFICIT: ICD-10-CM

## 2021-07-29 DIAGNOSIS — F32.9 MAJOR DEPRESSIVE DISORDER, SINGLE EPISODE, UNSPECIFIED: ICD-10-CM

## 2021-07-29 DIAGNOSIS — I95.1 ORTHOSTATIC HYPOTENSION: ICD-10-CM

## 2021-07-29 DIAGNOSIS — S06.5X9A TRAUMATIC SUBDURAL HEMORRHAGE WITH LOSS OF CONSCIOUSNESS OF UNSPECIFIED DURATION, INITIAL ENCOUNTER: ICD-10-CM

## 2021-07-29 DIAGNOSIS — E78.5 HYPERLIPIDEMIA, UNSPECIFIED: ICD-10-CM

## 2021-07-29 DIAGNOSIS — E11.65 TYPE 2 DIABETES MELLITUS WITH HYPERGLYCEMIA: ICD-10-CM

## 2021-07-29 DIAGNOSIS — S02.0XXD FRACTURE OF VAULT OF SKULL, SUBSEQUENT ENCOUNTER FOR FRACTURE WITH ROUTINE HEALING: ICD-10-CM

## 2021-07-29 DIAGNOSIS — R41.844 FRONTAL LOBE AND EXECUTIVE FUNCTION DEFICIT: ICD-10-CM

## 2021-07-29 LAB
GLUCOSE BLDC GLUCOMTR-MCNC: 151 MG/DL — HIGH (ref 70–99)
GLUCOSE BLDC GLUCOMTR-MCNC: 197 MG/DL — HIGH (ref 70–99)
GLUCOSE BLDC GLUCOMTR-MCNC: 240 MG/DL — HIGH (ref 70–99)
SARS-COV-2 RNA SPEC QL NAA+PROBE: SIGNIFICANT CHANGE UP

## 2021-07-29 PROCEDURE — U0005: CPT

## 2021-07-29 PROCEDURE — 97110 THERAPEUTIC EXERCISES: CPT

## 2021-07-29 PROCEDURE — 85730 THROMBOPLASTIN TIME PARTIAL: CPT

## 2021-07-29 PROCEDURE — 86769 SARS-COV-2 COVID-19 ANTIBODY: CPT

## 2021-07-29 PROCEDURE — 93306 TTE W/DOPPLER COMPLETE: CPT

## 2021-07-29 PROCEDURE — 80053 COMPREHEN METABOLIC PANEL: CPT

## 2021-07-29 PROCEDURE — 93005 ELECTROCARDIOGRAM TRACING: CPT

## 2021-07-29 PROCEDURE — 83935 ASSAY OF URINE OSMOLALITY: CPT

## 2021-07-29 PROCEDURE — 85025 COMPLETE CBC W/AUTO DIFF WBC: CPT

## 2021-07-29 PROCEDURE — 84132 ASSAY OF SERUM POTASSIUM: CPT

## 2021-07-29 PROCEDURE — 97530 THERAPEUTIC ACTIVITIES: CPT

## 2021-07-29 PROCEDURE — 85396 CLOTTING ASSAY WHOLE BLOOD: CPT

## 2021-07-29 PROCEDURE — 82435 ASSAY OF BLOOD CHLORIDE: CPT

## 2021-07-29 PROCEDURE — 85610 PROTHROMBIN TIME: CPT

## 2021-07-29 PROCEDURE — 93970 EXTREMITY STUDY: CPT

## 2021-07-29 PROCEDURE — 84145 PROCALCITONIN (PCT): CPT

## 2021-07-29 PROCEDURE — 83036 HEMOGLOBIN GLYCOSYLATED A1C: CPT

## 2021-07-29 PROCEDURE — 83930 ASSAY OF BLOOD OSMOLALITY: CPT

## 2021-07-29 PROCEDURE — 72170 X-RAY EXAM OF PELVIS: CPT

## 2021-07-29 PROCEDURE — 82330 ASSAY OF CALCIUM: CPT

## 2021-07-29 PROCEDURE — 83735 ASSAY OF MAGNESIUM: CPT

## 2021-07-29 PROCEDURE — 83605 ASSAY OF LACTIC ACID: CPT

## 2021-07-29 PROCEDURE — 81001 URINALYSIS AUTO W/SCOPE: CPT

## 2021-07-29 PROCEDURE — 82947 ASSAY GLUCOSE BLOOD QUANT: CPT

## 2021-07-29 PROCEDURE — 72125 CT NECK SPINE W/O DYE: CPT

## 2021-07-29 PROCEDURE — 80184 ASSAY OF PHENOBARBITAL: CPT

## 2021-07-29 PROCEDURE — 84443 ASSAY THYROID STIM HORMONE: CPT

## 2021-07-29 PROCEDURE — 82140 ASSAY OF AMMONIA: CPT

## 2021-07-29 PROCEDURE — 87040 BLOOD CULTURE FOR BACTERIA: CPT

## 2021-07-29 PROCEDURE — 71045 X-RAY EXAM CHEST 1 VIEW: CPT

## 2021-07-29 PROCEDURE — 86803 HEPATITIS C AB TEST: CPT

## 2021-07-29 PROCEDURE — 0225U NFCT DS DNA&RNA 21 SARSCOV2: CPT

## 2021-07-29 PROCEDURE — U0003: CPT

## 2021-07-29 PROCEDURE — 86900 BLOOD TYPING SEROLOGIC ABO: CPT

## 2021-07-29 PROCEDURE — 80307 DRUG TEST PRSMV CHEM ANLYZR: CPT

## 2021-07-29 PROCEDURE — 83690 ASSAY OF LIPASE: CPT

## 2021-07-29 PROCEDURE — 82803 BLOOD GASES ANY COMBINATION: CPT

## 2021-07-29 PROCEDURE — 99291 CRITICAL CARE FIRST HOUR: CPT

## 2021-07-29 PROCEDURE — 97162 PT EVAL MOD COMPLEX 30 MIN: CPT

## 2021-07-29 PROCEDURE — 80076 HEPATIC FUNCTION PANEL: CPT

## 2021-07-29 PROCEDURE — 82962 GLUCOSE BLOOD TEST: CPT

## 2021-07-29 PROCEDURE — 70450 CT HEAD/BRAIN W/O DYE: CPT

## 2021-07-29 PROCEDURE — 86901 BLOOD TYPING SEROLOGIC RH(D): CPT

## 2021-07-29 PROCEDURE — 84295 ASSAY OF SERUM SODIUM: CPT

## 2021-07-29 PROCEDURE — 80048 BASIC METABOLIC PNL TOTAL CA: CPT

## 2021-07-29 PROCEDURE — 87077 CULTURE AEROBIC IDENTIFY: CPT

## 2021-07-29 PROCEDURE — 76775 US EXAM ABDO BACK WALL LIM: CPT

## 2021-07-29 PROCEDURE — 84439 ASSAY OF FREE THYROXINE: CPT

## 2021-07-29 PROCEDURE — 84300 ASSAY OF URINE SODIUM: CPT

## 2021-07-29 PROCEDURE — 97129 THER IVNTJ 1ST 15 MIN: CPT

## 2021-07-29 PROCEDURE — 97166 OT EVAL MOD COMPLEX 45 MIN: CPT

## 2021-07-29 PROCEDURE — 84100 ASSAY OF PHOSPHORUS: CPT

## 2021-07-29 PROCEDURE — 82550 ASSAY OF CK (CPK): CPT

## 2021-07-29 PROCEDURE — 85018 HEMOGLOBIN: CPT

## 2021-07-29 PROCEDURE — 85027 COMPLETE CBC AUTOMATED: CPT

## 2021-07-29 PROCEDURE — 82565 ASSAY OF CREATININE: CPT

## 2021-07-29 PROCEDURE — 97116 GAIT TRAINING THERAPY: CPT

## 2021-07-29 PROCEDURE — P9037: CPT

## 2021-07-29 PROCEDURE — 86850 RBC ANTIBODY SCREEN: CPT

## 2021-07-29 PROCEDURE — 36430 TRANSFUSION BLD/BLD COMPNT: CPT

## 2021-07-29 PROCEDURE — 81003 URINALYSIS AUTO W/O SCOPE: CPT

## 2021-07-29 PROCEDURE — 74177 CT ABD & PELVIS W/CONTRAST: CPT

## 2021-07-29 PROCEDURE — 87086 URINE CULTURE/COLONY COUNT: CPT

## 2021-07-29 PROCEDURE — 87186 SC STD MICRODIL/AGAR DIL: CPT

## 2021-07-29 PROCEDURE — 85014 HEMATOCRIT: CPT

## 2021-07-29 RX ORDER — TIMOLOL 0.5 %
1 DROPS OPHTHALMIC (EYE) AT BEDTIME
Refills: 0 | Status: DISCONTINUED | OUTPATIENT
Start: 2021-07-29 | End: 2021-08-14

## 2021-07-29 RX ORDER — INSULIN LISPRO 100/ML
VIAL (ML) SUBCUTANEOUS
Refills: 0 | Status: DISCONTINUED | OUTPATIENT
Start: 2021-07-29 | End: 2021-08-10

## 2021-07-29 RX ORDER — DEXTROSE 50 % IN WATER 50 %
25 SYRINGE (ML) INTRAVENOUS ONCE
Refills: 0 | Status: DISCONTINUED | OUTPATIENT
Start: 2021-07-29 | End: 2021-08-10

## 2021-07-29 RX ORDER — SODIUM CHLORIDE 9 MG/ML
1000 INJECTION, SOLUTION INTRAVENOUS
Refills: 0 | Status: DISCONTINUED | OUTPATIENT
Start: 2021-07-29 | End: 2021-08-10

## 2021-07-29 RX ORDER — THIAMINE MONONITRATE (VIT B1) 100 MG
1 TABLET ORAL
Qty: 0 | Refills: 0 | DISCHARGE
Start: 2021-07-29

## 2021-07-29 RX ORDER — VALPROIC ACID (AS SODIUM SALT) 250 MG/5ML
500 SOLUTION, ORAL ORAL EVERY 12 HOURS
Refills: 0 | Status: DISCONTINUED | OUTPATIENT
Start: 2021-07-29 | End: 2021-08-14

## 2021-07-29 RX ORDER — LANOLIN ALCOHOL/MO/W.PET/CERES
1 CREAM (GRAM) TOPICAL
Qty: 0 | Refills: 0 | DISCHARGE
Start: 2021-07-29

## 2021-07-29 RX ORDER — THIAMINE MONONITRATE (VIT B1) 100 MG
100 TABLET ORAL DAILY
Refills: 0 | Status: DISCONTINUED | OUTPATIENT
Start: 2021-07-30 | End: 2021-08-14

## 2021-07-29 RX ORDER — GLUCAGON INJECTION, SOLUTION 0.5 MG/.1ML
1 INJECTION, SOLUTION SUBCUTANEOUS ONCE
Refills: 0 | Status: DISCONTINUED | OUTPATIENT
Start: 2021-07-29 | End: 2021-08-10

## 2021-07-29 RX ORDER — HEPARIN SODIUM 5000 [USP'U]/ML
5000 INJECTION INTRAVENOUS; SUBCUTANEOUS EVERY 12 HOURS
Refills: 0 | Status: DISCONTINUED | OUTPATIENT
Start: 2021-07-29 | End: 2021-08-02

## 2021-07-29 RX ORDER — MAGNESIUM HYDROXIDE 400 MG/1
30 TABLET, CHEWABLE ORAL DAILY
Refills: 0 | Status: DISCONTINUED | OUTPATIENT
Start: 2021-07-29 | End: 2021-08-14

## 2021-07-29 RX ORDER — ACETAMINOPHEN 500 MG
650 TABLET ORAL EVERY 6 HOURS
Refills: 0 | Status: DISCONTINUED | OUTPATIENT
Start: 2021-07-29 | End: 2021-08-14

## 2021-07-29 RX ORDER — ESCITALOPRAM OXALATE 10 MG/1
1 TABLET, FILM COATED ORAL
Qty: 0 | Refills: 0 | DISCHARGE
Start: 2021-07-29

## 2021-07-29 RX ORDER — TIMOLOL 0.5 %
1 DROPS OPHTHALMIC (EYE)
Qty: 0 | Refills: 0 | DISCHARGE
Start: 2021-07-29

## 2021-07-29 RX ORDER — SENNA PLUS 8.6 MG/1
2 TABLET ORAL
Qty: 0 | Refills: 0 | DISCHARGE
Start: 2021-07-29

## 2021-07-29 RX ORDER — MULTIVIT-MIN/FERROUS GLUCONATE 9 MG/15 ML
1 LIQUID (ML) ORAL
Qty: 0 | Refills: 0 | DISCHARGE
Start: 2021-07-29

## 2021-07-29 RX ORDER — POLYETHYLENE GLYCOL 3350 17 G/17G
17 POWDER, FOR SOLUTION ORAL DAILY
Refills: 0 | Status: DISCONTINUED | OUTPATIENT
Start: 2021-07-29 | End: 2021-08-14

## 2021-07-29 RX ORDER — ESCITALOPRAM OXALATE 10 MG/1
5 TABLET, FILM COATED ORAL DAILY
Refills: 0 | Status: DISCONTINUED | OUTPATIENT
Start: 2021-07-30 | End: 2021-08-02

## 2021-07-29 RX ORDER — ATORVASTATIN CALCIUM 80 MG/1
40 TABLET, FILM COATED ORAL AT BEDTIME
Refills: 0 | Status: DISCONTINUED | OUTPATIENT
Start: 2021-07-29 | End: 2021-08-14

## 2021-07-29 RX ORDER — POLYETHYLENE GLYCOL 3350 17 G/17G
17 POWDER, FOR SOLUTION ORAL
Qty: 0 | Refills: 0 | DISCHARGE
Start: 2021-07-29

## 2021-07-29 RX ORDER — MULTIVIT-MIN/FERROUS GLUCONATE 9 MG/15 ML
1 LIQUID (ML) ORAL DAILY
Refills: 0 | Status: DISCONTINUED | OUTPATIENT
Start: 2021-07-30 | End: 2021-08-04

## 2021-07-29 RX ORDER — LANOLIN ALCOHOL/MO/W.PET/CERES
3 CREAM (GRAM) TOPICAL AT BEDTIME
Refills: 0 | Status: DISCONTINUED | OUTPATIENT
Start: 2021-07-29 | End: 2021-08-14

## 2021-07-29 RX ORDER — SENNA PLUS 8.6 MG/1
2 TABLET ORAL AT BEDTIME
Refills: 0 | Status: DISCONTINUED | OUTPATIENT
Start: 2021-07-29 | End: 2021-08-14

## 2021-07-29 RX ORDER — FOLIC ACID 0.8 MG
1 TABLET ORAL
Qty: 0 | Refills: 0 | DISCHARGE
Start: 2021-07-29

## 2021-07-29 RX ORDER — INSULIN LISPRO 100/ML
VIAL (ML) SUBCUTANEOUS AT BEDTIME
Refills: 0 | Status: DISCONTINUED | OUTPATIENT
Start: 2021-07-29 | End: 2021-08-10

## 2021-07-29 RX ORDER — VALPROIC ACID (AS SODIUM SALT) 250 MG/5ML
10 SOLUTION, ORAL ORAL
Qty: 0 | Refills: 0 | DISCHARGE
Start: 2021-07-29

## 2021-07-29 RX ORDER — DEXTROSE 50 % IN WATER 50 %
15 SYRINGE (ML) INTRAVENOUS ONCE
Refills: 0 | Status: DISCONTINUED | OUTPATIENT
Start: 2021-07-29 | End: 2021-08-10

## 2021-07-29 RX ORDER — DEXTROSE 50 % IN WATER 50 %
12.5 SYRINGE (ML) INTRAVENOUS ONCE
Refills: 0 | Status: DISCONTINUED | OUTPATIENT
Start: 2021-07-29 | End: 2021-08-10

## 2021-07-29 RX ORDER — FOLIC ACID 0.8 MG
1 TABLET ORAL DAILY
Refills: 0 | Status: DISCONTINUED | OUTPATIENT
Start: 2021-07-30 | End: 2021-08-14

## 2021-07-29 RX ADMIN — Medication 500 MILLIGRAM(S): at 18:15

## 2021-07-29 RX ADMIN — POLYETHYLENE GLYCOL 3350 17 GRAM(S): 17 POWDER, FOR SOLUTION ORAL at 12:57

## 2021-07-29 RX ADMIN — Medication 2: at 13:00

## 2021-07-29 RX ADMIN — Medication 1: at 17:56

## 2021-07-29 RX ADMIN — Medication 500 MILLIGRAM(S): at 13:01

## 2021-07-29 RX ADMIN — HEPARIN SODIUM 5000 UNIT(S): 5000 INJECTION INTRAVENOUS; SUBCUTANEOUS at 05:12

## 2021-07-29 RX ADMIN — Medication 2: at 09:28

## 2021-07-29 RX ADMIN — ESCITALOPRAM OXALATE 5 MILLIGRAM(S): 10 TABLET, FILM COATED ORAL at 12:57

## 2021-07-29 RX ADMIN — Medication 500 MILLIGRAM(S): at 02:10

## 2021-07-29 RX ADMIN — Medication 1 MILLIGRAM(S): at 12:57

## 2021-07-29 RX ADMIN — Medication 1 TABLET(S): at 12:56

## 2021-07-29 RX ADMIN — Medication 10 MILLIGRAM(S): at 05:12

## 2021-07-29 RX ADMIN — Medication 100 MILLIGRAM(S): at 12:57

## 2021-07-29 NOTE — H&P ADULT - NSHPLABSRESULTS_GEN_ALL_CORE
VITALS     ICU Vital Signs Last 24 Hrs  T(C): 36.6 (29 Jul 2021 13:26), Max: 37.1 (28 Jul 2021 20:16)  T(F): 97.8 (29 Jul 2021 13:26), Max: 98.8 (28 Jul 2021 20:16)  HR: 96 (29 Jul 2021 13:26) (88 - 98)  BP: 105/59 (29 Jul 2021 13:26) (104/65 - 121/75)  BP(mean): --  ABP: --  ABP(mean): --  RR: 18 (29 Jul 2021 13:26) (18 - 18)  SpO2: 97% (29 Jul 2021 13:26) (96% - 99%)      LABS    	10.1   8.83  )-----------( 639      ( 26 Jul 2021 06:54 )             31.6     07-26    139  |  99  |  15  ----------------------------<  198<H>  4.0   |  23  |  1.14    Ca    10.1      26 Jul 2021 06:53    Radiology     EXAM:  CT BRAIN                          PROCEDURE DATE:  07/09/2021      IMPRESSION:  Right frontal and temporal subdural hemorrhage with maximum thickness of 0.7 cm. Bilateral frontal lobe and temporal lobe subarachnoid hemorrhages. Small subarachnoid hemorrhage in the leftposterior parietal/occipital lobe. No significant mass effect or midline shift. Basilar cisterns are preserved.    Nondisplaced mildly depressed calvarial fracture along the left lambdoid suture with small foci of adjacent pneumocephalus and overlying small scalp hematoma.    Possible nondisplaced fracture along the left posterolateral inferior endplate of C6. There is a small adjacent foci of air in the anterior spinal canal, likely secondary to fracture through C6/C7 disc space with vacuum phenomenon. Cervical degenerative spondylosis, as described above.    < end of copied text >      EXAM:  CT BRAIN                          PROCEDURE DATE:  07/13/2021      COMPARISON: Most recent prior CT examination of the head from 7/11/2021. Prior head CT examination from 7/9/2021.    FINDINGS: There is redemonstration of an evolving acute right convexity subdural hematoma extending along the posterior falx and right tentorium, unchanged when compared to the most recent prior CT exam. The greatest transverse depth measures up to 8 mm along the posterior right frontal convexity.    There is also redemonstration of small mixed density subdural hemorrhage along the left cerebral convexity which also appears grossly unchanged.    Hemorrhagic contusions along the bifrontal lobes are again seen. There is also a small hemorrhagic contusion involving the left posterior temporal cortical junction. Scattered areas of subarachnoid hemorrhage are again seen. No new areas of acute intracranial hemorrhage are noted.    There is no shift of the midline structures or herniation. There is no hydrocephalus.    A nondisplaced left-sided occipital temporal fracture is again seen.    The paranasal sinuses and right mastoid air cells remain clear. Trace left-sided mastoid air cell opacification is again seen and is nonspecific. The orbits appear unremarkable.    IMPRESSION: Overall stable follow-up CT examination when compared with 7/11/2021 at 8:11 AM.    --- End of Report ---

## 2021-07-29 NOTE — DISCHARGE NOTE PROVIDER - CARE PROVIDER_API CALL
Rojelio Coon)  Neurology  [facility], [address] [place] [address]  Phone: [phone]  Fax: [phone]  Follow Up Time:     PCP,   Phone: (   )    -  Fax: (   )    -  Follow Up Time:

## 2021-07-29 NOTE — DISCHARGE NOTE NURSING/CASE MANAGEMENT/SOCIAL WORK - PATIENT PORTAL LINK FT
You can access the FollowMyHealth Patient Portal offered by [facility] by registering at the following website: [email or web address]. By joining [facility]’s FollowMyHealth portal, you will also be able to view your health information using other applications (apps) compatible with our system.

## 2021-07-29 NOTE — H&P ADULT - NSHPREVIEWOFSYSTEMS_GEN_ALL_CORE
REVIEW OF SYSTEMS  Constitutional: No fever, No Chills, No fatigue  HEENT: No eye pain, No visual disturbances, No difficulty hearing  Pulm: No cough,  No shortness of breath  Cardio: No chest pain, No palpitations  GI:  No abdominal pain, No nausea, No vomiting, No diarrhea, No constipation  : No dysuria, No frequency, No hematuria  Neuro: No headaches, No memory loss, No loss of strength, No numbness, No tremors  Skin: No itching, No rashes, No lesions   Endo: No temperature intolerance  MSK: No joint pain, No joint swelling, No muscle pain, No Neck or back pain  Psych:  No depression, No anxiety
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
## 2021-07-29 NOTE — DISCHARGE NOTE PROVIDER - DETAILS OF MALNUTRITION DIAGNOSIS/DIAGNOSES
This patient has been assessed with a concern for Malnutrition and was treated during this hospitalization for the following Nutrition diagnosis/diagnoses:     -  07/26/2021: Severe protein-calorie malnutrition
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
## 2021-07-29 NOTE — CHART NOTE - NSCHARTNOTESELECT_GEN_ALL_CORE
Nutrition Services
Nutrition Services
Event Note
Fever/Event Note
Neurology/Event Note
Nutrition Services
Nutrition Services
Restraint/Event Note
Transfer Note
VTE Risk Assessment PA/Event Note

## 2021-07-29 NOTE — DISCHARGE NOTE PROVIDER - NSDCMRMEDTOKEN_GEN_ALL_CORE_FT
atorvastatin 40 mg oral tablet: 1 tab(s) orally once a day  enalapril 10 mg oral tablet: 1 tab(s) orally once a day  escitalopram 5 mg oral tablet: 1 tab(s) orally once a day  folic acid 1 mg oral tablet: 1 tab(s) orally once a day  glipiZIDE 10 mg oral tablet: 1 tab(s) orally once a day  Janumet XR 50 mg-1000 mg oral tablet, extended release: 1 tab(s) orally once a day  melatonin 3 mg oral tablet: 1 tab(s) orally once a day (at bedtime)  Multiple Vitamins with Minerals oral tablet: 1 tab(s) orally once a day  polyethylene glycol 3350 oral powder for reconstitution: 17 gram(s) orally once a day  senna oral tablet: 2 tab(s) orally once a day (at bedtime)  thiamine 100 mg oral tablet: 1 tab(s) orally once a day  timolol maleate 0.25% ophthalmic solution: 1 drop(s) to each affected eye once a day (at bedtime)  valproic acid 250 mg/5 mL oral liquid: 10 milliliter(s) orally every 12 hours  Vitamin D2 50,000 intl units (1.25 mg) oral capsule: 1 cap(s) orally once a day
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
## 2021-07-29 NOTE — H&P ADULT - NSHPPHYSICALEXAM_GEN_ALL_CORE
Constitutional - NAD, Comfortable  HEENT - NCAT, EOMI  Neck - Supple, No limited ROM  Chest - good chest expansion, good respiratory effort, CTAB   Cardio - warm and well perfused, RRR, no murmur  Abdomen -  Soft, NTND  Extremities - No peripheral edema, No calf tenderness   Neurologic Exam:                    Cognitive -             Orientation: Awake, Alert, AAO to self, place, date, year, situation            Attention:  Days of week, recall 3 objects without cuing            Memory: Recent/ remote memory intact            Thought: process, content appropriate     Speech - Fluent, Comprehensible, No dysarthria, No aphasia      Cranial Nerves - No facial asymmetry, Tongue midline, EOMI, Shoulder shrug intact     Motor -                      LEFT    UE - ShAB 5/5, EF 5/5, EE 5/5, WE 5/5,  WNL                    RIGHT UE - ShAB 5/5, EF 5/5, EE 5/5, WE 5/5,  WNL                    LEFT    LE - HF 5/5, KE 5/5, DF 5/5, PF 5/5                    RIGHT LE - HF 5/5, KE 5/5, DF 5/5, PF 5/5        Sensory - Intact to LT bilateral     Reflexes - DTR _ / 4 , neg Lyn's b/l, neg Babinski's b/l     Coordination - FTN / HTS intact     OculoVestibular -  No nystagmus  Psychiatric - Mood stable, Affect WNL  Skin on admission: Constitutional - NAD, Comfortable  HEENT - NCAT, EOMI  Neck - Supple, No limited ROM  Chest - good chest expansion, good respiratory effort, CTAB   Cardio - warm and well perfused, RRR, no murmur  Abdomen -  Soft, NTND  Extremities - No peripheral edema, No calf tenderness   Neurologic Exam:                    Cognitive -             Orientation: Awake, Alert, AAO to self, place, date, year, situation. Aware he is in the hospital.             Attention:  Days of week, can repeat backwards. Not willing to comply with word recall. Distracted & irritable. Not cooperative with exam.             Memory: Not willing to comply.            Thought: able to process, Distracted & irritable     Speech - Fluent, Comprehensible, No dysarthria, No aphasia      Cranial Nerves - No facial asymmetry, Tongue midline, EOMI, Shoulder shrug intact     Motor -                      LEFT    UE - ShAB 5/5, EF 5/5, EE 5/5, WE 5/5,  WNL                    RIGHT UE - ShAB 5/5, EF 5/5, EE 5/5, WE 5/5,  WNL                    LEFT    LE - HF 5/5, KE 5/5, DF 5/5, PF 5/5                    RIGHT LE - HF 5/5, KE 5/5, DF 5/5, PF 5/5        Sensory - Intact to LT bilateral     Reflexes -  neg Lyn's b/l, neg Babinski's b/l     Coordination - FTN / HTS intact     OculoVestibular -  No nystagmus    Psychiatric - easily agitated  Skin on admission: Constitutional - NAD, Comfortable  HEENT - NCAT, EOMI  Neck - Supple, No limited ROM  Chest - good chest expansion, good respiratory effort, CTAB   Cardio - warm and well perfused, RRR, no murmur  Abdomen -  Soft, NTND  Extremities - No peripheral edema, No calf tenderness   Neurologic Exam:                    Cognitive -             Orientation: Awake, Alert, AAO to self, place, date, year, situation. Aware he is in the hospital.             Attention:  Days of week, can repeat backwards. Not willing to comply with word recall. Distracted & irritable. Not cooperative with exam.             Memory: Not willing to comply.            Thought: able to process, Distracted & irritable     Speech - Fluent, Comprehensible, No dysarthria, No aphasia      Cranial Nerves - No facial asymmetry, Tongue midline, EOMI, Shoulder shrug intact     Motor -                      LEFT    UE - ShAB 5/5, EF 5/5, EE 5/5, WE 5/5,  WNL                    RIGHT UE - ShAB 5/5, EF 5/5, EE 5/5, WE 5/5,  WNL                    LEFT    LE - HF 5/5, KE 5/5, DF 5/5, PF 5/5                    RIGHT LE - HF 5/5, KE 5/5, DF 5/5, PF 5/5        Sensory - Intact to LT bilateral     Reflexes -  neg Lyn's b/l, neg Babinski's b/l     Coordination - FTN / HTS intact     OculoVestibular -  No nystagmus    Psychiatric - easily agitated  Skin on admission: No pressure ulcer noted.

## 2021-07-29 NOTE — CHART NOTE - NSCHARTNOTEFT_GEN_A_CORE
Nutrition Follow Up Note  Patient seen for malnutrition follow up and kcal count.     Chart reviewed, events noted.    Source: [] Patient       [x] EMR        [x] RN        [] Family at bedside       [] Other:    -If unable to interview patient: [] Trach/Vent/BiPAP  [x] Disoriented/confused/inappropriate to interview as per chart, unwilling to participate     RN reports pt with poor appetite and PO intake, refusing to eat or drink anything except juice. States probably pt would not drink Glucerna as it is milky but might drink Ensure Clear. Denies pt with nausea, vomiting, diarrhea, or constipation, last BM 2 days ago ().     As per kcal count: pt refused breakfast, lunch and dinner x 3 days, refused Glucerna, and only consumed cranberry juice - discussed with NP.     Diet Order:   Diet, Consistent Carbohydrate/No Snacks (-)    Weights:   Daily Weight in k.9 (), Weight in k.1 () -?accuracy of weight fluctuation.     Nutritionally Pertinent MEDICATIONS  (STANDING):  atorvastatin  enalapril  folic acid  insulin lispro (ADMELOG) corrective regimen sliding scale  multivitamin/minerals  polyethylene glycol 3350  senna  thiamine    Pertinent Labs: ()   A1C with Estimated Average Glucose Result: 7.3 % (07/10)    Finger Sticks:  POCT Blood Glucose.: 197 mg/dL ( @ 09:14)  POCT Blood Glucose.: 194 mg/dL ( @ 22:08)  POCT Blood Glucose.: 185 mg/dL ( @ 17:05)    Skin: no noted pressure injuries as per documentation.    Edema as per flow sheets: none.     Estimated Needs:   [x] no change since previous assessment  [] recalculated:     Previous Nutrition Diagnoses:  [x] Increased Nutrient Needs  [x] Malnutrition; severe      Nutrition Diagnoses are: [x] ongoing - being addressed with recommendations for nutritional supplements.     New Nutrition Diagnosis: [x] Not applicable    Nutrition Care Plan:  [x] In Progress    Nutrition Interventions:     Education Provided:       [] Yes  [x] No    Recommendations:      1. Recommend Consistent Carbohydrate with snack diet. Will continue to monitor as able and adjust as needed.   2. Recommend Ensure Clear 3x daily (540 abby and 24 gm protein) to optimize kcal and protein intake. Spoke to NP.   3. Gently encourage PO intake and obtain food preferences if feasible (unable to obtain at this time).   4. Continue Multivitamin/minerals, Vitamin B1, and Folic Acid as medically feasible to optimize nutrient intake.   5. Consider EN or appetite stimulant if within GOC and no medical contraindications.   6. Obtain current weight as able to identify changes if any.     Monitoring and Evaluation:   Continue to monitor nutritional intake, tolerance to diet prescription, weights, labs, skin integrity    RD remains available upon request and will follow up per protocol  Maegan Bhat MS RDN CDN #[id number] Nutrition Follow Up Note  Patient seen for malnutrition follow up and kcal count.     Chart reviewed, events noted.    Source: [] Patient       [x] EMR        [x] RN        [] Family at bedside       [x] Other: NP     -If unable to interview patient: [] Trach/Vent/BiPAP  [x] Disoriented/confused/inappropriate to interview as per chart, unwilling to participate     RN reports pt with poor appetite and PO intake, refusing to eat or drink anything except juice. States probably pt would not drink Glucerna as it is milky but might drink Ensure Clear. Denies pt with nausea, vomiting, diarrhea, or constipation, last BM 2 days ago ().     As per kcal count: pt refused breakfast, lunch and dinner x 3 days, refused Glucerna, and only consumed cranberry juice - discussed with NP; no plans for EN at this time, pt likely to get discharged today.     Diet Order:   Diet, Consistent Carbohydrate/No Snacks (-)    Weights:   Daily Weight in k.9 (), Weight in k.1 () -?accuracy of weight fluctuation.     Nutritionally Pertinent MEDICATIONS  (STANDING):  atorvastatin  enalapril  folic acid  insulin lispro (ADMELOG) corrective regimen sliding scale  multivitamin/minerals  polyethylene glycol 3350  senna  thiamine    Pertinent Labs: ()   A1C with Estimated Average Glucose Result: 7.3 % (07/10)    Finger Sticks:  POCT Blood Glucose.: 197 mg/dL ( @ 09:14)  POCT Blood Glucose.: 194 mg/dL ( @ 22:08)  POCT Blood Glucose.: 185 mg/dL ( @ 17:05)    Skin: no noted pressure injuries as per documentation.    Edema as per flow sheets: none.     Estimated Needs:   [x] no change since previous assessment  [] recalculated:     Previous Nutrition Diagnoses:  [x] Increased Nutrient Needs  [x] Malnutrition; severe      Nutrition Diagnoses are: [x] ongoing - being addressed with recommendations for nutritional supplements.     New Nutrition Diagnosis: [x] Not applicable    Nutrition Care Plan:  [x] In Progress    Nutrition Interventions:     Education Provided:       [] Yes  [x] No    Recommendations:      1. Recommend Consistent Carbohydrate with snack diet. Will continue to monitor as able and adjust as needed.   2. Recommend Ensure Clear 3x daily (540 abby and 24 gm protein) to optimize kcal and protein intake. Spoke to NP.   3. Gently encourage PO intake and obtain food preferences if feasible (unable to obtain at this time).   4. Continue Multivitamin/minerals, Vitamin B1, and Folic Acid as medically feasible to optimize nutrient intake.   5. Consider EN or appetite stimulant if within GOC and no medical contraindications.   6. Obtain current weight as able to identify changes if any.     Monitoring and Evaluation:   Continue to monitor nutritional intake, tolerance to diet prescription, weights, labs, skin integrity    RD remains available upon request and will follow up per protocol  Maegan Bhat MS RDN CDN #[phone]

## 2021-07-29 NOTE — H&P ADULT - ATTENDING COMMENTS
Patient seen and examined. He is confused, states that he wants to go home.   Will ask Neuro-Psych to see the pt.     Vital Signs Last 24 Hrs  T(C): 36.6 (30 Jul 2021 08:34), Max: 36.7 (29 Jul 2021 23:00)  T(F): 97.8 (30 Jul 2021 08:34), Max: 98 (29 Jul 2021 23:00)  HR: 86 (30 Jul 2021 08:34) (86 - 94)  BP: 110/72 (30 Jul 2021 08:34) (110/72 - 119/72)  BP(mean): --  RR: 14 (30 Jul 2021 08:34) (14 - 15)  SpO2: 98% (30 Jul 2021 08:34) (98% - 100%)                          9.9    5.20  )-----------( 548      ( 30 Jul 2021 06:14 )             29.8     07-30    137  |  101  |  17  ----------------------------<  211<H>  4.2   |  29  |  1.35<H>    Ca    9.4      30 Jul 2021 06:14    TPro  7.2  /  Alb  2.4<L>  /  TBili  0.2  /  DBili  x   /  AST  12  /  ALT  17  /  AlkPhos  105  07-30    PT/OT/ SLP/Neuropsychology

## 2021-07-29 NOTE — H&P ADULT - REASON FOR ADMISSION
Right frontal and temporal subdural hematoma Non- Traumatic Right frontal and temporal subdural hematoma Traumatic Right frontal and temporal subdural hematoma

## 2021-07-29 NOTE — H&P ADULT - NSHPSOCIALHISTORY_GEN_ALL_CORE
SOCIAL HISTORY  Alcohol: 4 or more times a week with 5-6 drinks per day   Tobacco: Unknown if ever smoked   Illicit substance use: occasional cannabis      FUNCTIONAL PROGRESS  7/26 PT   bed mobility min assist  transfers mod assist  limited by agitation, adamant about returning to bed     7/26 OT  sleeping/lethargic, refusing therapy SOCIAL HISTORY  Alcohol: 4 or more times a week with 5-6 drinks per day   Tobacco: Unknown if ever smoked   Illicit substance use: occasional cannabis    SW - This is a 76-y.o. 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    FUNCTIONAL PROGRESS  7/26 PT   bed mobility min assist  transfers mod assist  limited by agitation, adamant about returning to bed     7/26 OT  sleeping/lethargic, refusing therapy
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
## 2021-07-30 LAB
ALBUMIN SERPL ELPH-MCNC: 2.4 G/DL — LOW (ref 3.3–5)
ALP SERPL-CCNC: 105 U/L — SIGNIFICANT CHANGE UP (ref 40–120)
ALT FLD-CCNC: 17 U/L — SIGNIFICANT CHANGE UP (ref 10–45)
ANION GAP SERPL CALC-SCNC: 7 MMOL/L — SIGNIFICANT CHANGE UP (ref 5–17)
AST SERPL-CCNC: 12 U/L — SIGNIFICANT CHANGE UP (ref 10–40)
BILIRUB SERPL-MCNC: 0.2 MG/DL — SIGNIFICANT CHANGE UP (ref 0.2–1.2)
BUN SERPL-MCNC: 17 MG/DL — SIGNIFICANT CHANGE UP (ref 7–23)
CALCIUM SERPL-MCNC: 9.4 MG/DL — SIGNIFICANT CHANGE UP (ref 8.4–10.5)
CHLORIDE SERPL-SCNC: 101 MMOL/L — SIGNIFICANT CHANGE UP (ref 96–108)
CO2 SERPL-SCNC: 29 MMOL/L — SIGNIFICANT CHANGE UP (ref 22–31)
CREAT SERPL-MCNC: 1.35 MG/DL — HIGH (ref 0.5–1.3)
GLUCOSE BLDC GLUCOMTR-MCNC: 145 MG/DL — HIGH (ref 70–99)
GLUCOSE BLDC GLUCOMTR-MCNC: 191 MG/DL — HIGH (ref 70–99)
GLUCOSE BLDC GLUCOMTR-MCNC: 213 MG/DL — HIGH (ref 70–99)
GLUCOSE BLDC GLUCOMTR-MCNC: 222 MG/DL — HIGH (ref 70–99)
GLUCOSE SERPL-MCNC: 211 MG/DL — HIGH (ref 70–99)
HCT VFR BLD CALC: 29.8 % — LOW (ref 39–50)
HGB BLD-MCNC: 9.9 G/DL — LOW (ref 13–17)
MCHC RBC-ENTMCNC: 32.4 PG — SIGNIFICANT CHANGE UP (ref 27–34)
MCHC RBC-ENTMCNC: 33.2 GM/DL — SIGNIFICANT CHANGE UP (ref 32–36)
MCV RBC AUTO: 97.4 FL — SIGNIFICANT CHANGE UP (ref 80–100)
NRBC # BLD: 0 /100 WBCS — SIGNIFICANT CHANGE UP (ref 0–0)
PLATELET # BLD AUTO: 548 K/UL — HIGH (ref 150–400)
POTASSIUM SERPL-MCNC: 4.2 MMOL/L — SIGNIFICANT CHANGE UP (ref 3.5–5.3)
POTASSIUM SERPL-SCNC: 4.2 MMOL/L — SIGNIFICANT CHANGE UP (ref 3.5–5.3)
PROT SERPL-MCNC: 7.2 G/DL — SIGNIFICANT CHANGE UP (ref 6–8.3)
RBC # BLD: 3.06 M/UL — LOW (ref 4.2–5.8)
RBC # FLD: 11.2 % — SIGNIFICANT CHANGE UP (ref 10.3–14.5)
SARS-COV-2 RNA SPEC QL NAA+PROBE: SIGNIFICANT CHANGE UP
SODIUM SERPL-SCNC: 137 MMOL/L — SIGNIFICANT CHANGE UP (ref 135–145)
WBC # BLD: 5.2 K/UL — SIGNIFICANT CHANGE UP (ref 3.8–10.5)
WBC # FLD AUTO: 5.2 K/UL — SIGNIFICANT CHANGE UP (ref 3.8–10.5)

## 2021-07-30 PROCEDURE — 99223 1ST HOSP IP/OBS HIGH 75: CPT

## 2021-07-30 PROCEDURE — 99223 1ST HOSP IP/OBS HIGH 75: CPT | Mod: GC

## 2021-07-30 PROCEDURE — 90832 PSYTX W PT 30 MINUTES: CPT

## 2021-07-30 RX ADMIN — SENNA PLUS 2 TABLET(S): 8.6 TABLET ORAL at 21:30

## 2021-07-30 RX ADMIN — Medication 1 MILLIGRAM(S): at 12:08

## 2021-07-30 RX ADMIN — Medication 3 MILLIGRAM(S): at 21:30

## 2021-07-30 RX ADMIN — ESCITALOPRAM OXALATE 5 MILLIGRAM(S): 10 TABLET, FILM COATED ORAL at 12:08

## 2021-07-30 RX ADMIN — Medication 1: at 07:54

## 2021-07-30 RX ADMIN — Medication 500 MILLIGRAM(S): at 17:04

## 2021-07-30 RX ADMIN — Medication 1 DROP(S): at 21:30

## 2021-07-30 RX ADMIN — Medication 500 MILLIGRAM(S): at 08:00

## 2021-07-30 RX ADMIN — Medication 2: at 12:10

## 2021-07-30 RX ADMIN — POLYETHYLENE GLYCOL 3350 17 GRAM(S): 17 POWDER, FOR SOLUTION ORAL at 12:14

## 2021-07-30 RX ADMIN — HEPARIN SODIUM 5000 UNIT(S): 5000 INJECTION INTRAVENOUS; SUBCUTANEOUS at 17:05

## 2021-07-30 RX ADMIN — Medication 1 TABLET(S): at 12:14

## 2021-07-30 RX ADMIN — Medication 100 MILLIGRAM(S): at 12:08

## 2021-07-30 RX ADMIN — ATORVASTATIN CALCIUM 40 MILLIGRAM(S): 80 TABLET, FILM COATED ORAL at 21:30

## 2021-07-30 NOTE — DIETITIAN INITIAL EVALUATION ADULT. - PERTINENT LABORATORY DATA
7/30   Na-137  K-4.2  BUN-17  Creat-1.35  Gluc-211H  Hemoglobin A1c - 7.3H  POCT (over Last 3 Days) - Ranging from 151-240

## 2021-07-30 NOTE — DIETITIAN INITIAL EVALUATION ADULT. - ORAL INTAKE PTA/DIET HISTORY
Patient Does Not Follow Diet @Home, Consumes 3 Meals a Day & Does Take Vitamin/Supplements @Home (Can't Remember Which Ones Currently)    Consistent Carbohydrate Diet w/ Thin Liquids  Recommend Initiate Glucerna 8oz PO TID (Provides 660kcal-30grams of Protein)

## 2021-07-30 NOTE — PROGRESS NOTE ADULT - SUBJECTIVE AND OBJECTIVE BOX
Pt was seen for 25 min for supportive tx. Pt had no complaints. Reviewed chart, approached Pt for his initial assessment, introduced the role of neuropsychology in the tx team, and explained the nature and purpose of the exam. Pt reluctantly agreed to participate. Session focused on gathering biographical information including social hx and medical hx. Pt was not aware of the reason for being in the unit, other than he was told he had to come for tx. He was able to provide some personal information, and to answer questions to explore his current emotional status. Pt's responses were perseverative at times, and he appeared increasingly irritable during the course of the meeting. He repeated several times that he wanted to go home, and that this clinician had to let him go now because he had "to go back home to work and pay his bills". He also asked when he would be seen again and was told that in two days, and when told that in a couple of days, Pt stated that he would not be here in a couple of days. Given Pt's increasing irritability, and failure to complete the first task of his cognitive screening, this was stopped. Approached Pt's nurse and PT, both reported that Pt was inappropriate in his responses and interactions, and PT confirmed that Pt is able to walk, and concurred with this writer that is a potential elopement risk. Support and encouragement were provided.

## 2021-07-30 NOTE — DIETITIAN INITIAL EVALUATION ADULT. - OTHER INFO
Initial Nutrition Assessment   76yr Old Male   Dx: SDH  Diet - Consistent Carbohydrate Diet w/ Thin Liquids  (Recommend Initiate Glucerna 8oz PO TID)  Denies Food Allergy/Intolerance  Tolerates Diet Well - No Chewing/Swallowing Complications (Per Patient)  No Pressure Ulcers (as Per Nursing Flow Sheets)  No Edema Noted (as Per Nursing Flow Sheets)  No Recent Nausea/Vomiting/Diarrhea/Constipation (as Per Patient)

## 2021-07-30 NOTE — DIETITIAN INITIAL EVALUATION ADULT. - PERTINENT MEDS FT
Heparin, Admelog, Vasotec, Lexapro, Lipitor, Senna,  Depakene, Folic Acid, Multivitamin, VB1 Melatonin,

## 2021-07-30 NOTE — DIETITIAN INITIAL EVALUATION ADULT. - ADD RECOMMEND
1) Monitor Weights, Intake, Tolerance, Skin, POCT & Labwork   2) Glucerna 8oz PO TID 3) Continue Nutrition Plan of Care
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
## 2021-07-30 NOTE — PROGRESS NOTE ADULT - ASSESSMENT
Pt alert, distractible, relatively intact receptive/expressive language, irritable affect, euthymic mood, denied AH/VH, denied SI/HI/I/P, thought processes - perseverative, no abnormal thought contents noted but Pt was fixated on having to leave the facility, poor insight and judgment, mildly restless behavior. Pt unable to participate in cognitive assessment at this time due to distractibility and irritability. Pt appears at risk for elopement. Plan: Continue the assessment process; informed head nurse about elopement risk; consider 1:1 or enhanced supervision,

## 2021-07-30 NOTE — DIETITIAN INITIAL EVALUATION ADULT. - PHYSCIAL ASSESSMENT
Temporalis, Orbital Fat Pads, Buccal Fat Pads, Bicep, Tricep, Quadricep, Gastrocnemius(Calf) & Hand (Interosseous) Wasting Observed  (Per Nutrition Focused Physical Exam)

## 2021-07-31 LAB
GLUCOSE BLDC GLUCOMTR-MCNC: 188 MG/DL — HIGH (ref 70–99)
GLUCOSE BLDC GLUCOMTR-MCNC: 231 MG/DL — HIGH (ref 70–99)
GLUCOSE BLDC GLUCOMTR-MCNC: 248 MG/DL — HIGH (ref 70–99)
GLUCOSE BLDC GLUCOMTR-MCNC: 314 MG/DL — HIGH (ref 70–99)
GLUCOSE BLDC GLUCOMTR-MCNC: 334 MG/DL — HIGH (ref 70–99)

## 2021-07-31 PROCEDURE — 99232 SBSQ HOSP IP/OBS MODERATE 35: CPT

## 2021-07-31 RX ORDER — SODIUM CHLORIDE 9 MG/ML
500 INJECTION INTRAMUSCULAR; INTRAVENOUS; SUBCUTANEOUS ONCE
Refills: 0 | Status: COMPLETED | OUTPATIENT
Start: 2021-07-31 | End: 2021-07-31

## 2021-07-31 RX ADMIN — Medication 1: at 16:30

## 2021-07-31 RX ADMIN — Medication 1 DROP(S): at 21:08

## 2021-07-31 RX ADMIN — ESCITALOPRAM OXALATE 5 MILLIGRAM(S): 10 TABLET, FILM COATED ORAL at 11:58

## 2021-07-31 RX ADMIN — Medication 1 TABLET(S): at 12:01

## 2021-07-31 RX ADMIN — HEPARIN SODIUM 5000 UNIT(S): 5000 INJECTION INTRAVENOUS; SUBCUTANEOUS at 17:25

## 2021-07-31 RX ADMIN — Medication 1 MILLIGRAM(S): at 11:58

## 2021-07-31 RX ADMIN — Medication 100 MILLIGRAM(S): at 11:58

## 2021-07-31 RX ADMIN — Medication 4: at 07:42

## 2021-07-31 RX ADMIN — Medication 3 MILLIGRAM(S): at 21:09

## 2021-07-31 RX ADMIN — Medication 2: at 12:00

## 2021-07-31 RX ADMIN — HEPARIN SODIUM 5000 UNIT(S): 5000 INJECTION INTRAVENOUS; SUBCUTANEOUS at 05:50

## 2021-07-31 RX ADMIN — Medication 10 MILLIGRAM(S): at 05:50

## 2021-07-31 RX ADMIN — SENNA PLUS 2 TABLET(S): 8.6 TABLET ORAL at 21:09

## 2021-07-31 RX ADMIN — Medication 500 MILLIGRAM(S): at 17:25

## 2021-07-31 RX ADMIN — ATORVASTATIN CALCIUM 40 MILLIGRAM(S): 80 TABLET, FILM COATED ORAL at 21:09

## 2021-07-31 RX ADMIN — SODIUM CHLORIDE 500 MILLILITER(S): 9 INJECTION INTRAMUSCULAR; INTRAVENOUS; SUBCUTANEOUS at 12:38

## 2021-07-31 RX ADMIN — Medication 500 MILLIGRAM(S): at 05:50

## 2021-07-31 NOTE — PROGRESS NOTE ADULT - REASON FOR ADMISSION
Non- Traumatic Right frontal and temporal subdural hematoma Traumatic Right frontal and temporal subdural hematoma

## 2021-07-31 NOTE — PROGRESS NOTE ADULT - COMMENTS
Patient confused, thinks he has to call into work (reports being a , difficulty describing job) Reduced complex reasoning. No agitation but confused. Redirectable
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
## 2021-07-31 NOTE — PROGRESS NOTE ADULT - SUBJECTIVE AND OBJECTIVE BOX
Patient is a 76y old  Male who presents with a chief complaint of Non- Traumatic Right frontal and temporal subdural hematoma (2021 13:35)      HPI:  Patient is a 76 year old male with PMHx of Diabetes Mellitus type 2, initially presented as a Level 2 Trauma at [facility] on  after found down at a bus stop with altered mental status. It was thought to be non-traumatic cause. Patient appears intoxicated at the time with  on arrival. CT scan on admission demonstrated bifrontal SAH, a R SDH, a L parietal SAH with pneumocephalus, a L possible parietal non-displaced skull fracture, and a C6 inferior endplate fracture into disc space with air into the canal.     Patient was admitted to NSICU. Neurosurgery was consulted who decided to treat conservatively with no progression seen on repeat imaging. During patient hospital course he seemed to have worsening AMS which was thought to be due to delirium and or dementia. Pt also had a UTI & has completed ABx course. Pt was also found to be hyponatremic possibly due to SIADH, which has now resolved.     Pt recommended for acute inpatient rehabilitation and was transferred to [facility] on 21.           (2021 14:07)      PAST MEDICAL & SURGICAL HISTORY:  DM (diabetes mellitus)        MEDICATIONS  (STANDING):  atorvastatin 40 milliGRAM(s) Oral at bedtime  dextrose 40% Gel 15 Gram(s) Oral once  dextrose 5%. 1000 milliLiter(s) (50 mL/Hr) IV Continuous <Continuous>  dextrose 5%. 1000 milliLiter(s) (100 mL/Hr) IV Continuous <Continuous>  dextrose 50% Injectable 25 Gram(s) IV Push once  dextrose 50% Injectable 12.5 Gram(s) IV Push once  dextrose 50% Injectable 25 Gram(s) IV Push once  enalapril 10 milliGRAM(s) Oral daily  escitalopram 5 milliGRAM(s) Oral daily  folic acid 1 milliGRAM(s) Oral daily  glucagon  Injectable 1 milliGRAM(s) IntraMuscular once  heparin   Injectable 5000 Unit(s) SubCutaneous every 12 hours  insulin lispro (ADMELOG) corrective regimen sliding scale   SubCutaneous three times a day before meals  insulin lispro (ADMELOG) corrective regimen sliding scale   SubCutaneous at bedtime  melatonin 3 milliGRAM(s) Oral at bedtime  multivitamin/minerals 1 Tablet(s) Oral daily  polyethylene glycol 3350 17 Gram(s) Oral daily  senna 2 Tablet(s) Oral at bedtime  thiamine 100 milliGRAM(s) Oral daily  timolol 0.25% Solution 1 Drop(s) Both EYES at bedtime  valproic  acid Syrup 500 milliGRAM(s) Oral every 12 hours    MEDICATIONS  (PRN):  acetaminophen   Tablet .. 650 milliGRAM(s) Oral every 6 hours PRN Temp greater or equal to 38C (100.4F)  magnesium hydroxide Suspension 30 milliLiter(s) Oral daily PRN Constipation      Allergies    Allergy Status Unknown    Intolerances          VITALS  76y  Vital Signs Last 24 Hrs  T(C): 36.7 (2021 08:22), Max: 36.8 (2021 21:43)  T(F): 98.1 (2021 08:22), Max: 98.2 (2021 21:43)  HR: 89 (2021 08:22) (84 - 89)  BP: 117/72 (2021 08:22) (112/70 - 119/78)  BP(mean): --  RR: 15 (2021 08:22) (15 - 16)  SpO2: 97% (2021 08:22) (97% - 100%)  Daily     Daily Weight in k.3 (2021 22:40)        RECENT LABS:                          9.9    5.20  )-----------( 548      ( 2021 06:14 )             29.8     07-30    137  |  101  |  17  ----------------------------<  211<H>  4.2   |  29  |  1.35<H>    Ca    9.4      2021 06:14    TPro  7.2  /  Alb  2.4<L>  /  TBili  0.2  /  DBili  x   /  AST  12  /  ALT  17  /  AlkPhos  105  07-30    LIVER FUNCTIONS - ( 2021 06:14 )  Alb: 2.4 g/dL / Pro: 7.2 g/dL / ALK PHOS: 105 U/L / ALT: 17 U/L / AST: 12 U/L / GGT: x                   CAPILLARY BLOOD GLUCOSE      POCT Blood Glucose.: 314 mg/dL (2021 07:38)  POCT Blood Glucose.: 334 mg/dL (2021 07:35)  POCT Blood Glucose.: 222 mg/dL (2021 21:29)  POCT Blood Glucose.: 145 mg/dL (2021 16:41)  POCT Blood Glucose.: 213 mg/dL (2021 12:05)               Patient is a 76y old  Male who presents with a chief complaint of Traumatic Right frontal and temporal subdural hematoma (2021 13:35)      HPI:  Patient is a 76 year old male with PMHx of Diabetes Mellitus type 2, initially presented as a Level 2 Trauma at [facility] on  after found down at a bus stop with altered mental status. It was thought to be non-traumatic cause. Patient appears intoxicated at the time with  on arrival. CT scan on admission demonstrated bifrontal SAH, a R SDH, a L parietal SAH with pneumocephalus, a L possible parietal non-displaced skull fracture, and a C6 inferior endplate fracture into disc space with air into the canal.     Patient was admitted to NSICU. Neurosurgery was consulted who decided to treat conservatively with no progression seen on repeat imaging. During patient hospital course he seemed to have worsening AMS which was thought to be due to delirium and or dementia. Pt also had a UTI & has completed ABx course. Pt was also found to be hyponatremic possibly due to SIADH, which has now resolved.     Pt recommended for acute inpatient rehabilitation and was transferred to [facility] on 21.           (2021 14:07)      PAST MEDICAL & SURGICAL HISTORY:  DM (diabetes mellitus)        MEDICATIONS  (STANDING):  atorvastatin 40 milliGRAM(s) Oral at bedtime  dextrose 40% Gel 15 Gram(s) Oral once  dextrose 5%. 1000 milliLiter(s) (50 mL/Hr) IV Continuous <Continuous>  dextrose 5%. 1000 milliLiter(s) (100 mL/Hr) IV Continuous <Continuous>  dextrose 50% Injectable 25 Gram(s) IV Push once  dextrose 50% Injectable 12.5 Gram(s) IV Push once  dextrose 50% Injectable 25 Gram(s) IV Push once  enalapril 10 milliGRAM(s) Oral daily  escitalopram 5 milliGRAM(s) Oral daily  folic acid 1 milliGRAM(s) Oral daily  glucagon  Injectable 1 milliGRAM(s) IntraMuscular once  heparin   Injectable 5000 Unit(s) SubCutaneous every 12 hours  insulin lispro (ADMELOG) corrective regimen sliding scale   SubCutaneous three times a day before meals  insulin lispro (ADMELOG) corrective regimen sliding scale   SubCutaneous at bedtime  melatonin 3 milliGRAM(s) Oral at bedtime  multivitamin/minerals 1 Tablet(s) Oral daily  polyethylene glycol 3350 17 Gram(s) Oral daily  senna 2 Tablet(s) Oral at bedtime  thiamine 100 milliGRAM(s) Oral daily  timolol 0.25% Solution 1 Drop(s) Both EYES at bedtime  valproic  acid Syrup 500 milliGRAM(s) Oral every 12 hours    MEDICATIONS  (PRN):  acetaminophen   Tablet .. 650 milliGRAM(s) Oral every 6 hours PRN Temp greater or equal to 38C (100.4F)  magnesium hydroxide Suspension 30 milliLiter(s) Oral daily PRN Constipation      Allergies    Allergy Status Unknown    Intolerances          VITALS  76y  Vital Signs Last 24 Hrs  T(C): 36.7 (2021 08:22), Max: 36.8 (2021 21:43)  T(F): 98.1 (2021 08:22), Max: 98.2 (2021 21:43)  HR: 89 (2021 08:22) (84 - 89)  BP: 117/72 (2021 08:22) (112/70 - 119/78)  BP(mean): --  RR: 15 (2021 08:22) (15 - 16)  SpO2: 97% (2021 08:22) (97% - 100%)  Daily     Daily Weight in k.3 (2021 22:40)        RECENT LABS:                          9.9    5.20  )-----------( 548      ( 2021 06:14 )             29.8     07-30    137  |  101  |  17  ----------------------------<  211<H>  4.2   |  29  |  1.35<H>    Ca    9.4      2021 06:14    TPro  7.2  /  Alb  2.4<L>  /  TBili  0.2  /  DBili  x   /  AST  12  /  ALT  17  /  AlkPhos  105  07-30    LIVER FUNCTIONS - ( 2021 06:14 )  Alb: 2.4 g/dL / Pro: 7.2 g/dL / ALK PHOS: 105 U/L / ALT: 17 U/L / AST: 12 U/L / GGT: x                   CAPILLARY BLOOD GLUCOSE      POCT Blood Glucose.: 314 mg/dL (2021 07:38)  POCT Blood Glucose.: 334 mg/dL (2021 07:35)  POCT Blood Glucose.: 222 mg/dL (2021 21:29)  POCT Blood Glucose.: 145 mg/dL (2021 16:41)  POCT Blood Glucose.: 213 mg/dL (2021 12:05)               Patient is a 76y old  Male who presents with a chief complaint of Traumatic Right frontal and temporal subdural hematoma (2021 13:35)      HPI:  Patient is a 76 year old male with PMHx of Diabetes Mellitus type 2, initially presented as a Level 2 Trauma at [facility] on  after found down at a bus stop with altered mental status.  Patient appears intoxicated at the time with  on arrival. CT scan on admission demonstrated bifrontal SAH, a R SDH, a L parietal SAH with pneumocephalus, a L possible parietal non-displaced skull fracture, and a C6 inferior endplate fracture into disc space with air into the canal.     Patient was admitted to NSICU. Neurosurgery was consulted who decided to treat conservatively with no progression seen on repeat imaging. During patient hospital course he seemed to have worsening AMS which was thought to be due to delirium and or dementia. Pt also had a UTI & has completed ABx course. Pt was also found to be hyponatremic possibly due to SIADH, which has now resolved.     Pt recommended for acute inpatient rehabilitation and was transferred to [facility] on 21.           (2021 14:07)      PAST MEDICAL & SURGICAL HISTORY:  DM (diabetes mellitus)        MEDICATIONS  (STANDING):  atorvastatin 40 milliGRAM(s) Oral at bedtime  dextrose 40% Gel 15 Gram(s) Oral once  dextrose 5%. 1000 milliLiter(s) (50 mL/Hr) IV Continuous <Continuous>  dextrose 5%. 1000 milliLiter(s) (100 mL/Hr) IV Continuous <Continuous>  dextrose 50% Injectable 25 Gram(s) IV Push once  dextrose 50% Injectable 12.5 Gram(s) IV Push once  dextrose 50% Injectable 25 Gram(s) IV Push once  enalapril 10 milliGRAM(s) Oral daily  escitalopram 5 milliGRAM(s) Oral daily  folic acid 1 milliGRAM(s) Oral daily  glucagon  Injectable 1 milliGRAM(s) IntraMuscular once  heparin   Injectable 5000 Unit(s) SubCutaneous every 12 hours  insulin lispro (ADMELOG) corrective regimen sliding scale   SubCutaneous three times a day before meals  insulin lispro (ADMELOG) corrective regimen sliding scale   SubCutaneous at bedtime  melatonin 3 milliGRAM(s) Oral at bedtime  multivitamin/minerals 1 Tablet(s) Oral daily  polyethylene glycol 3350 17 Gram(s) Oral daily  senna 2 Tablet(s) Oral at bedtime  thiamine 100 milliGRAM(s) Oral daily  timolol 0.25% Solution 1 Drop(s) Both EYES at bedtime  valproic  acid Syrup 500 milliGRAM(s) Oral every 12 hours    MEDICATIONS  (PRN):  acetaminophen   Tablet .. 650 milliGRAM(s) Oral every 6 hours PRN Temp greater or equal to 38C (100.4F)  magnesium hydroxide Suspension 30 milliLiter(s) Oral daily PRN Constipation      Allergies    Allergy Status Unknown    Intolerances          VITALS  76y  Vital Signs Last 24 Hrs  T(C): 36.7 (2021 08:22), Max: 36.8 (2021 21:43)  T(F): 98.1 (2021 08:22), Max: 98.2 (2021 21:43)  HR: 89 (2021 08:22) (84 - 89)  BP: 117/72 (2021 08:22) (112/70 - 119/78)  BP(mean): --  RR: 15 (2021 08:22) (15 - 16)  SpO2: 97% (2021 08:22) (97% - 100%)  Daily     Daily Weight in k.3 (2021 22:40)        RECENT LABS:                          9.9    5.20  )-----------( 548      ( 2021 06:14 )             29.8     07-30    137  |  101  |  17  ----------------------------<  211<H>  4.2   |  29  |  1.35<H>    Ca    9.4      2021 06:14    TPro  7.2  /  Alb  2.4<L>  /  TBili  0.2  /  DBili  x   /  AST  12  /  ALT  17  /  AlkPhos  105  07-30    LIVER FUNCTIONS - ( 2021 06:14 )  Alb: 2.4 g/dL / Pro: 7.2 g/dL / ALK PHOS: 105 U/L / ALT: 17 U/L / AST: 12 U/L / GGT: x                   CAPILLARY BLOOD GLUCOSE      POCT Blood Glucose.: 314 mg/dL (2021 07:38)  POCT Blood Glucose.: 334 mg/dL (2021 07:35)  POCT Blood Glucose.: 222 mg/dL (2021 21:29)  POCT Blood Glucose.: 145 mg/dL (2021 16:41)  POCT Blood Glucose.: 213 mg/dL (2021 12:05)

## 2021-07-31 NOTE — PROGRESS NOTE ADULT - SUBJECTIVE AND OBJECTIVE BOX
Patient is a 76y old  Male who presents with a chief complaint of Non- Traumatic Right frontal and temporal subdural hematoma (31 Jul 2021 10:01)    NO events overnight  According to nursing, acts impulsive  Does not offer concerns, wants to go home  Patient seen and examined at bedside.    ALLERGIES:  Allergy Status Unknown    MEDICATIONS  (STANDING):  atorvastatin 40 milliGRAM(s) Oral at bedtime  dextrose 40% Gel 15 Gram(s) Oral once  dextrose 5%. 1000 milliLiter(s) (50 mL/Hr) IV Continuous <Continuous>  dextrose 5%. 1000 milliLiter(s) (100 mL/Hr) IV Continuous <Continuous>  dextrose 50% Injectable 25 Gram(s) IV Push once  dextrose 50% Injectable 12.5 Gram(s) IV Push once  dextrose 50% Injectable 25 Gram(s) IV Push once  enalapril 10 milliGRAM(s) Oral daily  escitalopram 5 milliGRAM(s) Oral daily  folic acid 1 milliGRAM(s) Oral daily  glucagon  Injectable 1 milliGRAM(s) IntraMuscular once  heparin   Injectable 5000 Unit(s) SubCutaneous every 12 hours  insulin lispro (ADMELOG) corrective regimen sliding scale   SubCutaneous three times a day before meals  insulin lispro (ADMELOG) corrective regimen sliding scale   SubCutaneous at bedtime  melatonin 3 milliGRAM(s) Oral at bedtime  multivitamin/minerals 1 Tablet(s) Oral daily  polyethylene glycol 3350 17 Gram(s) Oral daily  senna 2 Tablet(s) Oral at bedtime  thiamine 100 milliGRAM(s) Oral daily  timolol 0.25% Solution 1 Drop(s) Both EYES at bedtime  valproic  acid Syrup 500 milliGRAM(s) Oral every 12 hours    MEDICATIONS  (PRN):  acetaminophen   Tablet .. 650 milliGRAM(s) Oral every 6 hours PRN Temp greater or equal to 38C (100.4F)  magnesium hydroxide Suspension 30 milliLiter(s) Oral daily PRN Constipation    Vital Signs Last 24 Hrs  T(F): 98.1 (31 Jul 2021 08:22), Max: 98.2 (30 Jul 2021 21:43)  HR: 89 (31 Jul 2021 08:22) (84 - 89)  BP: 117/72 (31 Jul 2021 08:22) (112/70 - 119/78)  RR: 15 (31 Jul 2021 08:22) (15 - 16)  SpO2: 97% (31 Jul 2021 08:22) (97% - 100%)  I&O's Summary    30 Jul 2021 07:01  -  31 Jul 2021 07:00  --------------------------------------------------------  IN: 0 mL / OUT: 300 mL / NET: -300 mL      BMI (kg/m2): 19.3 (07-29-21 @ 17:00)  PHYSICAL EXAM:  GENERAL: NAD, AAox2, well-groomed, well-developed  HEAD:  Atraumatic, Normocephalic  EYES: EOMI, PERRLA, conjunctiva and sclera clear  ENMT: No tonsillar erythema, exudates, or enlargement; Moist mucous membranes, Good dentition, No lesions  NECK: Supple, No JVD, Normal thyroid  NERVOUS SYSTEM:  Alert & Oriented X3, Good concentration; Motor Strength 5/5 B/L upper and lower extremities; DTRs 2+ intact and symmetric  CHEST/LUNG: Clear to percussion bilaterally; No rales, rhonchi, wheezing, or rubs  HEART: Regular rate and rhythm; No murmurs, rubs, or gallops  ABDOMEN: Soft, Nontender, Nondistended; Bowel sounds present  EXTREMITIES:  2+ Peripheral Pulses, No clubbing, cyanosis, or edema  LYMPH: No lymphadenopathy noted  SKIN: No rashes or lesions    LABS:                        9.9    5.20  )-----------( 548      ( 30 Jul 2021 06:14 )             29.8       07-30    137  |  101  |  17  ----------------------------<  211  4.2   |  29  |  1.35    Ca    9.4      30 Jul 2021 06:14    TPro  7.2  /  Alb  2.4  /  TBili  0.2  /  DBili  x   /  AST  12  /  ALT  17  /  AlkPhos  105  07-30     eGFR if Non African American: 51 mL/min/1.73M2 (07-30-21 @ 06:14)  eGFR if African American: 60 mL/min/1.73M2 (07-30-21 @ 06:14)    POCT Blood Glucose.: 314 mg/dL (31 Jul 2021 07:38)  POCT Blood Glucose.: 334 mg/dL (31 Jul 2021 07:35)  POCT Blood Glucose.: 222 mg/dL (30 Jul 2021 21:29)  POCT Blood Glucose.: 145 mg/dL (30 Jul 2021 16:41)  POCT Blood Glucose.: 213 mg/dL (30 Jul 2021 12:05)    COVID-19 PCR: NotDetec (07-29-21 @ 17:25)  COVID-19 PCR: NotDetec (07-29-21 @ 11:23)  COVID-19 PCR: NotDetec (07-09-21 @ 22:51)  COVID-19 PCR: NotDetec (07-29-21 @ 17:25)  COVID-19 PCR: NotDetec (07-29-21 @ 11:23)  COVID-19 PCR: NotDetec (07-09-21 @ 22:51)

## 2021-07-31 NOTE — PROGRESS NOTE ADULT - ASSESSMENT
Patient is a 76 year old male with PMHx of Diabetes Mellitus type 2, initially presented as a Level 2 Trauma at [facility] on 7/9 after found [place] at a bus stop with altered mental status. Patient appears intoxicated at the time with  on arrival. CT scan on admission demonstrated bifrontal SAH, a R SDH, a L parietal SAH with pneumocephalus, a L possible parietal non-displaced skull fracture, and a C6 inferior endplate fracture into disc space with air into the canal.     # bifrontal SAH & R SDH.   - continue comprehensive rehab program, PT/OT/SLP 3 hours a day, 5 days a week  - neuropsychology evaluation  - Precautions: DM, spinal, AMS, fall, Seizure     #HTN   - Continue Enalapril   - Monitor BP. Keep BP<130/80  - controlled 7/31    # Diabetes   - HBA1c -  7.3  - ISS     #Depression   - Continue Escitalopram   - Neuropsychology support    # Delirium & seizure   - Continue on Valproate for both seizure prophylaxis and agitation.  - neuropsychology evaluation, SLP cognitive evaluation  - VPA level 8/2    #ETOH Abuse   - Continue Multivitamin   - Continue Folic Acid   - Continue Thiamine   - psychology and SW follow up    #HLD   - Continue Atorvastatin.     #Pain  - Tylenol PRN    #GI  - Dulcolax PRN, Senna  - Protonix  - Milk of Magnesia PRN.     #Diet  - Regular - Carb controlled - DASH diet.     # DVT Prophylaxis   - Continue Heparin 5000 units q12.

## 2021-08-01 LAB
GLUCOSE BLDC GLUCOMTR-MCNC: 159 MG/DL — HIGH (ref 70–99)
GLUCOSE BLDC GLUCOMTR-MCNC: 215 MG/DL — HIGH (ref 70–99)
GLUCOSE BLDC GLUCOMTR-MCNC: 243 MG/DL — HIGH (ref 70–99)
GLUCOSE BLDC GLUCOMTR-MCNC: 271 MG/DL — HIGH (ref 70–99)

## 2021-08-01 PROCEDURE — 99232 SBSQ HOSP IP/OBS MODERATE 35: CPT

## 2021-08-01 RX ORDER — METFORMIN HYDROCHLORIDE 850 MG/1
500 TABLET ORAL
Refills: 0 | Status: DISCONTINUED | OUTPATIENT
Start: 2021-08-01 | End: 2021-08-02

## 2021-08-01 RX ADMIN — Medication 1 TABLET(S): at 11:41

## 2021-08-01 RX ADMIN — HEPARIN SODIUM 5000 UNIT(S): 5000 INJECTION INTRAVENOUS; SUBCUTANEOUS at 17:11

## 2021-08-01 RX ADMIN — Medication 500 MILLIGRAM(S): at 17:12

## 2021-08-01 RX ADMIN — ESCITALOPRAM OXALATE 5 MILLIGRAM(S): 10 TABLET, FILM COATED ORAL at 11:35

## 2021-08-01 RX ADMIN — Medication 2: at 11:37

## 2021-08-01 RX ADMIN — Medication 500 MILLIGRAM(S): at 05:57

## 2021-08-01 RX ADMIN — ATORVASTATIN CALCIUM 40 MILLIGRAM(S): 80 TABLET, FILM COATED ORAL at 21:55

## 2021-08-01 RX ADMIN — Medication 1 MILLIGRAM(S): at 11:35

## 2021-08-01 RX ADMIN — HEPARIN SODIUM 5000 UNIT(S): 5000 INJECTION INTRAVENOUS; SUBCUTANEOUS at 05:57

## 2021-08-01 RX ADMIN — Medication 3: at 07:51

## 2021-08-01 RX ADMIN — Medication 3 MILLIGRAM(S): at 21:56

## 2021-08-01 RX ADMIN — Medication 100 MILLIGRAM(S): at 11:35

## 2021-08-01 RX ADMIN — Medication 1 DROP(S): at 21:56

## 2021-08-01 RX ADMIN — Medication 1: at 17:11

## 2021-08-01 RX ADMIN — METFORMIN HYDROCHLORIDE 500 MILLIGRAM(S): 850 TABLET ORAL at 17:11

## 2021-08-01 NOTE — PROGRESS NOTE ADULT - ASSESSMENT
Patient is a 76 year old male with PMHx of Diabetes Mellitus type 2, initially presented as a Level 2 Trauma at [facility] on 7/9 after found down at a bus stop with altered mental status. Patient appears intoxicated at the time with  on arrival. CT scan on admission demonstrated bifrontal SAH, a R SDH, a L parietal SAH with pneumocephalus, a L possible parietal non-displaced skull fracture, and a C6 inferior endplate fracture into disc space with air into the canal.     # bifrontal SAH & R SDH.   - continue comprehensive rehab program, PT/OT/SLP 3 hours a day, 5 days a week  - neuropsychology evaluation  - Precautions: DM, spinal, AMS, fall, Seizure     #HTN   - Continue Enalapril   - Monitor BP. Keep BP<130/80  - (83/53 - 115/73) BP on soft side 8/1. Will f/u hospitalist possible reduce enalapril    # Diabetes   - HBA1c -  7.3  - ISS     #Depression   - Continue Escitalopram   - Neuropsychology support    # Delirium & seizure   - Continue on Valproate for both seizure prophylaxis and agitation.  - neuropsychology evaluation, SLP cognitive evaluation  - VPA level 8/2    #ETOH Abuse   - Continue Multivitamin, Folic Acid, Thiamine   - psychology and SW follow up    #HLD   - Continue Atorvastatin.     #Pain  - Tylenol PRN    #GI  - Dulcolax PRN, Senna  - Protonix  - Milk of Magnesia PRN.     #Diet  - Regular - Carb controlled - DASH diet.     # DVT Prophylaxis   - Continue Heparin 5000 units q12.

## 2021-08-01 NOTE — PROGRESS NOTE ADULT - ASSESSMENT
Patient is a 76 year old male with PMHx of Diabetes Mellitus type 2, initially presented as a Level 2 Trauma at [facility] on 7/9 after found down at a bus stop with altered mental status. Patient appears intoxicated at the time with  on arrival. CT scan on admission demonstrated bifrontal SAH, a R SDH, a L parietal SAH with pneumocephalus, a L possible parietal non-displaced skull fracture, and a C6 inferior endplate fracture into disc space with air into the canal.     # bifrontal SAH & R SDH.   - Start comprehensive rehab program, PT/OT/SLP 3 hours a day, 5 days a week  - Precautions: DM, spinal, AMS, fall, Seizure     #HTN  - SBP on lower side  -Will decrease enalapril to 5mg daily today  - Monitor BP. Keep BP<130/80    # Diabetes type II with hyperglycemia  -Will increase metformin to 500mg BID from daily  -Adjust metformin as tolerates for better BS control  - HBA1c -  7.3  - ISS, qac and hs    #Depression   - Continue Escitalopram 5mg daily  - Neuropsychology evaluation    # Delirium & seizure   - Continue on Valproate 500mg q12hrs for both seizure prophylaxis and agitation.  - neuropsychology evaluation, SLP cognitive evaluation    #ETOH Abuse   - Continue Multivitamin   - Continue Folic Acid   - Continue Thiamine     #HLD   - Continue Atorvastatin 40mg qhs    #Pain  - Tylenol PRN    #GI  - Dulcolax PRN, Senna  - Protonix  - Milk of Magnesia PRN.     #  - bladder scan on admission and straight cath as necessary    #Diet  - Regular - Carb controlled - DASH diet.     # DVT Prophylaxis   - Continue Heparin 5000 units q12.  C/w close supervision    Discussed with Rehab Team

## 2021-08-01 NOTE — PROGRESS NOTE ADULT - SUBJECTIVE AND OBJECTIVE BOX
Patient is a 76y old  Male who presents with a chief complaint of Non- Traumatic Right frontal and temporal subdural hematoma (01 Aug 2021 09:31)    Had a BM this AM  Denies chest pain, SOB  Wants to go home    Patient seen and examined at bedside.    ALLERGIES:  Allergy Status Unknown    MEDICATIONS  (STANDING):  atorvastatin 40 milliGRAM(s) Oral at bedtime  dextrose 40% Gel 15 Gram(s) Oral once  dextrose 5%. 1000 milliLiter(s) (50 mL/Hr) IV Continuous <Continuous>  dextrose 5%. 1000 milliLiter(s) (100 mL/Hr) IV Continuous <Continuous>  dextrose 50% Injectable 25 Gram(s) IV Push once  dextrose 50% Injectable 12.5 Gram(s) IV Push once  dextrose 50% Injectable 25 Gram(s) IV Push once  escitalopram 5 milliGRAM(s) Oral daily  folic acid 1 milliGRAM(s) Oral daily  glucagon  Injectable 1 milliGRAM(s) IntraMuscular once  heparin   Injectable 5000 Unit(s) SubCutaneous every 12 hours  insulin lispro (ADMELOG) corrective regimen sliding scale   SubCutaneous three times a day before meals  insulin lispro (ADMELOG) corrective regimen sliding scale   SubCutaneous at bedtime  melatonin 3 milliGRAM(s) Oral at bedtime  metFORMIN 500 milliGRAM(s) Oral two times a day  multivitamin/minerals 1 Tablet(s) Oral daily  polyethylene glycol 3350 17 Gram(s) Oral daily  senna 2 Tablet(s) Oral at bedtime  thiamine 100 milliGRAM(s) Oral daily  timolol 0.25% Solution 1 Drop(s) Both EYES at bedtime  valproic  acid Syrup 500 milliGRAM(s) Oral every 12 hours    MEDICATIONS  (PRN):  acetaminophen   Tablet .. 650 milliGRAM(s) Oral every 6 hours PRN Temp greater or equal to 38C (100.4F)  magnesium hydroxide Suspension 30 milliLiter(s) Oral daily PRN Constipation    Vital Signs Last 24 Hrs  T(F): 97.3 (01 Aug 2021 08:50), Max: 98.4 (31 Jul 2021 21:09)  HR: 87 (01 Aug 2021 08:50) (87 - 101)  BP: 107/71 (01 Aug 2021 08:50) (83/53 - 115/73)  RR: 14 (01 Aug 2021 08:50) (14 - 16)  SpO2: 95% (01 Aug 2021 08:50) (95% - 100%)  I&O's Summary    31 Jul 2021 07:01  -  01 Aug 2021 07:00  --------------------------------------------------------  IN: 500 mL / OUT: 0 mL / NET: 500 mL      BMI (kg/m2): 19.3 (07-29-21 @ 17:00)  PHYSICAL EXAM:  GENERAL: NAD, AAox2, well-groomed, well-developed  HEAD:  Atraumatic, Normocephalic  EYES: EOMI, PERRLA, conjunctiva and sclera clear  ENMT: No tonsillar erythema, exudates, or enlargement; Moist mucous membranes, Good dentition, No lesions  NECK: Supple, No JVD, Normal thyroid  NERVOUS SYSTEM:  Alert & Oriented X3, Good concentration; Motor Strength 5/5 B/L upper and lower extremities; DTRs 2+ intact and symmetric  CHEST/LUNG: Clear to percussion bilaterally; No rales, rhonchi, wheezing, or rubs  HEART: Regular rate and rhythm; No murmurs, rubs, or gallops  ABDOMEN: Soft, Nontender, Nondistended; Bowel sounds present  EXTREMITIES:  2+ Peripheral Pulses, No clubbing, cyanosis, or edema  LYMPH: No lymphadenopathy noted  SKIN: No rashes or lesions    LABS:                        9.9    5.20  )-----------( 548      ( 30 Jul 2021 06:14 )             29.8       07-30    137  |  101  |  17  ----------------------------<  211  4.2   |  29  |  1.35    Ca    9.4      30 Jul 2021 06:14    TPro  7.2  /  Alb  2.4  /  TBili  0.2  /  DBili  x   /  AST  12  /  ALT  17  /  AlkPhos  105  07-30     eGFR if Non African American: 51 mL/min/1.73M2 (07-30-21 @ 06:14)  eGFR if African American: 60 mL/min/1.73M2 (07-30-21 @ 06:14)    POCT Blood Glucose.: 271 mg/dL (01 Aug 2021 07:19)  POCT Blood Glucose.: 231 mg/dL (31 Jul 2021 21:07)  POCT Blood Glucose.: 188 mg/dL (31 Jul 2021 16:28)  POCT Blood Glucose.: 248 mg/dL (31 Jul 2021 11:54)    COVID-19 PCR: NotDetec (07-29-21 @ 17:25)  COVID-19 PCR: NotDetec (07-29-21 @ 11:23)  COVID-19 PCR: NotDetec (07-09-21 @ 22:51)  COVID-19 PCR: NotDetec (07-29-21 @ 17:25)  COVID-19 PCR: NotDetec (07-29-21 @ 11:23)  COVID-19 PCR: NotDetec (07-09-21 @ 22:51)

## 2021-08-01 NOTE — PROGRESS NOTE ADULT - SUBJECTIVE AND OBJECTIVE BOX
Patient is a 76y old  Male who presents with a chief complaint of Non- Traumatic Right frontal and temporal subdural hematoma (2021 10:43)      HPI:  Patient is a 76 year old male with PMHx of Diabetes Mellitus type 2, initially presented as a Level 2 Trauma at [facility] on  after found [place] bus stop with altered mental status. It was thought to be non-traumatic cause. Patient appears intoxicated at the time with  on arrival. CT scan on admission demonstrated bifrontal SAH, a R SDH, a L parietal SAH with pneumocephalus, a L possible parietal non-displaced skull fracture, and a C6 inferior endplate fracture into disc space with air into the canal.     Patient was admitted to NSICU. Neurosurgery was consulted who decided to treat conservatively with no progression seen on repeat imaging. During patient hospital course he seemed to have worsening AMS which was thought to be due to delirium and or dementia. Pt also had a UTI & has completed ABx course. Pt was also found to be hyponatremic possibly due to SIADH, which has now resolved.     Pt recommended for acute inpatient rehabilitation and was transferred to [facility] on 21.           (2021 14:07)      PAST MEDICAL & SURGICAL HISTORY:  DM (diabetes mellitus)        MEDICATIONS  (STANDING):  atorvastatin 40 milliGRAM(s) Oral at bedtime  dextrose 40% Gel 15 Gram(s) Oral once  dextrose 5%. 1000 milliLiter(s) (50 mL/Hr) IV Continuous <Continuous>  dextrose 5%. 1000 milliLiter(s) (100 mL/Hr) IV Continuous <Continuous>  dextrose 50% Injectable 25 Gram(s) IV Push once  dextrose 50% Injectable 12.5 Gram(s) IV Push once  dextrose 50% Injectable 25 Gram(s) IV Push once  enalapril 10 milliGRAM(s) Oral daily  escitalopram 5 milliGRAM(s) Oral daily  folic acid 1 milliGRAM(s) Oral daily  glucagon  Injectable 1 milliGRAM(s) IntraMuscular once  heparin   Injectable 5000 Unit(s) SubCutaneous every 12 hours  insulin lispro (ADMELOG) corrective regimen sliding scale   SubCutaneous three times a day before meals  insulin lispro (ADMELOG) corrective regimen sliding scale   SubCutaneous at bedtime  melatonin 3 milliGRAM(s) Oral at bedtime  metFORMIN 500 milliGRAM(s) Oral two times a day  multivitamin/minerals 1 Tablet(s) Oral daily  polyethylene glycol 3350 17 Gram(s) Oral daily  senna 2 Tablet(s) Oral at bedtime  thiamine 100 milliGRAM(s) Oral daily  timolol 0.25% Solution 1 Drop(s) Both EYES at bedtime  valproic  acid Syrup 500 milliGRAM(s) Oral every 12 hours    MEDICATIONS  (PRN):  acetaminophen   Tablet .. 650 milliGRAM(s) Oral every 6 hours PRN Temp greater or equal to 38C (100.4F)  magnesium hydroxide Suspension 30 milliLiter(s) Oral daily PRN Constipation      Allergies    Allergy Status Unknown    Intolerances          VITALS  76y  Vital Signs Last 24 Hrs  T(C): 36.3 (01 Aug 2021 08:50), Max: 36.9 (2021 21:09)  T(F): 97.3 (01 Aug 2021 08:50), Max: 98.4 (2021 21:09)  HR: 87 (01 Aug 2021 08:50) (87 - 101)  BP: 107/71 (01 Aug 2021 08:50) (83/53 - 115/73)  BP(mean): --  RR: 14 (01 Aug 2021 08:50) (14 - 16)  SpO2: 95% (01 Aug 2021 08:50) (95% - 100%)  Daily     Daily Weight in k (01 Aug 2021 01:00)        RECENT LABS:                      CAPILLARY BLOOD GLUCOSE      POCT Blood Glucose.: 271 mg/dL (01 Aug 2021 07:19)  POCT Blood Glucose.: 231 mg/dL (2021 21:07)  POCT Blood Glucose.: 188 mg/dL (2021 16:28)  POCT Blood Glucose.: 248 mg/dL (2021 11:54)                Review of Systems:   · Additional ROS	Patient pleasant but still poor insight and awareness. Knows he is in hospital, but when asking about day of week and told he angela get therapy tomorrow (Mon) he states "I can't, I have to work tomorrow"    Denies H/A, dizziness. Feels he can do anything *dance, cook)    Physical Exam:   · Constitutional	detailed exam  · Constitutional Comments	O x 2. Poor insight and awareness. Reduced simple attenton but improved from yesterday. No agitation, compliant with exam  · Respiratory	detailed exam  · Respiratory Details	respirations non-labored; clear to auscultation bilaterally  · Cardiovascular	detailed exam  · Cardiovascular Details	regular rate and rhythm  · Gastrointestinal	detailed exam  · GI Normal	soft; nontender  · Extremities	detailed exam  · Extremities Comments	calves soft, NT bilaterally  no erythema or warmth no pedal edema  no tremors           Patient is a 76y old  Male who presents with a chief complaint of Traumatic Right frontal and temporal subdural hematoma (2021 10:43)      HPI:  Patient is a 76 year old male with PMHx of Diabetes Mellitus type 2, initially presented as a Level 2 Trauma at [facility] on  after found down at a bus stop with altered mental status. It was thought to be non-traumatic cause. Patient appears intoxicated at the time with  on arrival. CT scan on admission demonstrated bifrontal SAH, a R SDH, a L parietal SAH with pneumocephalus, a L possible parietal non-displaced skull fracture, and a C6 inferior endplate fracture into disc space with air into the canal.     Patient was admitted to NSICU. Neurosurgery was consulted who decided to treat conservatively with no progression seen on repeat imaging. During patient hospital course he seemed to have worsening AMS which was thought to be due to delirium and or dementia. Pt also had a UTI & has completed ABx course. Pt was also found to be hyponatremic possibly due to SIADH, which has now resolved.     Pt recommended for acute inpatient rehabilitation and was transferred to [facility] on 21.           (2021 14:07)      PAST MEDICAL & SURGICAL HISTORY:  DM (diabetes mellitus)        MEDICATIONS  (STANDING):  atorvastatin 40 milliGRAM(s) Oral at bedtime  dextrose 40% Gel 15 Gram(s) Oral once  dextrose 5%. 1000 milliLiter(s) (50 mL/Hr) IV Continuous <Continuous>  dextrose 5%. 1000 milliLiter(s) (100 mL/Hr) IV Continuous <Continuous>  dextrose 50% Injectable 25 Gram(s) IV Push once  dextrose 50% Injectable 12.5 Gram(s) IV Push once  dextrose 50% Injectable 25 Gram(s) IV Push once  enalapril 10 milliGRAM(s) Oral daily  escitalopram 5 milliGRAM(s) Oral daily  folic acid 1 milliGRAM(s) Oral daily  glucagon  Injectable 1 milliGRAM(s) IntraMuscular once  heparin   Injectable 5000 Unit(s) SubCutaneous every 12 hours  insulin lispro (ADMELOG) corrective regimen sliding scale   SubCutaneous three times a day before meals  insulin lispro (ADMELOG) corrective regimen sliding scale   SubCutaneous at bedtime  melatonin 3 milliGRAM(s) Oral at bedtime  metFORMIN 500 milliGRAM(s) Oral two times a day  multivitamin/minerals 1 Tablet(s) Oral daily  polyethylene glycol 3350 17 Gram(s) Oral daily  senna 2 Tablet(s) Oral at bedtime  thiamine 100 milliGRAM(s) Oral daily  timolol 0.25% Solution 1 Drop(s) Both EYES at bedtime  valproic  acid Syrup 500 milliGRAM(s) Oral every 12 hours    MEDICATIONS  (PRN):  acetaminophen   Tablet .. 650 milliGRAM(s) Oral every 6 hours PRN Temp greater or equal to 38C (100.4F)  magnesium hydroxide Suspension 30 milliLiter(s) Oral daily PRN Constipation      Allergies    Allergy Status Unknown    Intolerances          VITALS  76y  Vital Signs Last 24 Hrs  T(C): 36.3 (01 Aug 2021 08:50), Max: 36.9 (2021 21:09)  T(F): 97.3 (01 Aug 2021 08:50), Max: 98.4 (2021 21:09)  HR: 87 (01 Aug 2021 08:50) (87 - 101)  BP: 107/71 (01 Aug 2021 08:50) (83/53 - 115/73)  BP(mean): --  RR: 14 (01 Aug 2021 08:50) (14 - 16)  SpO2: 95% (01 Aug 2021 08:50) (95% - 100%)  Daily     Daily Weight in k (01 Aug 2021 01:00)        RECENT LABS:                      CAPILLARY BLOOD GLUCOSE      POCT Blood Glucose.: 271 mg/dL (01 Aug 2021 07:19)  POCT Blood Glucose.: 231 mg/dL (2021 21:07)  POCT Blood Glucose.: 188 mg/dL (2021 16:28)  POCT Blood Glucose.: 248 mg/dL (2021 11:54)                Review of Systems:   · Additional ROS	Patient pleasant but still poor insight and awareness. Knows he is in hospital, but when asking about day of week and told he angela get therapy tomorrow (Mon) he states "I can't, I have to work tomorrow"    Denies H/A, dizziness. Feels he can do anything *dance, cook)    Physical Exam:   · Constitutional	detailed exam  · Constitutional Comments	O x 2. Poor insight and awareness. Reduced simple attenton but improved from yesterday. No agitation, compliant with exam  · Respiratory	detailed exam  · Respiratory Details	respirations non-labored; clear to auscultation bilaterally  · Cardiovascular	detailed exam  · Cardiovascular Details	regular rate and rhythm  · Gastrointestinal	detailed exam  · GI Normal	soft; nontender  · Extremities	detailed exam  · Extremities Comments	calves soft, NT bilaterally  no erythema or warmth no pedal edema  no tremors           Patient is a 76y old  Male who presents with a chief complaint of Traumatic Right frontal and temporal subdural hematoma (2021 10:43)      HPI:  Patient is a 76 year old male with PMHx of Diabetes Mellitus type 2, initially presented as a Level 2 Trauma at [facility] on  after found down at a bus stop with altered mental status. Patient appears intoxicated at the time with  on arrival. CT scan on admission demonstrated bifrontal SAH, a R SDH, a L parietal SAH with pneumocephalus, a L possible parietal non-displaced skull fracture, and a C6 inferior endplate fracture into disc space with air into the canal.     Patient was admitted to NSICU. Neurosurgery was consulted who decided to treat conservatively with no progression seen on repeat imaging. During patient hospital course he seemed to have worsening AMS which was thought to be due to delirium and or dementia. Pt also had a UTI & has completed ABx course. Pt was also found to be hyponatremic possibly due to SIADH, which has now resolved.     Pt recommended for acute inpatient rehabilitation and was transferred to [facility] on 21.           (2021 14:07)      PAST MEDICAL & SURGICAL HISTORY:  DM (diabetes mellitus)        MEDICATIONS  (STANDING):  atorvastatin 40 milliGRAM(s) Oral at bedtime  dextrose 40% Gel 15 Gram(s) Oral once  dextrose 5%. 1000 milliLiter(s) (50 mL/Hr) IV Continuous <Continuous>  dextrose 5%. 1000 milliLiter(s) (100 mL/Hr) IV Continuous <Continuous>  dextrose 50% Injectable 25 Gram(s) IV Push once  dextrose 50% Injectable 12.5 Gram(s) IV Push once  dextrose 50% Injectable 25 Gram(s) IV Push once  enalapril 10 milliGRAM(s) Oral daily  escitalopram 5 milliGRAM(s) Oral daily  folic acid 1 milliGRAM(s) Oral daily  glucagon  Injectable 1 milliGRAM(s) IntraMuscular once  heparin   Injectable 5000 Unit(s) SubCutaneous every 12 hours  insulin lispro (ADMELOG) corrective regimen sliding scale   SubCutaneous three times a day before meals  insulin lispro (ADMELOG) corrective regimen sliding scale   SubCutaneous at bedtime  melatonin 3 milliGRAM(s) Oral at bedtime  metFORMIN 500 milliGRAM(s) Oral two times a day  multivitamin/minerals 1 Tablet(s) Oral daily  polyethylene glycol 3350 17 Gram(s) Oral daily  senna 2 Tablet(s) Oral at bedtime  thiamine 100 milliGRAM(s) Oral daily  timolol 0.25% Solution 1 Drop(s) Both EYES at bedtime  valproic  acid Syrup 500 milliGRAM(s) Oral every 12 hours    MEDICATIONS  (PRN):  acetaminophen   Tablet .. 650 milliGRAM(s) Oral every 6 hours PRN Temp greater or equal to 38C (100.4F)  magnesium hydroxide Suspension 30 milliLiter(s) Oral daily PRN Constipation      Allergies    Allergy Status Unknown    Intolerances          VITALS  76y  Vital Signs Last 24 Hrs  T(C): 36.3 (01 Aug 2021 08:50), Max: 36.9 (2021 21:09)  T(F): 97.3 (01 Aug 2021 08:50), Max: 98.4 (2021 21:09)  HR: 87 (01 Aug 2021 08:50) (87 - 101)  BP: 107/71 (01 Aug 2021 08:50) (83/53 - 115/73)  BP(mean): --  RR: 14 (01 Aug 2021 08:50) (14 - 16)  SpO2: 95% (01 Aug 2021 08:50) (95% - 100%)  Daily     Daily Weight in k (01 Aug 2021 01:00)        RECENT LABS:                      CAPILLARY BLOOD GLUCOSE      POCT Blood Glucose.: 271 mg/dL (01 Aug 2021 07:19)  POCT Blood Glucose.: 231 mg/dL (2021 21:07)  POCT Blood Glucose.: 188 mg/dL (2021 16:28)  POCT Blood Glucose.: 248 mg/dL (2021 11:54)                Review of Systems:   · Additional ROS	Patient pleasant but still poor insight and awareness. Knows he is in hospital, but when asking about day of week and told he angela get therapy tomorrow (Mon) he states "I can't, I have to work tomorrow"    Denies H/A, dizziness. Feels he can do anything *dance, cook)    Physical Exam:   · Constitutional	detailed exam  · Constitutional Comments	O x 2. Poor insight and awareness. Reduced simple attenton but improved from yesterday. No agitation, compliant with exam  · Respiratory	detailed exam  · Respiratory Details	respirations non-labored; clear to auscultation bilaterally  · Cardiovascular	detailed exam  · Cardiovascular Details	regular rate and rhythm  · Gastrointestinal	detailed exam  · GI Normal	soft; nontender  · Extremities	detailed exam  · Extremities Comments	calves soft, NT bilaterally  no erythema or warmth no pedal edema  no tremors

## 2021-08-02 LAB
ALBUMIN SERPL ELPH-MCNC: 2.3 G/DL — LOW (ref 3.3–5)
ALP SERPL-CCNC: 93 U/L — SIGNIFICANT CHANGE UP (ref 40–120)
ALT FLD-CCNC: 24 U/L — SIGNIFICANT CHANGE UP (ref 10–45)
ANION GAP SERPL CALC-SCNC: 9 MMOL/L — SIGNIFICANT CHANGE UP (ref 5–17)
AST SERPL-CCNC: 18 U/L — SIGNIFICANT CHANGE UP (ref 10–40)
BILIRUB SERPL-MCNC: 0.2 MG/DL — SIGNIFICANT CHANGE UP (ref 0.2–1.2)
BUN SERPL-MCNC: 17 MG/DL — SIGNIFICANT CHANGE UP (ref 7–23)
CALCIUM SERPL-MCNC: 9.4 MG/DL — SIGNIFICANT CHANGE UP (ref 8.4–10.5)
CHLORIDE SERPL-SCNC: 101 MMOL/L — SIGNIFICANT CHANGE UP (ref 96–108)
CO2 SERPL-SCNC: 28 MMOL/L — SIGNIFICANT CHANGE UP (ref 22–31)
CREAT SERPL-MCNC: 1.29 MG/DL — SIGNIFICANT CHANGE UP (ref 0.5–1.3)
GLUCOSE BLDC GLUCOMTR-MCNC: 186 MG/DL — HIGH (ref 70–99)
GLUCOSE BLDC GLUCOMTR-MCNC: 217 MG/DL — HIGH (ref 70–99)
GLUCOSE BLDC GLUCOMTR-MCNC: 276 MG/DL — HIGH (ref 70–99)
GLUCOSE BLDC GLUCOMTR-MCNC: 315 MG/DL — HIGH (ref 70–99)
GLUCOSE BLDC GLUCOMTR-MCNC: 328 MG/DL — HIGH (ref 70–99)
GLUCOSE SERPL-MCNC: 241 MG/DL — HIGH (ref 70–99)
HCT VFR BLD CALC: 28.9 % — LOW (ref 39–50)
HGB BLD-MCNC: 9.5 G/DL — LOW (ref 13–17)
MCHC RBC-ENTMCNC: 32.1 PG — SIGNIFICANT CHANGE UP (ref 27–34)
MCHC RBC-ENTMCNC: 32.9 GM/DL — SIGNIFICANT CHANGE UP (ref 32–36)
MCV RBC AUTO: 97.6 FL — SIGNIFICANT CHANGE UP (ref 80–100)
NRBC # BLD: 0 /100 WBCS — SIGNIFICANT CHANGE UP (ref 0–0)
PLATELET # BLD AUTO: 426 K/UL — HIGH (ref 150–400)
POTASSIUM SERPL-MCNC: 4 MMOL/L — SIGNIFICANT CHANGE UP (ref 3.5–5.3)
POTASSIUM SERPL-SCNC: 4 MMOL/L — SIGNIFICANT CHANGE UP (ref 3.5–5.3)
PROT SERPL-MCNC: 6.7 G/DL — SIGNIFICANT CHANGE UP (ref 6–8.3)
RBC # BLD: 2.96 M/UL — LOW (ref 4.2–5.8)
RBC # FLD: 11.1 % — SIGNIFICANT CHANGE UP (ref 10.3–14.5)
SODIUM SERPL-SCNC: 138 MMOL/L — SIGNIFICANT CHANGE UP (ref 135–145)
VALPROATE SERPL-MCNC: 70 UG/ML — SIGNIFICANT CHANGE UP (ref 50–100)
WBC # BLD: 3.83 K/UL — SIGNIFICANT CHANGE UP (ref 3.8–10.5)
WBC # FLD AUTO: 3.83 K/UL — SIGNIFICANT CHANGE UP (ref 3.8–10.5)

## 2021-08-02 PROCEDURE — 99232 SBSQ HOSP IP/OBS MODERATE 35: CPT

## 2021-08-02 RX ORDER — ENOXAPARIN SODIUM 100 MG/ML
40 INJECTION SUBCUTANEOUS DAILY
Refills: 0 | Status: DISCONTINUED | OUTPATIENT
Start: 2021-08-02 | End: 2021-08-14

## 2021-08-02 RX ORDER — ESCITALOPRAM OXALATE 10 MG/1
10 TABLET, FILM COATED ORAL DAILY
Refills: 0 | Status: DISCONTINUED | OUTPATIENT
Start: 2021-08-02 | End: 2021-08-14

## 2021-08-02 RX ORDER — METFORMIN HYDROCHLORIDE 850 MG/1
850 TABLET ORAL
Refills: 0 | Status: DISCONTINUED | OUTPATIENT
Start: 2021-08-02 | End: 2021-08-14

## 2021-08-02 RX ORDER — SODIUM CHLORIDE 9 MG/ML
500 INJECTION INTRAMUSCULAR; INTRAVENOUS; SUBCUTANEOUS ONCE
Refills: 0 | Status: COMPLETED | OUTPATIENT
Start: 2021-08-02 | End: 2021-08-02

## 2021-08-02 RX ADMIN — Medication 3: at 17:01

## 2021-08-02 RX ADMIN — Medication 2: at 08:18

## 2021-08-02 RX ADMIN — ESCITALOPRAM OXALATE 10 MILLIGRAM(S): 10 TABLET, FILM COATED ORAL at 11:57

## 2021-08-02 RX ADMIN — ATORVASTATIN CALCIUM 40 MILLIGRAM(S): 80 TABLET, FILM COATED ORAL at 21:26

## 2021-08-02 RX ADMIN — Medication 1 MILLIGRAM(S): at 11:55

## 2021-08-02 RX ADMIN — HEPARIN SODIUM 5000 UNIT(S): 5000 INJECTION INTRAVENOUS; SUBCUTANEOUS at 05:19

## 2021-08-02 RX ADMIN — Medication 3 MILLIGRAM(S): at 21:26

## 2021-08-02 RX ADMIN — Medication 100 MILLIGRAM(S): at 11:55

## 2021-08-02 RX ADMIN — SENNA PLUS 2 TABLET(S): 8.6 TABLET ORAL at 21:26

## 2021-08-02 RX ADMIN — Medication 500 MILLIGRAM(S): at 05:19

## 2021-08-02 RX ADMIN — SODIUM CHLORIDE 500 MILLILITER(S): 9 INJECTION INTRAMUSCULAR; INTRAVENOUS; SUBCUTANEOUS at 10:46

## 2021-08-02 RX ADMIN — METFORMIN HYDROCHLORIDE 500 MILLIGRAM(S): 850 TABLET ORAL at 08:19

## 2021-08-02 RX ADMIN — Medication 1: at 11:56

## 2021-08-02 RX ADMIN — Medication 500 MILLIGRAM(S): at 17:01

## 2021-08-02 RX ADMIN — ENOXAPARIN SODIUM 40 MILLIGRAM(S): 100 INJECTION SUBCUTANEOUS at 17:00

## 2021-08-02 RX ADMIN — Medication 1 TABLET(S): at 11:56

## 2021-08-02 RX ADMIN — METFORMIN HYDROCHLORIDE 850 MILLIGRAM(S): 850 TABLET ORAL at 17:00

## 2021-08-02 RX ADMIN — Medication 2: at 21:27

## 2021-08-02 RX ADMIN — Medication 1 DROP(S): at 21:26

## 2021-08-02 NOTE — PROGRESS NOTE ADULT - ASSESSMENT
Patient is a 76 year old male with PMHx of Diabetes Mellitus type 2, initially presented as a Level 2 Trauma at [facility] on 7/9 after found down at a bus stop with altered mental status. Patient appears intoxicated at the time with  on arrival. CT scan on admission demonstrated bifrontal SAH, a R SDH, a L parietal SAH with pneumocephalus, a L possible parietal non-displaced skull fracture, and a C6 inferior endplate fracture into disc space with air into the canal.     # bifrontal SAH & R SDH.   - continue comprehensive rehab program, PT/OT/SLP 3 hours a day, 5 days a week  - neuropsychology evaluation--reviewed and appreciated  - Precautions: DM, spinal, AMS, fall, Seizure     #HTN --Orthostatic Hypotension--symptomatic  -- STAT IV 500cc bolus given.    -d/c Enalapril   - Monitor BP. Keep BP<130/80      # Diabetes   - HBA1c -  7.3  - ISS     #Depression   - increase Escitalopram to 10mg  - Neuropsychology support    # Delirium & seizure   - Continue on Valproate for  agitation.  - neuropsychology & SLP cognitive treatment.   - VPA level 8/2--pending    Sleep  --melatonin     #ETOH Abuse   - Continue Multivitamin, Folic Acid, Thiamine   - psychology and SW follow up    #HLD   - Continue Atorvastatin.     #Pain  - Tylenol PRN    #GI  - Dulcolax PRN, Senna  - Protonix  - Milk of Magnesia PRN.     #Diet  - Regular - Carb controlled - DASH diet.     # DVT Prophylaxis   - Continue Heparin 5000 units q12.     Patient is a 76 year old male with PMHx of Diabetes Mellitus type 2, initially presented as a Level 2 Trauma at [facility] on 7/9 after found down at a bus stop with altered mental status. Patient appears intoxicated at the time with  on arrival. CT scan on admission demonstrated bifrontal SAH, a R SDH, a L parietal SAH with pneumocephalus, a L possible parietal non-displaced skull fracture, and a C6 inferior endplate fracture into disc space with air into the canal.     # bifrontal SAH & R SDH.   - continue comprehensive rehab program, PT/OT/SLP 3 hours a day, 5 days a week  - neuropsychology evaluation--reviewed and appreciated  - Precautions: DM, spinal, AMS, fall, Seizure     #HTN --Orthostatic Hypotension--symptomatic  -- STAT IV 500cc bolus given.    -d/c Enalapril   - Monitor BP. Keep BP<130/80      # Diabetes   - HBA1c -  7.3  - ISS  --Metformin  --will contact hospitalist regarding increasing metformin and reducing insulin if possible     #Depression   - increase Escitalopram to 10mg  - Neuropsychology support    # Delirium & seizure   - Continue on Valproate for  agitation.  - neuropsychology & SLP cognitive treatment.   - VPA level 8/2--pending    Sleep  --melatonin     #ETOH Abuse   - Continue Multivitamin, Folic Acid, Thiamine   - psychology and SW follow up    #HLD   - Continue Atorvastatin.     #Pain  - Tylenol PRN    #GI  - Dulcolax PRN, Senna  - Protonix  - Milk of Magnesia PRN.     #Diet  - Regular - Carb controlled - DASH diet.     # DVT Prophylaxis   - Stop Heparin 5000 units q12.  --changed to lovenox to decrease # of injections and improve compliance

## 2021-08-02 NOTE — PROGRESS NOTE ADULT - SUBJECTIVE AND OBJECTIVE BOX
HPI:  Patient is a 76 year old male with PMHx of Diabetes Mellitus type 2, initially presented as a Level 2 Trauma at [facility] on 7/9 after found down at a bus stop with altered mental status. It was thought to be non-traumatic cause. Patient appears intoxicated at the time with  on arrival. CT scan on admission demonstrated bifrontal SAH, a R SDH, a L parietal SAH with pneumocephalus, a L possible parietal non-displaced skull fracture, and a C6 inferior endplate fracture into disc space with air into the canal.     Patient was admitted to NSICU. Neurosurgery was consulted who decided to treat conservatively with no progression seen on repeat imaging. During patient hospital course he seemed to have worsening AMS which was thought to be due to delirium and or dementia. Pt also had a UTI & has completed ABx course. Pt was also found to be hyponatremic possibly due to SIADH, which has now resolved.     Pt recommended for acute inpatient rehabilitation and was transferred to [facility] on 7/29/21.           (29 Jul 2021 14:07)      PAST MEDICAL & SURGICAL HISTORY:  DM (diabetes mellitus)        Subjective:  slept last night.  No agitation.  confused.  Pt. was orthostatic while in OT --sitting in WC-- BP dropped to 79/54,  Was 109 systolic beforehand.  Pt. had brief period of unresponsiveness-- resolved when brought back to bed.  Poor appetite-- eating 0-25%      VITALS  Vital Signs Last 24 Hrs  T(C): 36.4 (02 Aug 2021 08:14), Max: 36.9 (01 Aug 2021 20:42)  T(F): 97.6 (02 Aug 2021 08:14), Max: 98.4 (01 Aug 2021 20:42)  HR: 75 (02 Aug 2021 08:14) (75 - 94)  BP: 109/74 (02 Aug 2021 08:14) (109/74 - 114/77)  BP(mean): --  RR: 17 (02 Aug 2021 08:14) (15 - 17)  SpO2: 95% (02 Aug 2021 08:14) (95% - 99%)    REVIEW OF SYMPTOMS  Neurological deficit-- confused, memory loss,   Orthostatic hypotension.     Physical Exam:  Constitutional - NAD, Comfortable  HEENT -  EOMI  Neck - Supple,   Chest - breathing comfortably on RA  Cardio - warm and well perfused,   Abdomen -  Soft, NTND  Extremities - No peripheral edema,   Neurologic Exam:                    Cognitive -             Orientation: Awake, Alert, AAO x2-3.  knows he's in hospital, but not name.  Knew year but not month and date.              Attention:  impaired,  tangential speech.  difficulty following conversation.  unable to complete days of week backward            Memory: impaired-- 0/3     Speech - Fluent, Comprehensible, No dysarthria, No aphasia      Cranial Nerves -PERRLA, No facial asymmetry, Tongue midline, EOMI, Shoulder shrug intact     Motor -                      LEFT    UE - ShAB 5/5, EF 5/5, EE 5/5, WE 5/5,  WNL                    RIGHT UE - ShAB 5/5, EF 5/5, EE 5/5, WE 5/5,  WNL                    LEFT    LE - HF 5/5, KE 5/5, DF 5/5, PF 5/5                    RIGHT LE - HF 5/5, KE 5/5, DF 5/5, PF 5/5        Sensory - Intact to LT bilateral         Coordination - FTN / HTS intact     OculoVestibular -  No nystagmus    Psychiatric - anxious,  can be irritable at times.      RECENT LABS                        9.5    3.83  )-----------( 426      ( 02 Aug 2021 07:33 )             28.9     08-02    138  |  101  |  17  ----------------------------<  241<H>  4.0   |  28  |  1.29    Ca    9.4      02 Aug 2021 07:33    TPro  6.7  /  Alb  2.3<L>  /  TBili  0.2  /  DBili  x   /  AST  18  /  ALT  24  /  AlkPhos  93  08-02            RADIOLOGY/OTHER RESULTS      MEDICATIONS  (STANDING):  atorvastatin 40 milliGRAM(s) Oral at bedtime  dextrose 40% Gel 15 Gram(s) Oral once  dextrose 5%. 1000 milliLiter(s) (50 mL/Hr) IV Continuous <Continuous>  dextrose 5%. 1000 milliLiter(s) (100 mL/Hr) IV Continuous <Continuous>  dextrose 50% Injectable 25 Gram(s) IV Push once  dextrose 50% Injectable 12.5 Gram(s) IV Push once  dextrose 50% Injectable 25 Gram(s) IV Push once  enalapril 5 milliGRAM(s) Oral daily  escitalopram 5 milliGRAM(s) Oral daily  folic acid 1 milliGRAM(s) Oral daily  glucagon  Injectable 1 milliGRAM(s) IntraMuscular once  heparin   Injectable 5000 Unit(s) SubCutaneous every 12 hours  insulin lispro (ADMELOG) corrective regimen sliding scale   SubCutaneous three times a day before meals  insulin lispro (ADMELOG) corrective regimen sliding scale   SubCutaneous at bedtime  melatonin 3 milliGRAM(s) Oral at bedtime  metFORMIN 500 milliGRAM(s) Oral two times a day  multivitamin/minerals 1 Tablet(s) Oral daily  polyethylene glycol 3350 17 Gram(s) Oral daily  senna 2 Tablet(s) Oral at bedtime  thiamine 100 milliGRAM(s) Oral daily  timolol 0.25% Solution 1 Drop(s) Both EYES at bedtime  valproic  acid Syrup 500 milliGRAM(s) Oral every 12 hours    MEDICATIONS  (PRN):  acetaminophen   Tablet .. 650 milliGRAM(s) Oral every 6 hours PRN Temp greater or equal to 38C (100.4F)  magnesium hydroxide Suspension 30 milliLiter(s) Oral daily PRN Constipation         HPI:  Patient is a 76 year old male with PMHx of Diabetes Mellitus type 2, initially presented as a Level 2 Trauma at [facility] on 7/9 after found down at a bus stop with altered mental status. It was thought to be non-traumatic cause. Patient appears intoxicated at the time with  on arrival. CT scan on admission demonstrated bifrontal SAH, a R SDH, a L parietal SAH with pneumocephalus, a L possible parietal non-displaced skull fracture, and a C6 inferior endplate fracture into disc space with air into the canal.     Patient was admitted to NSICU. Neurosurgery was consulted who decided to treat conservatively with no progression seen on repeat imaging. During patient hospital course he seemed to have worsening AMS which was thought to be due to delirium and or dementia. Pt also had a UTI & has completed ABx course. Pt was also found to be hyponatremic possibly due to SIADH, which has now resolved.     Pt recommended for acute inpatient rehabilitation and was transferred to [facility] on 7/29/21.           (29 Jul 2021 14:07)      PAST MEDICAL & SURGICAL HISTORY:  DM (diabetes mellitus)        Subjective:  slept last night.  No agitation.  confused.  Pt. was orthostatic while in OT --sitting in WC-- BP dropped to 79/54,  Was 109 systolic beforehand.  Pt. had brief period of unresponsiveness-- resolved when brought back to bed.  Poor appetite-- eating 0-25%  Nursing reports pt. gets irritable about getting injections-- heparin and insulin      VITALS  Vital Signs Last 24 Hrs  T(C): 36.4 (02 Aug 2021 08:14), Max: 36.9 (01 Aug 2021 20:42)  T(F): 97.6 (02 Aug 2021 08:14), Max: 98.4 (01 Aug 2021 20:42)  HR: 75 (02 Aug 2021 08:14) (75 - 94)  BP: 109/74 (02 Aug 2021 08:14) (109/74 - 114/77)  BP(mean): --  RR: 17 (02 Aug 2021 08:14) (15 - 17)  SpO2: 95% (02 Aug 2021 08:14) (95% - 99%)    REVIEW OF SYMPTOMS  Neurological deficit-- confused, memory loss,   Orthostatic hypotension.     Physical Exam:  Constitutional - NAD, Comfortable  HEENT -  EOMI  Neck - Supple,   Chest - breathing comfortably on RA  Cardio - warm and well perfused,   Abdomen -  Soft, NTND  Extremities - No peripheral edema,   Neurologic Exam:                    Cognitive -             Orientation: Awake, Alert, AAO x2-3.  knows he's in hospital, but not name.  Knew year but not month and date.              Attention:  impaired,  tangential speech.  difficulty following conversation.  unable to complete days of week backward            Memory: impaired-- 0/3     Speech - Fluent, Comprehensible, No dysarthria, No aphasia      Cranial Nerves -PERRLA, No facial asymmetry, Tongue midline, EOMI, Shoulder shrug intact     Motor -                      LEFT    UE - ShAB 5/5, EF 5/5, EE 5/5, WE 5/5,  WNL                    RIGHT UE - ShAB 5/5, EF 5/5, EE 5/5, WE 5/5,  WNL                    LEFT    LE - HF 5/5, KE 5/5, DF 5/5, PF 5/5                    RIGHT LE - HF 5/5, KE 5/5, DF 5/5, PF 5/5        Sensory - Intact to LT bilateral         Coordination - FTN / HTS intact     OculoVestibular -  No nystagmus    Psychiatric - anxious,  can be irritable at times.      RECENT LABS                        9.5    3.83  )-----------( 426      ( 02 Aug 2021 07:33 )             28.9     08-02    138  |  101  |  17  ----------------------------<  241<H>  4.0   |  28  |  1.29    Ca    9.4      02 Aug 2021 07:33    TPro  6.7  /  Alb  2.3<L>  /  TBili  0.2  /  DBili  x   /  AST  18  /  ALT  24  /  AlkPhos  93  08-02            RADIOLOGY/OTHER RESULTS      MEDICATIONS  (STANDING):  atorvastatin 40 milliGRAM(s) Oral at bedtime  dextrose 40% Gel 15 Gram(s) Oral once  dextrose 5%. 1000 milliLiter(s) (50 mL/Hr) IV Continuous <Continuous>  dextrose 5%. 1000 milliLiter(s) (100 mL/Hr) IV Continuous <Continuous>  dextrose 50% Injectable 25 Gram(s) IV Push once  dextrose 50% Injectable 12.5 Gram(s) IV Push once  dextrose 50% Injectable 25 Gram(s) IV Push once  enalapril 5 milliGRAM(s) Oral daily  escitalopram 5 milliGRAM(s) Oral daily  folic acid 1 milliGRAM(s) Oral daily  glucagon  Injectable 1 milliGRAM(s) IntraMuscular once  heparin   Injectable 5000 Unit(s) SubCutaneous every 12 hours  insulin lispro (ADMELOG) corrective regimen sliding scale   SubCutaneous three times a day before meals  insulin lispro (ADMELOG) corrective regimen sliding scale   SubCutaneous at bedtime  melatonin 3 milliGRAM(s) Oral at bedtime  metFORMIN 500 milliGRAM(s) Oral two times a day  multivitamin/minerals 1 Tablet(s) Oral daily  polyethylene glycol 3350 17 Gram(s) Oral daily  senna 2 Tablet(s) Oral at bedtime  thiamine 100 milliGRAM(s) Oral daily  timolol 0.25% Solution 1 Drop(s) Both EYES at bedtime  valproic  acid Syrup 500 milliGRAM(s) Oral every 12 hours    MEDICATIONS  (PRN):  acetaminophen   Tablet .. 650 milliGRAM(s) Oral every 6 hours PRN Temp greater or equal to 38C (100.4F)  magnesium hydroxide Suspension 30 milliLiter(s) Oral daily PRN Constipation         HPI:  Patient is a 76 year old male with PMHx of Diabetes Mellitus type 2, initially presented as a Level 2 Trauma at [facility] on 7/9 after found down at a bus stop with altered mental status. It was thought to be Traumatic cause. Patient appears intoxicated at the time with  on arrival. CT scan on admission demonstrated bifrontal SAH, a R SDH, a L parietal SAH with pneumocephalus, a L possible parietal non-displaced skull fracture, and a C6 inferior endplate fracture into disc space with air into the canal.     Patient was admitted to NSICU. Neurosurgery was consulted who decided to treat conservatively with no progression seen on repeat imaging. During patient hospital course he seemed to have worsening AMS which was thought to be due to delirium and or dementia. Pt also had a UTI & has completed ABx course. Pt was also found to be hyponatremic possibly due to SIADH, which has now resolved.     Pt recommended for acute inpatient rehabilitation and was transferred to [facility] on 7/29/21.           (29 Jul 2021 14:07)      PAST MEDICAL & SURGICAL HISTORY:  DM (diabetes mellitus)        Subjective:  slept last night.  No agitation.  confused.  Pt. was orthostatic while in OT --sitting in WC-- BP dropped to 79/54,  Was 109 systolic beforehand.  Pt. had brief period of unresponsiveness-- resolved when brought back to bed.  Poor appetite-- eating 0-25%  Nursing reports pt. gets irritable about getting injections-- heparin and insulin      VITALS  Vital Signs Last 24 Hrs  T(C): 36.4 (02 Aug 2021 08:14), Max: 36.9 (01 Aug 2021 20:42)  T(F): 97.6 (02 Aug 2021 08:14), Max: 98.4 (01 Aug 2021 20:42)  HR: 75 (02 Aug 2021 08:14) (75 - 94)  BP: 109/74 (02 Aug 2021 08:14) (109/74 - 114/77)  BP(mean): --  RR: 17 (02 Aug 2021 08:14) (15 - 17)  SpO2: 95% (02 Aug 2021 08:14) (95% - 99%)    REVIEW OF SYMPTOMS  Neurological deficit-- confused, memory loss,   Orthostatic hypotension.     Physical Exam:  Constitutional - NAD, Comfortable  HEENT -  EOMI  Neck - Supple,   Chest - breathing comfortably on RA  Cardio - warm and well perfused,   Abdomen -  Soft, NTND  Extremities - No peripheral edema,   Neurologic Exam:                    Cognitive -             Orientation: Awake, Alert, AAO x2-3.  knows he's in hospital, but not name.  Knew year but not month and date.              Attention:  impaired,  tangential speech.  difficulty following conversation.  unable to complete days of week backward            Memory: impaired-- 0/3     Speech - Fluent, Comprehensible, No dysarthria, No aphasia      Cranial Nerves -PERRLA, No facial asymmetry, Tongue midline, EOMI, Shoulder shrug intact     Motor -                      LEFT    UE - ShAB 5/5, EF 5/5, EE 5/5, WE 5/5,  WNL                    RIGHT UE - ShAB 5/5, EF 5/5, EE 5/5, WE 5/5,  WNL                    LEFT    LE - HF 5/5, KE 5/5, DF 5/5, PF 5/5                    RIGHT LE - HF 5/5, KE 5/5, DF 5/5, PF 5/5        Sensory - Intact to LT bilateral         Coordination - FTN / HTS intact     OculoVestibular -  No nystagmus    Psychiatric - anxious,  can be irritable at times.      RECENT LABS                        9.5    3.83  )-----------( 426      ( 02 Aug 2021 07:33 )             28.9     08-02    138  |  101  |  17  ----------------------------<  241<H>  4.0   |  28  |  1.29    Ca    9.4      02 Aug 2021 07:33    TPro  6.7  /  Alb  2.3<L>  /  TBili  0.2  /  DBili  x   /  AST  18  /  ALT  24  /  AlkPhos  93  08-02            RADIOLOGY/OTHER RESULTS      MEDICATIONS  (STANDING):  atorvastatin 40 milliGRAM(s) Oral at bedtime  dextrose 40% Gel 15 Gram(s) Oral once  dextrose 5%. 1000 milliLiter(s) (50 mL/Hr) IV Continuous <Continuous>  dextrose 5%. 1000 milliLiter(s) (100 mL/Hr) IV Continuous <Continuous>  dextrose 50% Injectable 25 Gram(s) IV Push once  dextrose 50% Injectable 12.5 Gram(s) IV Push once  dextrose 50% Injectable 25 Gram(s) IV Push once  enalapril 5 milliGRAM(s) Oral daily  escitalopram 5 milliGRAM(s) Oral daily  folic acid 1 milliGRAM(s) Oral daily  glucagon  Injectable 1 milliGRAM(s) IntraMuscular once  heparin   Injectable 5000 Unit(s) SubCutaneous every 12 hours  insulin lispro (ADMELOG) corrective regimen sliding scale   SubCutaneous three times a day before meals  insulin lispro (ADMELOG) corrective regimen sliding scale   SubCutaneous at bedtime  melatonin 3 milliGRAM(s) Oral at bedtime  metFORMIN 500 milliGRAM(s) Oral two times a day  multivitamin/minerals 1 Tablet(s) Oral daily  polyethylene glycol 3350 17 Gram(s) Oral daily  senna 2 Tablet(s) Oral at bedtime  thiamine 100 milliGRAM(s) Oral daily  timolol 0.25% Solution 1 Drop(s) Both EYES at bedtime  valproic  acid Syrup 500 milliGRAM(s) Oral every 12 hours    MEDICATIONS  (PRN):  acetaminophen   Tablet .. 650 milliGRAM(s) Oral every 6 hours PRN Temp greater or equal to 38C (100.4F)  magnesium hydroxide Suspension 30 milliLiter(s) Oral daily PRN Constipation

## 2021-08-02 NOTE — PROVIDER CONTACT NOTE (OTHER) - SITUATION
Pt became unresponsive while sitting up in wheelchair for approximately 1 minute as per OT who was accompanying patient at the time. Pt assisted to bed and became responsive.

## 2021-08-02 NOTE — PROVIDER CONTACT NOTE (OTHER) - BACKGROUND
75 y/o male  with PMHx DM2. Admitted to rehab after admission to hospital, after found at bus stop  with AMS, intoxicated, CT head - bifrontal SAH, a R SDH a L parietal SAH.

## 2021-08-02 NOTE — PROVIDER CONTACT NOTE (OTHER) - ASSESSMENT
RN was called to bedside, on assessment pt was awake and answering questions. Pt reports not feeling well and asking to stay in bed. Enalapril held today for parameters. Pt refused to eat breakfast. Fluids/juice provided.

## 2021-08-03 LAB
GLUCOSE BLDC GLUCOMTR-MCNC: 132 MG/DL — HIGH (ref 70–99)
GLUCOSE BLDC GLUCOMTR-MCNC: 182 MG/DL — HIGH (ref 70–99)
GLUCOSE BLDC GLUCOMTR-MCNC: 214 MG/DL — HIGH (ref 70–99)
GLUCOSE BLDC GLUCOMTR-MCNC: 247 MG/DL — HIGH (ref 70–99)

## 2021-08-03 PROCEDURE — 99232 SBSQ HOSP IP/OBS MODERATE 35: CPT

## 2021-08-03 RX ADMIN — Medication 1 DROP(S): at 21:31

## 2021-08-03 RX ADMIN — ESCITALOPRAM OXALATE 10 MILLIGRAM(S): 10 TABLET, FILM COATED ORAL at 12:29

## 2021-08-03 RX ADMIN — Medication 500 MILLIGRAM(S): at 17:13

## 2021-08-03 RX ADMIN — Medication 2: at 12:30

## 2021-08-03 RX ADMIN — Medication 1 TABLET(S): at 12:30

## 2021-08-03 RX ADMIN — Medication 3 MILLIGRAM(S): at 21:31

## 2021-08-03 RX ADMIN — Medication 500 MILLIGRAM(S): at 05:29

## 2021-08-03 RX ADMIN — ATORVASTATIN CALCIUM 40 MILLIGRAM(S): 80 TABLET, FILM COATED ORAL at 21:31

## 2021-08-03 RX ADMIN — Medication 2: at 09:15

## 2021-08-03 RX ADMIN — Medication 1 MILLIGRAM(S): at 12:29

## 2021-08-03 RX ADMIN — METFORMIN HYDROCHLORIDE 850 MILLIGRAM(S): 850 TABLET ORAL at 05:29

## 2021-08-03 RX ADMIN — SENNA PLUS 2 TABLET(S): 8.6 TABLET ORAL at 21:31

## 2021-08-03 RX ADMIN — Medication 100 MILLIGRAM(S): at 12:30

## 2021-08-03 RX ADMIN — METFORMIN HYDROCHLORIDE 850 MILLIGRAM(S): 850 TABLET ORAL at 17:13

## 2021-08-03 RX ADMIN — ENOXAPARIN SODIUM 40 MILLIGRAM(S): 100 INJECTION SUBCUTANEOUS at 12:29

## 2021-08-03 NOTE — PROGRESS NOTE ADULT - ASSESSMENT
Patient is a 76 year old male with PMHx of Diabetes Mellitus type 2, initially presented as a Level 2 Trauma at [facility] on 7/9 after found down at a bus stop with altered mental status. Patient appears intoxicated at the time with  on arrival. CT scan on admission demonstrated bifrontal SAH, a R SDH, a L parietal SAH with pneumocephalus, a L possible parietal non-displaced skull fracture, and a C6 inferior endplate fracture into disc space with air into the canal.     # bifrontal SAH & R SDH.   - continue comprehensive rehab program, PT/OT/SLP 3 hours a day, 5 days a week  - neuropsychology evaluation--reviewed and appreciated  - Precautions: DM, spinal, AMS, fall, Seizure     #HTN --Orthostatic Hypotension--  --s/p 500cc bolus - 8/2.    - d/c Enalapril   - Monitor BP. Keep BP<130/80  - Today BP - 100/65.   - Encourage P.O intake.     # Diabetes   - HBA1c -  7.3  - ISS  --Metformin increased to 850 mg BID.      #Depression   - increase Escitalopram to 10mg  - Neuropsychology support    # Delirium & seizure   - Continue on Valproate for  agitation.  - neuropsychology & SLP cognitive treatment.   - VPA level - 70 - WNL    Sleep  --melatonin     #ETOH Abuse   - Continue Multivitamin, Folic Acid, Thiamine   - psychology and SW follow up    #HLD   - Continue Atorvastatin.     #Pain  - Tylenol PRN    #GI  - Dulcolax PRN, Senna  - Protonix  - Milk of Magnesia PRN.     #Diet  - Regular - Carb controlled - DASH diet.     # DVT Prophylaxis   - Stop Heparin 5000 units q12.  --changed to lovenox to decrease # of injections and improve compliance
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
## 2021-08-04 LAB
ANION GAP SERPL CALC-SCNC: 6 MMOL/L — SIGNIFICANT CHANGE UP (ref 5–17)
BUN SERPL-MCNC: 16 MG/DL — SIGNIFICANT CHANGE UP (ref 7–23)
CALCIUM SERPL-MCNC: 9.9 MG/DL — SIGNIFICANT CHANGE UP (ref 8.4–10.5)
CHLORIDE SERPL-SCNC: 102 MMOL/L — SIGNIFICANT CHANGE UP (ref 96–108)
CO2 SERPL-SCNC: 30 MMOL/L — SIGNIFICANT CHANGE UP (ref 22–31)
CREAT SERPL-MCNC: 1.41 MG/DL — HIGH (ref 0.5–1.3)
GLUCOSE BLDC GLUCOMTR-MCNC: 139 MG/DL — HIGH (ref 70–99)
GLUCOSE BLDC GLUCOMTR-MCNC: 178 MG/DL — HIGH (ref 70–99)
GLUCOSE BLDC GLUCOMTR-MCNC: 184 MG/DL — HIGH (ref 70–99)
GLUCOSE BLDC GLUCOMTR-MCNC: 231 MG/DL — HIGH (ref 70–99)
GLUCOSE SERPL-MCNC: 160 MG/DL — HIGH (ref 70–99)
HCT VFR BLD CALC: 31.7 % — LOW (ref 39–50)
HGB BLD-MCNC: 10.2 G/DL — LOW (ref 13–17)
MCHC RBC-ENTMCNC: 32.2 GM/DL — SIGNIFICANT CHANGE UP (ref 32–36)
MCHC RBC-ENTMCNC: 32.4 PG — SIGNIFICANT CHANGE UP (ref 27–34)
MCV RBC AUTO: 100.6 FL — HIGH (ref 80–100)
NRBC # BLD: 0 /100 WBCS — SIGNIFICANT CHANGE UP (ref 0–0)
PLATELET # BLD AUTO: 370 K/UL — SIGNIFICANT CHANGE UP (ref 150–400)
POTASSIUM SERPL-MCNC: 4.3 MMOL/L — SIGNIFICANT CHANGE UP (ref 3.5–5.3)
POTASSIUM SERPL-SCNC: 4.3 MMOL/L — SIGNIFICANT CHANGE UP (ref 3.5–5.3)
RBC # BLD: 3.15 M/UL — LOW (ref 4.2–5.8)
RBC # FLD: 11.3 % — SIGNIFICANT CHANGE UP (ref 10.3–14.5)
SODIUM SERPL-SCNC: 138 MMOL/L — SIGNIFICANT CHANGE UP (ref 135–145)
WBC # BLD: 4.94 K/UL — SIGNIFICANT CHANGE UP (ref 3.8–10.5)
WBC # FLD AUTO: 4.94 K/UL — SIGNIFICANT CHANGE UP (ref 3.8–10.5)

## 2021-08-04 PROCEDURE — 99232 SBSQ HOSP IP/OBS MODERATE 35: CPT

## 2021-08-04 PROCEDURE — 71045 X-RAY EXAM CHEST 1 VIEW: CPT | Mod: 26

## 2021-08-04 PROCEDURE — 96116 NUBHVL XM PHYS/QHP 1ST HR: CPT

## 2021-08-04 RX ORDER — DONEPEZIL HYDROCHLORIDE 10 MG/1
5 TABLET, FILM COATED ORAL DAILY
Refills: 0 | Status: DISCONTINUED | OUTPATIENT
Start: 2021-08-04 | End: 2021-08-11

## 2021-08-04 RX ORDER — SODIUM CHLORIDE 9 MG/ML
1000 INJECTION INTRAMUSCULAR; INTRAVENOUS; SUBCUTANEOUS
Refills: 0 | Status: DISCONTINUED | OUTPATIENT
Start: 2021-08-04 | End: 2021-08-06

## 2021-08-04 RX ADMIN — SODIUM CHLORIDE 75 MILLILITER(S): 9 INJECTION INTRAMUSCULAR; INTRAVENOUS; SUBCUTANEOUS at 13:53

## 2021-08-04 RX ADMIN — DONEPEZIL HYDROCHLORIDE 5 MILLIGRAM(S): 10 TABLET, FILM COATED ORAL at 13:41

## 2021-08-04 RX ADMIN — Medication 1 TABLET(S): at 13:51

## 2021-08-04 RX ADMIN — Medication 2: at 08:04

## 2021-08-04 RX ADMIN — Medication 500 MILLIGRAM(S): at 06:22

## 2021-08-04 RX ADMIN — Medication 100 MILLIGRAM(S): at 11:55

## 2021-08-04 RX ADMIN — ATORVASTATIN CALCIUM 40 MILLIGRAM(S): 80 TABLET, FILM COATED ORAL at 22:39

## 2021-08-04 RX ADMIN — POLYETHYLENE GLYCOL 3350 17 GRAM(S): 17 POWDER, FOR SOLUTION ORAL at 11:55

## 2021-08-04 RX ADMIN — Medication 1: at 11:51

## 2021-08-04 RX ADMIN — Medication 1 DROP(S): at 22:39

## 2021-08-04 RX ADMIN — METFORMIN HYDROCHLORIDE 850 MILLIGRAM(S): 850 TABLET ORAL at 17:02

## 2021-08-04 RX ADMIN — METFORMIN HYDROCHLORIDE 850 MILLIGRAM(S): 850 TABLET ORAL at 06:22

## 2021-08-04 RX ADMIN — ESCITALOPRAM OXALATE 10 MILLIGRAM(S): 10 TABLET, FILM COATED ORAL at 11:53

## 2021-08-04 RX ADMIN — Medication 500 MILLIGRAM(S): at 17:02

## 2021-08-04 RX ADMIN — Medication 3 MILLIGRAM(S): at 22:39

## 2021-08-04 RX ADMIN — ENOXAPARIN SODIUM 40 MILLIGRAM(S): 100 INJECTION SUBCUTANEOUS at 11:53

## 2021-08-04 RX ADMIN — Medication 1 MILLIGRAM(S): at 11:53
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
## 2021-08-04 NOTE — PROGRESS NOTE ADULT - SUBJECTIVE AND OBJECTIVE BOX
Patient is a 76y old  Male who presents with a chief complaint of Non- Traumatic Right frontal and temporal subdural hematoma (04 Aug 2021 11:00)    HPI:  Patient is a 76 year old male with PMHx of Diabetes Mellitus type 2, initially presented as a Level 2 Trauma at [facility] on  after found down at a bus stop with altered mental status. It was thought to be non-traumatic cause. Patient appears intoxicated at the time with  on arrival. CT scan on admission demonstrated bifrontal SAH, a R SDH, a L parietal SAH with pneumocephalus, a L possible parietal non-displaced skull fracture, and a C6 inferior endplate fracture into disc space with air into the canal.     Patient was admitted to NSICU. Neurosurgery was consulted who decided to treat conservatively with no progression seen on repeat imaging. During patient hospital course he seemed to have worsening AMS which was thought to be due to delirium and or dementia. Pt also had a UTI & has completed ABx course. Pt was also found to be hyponatremic possibly due to SIADH, which has now resolved.     Pt recommended for acute inpatient rehabilitation and was transferred to [facility] on 21.           (2021 14:07)    PAST MEDICAL & SURGICAL HISTORY:  DM (diabetes mellitus)      Allergies    Allergy Status Unknown    Intolerances      ----------------------------------------------------------------------    SUBJECTIVE: Patient seen this morning. No events overnight noted. Remains with impaired cognition, orientation, and motivation.      ROS:  Positive:  Denies: headache, lightheadedness, CP, SOB, abdominal pain, dysuria, nausea, constipation      ----------------------------------------------------------------------  PHYSICAL EXAM:    Vital Signs Last 24 Hrs  T(C): 36.5 (04 Aug 2021 08:15), Max: 36.8 (03 Aug 2021 20:30)  T(F): 97.7 (04 Aug 2021 08:15), Max: 98.3 (03 Aug 2021 20:30)  HR: 90 (04 Aug 2021 08:15) (90 - 93)  BP: 106/67 (04 Aug 2021 08:15) (106/67 - 109/71)  BP(mean): --  RR: 14 (04 Aug 2021 08:15) (14 - 16)  SpO2: 100% (04 Aug 2021 08:15) (98% - 100%)  Daily     Daily Weight in k.5 (03 Aug 2021 22:46)      Constitutional - NAD, Comfortable  HEENT -  EOMI  Neck - Supple,   Chest - breathing comfortably on RA  Cardio - warm and well perfused,   Abdomen -  Soft, NTND  Extremities - No peripheral edema,   Neurologic Exam:                    Cognitive -             Orientation: Awake, Alert, AAO x2-3.  knows he's in hospital, but not name.  Knew year but not month and date.              Attention:  impaired,  tangential speech.  difficulty following conversation.  unable to complete days of week backward            Memory: impaired-- 0/3     Speech - Fluent, Comprehensible, No dysarthria, No aphasia      Cranial Nerves -PERRLA, No facial asymmetry, Tongue midline, EOMI, Shoulder shrug intact     Motor -                      LEFT    UE - ShAB 5/5, EF 5/5, EE 5/5, WE 5/5,  WNL                    RIGHT UE - ShAB 5/5, EF 5/5, EE 5/5, WE 5/5,  WNL                    LEFT    LE - HF 5/5, KE 5/5, DF 5/5, PF 5/5                    RIGHT LE - HF 5/5, KE 5/5, DF 5/5, PF 5/5        Sensory - Intact to LT bilateral     Coordination - FTN / HTS intact     OculoVestibular -  No nystagmus    Psychiatric - anxious,  can be irritable at times.        ----------------------------------------------------------------------  RECENT LABS:             9.5    3.83  )-----------( 426      ( 02 Aug 2021 07:33 )             28.9     08-02    138  |  101  |  17  ----------------------------<  241<H>  4.0   |  28  |  1.29    Ca    9.4      02 Aug 2021 07:33    TPro  6.7  /  Alb  2.3<L>  /  TBili  0.2  /  DBili  x   /  AST  18  /  ALT  24  /  AlkPhos  93  08-02              CAPILLARY BLOOD GLUCOSE      POCT Blood Glucose.: 184 mg/dL (04 Aug 2021 11:46)  POCT Blood Glucose.: 231 mg/dL (04 Aug 2021 07:57)  POCT Blood Glucose.: 182 mg/dL (03 Aug 2021 20:41)  POCT Blood Glucose.: 132 mg/dL (03 Aug 2021 16:46)    ----------------------------------------------------------------------  RECENT IMAGING:    CT Head : Right frontal and temporal subdural hemorrhage with maximum thickness of 0.7 cm. Bilateral frontal lobe and temporal lobe subarachnoid hemorrhages. Small subarachnoid hemorrhage in the left posterior parietal/occipital lobe. No significant mass effect or midline shift. Basilar cisterns are preserved. Nondisplaced mildly depressed calvarial fracture along the left lambdoid suture with small foci of adjacent pneumocephalus and overlying small scalp hematoma.    CT Head 7/10: Interval worsening with marked enlargement of the right holohemispheric subdural hematoma as compared to the initial study of 2021. Also there is bilateral extracerebral blood in the subarachnoid spaces anteriorly, which are more prominent as well. A left temporal bone fracture is suggested with pneumocephalus in the left temporal bone region. High-resolution temporal bone imaging is therefore recommended as well.    CT Head : Bilateral subdural and subarachnoid hemorrhages and hemorrhagic contusions, overall similar to prior exam.    CT Head : Overall stable follow-up CT examination when compared with 2021 at 8:11 AM.    ----------------------------------------------------------------------  MEDICATIONS:  MEDICATIONS  (STANDING):  atorvastatin 40 milliGRAM(s) Oral at bedtime  dextrose 40% Gel 15 Gram(s) Oral once  dextrose 5%. 1000 milliLiter(s) (50 mL/Hr) IV Continuous <Continuous>  dextrose 5%. 1000 milliLiter(s) (100 mL/Hr) IV Continuous <Continuous>  dextrose 50% Injectable 25 Gram(s) IV Push once  dextrose 50% Injectable 12.5 Gram(s) IV Push once  dextrose 50% Injectable 25 Gram(s) IV Push once  donepezil 5 milliGRAM(s) Oral daily  enoxaparin Injectable 40 milliGRAM(s) SubCutaneous daily  escitalopram 10 milliGRAM(s) Oral daily  folic acid 1 milliGRAM(s) Oral daily  glucagon  Injectable 1 milliGRAM(s) IntraMuscular once  insulin lispro (ADMELOG) corrective regimen sliding scale   SubCutaneous three times a day before meals  insulin lispro (ADMELOG) corrective regimen sliding scale   SubCutaneous at bedtime  melatonin 3 milliGRAM(s) Oral at bedtime  metFORMIN 850 milliGRAM(s) Oral two times a day  multivitamin/minerals 1 Tablet(s) Oral daily  polyethylene glycol 3350 17 Gram(s) Oral daily  senna 2 Tablet(s) Oral at bedtime  sodium chloride 0.9%. 1000 milliLiter(s) (75 mL/Hr) IV Continuous <Continuous>  thiamine 100 milliGRAM(s) Oral daily  timolol 0.25% Solution 1 Drop(s) Both EYES at bedtime  valproic  acid Syrup 500 milliGRAM(s) Oral every 12 hours    MEDICATIONS  (PRN):  acetaminophen   Tablet .. 650 milliGRAM(s) Oral every 6 hours PRN Temp greater or equal to 38C (100.4F)  magnesium hydroxide Suspension 30 milliLiter(s) Oral daily PRN Constipation    ----------------------------------------------------------------------    Assessment and Plan:   · Assessment	  Patient is a 76 year old male with PMHx of Diabetes Mellitus type 2, initially presented as a Level 2 Trauma at [facility] on  after found down at a bus stop with altered mental status. Patient appears intoxicated at the time with  on arrival. CT scan on admission demonstrated bifrontal SAH, a R SDH, a L parietal SAH with pneumocephalus, a L possible parietal non-displaced skull fracture, and a C6 inferior endplate fracture into disc space with air into the canal.     # bifrontal SAH & R SDH.   - continue comprehensive rehab program, PT/OT/SLP 3 hours a day, 5 days a week  - neuropsychology evaluation--reviewed and appreciated  - Precautions: DM, spinal, AMS, fall, Seizure   - Donepezil 5 mg started     #HTN --Orthostatic Hypotension--  --s/p 500cc bolus - .    - Previously on enalapril which was discontinued  - Monitor BP. Keep BP<130/80  - Encourage P.O intake.   - Discussed with hospitalist. Workup ordered to assess for infection including CXR, UA, CBC, BMP. If negative, can then consider midodrine.  - Started NS @75cc/hr    # Diabetes   - HBA1c -  7.3  - ISS  --Metformin previously increased to 850 mg BID.      #Depression   - increase Escitalopram to 10mg  - Neuropsychology support    # Delirium & seizure   - Continue on Valproate for  agitation.  - neuropsychology & SLP cognitive treatment.   - VPA level - 70 - WNL    Sleep  --melatonin     #ETOH Abuse   - Continue Multivitamin, Folic Acid, Thiamine   - psychology and SW follow up    #HLD   - Continue Atorvastatin.     #Pain  - Tylenol PRN    #GI  - Dulcolax PRN, Senna  - Protonix  - Milk of Magnesia PRN.     #Diet  - Regular - Carb controlled - DASH diet.     # DVT Prophylaxis   -previously on heparin Q12H but now on lovenox to decrease # of injections and improve compliance        Nutritional Assessment:  · Nutritional Assessment	This patient has been assessed with a concern for Malnutrition and has been determined to have a diagnosis/diagnoses of Severe protein-calorie malnutrition.    This patient is being managed with:   Diet Consistent Carbohydrate w/Evening Snack-  Supplement Feeding Modality:  Oral  Glucerna Shake Cans or Servings Per Day:  1       Frequency:  Three Times a day  Entered: 2021 10:45AM             Patient is a 76y old  Male who presents with a chief complaint of Non- Traumatic Right frontal and temporal subdural hematoma (04 Aug 2021 11:00)    HPI:  Patient is a 76 year old male with PMHx of Diabetes Mellitus type 2, initially presented as a Level 2 Trauma at [facility] on  after found down at a bus stop with altered mental status. It was thought to be non-traumatic cause. Patient appears intoxicated at the time with  on arrival. CT scan on admission demonstrated bifrontal SAH, a R SDH, a L parietal SAH with pneumocephalus, a L possible parietal non-displaced skull fracture, and a C6 inferior endplate fracture into disc space with air into the canal.     Patient was admitted to NSICU. Neurosurgery was consulted who decided to treat conservatively with no progression seen on repeat imaging. During patient hospital course he seemed to have worsening AMS which was thought to be due to delirium and or dementia. Pt also had a UTI & has completed ABx course. Pt was also found to be hyponatremic possibly due to SIADH, which has now resolved.     Pt recommended for acute inpatient rehabilitation and was transferred to [facility] on 21.           (2021 14:07)    PAST MEDICAL & SURGICAL HISTORY:  DM (diabetes mellitus)      Allergies    Allergy Status Unknown    Intolerances      ----------------------------------------------------------------------    SUBJECTIVE: Patient seen this morning. No events overnight noted. Remains with impaired cognition, orientation, and motivation.      ROS:  Positive:  Denies: headache, lightheadedness, CP, SOB, abdominal pain, dysuria, nausea, constipation      ----------------------------------------------------------------------  PHYSICAL EXAM:    Vital Signs Last 24 Hrs  T(C): 36.5 (04 Aug 2021 08:15), Max: 36.8 (03 Aug 2021 20:30)  T(F): 97.7 (04 Aug 2021 08:15), Max: 98.3 (03 Aug 2021 20:30)  HR: 90 (04 Aug 2021 08:15) (90 - 93)  BP: 106/67 (04 Aug 2021 08:15) (106/67 - 109/71)  BP(mean): --  RR: 14 (04 Aug 2021 08:15) (14 - 16)  SpO2: 100% (04 Aug 2021 08:15) (98% - 100%)  Daily     Daily Weight in k.5 (03 Aug 2021 22:46)      Constitutional - NAD, Comfortable  HEENT -  EOMI  Neck - Supple,   Chest - breathing comfortably on RA  Cardio - warm and well perfused,   Abdomen -  Soft, NTND  Extremities - No peripheral edema,   Neurologic Exam:                    Cognitive -             Orientation: Awake, Alert, AAO x2-3.  knows he's in hospital, but not name.  Knew year but not month and date.              Attention:  impaired,  tangential speech.  difficulty following conversation.  unable to complete days of week backward            Memory: impaired-- 0/3     Speech - Fluent, Comprehensible, No dysarthria, No aphasia      Cranial Nerves -PERRLA, No facial asymmetry, Tongue midline, EOMI, Shoulder shrug intact     Motor -                      LEFT    UE - ShAB 5/5, EF 5/5, EE 5/5, WE 5/5,  WNL                    RIGHT UE - ShAB 5/5, EF 5/5, EE 5/5, WE 5/5,  WNL                    LEFT    LE - HF 5/5, KE 5/5, DF 5/5, PF 5/5                    RIGHT LE - HF 5/5, KE 5/5, DF 5/5, PF 5/5        Sensory - Intact to LT bilateral     Coordination - FTN / HTS intact     OculoVestibular -  No nystagmus    Psychiatric - anxious,  can be irritable at times.        ----------------------------------------------------------------------  RECENT LABS:             9.5    3.83  )-----------( 426      ( 02 Aug 2021 07:33 )             28.9     08-02    138  |  101  |  17  ----------------------------<  241<H>  4.0   |  28  |  1.29    Ca    9.4      02 Aug 2021 07:33    TPro  6.7  /  Alb  2.3<L>  /  TBili  0.2  /  DBili  x   /  AST  18  /  ALT  24  /  AlkPhos  93  08-02              CAPILLARY BLOOD GLUCOSE      POCT Blood Glucose.: 184 mg/dL (04 Aug 2021 11:46)  POCT Blood Glucose.: 231 mg/dL (04 Aug 2021 07:57)  POCT Blood Glucose.: 182 mg/dL (03 Aug 2021 20:41)  POCT Blood Glucose.: 132 mg/dL (03 Aug 2021 16:46)    ----------------------------------------------------------------------  RECENT IMAGING:    CT Head : Right frontal and temporal subdural hemorrhage with maximum thickness of 0.7 cm. Bilateral frontal lobe and temporal lobe subarachnoid hemorrhages. Small subarachnoid hemorrhage in the left posterior parietal/occipital lobe. No significant mass effect or midline shift. Basilar cisterns are preserved. Nondisplaced mildly depressed calvarial fracture along the left lambdoid suture with small foci of adjacent pneumocephalus and overlying small scalp hematoma.    CT Head 7/10: Interval worsening with marked enlargement of the right holohemispheric subdural hematoma as compared to the initial study of 2021. Also there is bilateral extracerebral blood in the subarachnoid spaces anteriorly, which are more prominent as well. A left temporal bone fracture is suggested with pneumocephalus in the left temporal bone region. High-resolution temporal bone imaging is therefore recommended as well.    CT Head : Bilateral subdural and subarachnoid hemorrhages and hemorrhagic contusions, overall similar to prior exam.    CT Head : Overall stable follow-up CT examination when compared with 2021 at 8:11 AM.    ----------------------------------------------------------------------  MEDICATIONS:  MEDICATIONS  (STANDING):  atorvastatin 40 milliGRAM(s) Oral at bedtime  dextrose 40% Gel 15 Gram(s) Oral once  dextrose 5%. 1000 milliLiter(s) (50 mL/Hr) IV Continuous <Continuous>  dextrose 5%. 1000 milliLiter(s) (100 mL/Hr) IV Continuous <Continuous>  dextrose 50% Injectable 25 Gram(s) IV Push once  dextrose 50% Injectable 12.5 Gram(s) IV Push once  dextrose 50% Injectable 25 Gram(s) IV Push once  donepezil 5 milliGRAM(s) Oral daily  enoxaparin Injectable 40 milliGRAM(s) SubCutaneous daily  escitalopram 10 milliGRAM(s) Oral daily  folic acid 1 milliGRAM(s) Oral daily  glucagon  Injectable 1 milliGRAM(s) IntraMuscular once  insulin lispro (ADMELOG) corrective regimen sliding scale   SubCutaneous three times a day before meals  insulin lispro (ADMELOG) corrective regimen sliding scale   SubCutaneous at bedtime  melatonin 3 milliGRAM(s) Oral at bedtime  metFORMIN 850 milliGRAM(s) Oral two times a day  multivitamin/minerals 1 Tablet(s) Oral daily  polyethylene glycol 3350 17 Gram(s) Oral daily  senna 2 Tablet(s) Oral at bedtime  sodium chloride 0.9%. 1000 milliLiter(s) (75 mL/Hr) IV Continuous <Continuous>  thiamine 100 milliGRAM(s) Oral daily  timolol 0.25% Solution 1 Drop(s) Both EYES at bedtime  valproic  acid Syrup 500 milliGRAM(s) Oral every 12 hours    MEDICATIONS  (PRN):  acetaminophen   Tablet .. 650 milliGRAM(s) Oral every 6 hours PRN Temp greater or equal to 38C (100.4F)  magnesium hydroxide Suspension 30 milliLiter(s) Oral daily PRN Constipation    ----------------------------------------------------------------------    Assessment and Plan:   · Assessment	  Patient is a 76 year old male with PMHx of Diabetes Mellitus type 2, initially presented as a Level 2 Trauma at [facility] on  after found down at a bus stop with altered mental status. Patient appears intoxicated at the time with  on arrival. CT scan on admission demonstrated bifrontal SAH, a R SDH, a L parietal SAH with pneumocephalus, a L possible parietal non-displaced skull fracture, and a C6 inferior endplate fracture into disc space with air into the canal.     # bifrontal SAH & R SDH.   - continue comprehensive rehab program, PT/OT/SLP 3 hours a day, 5 days a week  - neuropsychology evaluation--reviewed and appreciated  - Precautions: DM, spinal, AMS, fall, Seizure   - Donepezil 5 mg started     #HTN --Orthostatic Hypotension--  --s/p 500cc bolus - .    - Previously on enalapril which was discontinued  - Monitor BP. Keep BP<130/80  - Encourage P.O intake.   - Discussed with hospitalist. Workup ordered to assess for infection including CXR, UA, CBC, BMP. If negative, can then consider midodrine.  - Started NS @75cc/hr    # Diabetes   - HBA1c -  7.3  - ISS  --Metformin previously increased to 850 mg BID.      #Depression   - increase Escitalopram to 10mg  - Neuropsychology support    # Delirium & seizure   - Continue on Valproate for  agitation.  - neuropsychology & SLP cognitive treatment.   - VPA level - 70 - WNL    Sleep  --melatonin     #ETOH Abuse   - Continue Multivitamin, Folic Acid, Thiamine   - psychology and SW follow up    #HLD   - Continue Atorvastatin.     #Pain  - Tylenol PRN    #GI  - Dulcolax PRN, Senna  - Protonix  - Milk of Magnesia PRN.     #Diet  - Regular - Carb controlled - DASH diet.     # DVT Prophylaxis   -previously on heparin Q12H but now on lovenox to decrease # of injections and improve compliance      IDT 21  -SW: Lives with significant other in a private home with 1 JESSIE. Daughter is involved in his care.   -OT: supervision eating; min A for grooming; Mod A toileting; Min A lower body dressing. Agitated and inappropriate  -PT: Stairs: 8 steps Min A  -Speech: Regular consistency diet. Memory is Mod A. Needs frequent redirection and reorientation.    D/C plannin/14 home.       Nutritional Assessment:  · Nutritional Assessment	This patient has been assessed with a concern for Malnutrition and has been determined to have a diagnosis/diagnoses of Severe protein-calorie malnutrition.    This patient is being managed with:   Diet Consistent Carbohydrate w/Evening Snack-  Supplement Feeding Modality:  Oral  Glucerna Shake Cans or Servings Per Day:  1       Frequency:  Three Times a day  Entered: 2021 10:45AM             Patient is a 76y old  Male who presents with a chief complaint of Non- Traumatic Right frontal and temporal subdural hematoma (04 Aug 2021 11:00)    HPI:  Patient is a 76 year old male with PMHx of Diabetes Mellitus type 2, initially presented as a Level 2 Trauma at [facility] on  after found down at a bus stop with altered mental status. It was thought to be Traumatic cause. Patient appears intoxicated at the time with  on arrival. CT scan on admission demonstrated bifrontal SAH, a R SDH, a L parietal SAH with pneumocephalus, a L possible parietal non-displaced skull fracture, and a C6 inferior endplate fracture into disc space with air into the canal.     Patient was admitted to NSICU. Neurosurgery was consulted who decided to treat conservatively with no progression seen on repeat imaging. During patient hospital course he seemed to have worsening AMS which was thought to be due to delirium and or dementia. Pt also had a UTI & has completed ABx course. Pt was also found to be hyponatremic possibly due to SIADH, which has now resolved.     Pt recommended for acute inpatient rehabilitation and was transferred to [facility] on 21.           (2021 14:07)    PAST MEDICAL & SURGICAL HISTORY:  DM (diabetes mellitus)      Allergies    Allergy Status Unknown    Intolerances      ----------------------------------------------------------------------    SUBJECTIVE: Patient seen this morning. No events overnight noted. Remains with impaired cognition, orientation, and motivation.      ROS:  Positive:  Denies: headache, lightheadedness, CP, SOB, abdominal pain, dysuria, nausea, constipation      ----------------------------------------------------------------------  PHYSICAL EXAM:    Vital Signs Last 24 Hrs  T(C): 36.5 (04 Aug 2021 08:15), Max: 36.8 (03 Aug 2021 20:30)  T(F): 97.7 (04 Aug 2021 08:15), Max: 98.3 (03 Aug 2021 20:30)  HR: 90 (04 Aug 2021 08:15) (90 - 93)  BP: 106/67 (04 Aug 2021 08:15) (106/67 - 109/71)  BP(mean): --  RR: 14 (04 Aug 2021 08:15) (14 - 16)  SpO2: 100% (04 Aug 2021 08:15) (98% - 100%)  Daily     Daily Weight in k.5 (03 Aug 2021 22:46)      Constitutional - NAD, Comfortable  HEENT -  EOMI  Neck - Supple,   Chest - breathing comfortably on RA  Cardio - warm and well perfused,   Abdomen -  Soft, NTND  Extremities - No peripheral edema,   Neurologic Exam:                    Cognitive -             Orientation: Awake, Alert, AAO x2-3.  knows he's in hospital, but not name.  Knew year but not month and date.              Attention:  impaired,  tangential speech.  difficulty following conversation.  unable to complete days of week backward            Memory: impaired-- 0/3     Speech - Fluent, Comprehensible, No dysarthria, No aphasia      Cranial Nerves -PERRLA, No facial asymmetry, Tongue midline, EOMI, Shoulder shrug intact     Motor -                      LEFT    UE - ShAB 5/5, EF 5/5, EE 5/5, WE 5/5,  WNL                    RIGHT UE - ShAB 5/5, EF 5/5, EE 5/5, WE 5/5,  WNL                    LEFT    LE - HF 5/5, KE 5/5, DF 5/5, PF 5/5                    RIGHT LE - HF 5/5, KE 5/5, DF 5/5, PF 5/5        Sensory - Intact to LT bilateral     Coordination - FTN / HTS intact     OculoVestibular -  No nystagmus    Psychiatric - anxious,  can be irritable at times.        ----------------------------------------------------------------------  RECENT LABS:             9.5    3.83  )-----------( 426      ( 02 Aug 2021 07:33 )             28.9     08-02    138  |  101  |  17  ----------------------------<  241<H>  4.0   |  28  |  1.29    Ca    9.4      02 Aug 2021 07:33    TPro  6.7  /  Alb  2.3<L>  /  TBili  0.2  /  DBili  x   /  AST  18  /  ALT  24  /  AlkPhos  93  08-02              CAPILLARY BLOOD GLUCOSE      POCT Blood Glucose.: 184 mg/dL (04 Aug 2021 11:46)  POCT Blood Glucose.: 231 mg/dL (04 Aug 2021 07:57)  POCT Blood Glucose.: 182 mg/dL (03 Aug 2021 20:41)  POCT Blood Glucose.: 132 mg/dL (03 Aug 2021 16:46)    ----------------------------------------------------------------------  RECENT IMAGING:    CT Head : Right frontal and temporal subdural hemorrhage with maximum thickness of 0.7 cm. Bilateral frontal lobe and temporal lobe subarachnoid hemorrhages. Small subarachnoid hemorrhage in the left posterior parietal/occipital lobe. No significant mass effect or midline shift. Basilar cisterns are preserved. Nondisplaced mildly depressed calvarial fracture along the left lambdoid suture with small foci of adjacent pneumocephalus and overlying small scalp hematoma.    CT Head 7/10: Interval worsening with marked enlargement of the right holohemispheric subdural hematoma as compared to the initial study of 2021. Also there is bilateral extracerebral blood in the subarachnoid spaces anteriorly, which are more prominent as well. A left temporal bone fracture is suggested with pneumocephalus in the left temporal bone region. High-resolution temporal bone imaging is therefore recommended as well.    CT Head : Bilateral subdural and subarachnoid hemorrhages and hemorrhagic contusions, overall similar to prior exam.    CT Head : Overall stable follow-up CT examination when compared with 2021 at 8:11 AM.    ----------------------------------------------------------------------  MEDICATIONS:  MEDICATIONS  (STANDING):  atorvastatin 40 milliGRAM(s) Oral at bedtime  dextrose 40% Gel 15 Gram(s) Oral once  dextrose 5%. 1000 milliLiter(s) (50 mL/Hr) IV Continuous <Continuous>  dextrose 5%. 1000 milliLiter(s) (100 mL/Hr) IV Continuous <Continuous>  dextrose 50% Injectable 25 Gram(s) IV Push once  dextrose 50% Injectable 12.5 Gram(s) IV Push once  dextrose 50% Injectable 25 Gram(s) IV Push once  donepezil 5 milliGRAM(s) Oral daily  enoxaparin Injectable 40 milliGRAM(s) SubCutaneous daily  escitalopram 10 milliGRAM(s) Oral daily  folic acid 1 milliGRAM(s) Oral daily  glucagon  Injectable 1 milliGRAM(s) IntraMuscular once  insulin lispro (ADMELOG) corrective regimen sliding scale   SubCutaneous three times a day before meals  insulin lispro (ADMELOG) corrective regimen sliding scale   SubCutaneous at bedtime  melatonin 3 milliGRAM(s) Oral at bedtime  metFORMIN 850 milliGRAM(s) Oral two times a day  multivitamin/minerals 1 Tablet(s) Oral daily  polyethylene glycol 3350 17 Gram(s) Oral daily  senna 2 Tablet(s) Oral at bedtime  sodium chloride 0.9%. 1000 milliLiter(s) (75 mL/Hr) IV Continuous <Continuous>  thiamine 100 milliGRAM(s) Oral daily  timolol 0.25% Solution 1 Drop(s) Both EYES at bedtime  valproic  acid Syrup 500 milliGRAM(s) Oral every 12 hours    MEDICATIONS  (PRN):  acetaminophen   Tablet .. 650 milliGRAM(s) Oral every 6 hours PRN Temp greater or equal to 38C (100.4F)  magnesium hydroxide Suspension 30 milliLiter(s) Oral daily PRN Constipation    ----------------------------------------------------------------------    Assessment and Plan:   · Assessment	  Patient is a 76 year old male with PMHx of Diabetes Mellitus type 2, initially presented as a Level 2 Trauma at [facility] on  after found down at a bus stop with altered mental status. Patient appears intoxicated at the time with  on arrival. CT scan on admission demonstrated bifrontal SAH, a R SDH, a L parietal SAH with pneumocephalus, a L possible parietal non-displaced skull fracture, and a C6 inferior endplate fracture into disc space with air into the canal.     # bifrontal SAH & R SDH.   - continue comprehensive rehab program, PT/OT/SLP 3 hours a day, 5 days a week  - neuropsychology evaluation--reviewed and appreciated  - Precautions: DM, spinal, AMS, fall, Seizure   - Donepezil 5 mg started     #HTN --Orthostatic Hypotension--  --s/p 500cc bolus - .    - Previously on enalapril which was discontinued  - Monitor BP. Keep BP<130/80  - Encourage P.O intake.   - Discussed with hospitalist. Workup ordered to assess for infection including CXR, UA, CBC, BMP. If negative, can then consider midodrine.  - Started NS @75cc/hr    # Diabetes   - HBA1c -  7.3  - ISS  --Metformin previously increased to 850 mg BID.      #Depression   - increase Escitalopram to 10mg  - Neuropsychology support    # Delirium & seizure   - Continue on Valproate for  agitation.  - neuropsychology & SLP cognitive treatment.   - VPA level - 70 - WNL    Sleep  --melatonin     #ETOH Abuse   - Continue Multivitamin, Folic Acid, Thiamine   - psychology and SW follow up    #HLD   - Continue Atorvastatin.     #Pain  - Tylenol PRN    #GI  - Dulcolax PRN, Senna  - Protonix  - Milk of Magnesia PRN.     #Diet  - Regular - Carb controlled - DASH diet.     # DVT Prophylaxis   -previously on heparin Q12H but now on lovenox to decrease # of injections and improve compliance      IDT 21  -SW: Lives with significant other in a private home with 1 JESSIE. Daughter is involved in his care.   -OT: supervision eating; min A for grooming; Mod A toileting; Min A lower body dressing. Agitated and inappropriate  -PT: Stairs: 8 steps Min A  -Speech: Regular consistency diet. Memory is Mod A. Needs frequent redirection and reorientation.    D/C plannin/14 home.       Nutritional Assessment:  · Nutritional Assessment	This patient has been assessed with a concern for Malnutrition and has been determined to have a diagnosis/diagnoses of Severe protein-calorie malnutrition.    This patient is being managed with:   Diet Consistent Carbohydrate w/Evening Snack-  Supplement Feeding Modality:  Oral  Glucerna Shake Cans or Servings Per Day:  1       Frequency:  Three Times a day  Entered: 2021 10:45AM             Patient is a 76y old  Male who presents with a chief complaint of Non- Traumatic Right frontal and temporal subdural hematoma (04 Aug 2021 11:00)    HPI:  Patient is a 76 year old male with PMHx of Diabetes Mellitus type 2, initially presented as a Level 2 Trauma at [facility] on  after found down at a bus stop with altered mental status. It was thought to be Traumatic cause. Patient appears intoxicated at the time with  on arrival. CT scan on admission demonstrated bifrontal SAH, a R SDH, a L parietal SAH with pneumocephalus, a L possible parietal non-displaced skull fracture, and a C6 inferior endplate fracture into disc space with air into the canal.     Patient was admitted to NSICU. Neurosurgery was consulted who decided to treat conservatively with no progression seen on repeat imaging. During patient hospital course he seemed to have worsening AMS which was thought to be due to delirium and or dementia. Pt also had a UTI & has completed ABx course. Pt was also found to be hyponatremic possibly due to SIADH, which has now resolved.     Pt recommended for acute inpatient rehabilitation and was transferred to [facility] on 21.           (2021 14:07)    PAST MEDICAL & SURGICAL HISTORY:  DM (diabetes mellitus)      Allergies    Allergy Status Unknown    Intolerances      ----------------------------------------------------------------------    SUBJECTIVE: Patient seen this morning. No events overnight noted. Remains with impaired cognition, orientation, and motivation.  Therapists note BP low-- 90s when sitting-- pt. resistant to standing -- likely orthostatic hypotension.  Cognitive impairment limits subjective complaints.       ROS:  Positive:  Denies: headache, lightheadedness, CP, SOB, abdominal pain, dysuria, nausea, constipation      ----------------------------------------------------------------------  PHYSICAL EXAM:    Vital Signs Last 24 Hrs  T(C): 36.5 (04 Aug 2021 08:15), Max: 36.8 (03 Aug 2021 20:30)  T(F): 97.7 (04 Aug 2021 08:15), Max: 98.3 (03 Aug 2021 20:30)  HR: 90 (04 Aug 2021 08:15) (90 - 93)  BP: 106/67 (04 Aug 2021 08:15) (106/67 - 109/71)  BP(mean): --  RR: 14 (04 Aug 2021 08:15) (14 - 16)  SpO2: 100% (04 Aug 2021 08:15) (98% - 100%)  Daily     Daily Weight in k.5 (03 Aug 2021 22:46)      Constitutional - NAD, Comfortable  HEENT -  EOMI  Neck - Supple,   Chest - breathing comfortably on RA  Cardio - warm and well perfused,   Abdomen -  Soft, NTND  Extremities - No peripheral edema,   Neurologic Exam:                    Cognitive -             Orientation: Awake, Alert, AAO x2-3.  knows he's in hospital, but not name.  Knew year but not month and date.              Attention:  impaired,  tangential speech.  difficulty following conversation.  unable to complete days of week backward            Memory: impaired-- 0/3     Speech - Fluent, Comprehensible, No dysarthria, No aphasia      Cranial Nerves -PERRLA, No facial asymmetry, Tongue midline, EOMI, Shoulder shrug intact     Motor -                      LEFT    UE - ShAB 5/5, EF 5/5, EE 5/5, WE 5/5,  WNL                    RIGHT UE - ShAB 5/5, EF 5/5, EE 5/5, WE 5/5,  WNL                    LEFT    LE - HF 5/5, KE 5/5, DF 5/5, PF 5/5                    RIGHT LE - HF 5/5, KE 5/5, DF 5/5, PF 5/5        Sensory - Intact to LT bilateral     Coordination - FTN / HTS intact     OculoVestibular -  No nystagmus    Psychiatric - anxious,  can be irritable at times.        ----------------------------------------------------------------------  RECENT LABS:             9.5    3.83  )-----------( 426      ( 02 Aug 2021 07:33 )             28.9     08-    138  |  101  |  17  ----------------------------<  241<H>  4.0   |  28  |  1.29    Ca    9.4      02 Aug 2021 07:33    TPro  6.7  /  Alb  2.3<L>  /  TBili  0.2  /  DBili  x   /  AST  18  /  ALT  24  /  AlkPhos  93  08-              CAPILLARY BLOOD GLUCOSE      POCT Blood Glucose.: 184 mg/dL (04 Aug 2021 11:46)  POCT Blood Glucose.: 231 mg/dL (04 Aug 2021 07:57)  POCT Blood Glucose.: 182 mg/dL (03 Aug 2021 20:41)  POCT Blood Glucose.: 132 mg/dL (03 Aug 2021 16:46)    ----------------------------------------------------------------------  RECENT IMAGING:    CT Head : Right frontal and temporal subdural hemorrhage with maximum thickness of 0.7 cm. Bilateral frontal lobe and temporal lobe subarachnoid hemorrhages. Small subarachnoid hemorrhage in the left posterior parietal/occipital lobe. No significant mass effect or midline shift. Basilar cisterns are preserved. Nondisplaced mildly depressed calvarial fracture along the left lambdoid suture with small foci of adjacent pneumocephalus and overlying small scalp hematoma.    CT Head 7/10: Interval worsening with marked enlargement of the right holohemispheric subdural hematoma as compared to the initial study of 2021. Also there is bilateral extracerebral blood in the subarachnoid spaces anteriorly, which are more prominent as well. A left temporal bone fracture is suggested with pneumocephalus in the left temporal bone region. High-resolution temporal bone imaging is therefore recommended as well.    CT Head : Bilateral subdural and subarachnoid hemorrhages and hemorrhagic contusions, overall similar to prior exam.    CT Head : Overall stable follow-up CT examination when compared with 2021 at 8:11 AM.    ----------------------------------------------------------------------  MEDICATIONS:  MEDICATIONS  (STANDING):  atorvastatin 40 milliGRAM(s) Oral at bedtime  dextrose 40% Gel 15 Gram(s) Oral once  dextrose 5%. 1000 milliLiter(s) (50 mL/Hr) IV Continuous <Continuous>  dextrose 5%. 1000 milliLiter(s) (100 mL/Hr) IV Continuous <Continuous>  dextrose 50% Injectable 25 Gram(s) IV Push once  dextrose 50% Injectable 12.5 Gram(s) IV Push once  dextrose 50% Injectable 25 Gram(s) IV Push once  donepezil 5 milliGRAM(s) Oral daily  enoxaparin Injectable 40 milliGRAM(s) SubCutaneous daily  escitalopram 10 milliGRAM(s) Oral daily  folic acid 1 milliGRAM(s) Oral daily  glucagon  Injectable 1 milliGRAM(s) IntraMuscular once  insulin lispro (ADMELOG) corrective regimen sliding scale   SubCutaneous three times a day before meals  insulin lispro (ADMELOG) corrective regimen sliding scale   SubCutaneous at bedtime  melatonin 3 milliGRAM(s) Oral at bedtime  metFORMIN 850 milliGRAM(s) Oral two times a day  multivitamin/minerals 1 Tablet(s) Oral daily  polyethylene glycol 3350 17 Gram(s) Oral daily  senna 2 Tablet(s) Oral at bedtime  sodium chloride 0.9%. 1000 milliLiter(s) (75 mL/Hr) IV Continuous <Continuous>  thiamine 100 milliGRAM(s) Oral daily  timolol 0.25% Solution 1 Drop(s) Both EYES at bedtime  valproic  acid Syrup 500 milliGRAM(s) Oral every 12 hours    MEDICATIONS  (PRN):  acetaminophen   Tablet .. 650 milliGRAM(s) Oral every 6 hours PRN Temp greater or equal to 38C (100.4F)  magnesium hydroxide Suspension 30 milliLiter(s) Oral daily PRN Constipation    ----------------------------------------------------------------------    Assessment and Plan:   · Assessment	  Patient is a 76 year old male with PMHx of Diabetes Mellitus type 2, initially presented as a Level 2 Trauma at [facility] on  after found down at a bus stop with altered mental status. Patient appears intoxicated at the time with  on arrival. CT scan on admission demonstrated bifrontal SAH, a R SDH, a L parietal SAH with pneumocephalus, a L possible parietal non-displaced skull fracture, and a C6 inferior endplate fracture into disc space with air into the canal.     # bifrontal SAH & R SDH.   - continue comprehensive rehab program, PT/OT/SLP 3 hours a day, 5 days a week  - neuropsychology evaluation--reviewed and appreciated  - Precautions: DM, spinal, AMS, fall, Seizure   - Donepezil 5 mg started  for memory/cognition.     #HTN --Orthostatic Hypotension--  --s/p 500cc bolus - .    - Previously on enalapril which was discontinued  - Monitor BP. Keep BP<130/80  - Encourage P.O intake.   - Discussed with hospitalist. Workup ordered to assess for infection including CXR, UA, CBC, BMP. If negative, can then consider midodrine.  - Started NS @75cc/hr    # Diabetes   - HBA1c -  7.3  - ISS  --Metformin previously increased to 850 mg BID.      #Depression   - cont. Escitalopram to 10mg  - Neuropsychology support    # Delirium & seizure   - Continue on Valproate for  agitation.  - neuropsychology & SLP cognitive treatment.   - VPA level - 70 - WNL    Sleep  --melatonin     #ETOH Abuse   - Continue Multivitamin, Folic Acid, Thiamine   - psychology and SW follow up    #HLD   - Continue Atorvastatin.     #Pain  - Tylenol PRN    #GI  - Dulcolax PRN, Senna  - Protonix  - Milk of Magnesia PRN.     #Diet  - Regular - Carb controlled - DASH diet.     # DVT Prophylaxis   -previously on heparin Q12H but now on lovenox to decrease # of injections and improve compliance      IDT 21  -SW: Lives with significant other in a private home with 1 JESSIE. Daughter is involved in his care.   -OT: supervision eating, UBD; min A for grooming, LBD, toilet trans; Mod A toileting; Min A lower body dressing. Agitated and inappropriate  -PT: Stairs: 8 steps Min A;  CG transfers; Min A amb. 50ft.   -Speech: Regular consistency diet. Memory is Mod A. Needs frequent redirection and reorientation.  Compr min A, Soc Max A, Poor attention, disoriented.  poor interaction  D/C plannin/14 home.       Nutritional Assessment:  · Nutritional Assessment	This patient has been assessed with a concern for Malnutrition and has been determined to have a diagnosis/diagnoses of Severe protein-calorie malnutrition.    This patient is being managed with:   Diet Consistent Carbohydrate w/Evening Snack-  Supplement Feeding Modality:  Oral  Glucerna Shake Cans or Servings Per Day:  1       Frequency:  Three Times a day  Entered: 2021 10:45AM

## 2021-08-04 NOTE — CONSULT NOTE ADULT - REASON FOR ADMISSION
Non- Traumatic Right frontal and temporal subdural hematoma
Non- Traumatic Right frontal and temporal subdural hematoma

## 2021-08-05 LAB
ALBUMIN SERPL ELPH-MCNC: 2.2 G/DL — LOW (ref 3.3–5)
ALP SERPL-CCNC: 93 U/L — SIGNIFICANT CHANGE UP (ref 40–120)
ALT FLD-CCNC: 27 U/L — SIGNIFICANT CHANGE UP (ref 10–45)
ANION GAP SERPL CALC-SCNC: 7 MMOL/L — SIGNIFICANT CHANGE UP (ref 5–17)
APPEARANCE UR: CLEAR — SIGNIFICANT CHANGE UP
AST SERPL-CCNC: 25 U/L — SIGNIFICANT CHANGE UP (ref 10–40)
BILIRUB SERPL-MCNC: 0.2 MG/DL — SIGNIFICANT CHANGE UP (ref 0.2–1.2)
BILIRUB UR-MCNC: NEGATIVE — SIGNIFICANT CHANGE UP
BUN SERPL-MCNC: 12 MG/DL — SIGNIFICANT CHANGE UP (ref 7–23)
CALCIUM SERPL-MCNC: 9.1 MG/DL — SIGNIFICANT CHANGE UP (ref 8.4–10.5)
CHLORIDE SERPL-SCNC: 105 MMOL/L — SIGNIFICANT CHANGE UP (ref 96–108)
CO2 SERPL-SCNC: 27 MMOL/L — SIGNIFICANT CHANGE UP (ref 22–31)
COLOR SPEC: YELLOW — SIGNIFICANT CHANGE UP
CREAT SERPL-MCNC: 1.14 MG/DL — SIGNIFICANT CHANGE UP (ref 0.5–1.3)
DIFF PNL FLD: NEGATIVE — SIGNIFICANT CHANGE UP
GLUCOSE BLDC GLUCOMTR-MCNC: 119 MG/DL — HIGH (ref 70–99)
GLUCOSE BLDC GLUCOMTR-MCNC: 138 MG/DL — HIGH (ref 70–99)
GLUCOSE BLDC GLUCOMTR-MCNC: 154 MG/DL — HIGH (ref 70–99)
GLUCOSE BLDC GLUCOMTR-MCNC: 174 MG/DL — HIGH (ref 70–99)
GLUCOSE SERPL-MCNC: 217 MG/DL — HIGH (ref 70–99)
GLUCOSE UR QL: NEGATIVE — SIGNIFICANT CHANGE UP
HCT VFR BLD CALC: 28.4 % — LOW (ref 39–50)
HGB BLD-MCNC: 9.1 G/DL — LOW (ref 13–17)
KETONES UR-MCNC: NEGATIVE — SIGNIFICANT CHANGE UP
LEUKOCYTE ESTERASE UR-ACNC: NEGATIVE — SIGNIFICANT CHANGE UP
MCHC RBC-ENTMCNC: 32 GM/DL — SIGNIFICANT CHANGE UP (ref 32–36)
MCHC RBC-ENTMCNC: 32 PG — SIGNIFICANT CHANGE UP (ref 27–34)
MCV RBC AUTO: 100 FL — SIGNIFICANT CHANGE UP (ref 80–100)
NITRITE UR-MCNC: NEGATIVE — SIGNIFICANT CHANGE UP
NRBC # BLD: 0 /100 WBCS — SIGNIFICANT CHANGE UP (ref 0–0)
PH UR: 7 — SIGNIFICANT CHANGE UP (ref 5–8)
PLATELET # BLD AUTO: 329 K/UL — SIGNIFICANT CHANGE UP (ref 150–400)
POTASSIUM SERPL-MCNC: 4.4 MMOL/L — SIGNIFICANT CHANGE UP (ref 3.5–5.3)
POTASSIUM SERPL-SCNC: 4.4 MMOL/L — SIGNIFICANT CHANGE UP (ref 3.5–5.3)
PROT SERPL-MCNC: 6.3 G/DL — SIGNIFICANT CHANGE UP (ref 6–8.3)
PROT UR-MCNC: NEGATIVE — SIGNIFICANT CHANGE UP
RBC # BLD: 2.84 M/UL — LOW (ref 4.2–5.8)
RBC # FLD: 11.1 % — SIGNIFICANT CHANGE UP (ref 10.3–14.5)
SODIUM SERPL-SCNC: 139 MMOL/L — SIGNIFICANT CHANGE UP (ref 135–145)
SP GR SPEC: 1 — LOW (ref 1.01–1.02)
UROBILINOGEN FLD QL: NEGATIVE — SIGNIFICANT CHANGE UP
WBC # BLD: 3.97 K/UL — SIGNIFICANT CHANGE UP (ref 3.8–10.5)
WBC # FLD AUTO: 3.97 K/UL — SIGNIFICANT CHANGE UP (ref 3.8–10.5)

## 2021-08-05 PROCEDURE — 99232 SBSQ HOSP IP/OBS MODERATE 35: CPT

## 2021-08-05 RX ADMIN — METFORMIN HYDROCHLORIDE 850 MILLIGRAM(S): 850 TABLET ORAL at 08:49

## 2021-08-05 RX ADMIN — Medication 100 MILLIGRAM(S): at 11:45

## 2021-08-05 RX ADMIN — Medication 1: at 11:46

## 2021-08-05 RX ADMIN — Medication 1 MILLIGRAM(S): at 11:45

## 2021-08-05 RX ADMIN — Medication 500 MILLIGRAM(S): at 17:02

## 2021-08-05 RX ADMIN — Medication 500 MILLIGRAM(S): at 05:35

## 2021-08-05 RX ADMIN — METFORMIN HYDROCHLORIDE 850 MILLIGRAM(S): 850 TABLET ORAL at 17:02

## 2021-08-05 RX ADMIN — ATORVASTATIN CALCIUM 40 MILLIGRAM(S): 80 TABLET, FILM COATED ORAL at 21:20

## 2021-08-05 RX ADMIN — SODIUM CHLORIDE 75 MILLILITER(S): 9 INJECTION INTRAMUSCULAR; INTRAVENOUS; SUBCUTANEOUS at 03:17

## 2021-08-05 RX ADMIN — Medication 1: at 08:49

## 2021-08-05 RX ADMIN — DONEPEZIL HYDROCHLORIDE 5 MILLIGRAM(S): 10 TABLET, FILM COATED ORAL at 11:46

## 2021-08-05 RX ADMIN — ENOXAPARIN SODIUM 40 MILLIGRAM(S): 100 INJECTION SUBCUTANEOUS at 11:46

## 2021-08-05 RX ADMIN — ESCITALOPRAM OXALATE 10 MILLIGRAM(S): 10 TABLET, FILM COATED ORAL at 11:45

## 2021-08-05 RX ADMIN — Medication 1 TABLET(S): at 11:45

## 2021-08-05 NOTE — PROGRESS NOTE ADULT - ASSESSMENT
· Assessment	  Patient is a 76 year old male with PMHx of Diabetes Mellitus type 2, initially presented as a Level 2 Trauma at [facility] on  after found down at a bus stop with altered mental status. Patient appears intoxicated at the time with  on arrival. CT scan on admission demonstrated bifrontal SAH, a R SDH, a L parietal SAH with pneumocephalus, a L possible parietal non-displaced skull fracture, and a C6 inferior endplate fracture into disc space with air into the canal.     # bifrontal SAH & R SDH.   - continue comprehensive rehab program, PT/OT/SLP 3 hours a day, 5 days a week  - neuropsychology evaluation--reviewed and appreciated  - Precautions: DM, spinal, AMS, fall, Seizure   - Donepezil 5 mg started  for memory/cognition. monitor for SE-- appears to be tolerating.    --Neuroendocrine w/u-- AM cortisol,  Had normal TSH and free T4 on     #HTN --Orthostatic Hypotension--  --s/p 500cc bolus - .    - Previously on enalapril which was discontinued  - Monitor BP. Keep BP<130/80  - Encourage P.O intake.   - w/u neg for infection  - completing IVFs NS @75cc/hr  --Monitor fluid hydration-- spoke with nursing to increase     # Diabetes   - HBA1c -  7.3  - ISS  --Metformin 850 mg BID.   --FS controlled     #Depression   - cont. Escitalopram to 10mg  - Neuropsychology following    # Delirium & seizure   - Continue on Valproate for  agitation.  - neuropsychology & SLP cognitive treatment.   - VPA level - 70 - WNL  --check ammonia level tomorrow AM    Sleep  --melatonin     #ETOH Abuse   - Continue Multivitamin, Folic Acid, Thiamine   - psychology and SW follow up    #HLD   - Continue Atorvastatin.     #Pain  - Tylenol PRN    #GI  - Dulcolax PRN, Senna  - Protonix  - Milk of Magnesia PRN.     #Diet  - Regular - Carb controlled - DASH diet.     # DVT Prophylaxis   -previously on heparin Q12H but now on lovenox to decrease # of injections and improve compliance      IDT 21  -SW: Lives with significant other in a private home with 1 JESSIE. Daughter is involved in his care.   -OT: supervision eating, UBD; min A for grooming, LBD, toilet trans; Mod A toileting; Min A lower body dressing. Agitated and inappropriate  -PT: Stairs: 8 steps Min A;  CG transfers; Min A amb. 50ft.   -Speech: Regular consistency diet. Memory is Mod A. Needs frequent redirection and reorientation.  Compr min A, Soc Max A, Poor attention, disoriented.  poor interaction  D/C plannin/14 home.

## 2021-08-05 NOTE — PROGRESS NOTE ADULT - SUBJECTIVE AND OBJECTIVE BOX
HPI:  Patient is a 76 year old male with PMHx of Diabetes Mellitus type 2, initially presented as a Level 2 Trauma at [facility] on  after found down at a bus stop with altered mental status. It was thought to be due totraumatic cause. Patient appears intoxicated at the time with  on arrival. CT scan on admission demonstrated bifrontal SAH, a R SDH, a L parietal SAH with pneumocephalus, a L possible parietal non-displaced skull fracture, and a C6 inferior endplate fracture into disc space with air into the canal.     Patient was admitted to NSICU. Neurosurgery was consulted who decided to treat conservatively with no progression seen on repeat imaging. During patient hospital course he seemed to have worsening AMS which was thought to be due to delirium and or dementia. Pt also had a UTI & has completed ABx course. Pt was also found to be hyponatremic possibly due to SIADH, which has now resolved.     Pt recommended for acute inpatient rehabilitation and was transferred to [facility] on 21.           (2021 14:07)      PAST MEDICAL & SURGICAL HISTORY:  DM (diabetes mellitus)        Subjective:  No overnight issues.  Pt. eating better this AM,  ate most of breakfast as per nursing.  got OOB for therapy-- ambulating in gaspar with PT.  slept during the night as per nursing.  confused      VITALS  Vital Signs Last 24 Hrs  T(C): 36.9 (05 Aug 2021 08:40), Max: 36.9 (05 Aug 2021 08:40)  T(F): 98.4 (05 Aug 2021 08:40), Max: 98.4 (05 Aug 2021 08:40)  HR: 97 (05 Aug 2021 08:40) (97 - 100)  BP: 113/75 (05 Aug 2021 08:40) (101/63 - 113/75)  BP(mean): --  RR: 15 (05 Aug 2021 08:40) (15 - 15)  SpO2: 99% (05 Aug 2021 08:40) (98% - 99%)    REVIEW OF SYMPTOMS  Positive: cognitive impairment.    Denies: headache,  CP, SOB, abdominal pain, dysuria, nausea, constipation    Physical Exam:    Constitutional - NAD, Comfortable  HEENT -  EOMI  Neck - Supple,   Chest - breathing comfortably on RA  Cardio - warm and well perfused,   Abdomen -  Soft, NTND  Extremities - No peripheral edema,   Neurologic Exam:                    Cognitive -             Orientation: Awake, Alert, AAO x2-3.  knows he's in hospital, but not name.  Knew year but not month and date.              Attention:  impaired,  tangential speech.  difficulty following conversation.  unable to complete days of week backward            Memory: impaired-- 0/3 spontaneously,  3/3 with cat. cues     Speech - Fluent, Comprehensible, No dysarthria, No aphasia      Cranial Nerves -PERRLA, No facial asymmetry, Tongue midline, EOMI, Shoulder shrug intact     Motor -                      LEFT    UE - ShAB 5/5, EF 5/5, EE 5/5, WE 5/5,  WNL                    RIGHT UE - ShAB 5/5, EF 5/5, EE 5/5, WE 5/5,  WNL                    LEFT    LE - HF 5/5, KE 5/5, DF 5/5, PF 5/5                    RIGHT LE - HF 5/5, KE 5/5, DF 5/5, PF 5/5        Sensory - Intact to LT bilateral     Coordination - FTN / HTS intact     OculoVestibular -  No nystagmus    Psychiatric - anxious,  can be irritable at times.        RECENT LABS                        9.1    3.97  )-----------( 329      ( 05 Aug 2021 06:30 )             28.4     08-05    139  |  105  |  12  ----------------------------<  217<H>  4.4   |  27  |  1.14    Ca    9.1      05 Aug 2021 06:30    TPro  6.3  /  Alb  2.2<L>  /  TBili  0.2  /  DBili  x   /  AST  25  /  ALT  27  /  AlkPhos  93  08-05      Urinalysis Basic - ( 05 Aug 2021 07:20 )    Color: Yellow / Appearance: Clear / S.005 / pH: x  Gluc: x / Ketone: Negative  / Bili: Negative / Urobili: Negative   Blood: x / Protein: Negative / Nitrite: Negative   Leuk Esterase: Negative / RBC: x / WBC x   Sq Epi: x / Non Sq Epi: x / Bacteria: x          RADIOLOGY/OTHER RESULTS      MEDICATIONS  (STANDING):  atorvastatin 40 milliGRAM(s) Oral at bedtime  dextrose 40% Gel 15 Gram(s) Oral once  dextrose 5%. 1000 milliLiter(s) (50 mL/Hr) IV Continuous <Continuous>  dextrose 5%. 1000 milliLiter(s) (100 mL/Hr) IV Continuous <Continuous>  dextrose 50% Injectable 25 Gram(s) IV Push once  dextrose 50% Injectable 12.5 Gram(s) IV Push once  dextrose 50% Injectable 25 Gram(s) IV Push once  donepezil 5 milliGRAM(s) Oral daily  enoxaparin Injectable 40 milliGRAM(s) SubCutaneous daily  escitalopram 10 milliGRAM(s) Oral daily  folic acid 1 milliGRAM(s) Oral daily  glucagon  Injectable 1 milliGRAM(s) IntraMuscular once  insulin lispro (ADMELOG) corrective regimen sliding scale   SubCutaneous three times a day before meals  insulin lispro (ADMELOG) corrective regimen sliding scale   SubCutaneous at bedtime  melatonin 3 milliGRAM(s) Oral at bedtime  metFORMIN 850 milliGRAM(s) Oral two times a day  multivitamin 1 Tablet(s) Oral daily  polyethylene glycol 3350 17 Gram(s) Oral daily  senna 2 Tablet(s) Oral at bedtime  sodium chloride 0.9%. 1000 milliLiter(s) (75 mL/Hr) IV Continuous <Continuous>  thiamine 100 milliGRAM(s) Oral daily  timolol 0.25% Solution 1 Drop(s) Both EYES at bedtime  valproic  acid Syrup 500 milliGRAM(s) Oral every 12 hours    MEDICATIONS  (PRN):  acetaminophen   Tablet .. 650 milliGRAM(s) Oral every 6 hours PRN Temp greater or equal to 38C (100.4F)  magnesium hydroxide Suspension 30 milliLiter(s) Oral daily PRN Constipation

## 2021-08-05 NOTE — PROGRESS NOTE ADULT - ASSESSMENT
75 y/o M PMHx of Diabetes Mellitus type 2, initially presented as a Level 2 Trauma at [facility] on 7/9 after found down at a bus stop with altered mental status. Patient appears intoxicated at the time with  on arrival. CT scan on admission demonstrated bifrontal SAH, a R SDH, a L parietal SAH with pneumocephalus, a L possible parietal non-displaced skull fracture, and a C6 inferior endplate fracture into disc space with air into the canal.     #bifrontal SAH & R SDH  -Continue comprehensive rehab program -PT/OT/SLP per rehab team  -Pain management, bowel regimen per rehab   -Neuropsych following   -Donepezil 5mg mg started 8/4 for memory/cognition per rehab - monitor for GI sx    #HTN  #Orthostatic Hypotension  -Previously on enalapril which was discontinued  -Monitor BP. Keep BP<130/80  -Encourage PO  -NS @75cc/hr    #T2DM -HbA1c 7.3  -ISS  -Metformin 850mg BID      #Depression   -Lexapro 10mg   -Neuropsych     #Delirium & seizure   -Continue on Valproate for  agitation.    #ETOH Abuse - stable  -Continue Multivitamin, Folic Acid, Thiamine   -Psychology and SW follow up    #HLD   -Atorvastatin    #DVT ppx - lovenox 75 y/o M PMHx of Diabetes Mellitus type 2, initially presented as a Level 2 Trauma at [facility] on 7/9 after found down at a bus stop with altered mental status. Patient appears intoxicated at the time with  on arrival. CT scan on admission demonstrated bifrontal SAH, a R SDH, a L parietal SAH with pneumocephalus, a L possible parietal non-displaced skull fracture, and a C6 inferior endplate fracture into disc space with air into the canal.     #Bifrontal SAH & R SDH  -Continue comprehensive rehab program -PT/OT/SLP per rehab team  -Pain management, bowel regimen per rehab   -Neuropsych following   -Donepezil 5mg mg started 8/4 for memory/cognition per rehab - monitor for GI sx    #HTN  #Orthostatic Hypotension  -Previously on enalapril which was discontinued  -Monitor BP. Keep BP<130/80  -Encourage PO  -NS @75cc/hr    #T2DM -HbA1c 7.3  -ISS  -Metformin 850mg BID      #Depression   -Lexapro 10mg   -Neuropsych     #Delirium & seizure   -Continue on Valproate for agitation    #ETOH Abuse - stable  -Continue Multivitamin, Folic Acid, Thiamine   -Psychology and SW follow up    #HLD   -Atorvastatin    #DVT ppx - lovenox 75 y/o M PMHx of Diabetes Mellitus type 2, initially presented as a Level 2 Trauma at [facility] on 7/9 after found down at a bus stop with altered mental status. Patient appears intoxicated at the time with  on arrival. CT scan on admission demonstrated bifrontal SAH, a R SDH, a L parietal SAH with pneumocephalus, a L possible parietal non-displaced skull fracture, and a C6 inferior endplate fracture into disc space with air into the canal.     #Bifrontal SAH & R SDH  -Continue comprehensive rehab program -PT/OT/SLP per rehab team  -Pain management, bowel regimen per rehab   -Neuropsych following   -Donepezil 5mg mg started 8/4 for memory/cognition per rehab - monitor for GI sx    #HTN  #Orthostatic Hypotension  -Previously on enalapril which was discontinued  -Monitor BP. Keep BP<130/80  -Encourage PO  -NS @75cc/hr - BP improving, can DC if tolerating diet    #T2DM -HbA1c 7.3  -ISS  -Metformin 850mg BID      #Depression   -Lexapro 10mg   -Neuropsych     #Delirium & seizure   -Continue on Valproate for agitation    #ETOH Abuse - stable  -Continue Multivitamin, Folic Acid, Thiamine   -Psychology and SW follow up    #HLD   -Atorvastatin    #DVT ppx - lovenox

## 2021-08-05 NOTE — PROGRESS NOTE ADULT - SUBJECTIVE AND OBJECTIVE BOX
Patient is a 76y old  Male who presents with a chief complaint of Traumatic Right frontal and temporal subdural hematoma (04 Aug 2021 13:24)    Patient seen and examined at bedside. doing well no acute complaints.      ALLERGIES:  Allergy Status Unknown    MEDICATIONS  (STANDING):  atorvastatin 40 milliGRAM(s) Oral at bedtime  dextrose 40% Gel 15 Gram(s) Oral once  dextrose 5%. 1000 milliLiter(s) (50 mL/Hr) IV Continuous <Continuous>  dextrose 5%. 1000 milliLiter(s) (100 mL/Hr) IV Continuous <Continuous>  dextrose 50% Injectable 25 Gram(s) IV Push once  dextrose 50% Injectable 12.5 Gram(s) IV Push once  dextrose 50% Injectable 25 Gram(s) IV Push once  donepezil 5 milliGRAM(s) Oral daily  enoxaparin Injectable 40 milliGRAM(s) SubCutaneous daily  escitalopram 10 milliGRAM(s) Oral daily  folic acid 1 milliGRAM(s) Oral daily  glucagon  Injectable 1 milliGRAM(s) IntraMuscular once  insulin lispro (ADMELOG) corrective regimen sliding scale   SubCutaneous three times a day before meals  insulin lispro (ADMELOG) corrective regimen sliding scale   SubCutaneous at bedtime  melatonin 3 milliGRAM(s) Oral at bedtime  metFORMIN 850 milliGRAM(s) Oral two times a day  multivitamin 1 Tablet(s) Oral daily  polyethylene glycol 3350 17 Gram(s) Oral daily  senna 2 Tablet(s) Oral at bedtime  sodium chloride 0.9%. 1000 milliLiter(s) (75 mL/Hr) IV Continuous <Continuous>  thiamine 100 milliGRAM(s) Oral daily  timolol 0.25% Solution 1 Drop(s) Both EYES at bedtime  valproic  acid Syrup 500 milliGRAM(s) Oral every 12 hours    MEDICATIONS  (PRN):  acetaminophen   Tablet .. 650 milliGRAM(s) Oral every 6 hours PRN Temp greater or equal to 38C (100.4F)  magnesium hydroxide Suspension 30 milliLiter(s) Oral daily PRN Constipation    Vital Signs Last 24 Hrs  T(F): 98 (04 Aug 2021 20:40), Max: 98 (04 Aug 2021 20:40)  HR: 100 (04 Aug 2021 20:40) (100 - 100)  BP: 101/63 (04 Aug 2021 20:40) (101/63 - 101/63)  RR: 15 (04 Aug 2021 20:40) (15 - 15)  SpO2: 98% (04 Aug 2021 20:40) (98% - 98%)  I&O's Summary    04 Aug 2021 07:01  -  05 Aug 2021 07:00  --------------------------------------------------------  IN: 0 mL / OUT: 500 mL / NET: -500 mL    PHYSICAL EXAM:  General: NAD, A/O x 3  ENT: MMM, no scleral icterus  Neck: Supple, No JVD  Lungs: Respirations unlabored. Clear to auscultation bilaterally, no wheezes, rales, rhonchi  Cardio: RRR, S1/S2  Abdomen: Soft, Nontender, Nondistended; Bowel sounds present  Extremities: No calf tenderness, No pitting edema LE b/l    LABS:                        9.1    3.97  )-----------( 329      ( 05 Aug 2021 06:30 )             28.4       08-    139  |  105  |  12  ----------------------------<  217  4.4   |  27  |  1.14    Ca    9.1      05 Aug 2021 06:30    TPro  6.3  /  Alb  2.2  /  TBili  0.2  /  DBili  x   /  AST  25  /  ALT  27  /  AlkPhos  93  08-05     eGFR if Non African American: 63 mL/min/1.73M2 (21 @ 06:30)  eGFR if : 73 mL/min/1.73M2 (21 @ 06:30)    POCT Blood Glucose.: 174 mg/dL (05 Aug 2021 08:42)  POCT Blood Glucose.: 178 mg/dL (04 Aug 2021 22:38)  POCT Blood Glucose.: 139 mg/dL (04 Aug 2021 16:58)  POCT Blood Glucose.: 184 mg/dL (04 Aug 2021 11:46)    Urinalysis Basic - ( 05 Aug 2021 07:20 )    Color: Yellow / Appearance: Clear / S.005 / pH: x  Gluc: x / Ketone: Negative  / Bili: Negative / Urobili: Negative   Blood: x / Protein: Negative / Nitrite: Negative   Leuk Esterase: Negative / RBC: x / WBC x   Sq Epi: x / Non Sq Epi: x / Bacteria: x    COVID-19 PCR: NotDetec (21 @ 17:25)  COVID-19 PCR: NotDetec (21 @ 11:23)  COVID-19 PCR: NotDetec (21 @ 22:51)    RADIOLOGY & ADDITIONAL TESTS: reviewed    Care Discussed with Consultants/Other Providers: yes Patient is a 76y old  Male who presents with a chief complaint of Traumatic Right frontal and temporal subdural hematoma (04 Aug 2021 13:24)    Patient seen and examined at bedside. doing well no acute complaints. wants to call taxi to leave to visit family.      ALLERGIES:  Allergy Status Unknown    MEDICATIONS  (STANDING):  atorvastatin 40 milliGRAM(s) Oral at bedtime  dextrose 40% Gel 15 Gram(s) Oral once  dextrose 5%. 1000 milliLiter(s) (50 mL/Hr) IV Continuous <Continuous>  dextrose 5%. 1000 milliLiter(s) (100 mL/Hr) IV Continuous <Continuous>  dextrose 50% Injectable 25 Gram(s) IV Push once  dextrose 50% Injectable 12.5 Gram(s) IV Push once  dextrose 50% Injectable 25 Gram(s) IV Push once  donepezil 5 milliGRAM(s) Oral daily  enoxaparin Injectable 40 milliGRAM(s) SubCutaneous daily  escitalopram 10 milliGRAM(s) Oral daily  folic acid 1 milliGRAM(s) Oral daily  glucagon  Injectable 1 milliGRAM(s) IntraMuscular once  insulin lispro (ADMELOG) corrective regimen sliding scale   SubCutaneous three times a day before meals  insulin lispro (ADMELOG) corrective regimen sliding scale   SubCutaneous at bedtime  melatonin 3 milliGRAM(s) Oral at bedtime  metFORMIN 850 milliGRAM(s) Oral two times a day  multivitamin 1 Tablet(s) Oral daily  polyethylene glycol 3350 17 Gram(s) Oral daily  senna 2 Tablet(s) Oral at bedtime  sodium chloride 0.9%. 1000 milliLiter(s) (75 mL/Hr) IV Continuous <Continuous>  thiamine 100 milliGRAM(s) Oral daily  timolol 0.25% Solution 1 Drop(s) Both EYES at bedtime  valproic  acid Syrup 500 milliGRAM(s) Oral every 12 hours    MEDICATIONS  (PRN):  acetaminophen   Tablet .. 650 milliGRAM(s) Oral every 6 hours PRN Temp greater or equal to 38C (100.4F)  magnesium hydroxide Suspension 30 milliLiter(s) Oral daily PRN Constipation    Vital Signs Last 24 Hrs  T(F): 98 (04 Aug 2021 20:40), Max: 98 (04 Aug 2021 20:40)  HR: 100 (04 Aug 2021 20:40) (100 - 100)  BP: 101/63 (04 Aug 2021 20:40) (101/63 - 101/63)  RR: 15 (04 Aug 2021 20:40) (15 - 15)  SpO2: 98% (04 Aug 2021 20:40) (98% - 98%)  I&O's Summary    04 Aug 2021 07:01  -  05 Aug 2021 07:00  --------------------------------------------------------  IN: 0 mL / OUT: 500 mL / NET: -500 mL    PHYSICAL EXAM:  General: NAD, A/O x 3  ENT: MMM, no scleral icterus  Neck: Supple, No JVD  Lungs: Respirations unlabored. Clear to auscultation bilaterally, no wheezes, rales, rhonchi  Cardio: RRR, S1/S2  Abdomen: Soft, Nontender, Nondistended; Bowel sounds present  Extremities: No calf tenderness, No pitting edema LE b/l    LABS:                        9.1    3.97  )-----------( 329      ( 05 Aug 2021 06:30 )             28.4       08-    139  |  105  |  12  ----------------------------<  217  4.4   |  27  |  1.14    Ca    9.1      05 Aug 2021 06:30    TPro  6.3  /  Alb  2.2  /  TBili  0.2  /  DBili  x   /  AST  25  /  ALT  27  /  AlkPhos  93  08-05     eGFR if Non African American: 63 mL/min/1.73M2 (21 @ 06:30)  eGFR if : 73 mL/min/1.73M2 (21 @ 06:30)    POCT Blood Glucose.: 174 mg/dL (05 Aug 2021 08:42)  POCT Blood Glucose.: 178 mg/dL (04 Aug 2021 22:38)  POCT Blood Glucose.: 139 mg/dL (04 Aug 2021 16:58)  POCT Blood Glucose.: 184 mg/dL (04 Aug 2021 11:46)    Urinalysis Basic - ( 05 Aug 2021 07:20 )    Color: Yellow / Appearance: Clear / S.005 / pH: x  Gluc: x / Ketone: Negative  / Bili: Negative / Urobili: Negative   Blood: x / Protein: Negative / Nitrite: Negative   Leuk Esterase: Negative / RBC: x / WBC x   Sq Epi: x / Non Sq Epi: x / Bacteria: x    COVID-19 PCR: NotDetec (21 @ 17:25)  COVID-19 PCR: NotDetec (21 @ 11:23)  COVID-19 PCR: NotDetec (21 @ 22:51)    RADIOLOGY & ADDITIONAL TESTS: reviewed    Care Discussed with Consultants/Other Providers: yes

## 2021-08-06 LAB
AMMONIA BLD-MCNC: 53 UMOL/L — SIGNIFICANT CHANGE UP (ref 11–55)
CORTIS AM PEAK SERPL-MCNC: 14.4 UG/DL — SIGNIFICANT CHANGE UP (ref 6–18.4)
GLUCOSE BLDC GLUCOMTR-MCNC: 133 MG/DL — HIGH (ref 70–99)
GLUCOSE BLDC GLUCOMTR-MCNC: 151 MG/DL — HIGH (ref 70–99)
GLUCOSE BLDC GLUCOMTR-MCNC: 162 MG/DL — HIGH (ref 70–99)
GLUCOSE BLDC GLUCOMTR-MCNC: 181 MG/DL — HIGH (ref 70–99)

## 2021-08-06 PROCEDURE — 99232 SBSQ HOSP IP/OBS MODERATE 35: CPT

## 2021-08-06 PROCEDURE — 99233 SBSQ HOSP IP/OBS HIGH 50: CPT

## 2021-08-06 RX ADMIN — Medication 1 TABLET(S): at 12:08

## 2021-08-06 RX ADMIN — METFORMIN HYDROCHLORIDE 850 MILLIGRAM(S): 850 TABLET ORAL at 07:39

## 2021-08-06 RX ADMIN — Medication 3 MILLIGRAM(S): at 21:57

## 2021-08-06 RX ADMIN — METFORMIN HYDROCHLORIDE 850 MILLIGRAM(S): 850 TABLET ORAL at 17:32

## 2021-08-06 RX ADMIN — ESCITALOPRAM OXALATE 10 MILLIGRAM(S): 10 TABLET, FILM COATED ORAL at 12:08

## 2021-08-06 RX ADMIN — DONEPEZIL HYDROCHLORIDE 5 MILLIGRAM(S): 10 TABLET, FILM COATED ORAL at 12:07

## 2021-08-06 RX ADMIN — Medication 1: at 16:23

## 2021-08-06 RX ADMIN — Medication 1 MILLIGRAM(S): at 12:08

## 2021-08-06 RX ADMIN — ENOXAPARIN SODIUM 40 MILLIGRAM(S): 100 INJECTION SUBCUTANEOUS at 12:07

## 2021-08-06 RX ADMIN — Medication 100 MILLIGRAM(S): at 12:08

## 2021-08-06 RX ADMIN — Medication 500 MILLIGRAM(S): at 17:32

## 2021-08-06 RX ADMIN — Medication 1: at 07:38

## 2021-08-06 RX ADMIN — ATORVASTATIN CALCIUM 40 MILLIGRAM(S): 80 TABLET, FILM COATED ORAL at 21:57

## 2021-08-06 RX ADMIN — Medication 500 MILLIGRAM(S): at 05:45

## 2021-08-06 NOTE — PROGRESS NOTE ADULT - ASSESSMENT
· Assessment	  Patient is a 76 year old male with PMHx of Diabetes Mellitus type 2, initially presented as a Level 2 Trauma at [facility] on  after found down at a bus stop with altered mental status. Patient appears intoxicated at the time with  on arrival. CT scan on admission demonstrated bifrontal SAH, a R SDH, a L parietal SAH with pneumocephalus, a L possible parietal non-displaced skull fracture, and a C6 inferior endplate fracture into disc space with air into the canal.     # bifrontal SAH & R SDH.   - continue comprehensive rehab program, PT/OT/SLP 3 hours a day, 5 days a week  - neuropsychology evaluation--reviewed and appreciated  - Precautions: DM, spinal, AMS, fall, Seizure   - Donepezil 5 mg started  for memory/cognition. monitor for SE-- appears to be tolerating.    --Neuroendocrine w/u-- AM cortisol pending,  Had normal TSH and free T4 on     #HTN --Orthostatic Hypotension--  --s/p IVFs-- BP improved   - Previously on enalapril which was discontinued  - Monitor BP. Keep BP<130/80  - Encourage P.O intake.   - w/u neg for infection  -d/c IVFs   --Monitor fluid hydration-- spoke with nursing      # Diabetes   - HBA1c -  7.3  - ISS  --Metformin 850 mg BID.   --FS controlled     #Depression   - cont. Escitalopram to 10mg  - Neuropsychology following    # Delirium & seizure   - Continue on Valproate for  agitation.  - neuropsychology & SLP cognitive treatment.   - VPA level - 70 - WNL  --ammonia level  WNL    Sleep  --melatonin     #ETOH Abuse   - Continue Multivitamin, Folic Acid, Thiamine   - psychology and SW follow up    #HLD   - Continue Atorvastatin.     #Pain  - Tylenol PRN    #GI  - Dulcolax PRN, Senna  - Protonix  - Milk of Magnesia PRN.     #Diet  - Regular - Carb controlled - DASH diet.     # DVT Prophylaxis   -previously on heparin Q12H but now on lovenox to decrease # of injections and improve compliance      IDT 21  -SW: Lives with significant other in a private home with 1 JESSIE. Daughter is involved in his care.   -OT: supervision eating, UBD; min A for grooming, LBD, toilet trans; Mod A toileting; Min A lower body dressing. Agitated and inappropriate  -PT: Stairs: 8 steps Min A;  CG transfers; Min A amb. 50ft.   -Speech: Regular consistency diet. Memory is Mod A. Needs frequent redirection and reorientation.  Compr min A, Soc Max A, Poor attention, disoriented.  poor interaction  D/C plannin/14 home.

## 2021-08-06 NOTE — PROGRESS NOTE ADULT - SUBJECTIVE AND OBJECTIVE BOX
HPI:  Patient is a 76 year old male with PMHx of Diabetes Mellitus type 2, initially presented as a Level 2 Trauma at [facility] on  after found down at a bus stop with altered mental status. It was thought to be due totraumatic cause. Patient appears intoxicated at the time with  on arrival. CT scan on admission demonstrated bifrontal SAH, a R SDH, a L parietal SAH with pneumocephalus, a L possible parietal non-displaced skull fracture, and a C6 inferior endplate fracture into disc space with air into the canal.     Patient was admitted to NSICU. Neurosurgery was consulted who decided to treat conservatively with no progression seen on repeat imaging. During patient hospital course he seemed to have worsening AMS which was thought to be due to delirium and or dementia. Pt also had a UTI & has completed ABx course. Pt was also found to be hyponatremic possibly due to SIADH, which has now resolved.     Pt recommended for acute inpatient rehabilitation and was transferred to [facility] on 21.           (2021 14:07)      PAST MEDICAL & SURGICAL HISTORY:  DM (diabetes mellitus)        Subjective:  No overnight issues.  Pt. slept during the night.  Participating in therapy.  Eating 50% of meals.  Denies pain.  c/o IV line.  Pt. states he has been hydrating well.  BP is improved.  No acute agitation events.       VITALS  Vital Signs Last 24 Hrs  T(C): 36.7 (06 Aug 2021 07:43), Max: 36.7 (06 Aug 2021 07:43)  T(F): 98 (06 Aug 2021 07:43), Max: 98 (06 Aug 2021 07:43)  HR: 88 (06 Aug 2021 07:43) (88 - 98)  BP: 124/80 (06 Aug 2021 07:43) (117/73 - 124/80)  BP(mean): --  RR: 15 (06 Aug 2021 07:43) (15 - 16)  SpO2: 99% (06 Aug 2021 07:43) (99% - 99%)    REVIEW OF SYMPTOMS  Positive: cognitive impairment.    Denies: headache,  CP, SOB, abdominal pain, dysuria, nausea, constipation    Physical Exam:    Constitutional - NAD, Comfortable  HEENT -  EOMI  Neck - Supple,   Chest - breathing comfortably on RA  Cardio - warm and well perfused,   Abdomen -  Soft, NTND  Extremities - No peripheral edema,   Neurologic Exam:                    Cognitive -             Orientation: Awake, Alert, AAO x2-3.  knows he's in hospital, but not name.  Knew year but not month and date.              Attention:  impaired,  tangential speech.  difficulty following conversation.  unable to complete days of week backward            Memory: impaired-- 0/3 spontaneously,  3/3 with cat. cues     Speech - Fluent, Comprehensible, No dysarthria, No aphasia      Cranial Nerves -PERRLA, No facial asymmetry, Tongue midline, EOMI, Shoulder shrug intact     Motor -                      LEFT    UE - ShAB 5/5, EF 5/5, EE 5/5, WE 5/5,  WNL                    RIGHT UE - ShAB 5/5, EF 5/5, EE 5/5, WE 5/5,  WNL                    LEFT    LE - HF 5/5, KE 5/5, DF 5/5, PF 5/5                    RIGHT LE - HF 5/5, KE 5/5, DF 5/5, PF 5/5        Sensory - Intact to LT bilateral     Coordination - FTN / HTS intact    Psychiatric - anxious,  can be irritable at times.    RECENT LABS                        9.1    3.97  )-----------( 329      ( 05 Aug 2021 06:30 )             28.4     08-05    139  |  105  |  12  ----------------------------<  217<H>  4.4   |  27  |  1.14    Ca    9.1      05 Aug 2021 06:30    TPro  6.3  /  Alb  2.2<L>  /  TBili  0.2  /  DBili  x   /  AST  25  /  ALT  27  /  AlkPhos  93  08-05      Urinalysis Basic - ( 05 Aug 2021 07:20 )    Color: Yellow / Appearance: Clear / S.005 / pH: x  Gluc: x / Ketone: Negative  / Bili: Negative / Urobili: Negative   Blood: x / Protein: Negative / Nitrite: Negative   Leuk Esterase: Negative / RBC: x / WBC x   Sq Epi: x / Non Sq Epi: x / Bacteria: x          RADIOLOGY/OTHER RESULTS      MEDICATIONS  (STANDING):  atorvastatin 40 milliGRAM(s) Oral at bedtime  dextrose 40% Gel 15 Gram(s) Oral once  dextrose 5%. 1000 milliLiter(s) (50 mL/Hr) IV Continuous <Continuous>  dextrose 5%. 1000 milliLiter(s) (100 mL/Hr) IV Continuous <Continuous>  dextrose 50% Injectable 12.5 Gram(s) IV Push once  dextrose 50% Injectable 25 Gram(s) IV Push once  dextrose 50% Injectable 25 Gram(s) IV Push once  donepezil 5 milliGRAM(s) Oral daily  enoxaparin Injectable 40 milliGRAM(s) SubCutaneous daily  escitalopram 10 milliGRAM(s) Oral daily  folic acid 1 milliGRAM(s) Oral daily  glucagon  Injectable 1 milliGRAM(s) IntraMuscular once  insulin lispro (ADMELOG) corrective regimen sliding scale   SubCutaneous three times a day before meals  insulin lispro (ADMELOG) corrective regimen sliding scale   SubCutaneous at bedtime  melatonin 3 milliGRAM(s) Oral at bedtime  metFORMIN 850 milliGRAM(s) Oral two times a day  multivitamin 1 Tablet(s) Oral daily  polyethylene glycol 3350 17 Gram(s) Oral daily  senna 2 Tablet(s) Oral at bedtime  thiamine 100 milliGRAM(s) Oral daily  timolol 0.25% Solution 1 Drop(s) Both EYES at bedtime  valproic  acid Syrup 500 milliGRAM(s) Oral every 12 hours    MEDICATIONS  (PRN):  acetaminophen   Tablet .. 650 milliGRAM(s) Oral every 6 hours PRN Temp greater or equal to 38C (100.4F)  magnesium hydroxide Suspension 30 milliLiter(s) Oral daily PRN Constipation

## 2021-08-06 NOTE — PROGRESS NOTE ADULT - ASSESSMENT
77 y/o M PMHx of Diabetes Mellitus type 2, initially presented as a Level 2 Trauma at [facility] on 7/9 after found down at a bus stop with altered mental status. Patient appears intoxicated at the time with  on arrival. CT scan on admission demonstrated bifrontal SAH, a R SDH, a L parietal SAH with pneumocephalus, a L possible parietal non-displaced skull fracture, and a C6 inferior endplate fracture into disc space with air into the canal.     #Bifrontal SAH & R SDH  -Continue comprehensive rehab program -PT/OT/SLP per rehab team  -Pain management, bowel regimen per rehab   -Neuropsych following   -Donepezil 5mg mg started 8/4 for memory/cognition per rehab - monitor for GI sx    #HTN  #Orthostatic Hypotension  -Previously on enalapril which was discontinued  -Monitor BP. Keep BP<130/80  -Encourage PO  -NS @75cc/hr - BP improving, can DC if tolerating diet    #T2DM -HbA1c 7.3  -ISS  -Metformin 850mg BID      #Depression   -Lexapro 10mg   -Neuropsych     #Delirium & seizure   -Continue on Valproate for agitation    #ETOH Abuse - stable  -Continue Multivitamin, Folic Acid, Thiamine   -Psychology and SW follow up    #HLD   -Atorvastatin    #DVT ppx - lovenox

## 2021-08-06 NOTE — PROGRESS NOTE ADULT - SUBJECTIVE AND OBJECTIVE BOX
Patient is a 76y old  Male who presents with a chief complaint of Traumatic Right frontal and temporal subdural hematoma (04 Aug 2021 13:24)    Patient seen and examined at bedside. doing well no acute complaints  Wants to go home.     ALLERGIES:  Allergy Status Unknown    MEDICATIONS  (STANDING):  atorvastatin 40 milliGRAM(s) Oral at bedtime  dextrose 40% Gel 15 Gram(s) Oral once  dextrose 5%. 1000 milliLiter(s) (50 mL/Hr) IV Continuous <Continuous>  dextrose 5%. 1000 milliLiter(s) (100 mL/Hr) IV Continuous <Continuous>  dextrose 50% Injectable 25 Gram(s) IV Push once  dextrose 50% Injectable 12.5 Gram(s) IV Push once  dextrose 50% Injectable 25 Gram(s) IV Push once  donepezil 5 milliGRAM(s) Oral daily  enoxaparin Injectable 40 milliGRAM(s) SubCutaneous daily  escitalopram 10 milliGRAM(s) Oral daily  folic acid 1 milliGRAM(s) Oral daily  glucagon  Injectable 1 milliGRAM(s) IntraMuscular once  insulin lispro (ADMELOG) corrective regimen sliding scale   SubCutaneous three times a day before meals  insulin lispro (ADMELOG) corrective regimen sliding scale   SubCutaneous at bedtime  melatonin 3 milliGRAM(s) Oral at bedtime  metFORMIN 850 milliGRAM(s) Oral two times a day  multivitamin 1 Tablet(s) Oral daily  polyethylene glycol 3350 17 Gram(s) Oral daily  senna 2 Tablet(s) Oral at bedtime  sodium chloride 0.9%. 1000 milliLiter(s) (75 mL/Hr) IV Continuous <Continuous>  thiamine 100 milliGRAM(s) Oral daily  timolol 0.25% Solution 1 Drop(s) Both EYES at bedtime  valproic  acid Syrup 500 milliGRAM(s) Oral every 12 hours    MEDICATIONS  (PRN):  acetaminophen   Tablet .. 650 milliGRAM(s) Oral every 6 hours PRN Temp greater or equal to 38C (100.4F)  magnesium hydroxide Suspension 30 milliLiter(s) Oral daily PRN Constipation    Vital Signs Last 24 Hrs  T(C): 36.6 (05 Aug 2021 21:18), Max: 36.9 (05 Aug 2021 08:40)  T(F): 97.8 (05 Aug 2021 21:18), Max: 98.4 (05 Aug 2021 08:40)  HR: 88 (05 Aug 2021 21:18) (88 - 98)  BP: 117/73 (05 Aug 2021 21:18) (113/75 - 122/77)  BP(mean): --  RR: 16 (05 Aug 2021 21:18) (15 - 16)  SpO2: 99% (05 Aug 2021 21:18) (99% - 99%)  I&O's Summary    04 Aug 2021 07:01  -  05 Aug 2021 07:00  --------------------------------------------------------  IN: 0 mL / OUT: 500 mL / NET: -500 mL    PHYSICAL EXAM:  General: NAD, A/O x 3  ENT: MMM, no scleral icterus  Neck: Supple, No JVD  Lungs: Respirations unlabored. Clear to auscultation bilaterally, no wheezes, rales, rhonchi  Cardio: RRR, S1/S2  Abdomen: Soft, Nontender, Nondistended; Bowel sounds present  Extremities: No calf tenderness, No pitting edema LE b/l    LABS:                        9.1    3.97  )-----------( 329      ( 05 Aug 2021 06:30 )             28.4       08-    139  |  105  |  12  ----------------------------<  217  4.4   |  27  |  1.14    Ca    9.1      05 Aug 2021 06:30    TPro  6.3  /  Alb  2.2  /  TBili  0.2  /  DBili  x   /  AST  25  /  ALT  27  /  AlkPhos  93  08-05     eGFR if Non African American: 63 mL/min/1.73M2 (21 @ 06:30)  eGFR if : 73 mL/min/1.73M2 (21 @ 06:30)    POCT Blood Glucose.: 174 mg/dL (05 Aug 2021 08:42)  POCT Blood Glucose.: 178 mg/dL (04 Aug 2021 22:38)  POCT Blood Glucose.: 139 mg/dL (04 Aug 2021 16:58)  POCT Blood Glucose.: 184 mg/dL (04 Aug 2021 11:46)    Urinalysis Basic - ( 05 Aug 2021 07:20 )    Color: Yellow / Appearance: Clear / S.005 / pH: x  Gluc: x / Ketone: Negative  / Bili: Negative / Urobili: Negative   Blood: x / Protein: Negative / Nitrite: Negative   Leuk Esterase: Negative / RBC: x / WBC x   Sq Epi: x / Non Sq Epi: x / Bacteria: x    COVID-19 PCR: NotDetec (21 @ 17:25)  COVID-19 PCR: NotDetec (21 @ 11:23)  COVID-19 PCR: NotDetec (21 @ 22:51)    RADIOLOGY & ADDITIONAL TESTS: reviewed    Care Discussed with Consultants/Other Providers: yes Patient is a 76y old  Male who presents with a chief complaint of Traumatic Right frontal and temporal subdural hematoma (04 Aug 2021 13:24)    Patient seen and examined at bedside. doing well no acute complaints  Wants to go home.     ALLERGIES:  Allergy Status Unknown    MEDICATIONS  (STANDING):  atorvastatin 40 milliGRAM(s) Oral at bedtime  dextrose 40% Gel 15 Gram(s) Oral once  dextrose 5%. 1000 milliLiter(s) (50 mL/Hr) IV Continuous <Continuous>  dextrose 5%. 1000 milliLiter(s) (100 mL/Hr) IV Continuous <Continuous>  dextrose 50% Injectable 25 Gram(s) IV Push once  dextrose 50% Injectable 12.5 Gram(s) IV Push once  dextrose 50% Injectable 25 Gram(s) IV Push once  donepezil 5 milliGRAM(s) Oral daily  enoxaparin Injectable 40 milliGRAM(s) SubCutaneous daily  escitalopram 10 milliGRAM(s) Oral daily  folic acid 1 milliGRAM(s) Oral daily  glucagon  Injectable 1 milliGRAM(s) IntraMuscular once  insulin lispro (ADMELOG) corrective regimen sliding scale   SubCutaneous three times a day before meals  insulin lispro (ADMELOG) corrective regimen sliding scale   SubCutaneous at bedtime  melatonin 3 milliGRAM(s) Oral at bedtime  metFORMIN 850 milliGRAM(s) Oral two times a day  multivitamin 1 Tablet(s) Oral daily  polyethylene glycol 3350 17 Gram(s) Oral daily  senna 2 Tablet(s) Oral at bedtime  sodium chloride 0.9%. 1000 milliLiter(s) (75 mL/Hr) IV Continuous <Continuous>  thiamine 100 milliGRAM(s) Oral daily  timolol 0.25% Solution 1 Drop(s) Both EYES at bedtime  valproic  acid Syrup 500 milliGRAM(s) Oral every 12 hours    MEDICATIONS  (PRN):  acetaminophen   Tablet .. 650 milliGRAM(s) Oral every 6 hours PRN Temp greater or equal to 38C (100.4F)  magnesium hydroxide Suspension 30 milliLiter(s) Oral daily PRN Constipation    Vital Signs Last 24 Hrs  T(C): 36.7 (06 Aug 2021 07:43), Max: 36.7 (06 Aug 2021 07:43)  T(F): 98 (06 Aug 2021 07:43), Max: 98 (06 Aug 2021 07:43)  HR: 88 (06 Aug 2021 07:43) (88 - 88)  BP: 124/80 (06 Aug 2021 07:43) (117/73 - 124/80)  BP(mean): --  RR: 15 (06 Aug 2021 07:43) (15 - 16)  SpO2: 99% (06 Aug 2021 07:43) (99% - 99%)  I&O's Summary    04 Aug 2021 07:01  -  05 Aug 2021 07:00  --------------------------------------------------------  IN: 0 mL / OUT: 500 mL / NET: -500 mL    PHYSICAL EXAM:  General: NAD, A/O x 3  ENT: MMM, no scleral icterus  Neck: Supple, No JVD  Lungs: Respirations unlabored. Clear to auscultation bilaterally, no wheezes, rales, rhonchi  Cardio: RRR, S1/S2  Abdomen: Soft, Nontender, Nondistended; Bowel sounds present  Extremities: No calf tenderness, No pitting edema LE b/l    LABS:                        9.1    3.97  )-----------( 329      ( 05 Aug 2021 06:30 )             28.4       08-    139  |  105  |  12  ----------------------------<  217  4.4   |  27  |  1.14    Ca    9.1      05 Aug 2021 06:30    TPro  6.3  /  Alb  2.2  /  TBili  0.2  /  DBili  x   /  AST  25  /  ALT  27  /  AlkPhos  93  08-05     eGFR if Non African American: 63 mL/min/1.73M2 (21 @ 06:30)  eGFR if : 73 mL/min/1.73M2 (21 @ 06:30)    POCT Blood Glucose.: 174 mg/dL (05 Aug 2021 08:42)  POCT Blood Glucose.: 178 mg/dL (04 Aug 2021 22:38)  POCT Blood Glucose.: 139 mg/dL (04 Aug 2021 16:58)  POCT Blood Glucose.: 184 mg/dL (04 Aug 2021 11:46)    Urinalysis Basic - ( 05 Aug 2021 07:20 )    Color: Yellow / Appearance: Clear / S.005 / pH: x  Gluc: x / Ketone: Negative  / Bili: Negative / Urobili: Negative   Blood: x / Protein: Negative / Nitrite: Negative   Leuk Esterase: Negative / RBC: x / WBC x   Sq Epi: x / Non Sq Epi: x / Bacteria: x    COVID-19 PCR: NotDetec (21 @ 17:25)  COVID-19 PCR: NotDetec (21 @ 11:23)  COVID-19 PCR: NotDetec (21 @ 22:51)    RADIOLOGY & ADDITIONAL TESTS: reviewed    Care Discussed with Consultants/Other Providers: yes

## 2021-08-07 LAB
GLUCOSE BLDC GLUCOMTR-MCNC: 135 MG/DL — HIGH (ref 70–99)
GLUCOSE BLDC GLUCOMTR-MCNC: 145 MG/DL — HIGH (ref 70–99)
GLUCOSE BLDC GLUCOMTR-MCNC: 163 MG/DL — HIGH (ref 70–99)
GLUCOSE BLDC GLUCOMTR-MCNC: 186 MG/DL — HIGH (ref 70–99)

## 2021-08-07 PROCEDURE — 99232 SBSQ HOSP IP/OBS MODERATE 35: CPT

## 2021-08-07 PROCEDURE — 99232 SBSQ HOSP IP/OBS MODERATE 35: CPT | Mod: GC

## 2021-08-07 RX ADMIN — METFORMIN HYDROCHLORIDE 850 MILLIGRAM(S): 850 TABLET ORAL at 07:59

## 2021-08-07 RX ADMIN — SENNA PLUS 2 TABLET(S): 8.6 TABLET ORAL at 21:37

## 2021-08-07 RX ADMIN — DONEPEZIL HYDROCHLORIDE 5 MILLIGRAM(S): 10 TABLET, FILM COATED ORAL at 11:24

## 2021-08-07 RX ADMIN — Medication 500 MILLIGRAM(S): at 17:45

## 2021-08-07 RX ADMIN — ATORVASTATIN CALCIUM 40 MILLIGRAM(S): 80 TABLET, FILM COATED ORAL at 21:38

## 2021-08-07 RX ADMIN — ENOXAPARIN SODIUM 40 MILLIGRAM(S): 100 INJECTION SUBCUTANEOUS at 16:09

## 2021-08-07 RX ADMIN — Medication 3 MILLIGRAM(S): at 21:37

## 2021-08-07 RX ADMIN — Medication 1: at 07:58

## 2021-08-07 RX ADMIN — METFORMIN HYDROCHLORIDE 850 MILLIGRAM(S): 850 TABLET ORAL at 16:10

## 2021-08-07 RX ADMIN — Medication 500 MILLIGRAM(S): at 05:34

## 2021-08-07 RX ADMIN — ESCITALOPRAM OXALATE 10 MILLIGRAM(S): 10 TABLET, FILM COATED ORAL at 11:24

## 2021-08-07 NOTE — PROGRESS NOTE ADULT - ASSESSMENT
· Assessment	  Patient is a 76 year old male with PMHx of Diabetes Mellitus type 2, initially presented as a Level 2 Trauma at [facility] on  after found [place] bus stop with altered mental status. Patient appears intoxicated at the time with  on arrival. CT scan on admission demonstrated bifrontal SAH, a R SDH, a L parietal SAH with pneumocephalus, a L possible parietal non-displaced skull fracture, and a C6 inferior endplate fracture into disc space with air into the canal.     # bifrontal SAH & R SDH.   - continue comprehensive rehab program, PT/OT/SLP 3 hours a day, 5 days a week  - neuropsychology evaluation--reviewed and appreciated  - Precautions: DM, spinal, AMS, fall, Seizure   - Donepezil 5 mg started  for memory/cognition. monitor for SE-- appears to be tolerating.    --Neuroendocrine w/u-- cortisol pending,  Had normal TSH and free T4 on     #HTN --Orthostatic Hypotension--  --s/p IVFs-- BP improved   - Previously on enalapril which was discontinued  - Monitor BP. Keep BP<130/80  - Encourage P.O intake.   - w/u neg for infection      # Diabetes   - HBA1c -  7.3  - ISS  --Metformin 850 mg BID.   --FS controlled     #Depression   - cont. Escitalopram to 10mg  - Neuropsychology following    # Delirium & seizure   - Continue on Valproate for  agitation.  - neuropsychology & SLP cognitive treatment.   - VPA level - 70 - WNL  --ammonia level 8/6 WNL    Sleep  --melatonin     #ETOH Abuse   - Continue Multivitamin, Folic Acid, Thiamine   - psychology and SW follow up    #HLD   - Continue Atorvastatin.     #Pain  - Tylenol PRN    #GI  - Dulcolax PRN, Senna  - Protonix  - Milk of Magnesia PRN.     #Diet  - Regular - Carb controlled - DASH diet.     # DVT Prophylaxis   -previously on heparin Q12H but now on lovenox to decrease # of injections and improve compliance      IDT 21  -SW: Lives with significant other in a private home with 1 JESSIE. Daughter is involved in his care.   -OT: supervision eating, UBD; min A for grooming, LBD, toilet trans; Mod A toileting; Min A lower body dressing. Agitated and inappropriate  -PT: Stairs: 8 steps Min A;  CG transfers; Min A amb. 50ft.   -Speech: Regular consistency diet. Memory is Mod A. Needs frequent redirection and reorientation.  Compr min A, Soc Max A, Poor attention, disoriented.  poor interaction  D/C plannin/14 home.

## 2021-08-07 NOTE — PROGRESS NOTE ADULT - SUBJECTIVE AND OBJECTIVE BOX
HPI:  Patient is a 76 year old male with PMHx of Diabetes Mellitus type 2, initially presented as a Level 2 Trauma at [facility] on 7/9 after found down at a bus stop with altered mental status. It was thought to be due totraumatic cause. Patient appears intoxicated at the time with  on arrival. CT scan on admission demonstrated bifrontal SAH, a R SDH, a L parietal SAH with pneumocephalus, a L possible parietal non-displaced skull fracture, and a C6 inferior endplate fracture into disc space with air into the canal.     Patient was admitted to NSICU. Neurosurgery was consulted who decided to treat conservatively with no progression seen on repeat imaging. During patient hospital course he seemed to have worsening AMS which was thought to be due to delirium and or dementia. Pt also had a UTI & has completed ABx course. Pt was also found to be hyponatremic possibly due to SIADH, which has now resolved.     Pt recommended for acute inpatient rehabilitation and was transferred to [facility] on 7/29/21.        Subjective:  No overnight issues.  Pt. slept during the night.  Participating in therapy.    Vital Signs Last 24 Hrs  T(C): 36.7 (07 Aug 2021 07:53), Max: 36.8 (06 Aug 2021 19:53)  T(F): 98.1 (07 Aug 2021 07:53), Max: 98.2 (06 Aug 2021 19:53)  HR: 71 (07 Aug 2021 07:53) (71 - 97)  BP: 122/78 (07 Aug 2021 07:53) (106/65 - 122/78)  BP(mean): --  RR: 15 (07 Aug 2021 07:53) (15 - 15)  SpO2: 100% (07 Aug 2021 07:53) (100% - 100%)    REVIEW OF SYMPTOMS  Positive: cognitive impairment.    Denies: headache,  CP, SOB, abdominal pain, dysuria, nausea, constipation    Physical Exam:    Constitutional - NAD, Comfortable  HEENT -  EOMI  Neck - Supple,   Chest - breathing comfortably on RA  Cardio - warm and well perfused,   Abdomen -  Soft, NTND  Extremities - No peripheral edema,   Neurologic Exam:                    Cognitive -             Orientation: Awake, Alert, AAO x2-3.  knows he's in hospital, but not name.  Knew year but not month and date.              Attention:  impaired,  tangential speech.  difficulty following conversation.  unable to complete days of week backward            Memory: impaired-- 0/3 spontaneously,  3/3 with cat. cues     Speech - Fluent, Comprehensible, No dysarthria, No aphasia      Cranial Nerves -PERRLA, No facial asymmetry, Tongue midline, EOMI, Shoulder shrug intact     Motor -                      LEFT    UE - ShAB 5/5, EF 5/5, EE 5/5, WE 5/5,  WNL                    RIGHT UE - ShAB 5/5, EF 5/5, EE 5/5, WE 5/5,  WNL                    LEFT    LE - HF 5/5, KE 5/5, DF 5/5, PF 5/5                    RIGHT LE - HF 5/5, KE 5/5, DF 5/5, PF 5/5        Sensory - Intact to LT bilateral     Coordination - FTN / HTS intact    Psychiatric - anxious,  can be irritable at times.                MEDICATIONS  (STANDING):  atorvastatin 40 milliGRAM(s) Oral at bedtime  dextrose 40% Gel 15 Gram(s) Oral once  dextrose 5%. 1000 milliLiter(s) (50 mL/Hr) IV Continuous <Continuous>  dextrose 5%. 1000 milliLiter(s) (100 mL/Hr) IV Continuous <Continuous>  dextrose 50% Injectable 12.5 Gram(s) IV Push once  dextrose 50% Injectable 25 Gram(s) IV Push once  dextrose 50% Injectable 25 Gram(s) IV Push once  donepezil 5 milliGRAM(s) Oral daily  enoxaparin Injectable 40 milliGRAM(s) SubCutaneous daily  escitalopram 10 milliGRAM(s) Oral daily  folic acid 1 milliGRAM(s) Oral daily  glucagon  Injectable 1 milliGRAM(s) IntraMuscular once  insulin lispro (ADMELOG) corrective regimen sliding scale   SubCutaneous three times a day before meals  insulin lispro (ADMELOG) corrective regimen sliding scale   SubCutaneous at bedtime  melatonin 3 milliGRAM(s) Oral at bedtime  metFORMIN 850 milliGRAM(s) Oral two times a day  multivitamin 1 Tablet(s) Oral daily  polyethylene glycol 3350 17 Gram(s) Oral daily  senna 2 Tablet(s) Oral at bedtime  thiamine 100 milliGRAM(s) Oral daily  timolol 0.25% Solution 1 Drop(s) Both EYES at bedtime  valproic  acid Syrup 500 milliGRAM(s) Oral every 12 hours    MEDICATIONS  (PRN):  acetaminophen   Tablet .. 650 milliGRAM(s) Oral every 6 hours PRN Temp greater or equal to 38C (100.4F)  magnesium hydroxide Suspension 30 milliLiter(s) Oral daily PRN Constipation

## 2021-08-07 NOTE — PROGRESS NOTE ADULT - SUBJECTIVE AND OBJECTIVE BOX
Patient is a 76y old  Male who presents with a chief complaint of Traumatic Right frontal and temporal subdural hematoma (04 Aug 2021 13:24)    Patient seen and examined at bedside. doing well no acute complaints     ALLERGIES:  Allergy Status Unknown    MEDICATIONS  (STANDING):  atorvastatin 40 milliGRAM(s) Oral at bedtime  dextrose 40% Gel 15 Gram(s) Oral once  dextrose 5%. 1000 milliLiter(s) (50 mL/Hr) IV Continuous <Continuous>  dextrose 5%. 1000 milliLiter(s) (100 mL/Hr) IV Continuous <Continuous>  dextrose 50% Injectable 25 Gram(s) IV Push once  dextrose 50% Injectable 12.5 Gram(s) IV Push once  dextrose 50% Injectable 25 Gram(s) IV Push once  donepezil 5 milliGRAM(s) Oral daily  enoxaparin Injectable 40 milliGRAM(s) SubCutaneous daily  escitalopram 10 milliGRAM(s) Oral daily  folic acid 1 milliGRAM(s) Oral daily  glucagon  Injectable 1 milliGRAM(s) IntraMuscular once  insulin lispro (ADMELOG) corrective regimen sliding scale   SubCutaneous three times a day before meals  insulin lispro (ADMELOG) corrective regimen sliding scale   SubCutaneous at bedtime  melatonin 3 milliGRAM(s) Oral at bedtime  metFORMIN 850 milliGRAM(s) Oral two times a day  multivitamin 1 Tablet(s) Oral daily  polyethylene glycol 3350 17 Gram(s) Oral daily  senna 2 Tablet(s) Oral at bedtime  sodium chloride 0.9%. 1000 milliLiter(s) (75 mL/Hr) IV Continuous <Continuous>  thiamine 100 milliGRAM(s) Oral daily  timolol 0.25% Solution 1 Drop(s) Both EYES at bedtime  valproic  acid Syrup 500 milliGRAM(s) Oral every 12 hours    MEDICATIONS  (PRN):  acetaminophen   Tablet .. 650 milliGRAM(s) Oral every 6 hours PRN Temp greater or equal to 38C (100.4F)  magnesium hydroxide Suspension 30 milliLiter(s) Oral daily PRN Constipation    Vital Signs Last 24 Hrs  T(C): 36.7 (07 Aug 2021 07:53), Max: 36.8 (06 Aug 2021 19:53)  T(F): 98.1 (07 Aug 2021 07:53), Max: 98.2 (06 Aug 2021 19:53)  HR: 71 (07 Aug 2021 07:53) (71 - 97)  BP: 122/78 (07 Aug 2021 07:53) (106/65 - 122/78)  BP(mean): --  RR: 15 (07 Aug 2021 07:53) (15 - 15)  SpO2: 100% (07 Aug 2021 07:53) (100% - 100%)  I&O's Summary    04 Aug 2021 07:01  -  05 Aug 2021 07:00  --------------------------------------------------------  IN: 0 mL / OUT: 500 mL / NET: -500 mL    PHYSICAL EXAM:  General: NAD, A/O x 3  ENT: MMM, no scleral icterus  Neck: Supple, No JVD  Lungs: Respirations unlabored. Clear to auscultation bilaterally, no wheezes, rales, rhonchi  Cardio: RRR, S1/S2  Abdomen: Soft, Nontender, Nondistended; Bowel sounds present  Extremities: No calf tenderness, No pitting edema LE b/l    LABS:                        9.1    3.97  )-----------( 329      ( 05 Aug 2021 06:30 )             28.4       08-    139  |  105  |  12  ----------------------------<  217  4.4   |  27  |  1.14    Ca    9.1      05 Aug 2021 06:30    TPro  6.3  /  Alb  2.2  /  TBili  0.2  /  DBili  x   /  AST  25  /  ALT  27  /  AlkPhos  93  08-05     eGFR if Non African American: 63 mL/min/1.73M2 (21 @ 06:30)  eGFR if : 73 mL/min/1.73M2 (21 @ 06:30)    POCT Blood Glucose.: 174 mg/dL (05 Aug 2021 08:42)  POCT Blood Glucose.: 178 mg/dL (04 Aug 2021 22:38)  POCT Blood Glucose.: 139 mg/dL (04 Aug 2021 16:58)  POCT Blood Glucose.: 184 mg/dL (04 Aug 2021 11:46)    Urinalysis Basic - ( 05 Aug 2021 07:20 )    Color: Yellow / Appearance: Clear / S.005 / pH: x  Gluc: x / Ketone: Negative  / Bili: Negative / Urobili: Negative   Blood: x / Protein: Negative / Nitrite: Negative   Leuk Esterase: Negative / RBC: x / WBC x   Sq Epi: x / Non Sq Epi: x / Bacteria: x    COVID-19 PCR: NotDetec (21 @ 17:25)  COVID-19 PCR: NotDetec (21 @ 11:23)  COVID-19 PCR: NotDetec (21 @ 22:51)    RADIOLOGY & ADDITIONAL TESTS: reviewed    Care Discussed with Consultants/Other Providers: yes Patient is a 76y old  Male who presents with a chief complaint of Traumatic Right frontal and temporal subdural hematoma (04 Aug 2021 13:24)    Patient seen and examined at bedside. doing well no acute complaints - states he is leaving 'for a few minutes to handle business' later today. per staff, pt frequently makes inappropriate sexual remarks.      ALLERGIES:  Allergy Status Unknown    MEDICATIONS  (STANDING):  atorvastatin 40 milliGRAM(s) Oral at bedtime  dextrose 40% Gel 15 Gram(s) Oral once  dextrose 5%. 1000 milliLiter(s) (50 mL/Hr) IV Continuous <Continuous>  dextrose 5%. 1000 milliLiter(s) (100 mL/Hr) IV Continuous <Continuous>  dextrose 50% Injectable 25 Gram(s) IV Push once  dextrose 50% Injectable 12.5 Gram(s) IV Push once  dextrose 50% Injectable 25 Gram(s) IV Push once  donepezil 5 milliGRAM(s) Oral daily  enoxaparin Injectable 40 milliGRAM(s) SubCutaneous daily  escitalopram 10 milliGRAM(s) Oral daily  folic acid 1 milliGRAM(s) Oral daily  glucagon  Injectable 1 milliGRAM(s) IntraMuscular once  insulin lispro (ADMELOG) corrective regimen sliding scale   SubCutaneous three times a day before meals  insulin lispro (ADMELOG) corrective regimen sliding scale   SubCutaneous at bedtime  melatonin 3 milliGRAM(s) Oral at bedtime  metFORMIN 850 milliGRAM(s) Oral two times a day  multivitamin 1 Tablet(s) Oral daily  polyethylene glycol 3350 17 Gram(s) Oral daily  senna 2 Tablet(s) Oral at bedtime  sodium chloride 0.9%. 1000 milliLiter(s) (75 mL/Hr) IV Continuous <Continuous>  thiamine 100 milliGRAM(s) Oral daily  timolol 0.25% Solution 1 Drop(s) Both EYES at bedtime  valproic  acid Syrup 500 milliGRAM(s) Oral every 12 hours    MEDICATIONS  (PRN):  acetaminophen   Tablet .. 650 milliGRAM(s) Oral every 6 hours PRN Temp greater or equal to 38C (100.4F)  magnesium hydroxide Suspension 30 milliLiter(s) Oral daily PRN Constipation      Vital Signs Last 24 Hrs  T(C): 36.7 (07 Aug 2021 07:53), Max: 36.8 (06 Aug 2021 19:53)  T(F): 98.1 (07 Aug 2021 07:53), Max: 98.2 (06 Aug 2021 19:53)  HR: 71 (07 Aug 2021 07:53) (71 - 97)  BP: 122/78 (07 Aug 2021 07:53) (106/65 - 122/78)  BP(mean): --  RR: 15 (07 Aug 2021 07:53) (15 - 15)  SpO2: 100% (07 Aug 2021 07:53) (100% - 100%)  I&O's Summary    04 Aug 2021 07:01  -  05 Aug 2021 07:00  --------------------------------------------------------  IN: 0 mL / OUT: 500 mL / NET: -500 mL    PHYSICAL EXAM:  General: NAD, A/O x 3  ENT: MMM, no scleral icterus  Neck: Supple, No JVD  Lungs: Respirations unlabored. Clear to auscultation bilaterally, no wheezes, rales, rhonchi  Cardio: RRR, S1/S2  Abdomen: Soft, Nontender, Nondistended; Bowel sounds present  Extremities: No calf tenderness, No pitting edema LE b/l    LABS:                        9.1    3.97  )-----------( 329      ( 05 Aug 2021 06:30 )             28.4       08-    139  |  105  |  12  ----------------------------<  217  4.4   |  27  |  1.14    Ca    9.1      05 Aug 2021 06:30    TPro  6.3  /  Alb  2.2  /  TBili  0.2  /  DBili  x   /  AST  25  /  ALT  27  /  AlkPhos  93  08-     eGFR if Non African American: 63 mL/min/1.73M2 (21 @ 06:30)  eGFR if : 73 mL/min/1.73M2 (21 @ 06:30)    POCT Blood Glucose.: 174 mg/dL (05 Aug 2021 08:42)  POCT Blood Glucose.: 178 mg/dL (04 Aug 2021 22:38)  POCT Blood Glucose.: 139 mg/dL (04 Aug 2021 16:58)  POCT Blood Glucose.: 184 mg/dL (04 Aug 2021 11:46)    Urinalysis Basic - ( 05 Aug 2021 07:20 )    Color: Yellow / Appearance: Clear / S.005 / pH: x  Gluc: x / Ketone: Negative  / Bili: Negative / Urobili: Negative   Blood: x / Protein: Negative / Nitrite: Negative   Leuk Esterase: Negative / RBC: x / WBC x   Sq Epi: x / Non Sq Epi: x / Bacteria: x    COVID-19 PCR: NotDetec (21 @ 17:25)  COVID-19 PCR: NotDetec (21 @ 11:23)  COVID- PCR: NotDetec (21 @ 22:51)    RADIOLOGY & ADDITIONAL TESTS: reviewed    Care Discussed with Consultants/Other Providers: yes
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
## 2021-08-08 LAB
GLUCOSE BLDC GLUCOMTR-MCNC: 136 MG/DL — HIGH (ref 70–99)
GLUCOSE BLDC GLUCOMTR-MCNC: 145 MG/DL — HIGH (ref 70–99)
GLUCOSE BLDC GLUCOMTR-MCNC: 169 MG/DL — HIGH (ref 70–99)
GLUCOSE BLDC GLUCOMTR-MCNC: 193 MG/DL — HIGH (ref 70–99)

## 2021-08-08 PROCEDURE — 99232 SBSQ HOSP IP/OBS MODERATE 35: CPT

## 2021-08-08 RX ADMIN — Medication 500 MILLIGRAM(S): at 05:35

## 2021-08-08 RX ADMIN — Medication 3 MILLIGRAM(S): at 21:38

## 2021-08-08 RX ADMIN — Medication 1: at 11:28

## 2021-08-08 RX ADMIN — ATORVASTATIN CALCIUM 40 MILLIGRAM(S): 80 TABLET, FILM COATED ORAL at 21:39

## 2021-08-08 RX ADMIN — Medication 1 DROP(S): at 21:39

## 2021-08-08 RX ADMIN — METFORMIN HYDROCHLORIDE 850 MILLIGRAM(S): 850 TABLET ORAL at 17:48

## 2021-08-08 RX ADMIN — METFORMIN HYDROCHLORIDE 850 MILLIGRAM(S): 850 TABLET ORAL at 07:38

## 2021-08-08 RX ADMIN — Medication 1 TABLET(S): at 11:23

## 2021-08-08 RX ADMIN — DONEPEZIL HYDROCHLORIDE 5 MILLIGRAM(S): 10 TABLET, FILM COATED ORAL at 11:23

## 2021-08-08 RX ADMIN — Medication 1 MILLIGRAM(S): at 11:29

## 2021-08-08 RX ADMIN — Medication 100 MILLIGRAM(S): at 11:23

## 2021-08-08 RX ADMIN — ESCITALOPRAM OXALATE 10 MILLIGRAM(S): 10 TABLET, FILM COATED ORAL at 11:23

## 2021-08-08 RX ADMIN — POLYETHYLENE GLYCOL 3350 17 GRAM(S): 17 POWDER, FOR SOLUTION ORAL at 11:24

## 2021-08-08 RX ADMIN — ENOXAPARIN SODIUM 40 MILLIGRAM(S): 100 INJECTION SUBCUTANEOUS at 11:23

## 2021-08-08 RX ADMIN — Medication 500 MILLIGRAM(S): at 17:48

## 2021-08-08 RX ADMIN — SENNA PLUS 2 TABLET(S): 8.6 TABLET ORAL at 21:38
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
## 2021-08-08 NOTE — PROGRESS NOTE ADULT - SUBJECTIVE AND OBJECTIVE BOX
Patient is a 76y old  Male who presents with a chief complaint of Traumatic Right frontal and temporal subdural hematoma (07 Aug 2021 12:49)    Patient seen and examined at bedside. No acute overnight events.     ALLERGIES:  Allergy Status Unknown    MEDICATIONS  (STANDING):  atorvastatin 40 milliGRAM(s) Oral at bedtime  dextrose 40% Gel 15 Gram(s) Oral once  dextrose 5%. 1000 milliLiter(s) (50 mL/Hr) IV Continuous <Continuous>  dextrose 5%. 1000 milliLiter(s) (100 mL/Hr) IV Continuous <Continuous>  dextrose 50% Injectable 25 Gram(s) IV Push once  dextrose 50% Injectable 12.5 Gram(s) IV Push once  dextrose 50% Injectable 25 Gram(s) IV Push once  donepezil 5 milliGRAM(s) Oral daily  enoxaparin Injectable 40 milliGRAM(s) SubCutaneous daily  escitalopram 10 milliGRAM(s) Oral daily  folic acid 1 milliGRAM(s) Oral daily  glucagon  Injectable 1 milliGRAM(s) IntraMuscular once  insulin lispro (ADMELOG) corrective regimen sliding scale   SubCutaneous three times a day before meals  insulin lispro (ADMELOG) corrective regimen sliding scale   SubCutaneous at bedtime  melatonin 3 milliGRAM(s) Oral at bedtime  metFORMIN 850 milliGRAM(s) Oral two times a day  multivitamin 1 Tablet(s) Oral daily  polyethylene glycol 3350 17 Gram(s) Oral daily  senna 2 Tablet(s) Oral at bedtime  thiamine 100 milliGRAM(s) Oral daily  timolol 0.25% Solution 1 Drop(s) Both EYES at bedtime  valproic  acid Syrup 500 milliGRAM(s) Oral every 12 hours    MEDICATIONS  (PRN):  acetaminophen   Tablet .. 650 milliGRAM(s) Oral every 6 hours PRN Temp greater or equal to 38C (100.4F)  magnesium hydroxide Suspension 30 milliLiter(s) Oral daily PRN Constipation    Vital Signs Last 24 Hrs  T(C): 36.7 (07 Aug 2021 21:36), Max: 36.7 (07 Aug 2021 07:53)  T(F): 98.1 (07 Aug 2021 21:36), Max: 98.1 (07 Aug 2021 07:53)  HR: 85 (07 Aug 2021 21:36) (71 - 85)  BP: 113/68 (07 Aug 2021 21:36) (113/68 - 122/78)  BP(mean): --  RR: 15 (07 Aug 2021 21:36) (15 - 15)  SpO2: 99% (07 Aug 2021 21:36) (99% - 100%)  I&O's Summary      PHYSICAL EXAM:  General: NAD, A/O x 3  ENT: MMM, no scleral icterus  Neck: Supple, No JVD  Lungs: Respirations unlabored. Clear to auscultation bilaterally, no wheezes, rales, rhonchi  Cardio: RRR, S1/S2  Abdomen: Soft, Nontender, Nondistended; Bowel sounds present  Extremities: No calf tenderness, No pitting edema LE b/l    LABS:    POCT Blood Glucose.: 145 mg/dL (08 Aug 2021 07:30)  POCT Blood Glucose.: 163 mg/dL (07 Aug 2021 21:33)  POCT Blood Glucose.: 145 mg/dL (07 Aug 2021 16:04)  POCT Blood Glucose.: 135 mg/dL (07 Aug 2021 11:18)  POCT Blood Glucose.: 186 mg/dL (07 Aug 2021 07:56)    COVID-19 PCR: NotDetec (07-29-21 @ 17:25)  COVID-19 PCR: NotDetec (07-29-21 @ 11:23)  COVID-19 PCR: NotDetec (07-09-21 @ 22:51)    RADIOLOGY & ADDITIONAL TESTS: reviewed    Care Discussed with Consultants/Other Providers: yes   Patient is a 76y old  Male who presents with a chief complaint of Traumatic Right frontal and temporal subdural hematoma (07 Aug 2021 12:49)    Patient seen and examined at bedside. No acute overnight events. no acute medical complaints.     ALLERGIES:  Allergy Status Unknown    MEDICATIONS  (STANDING):  atorvastatin 40 milliGRAM(s) Oral at bedtime  dextrose 40% Gel 15 Gram(s) Oral once  dextrose 5%. 1000 milliLiter(s) (50 mL/Hr) IV Continuous <Continuous>  dextrose 5%. 1000 milliLiter(s) (100 mL/Hr) IV Continuous <Continuous>  dextrose 50% Injectable 25 Gram(s) IV Push once  dextrose 50% Injectable 12.5 Gram(s) IV Push once  dextrose 50% Injectable 25 Gram(s) IV Push once  donepezil 5 milliGRAM(s) Oral daily  enoxaparin Injectable 40 milliGRAM(s) SubCutaneous daily  escitalopram 10 milliGRAM(s) Oral daily  folic acid 1 milliGRAM(s) Oral daily  glucagon  Injectable 1 milliGRAM(s) IntraMuscular once  insulin lispro (ADMELOG) corrective regimen sliding scale   SubCutaneous three times a day before meals  insulin lispro (ADMELOG) corrective regimen sliding scale   SubCutaneous at bedtime  melatonin 3 milliGRAM(s) Oral at bedtime  metFORMIN 850 milliGRAM(s) Oral two times a day  multivitamin 1 Tablet(s) Oral daily  polyethylene glycol 3350 17 Gram(s) Oral daily  senna 2 Tablet(s) Oral at bedtime  thiamine 100 milliGRAM(s) Oral daily  timolol 0.25% Solution 1 Drop(s) Both EYES at bedtime  valproic  acid Syrup 500 milliGRAM(s) Oral every 12 hours    MEDICATIONS  (PRN):  acetaminophen   Tablet .. 650 milliGRAM(s) Oral every 6 hours PRN Temp greater or equal to 38C (100.4F)  magnesium hydroxide Suspension 30 milliLiter(s) Oral daily PRN Constipation    Vital Signs Last 24 Hrs  T(C): 36.5 (08 Aug 2021 08:25), Max: 36.7 (07 Aug 2021 21:36)  T(F): 97.7 (08 Aug 2021 08:25), Max: 98.1 (07 Aug 2021 21:36)  HR: 91 (08 Aug 2021 08:25) (85 - 91)  BP: 109/71 (08 Aug 2021 08:25) (109/71 - 113/68)  BP(mean): --  RR: 14 (08 Aug 2021 08:25) (14 - 15)  SpO2: 100% (08 Aug 2021 08:25) (99% - 100%)  I&O's Summary      PHYSICAL EXAM:  General: NAD, A/O x 3  ENT: MMM, no scleral icterus  Neck: Supple, No JVD  Lungs: Respirations unlabored. Clear to auscultation bilaterally, no wheezes, rales, rhonchi  Cardio: RRR, S1/S2  Abdomen: Soft, Nontender, Nondistended; Bowel sounds present  Extremities: No calf tenderness, No pitting edema LE b/l    LABS:    POCT Blood Glucose.: 145 mg/dL (08 Aug 2021 07:30)  POCT Blood Glucose.: 163 mg/dL (07 Aug 2021 21:33)  POCT Blood Glucose.: 145 mg/dL (07 Aug 2021 16:04)  POCT Blood Glucose.: 135 mg/dL (07 Aug 2021 11:18)  POCT Blood Glucose.: 186 mg/dL (07 Aug 2021 07:56)    COVID-19 PCR: NotDetec (07-29-21 @ 17:25)  COVID-19 PCR: NotDetec (07-29-21 @ 11:23)  COVID-19 PCR: NotDetec (07-09-21 @ 22:51)    RADIOLOGY & ADDITIONAL TESTS: reviewed    Care Discussed with Consultants/Other Providers: yes

## 2021-08-09 ENCOUNTER — TRANSCRIPTION ENCOUNTER (OUTPATIENT)
Age: 76
End: 2021-08-09

## 2021-08-09 LAB
ALBUMIN SERPL ELPH-MCNC: 2.4 G/DL — LOW (ref 3.3–5)
ALP SERPL-CCNC: 91 U/L — SIGNIFICANT CHANGE UP (ref 40–120)
ALT FLD-CCNC: 40 U/L — SIGNIFICANT CHANGE UP (ref 10–45)
ANION GAP SERPL CALC-SCNC: 7 MMOL/L — SIGNIFICANT CHANGE UP (ref 5–17)
AST SERPL-CCNC: 28 U/L — SIGNIFICANT CHANGE UP (ref 10–40)
BILIRUB SERPL-MCNC: 0.2 MG/DL — SIGNIFICANT CHANGE UP (ref 0.2–1.2)
BUN SERPL-MCNC: 11 MG/DL — SIGNIFICANT CHANGE UP (ref 7–23)
CALCIUM SERPL-MCNC: 8.8 MG/DL — SIGNIFICANT CHANGE UP (ref 8.4–10.5)
CHLORIDE SERPL-SCNC: 104 MMOL/L — SIGNIFICANT CHANGE UP (ref 96–108)
CO2 SERPL-SCNC: 28 MMOL/L — SIGNIFICANT CHANGE UP (ref 22–31)
CREAT SERPL-MCNC: 1.22 MG/DL — SIGNIFICANT CHANGE UP (ref 0.5–1.3)
GLUCOSE BLDC GLUCOMTR-MCNC: 135 MG/DL — HIGH (ref 70–99)
GLUCOSE BLDC GLUCOMTR-MCNC: 145 MG/DL — HIGH (ref 70–99)
GLUCOSE BLDC GLUCOMTR-MCNC: 147 MG/DL — HIGH (ref 70–99)
GLUCOSE BLDC GLUCOMTR-MCNC: 167 MG/DL — HIGH (ref 70–99)
GLUCOSE SERPL-MCNC: 200 MG/DL — HIGH (ref 70–99)
HCT VFR BLD CALC: 28 % — LOW (ref 39–50)
HGB BLD-MCNC: 9 G/DL — LOW (ref 13–17)
MCHC RBC-ENTMCNC: 31.6 PG — SIGNIFICANT CHANGE UP (ref 27–34)
MCHC RBC-ENTMCNC: 32.1 GM/DL — SIGNIFICANT CHANGE UP (ref 32–36)
MCV RBC AUTO: 98.2 FL — SIGNIFICANT CHANGE UP (ref 80–100)
NRBC # BLD: 0 /100 WBCS — SIGNIFICANT CHANGE UP (ref 0–0)
PLATELET # BLD AUTO: 266 K/UL — SIGNIFICANT CHANGE UP (ref 150–400)
POTASSIUM SERPL-MCNC: 4 MMOL/L — SIGNIFICANT CHANGE UP (ref 3.5–5.3)
POTASSIUM SERPL-SCNC: 4 MMOL/L — SIGNIFICANT CHANGE UP (ref 3.5–5.3)
PROT SERPL-MCNC: 6.4 G/DL — SIGNIFICANT CHANGE UP (ref 6–8.3)
RBC # BLD: 2.85 M/UL — LOW (ref 4.2–5.8)
RBC # FLD: 11.4 % — SIGNIFICANT CHANGE UP (ref 10.3–14.5)
SODIUM SERPL-SCNC: 139 MMOL/L — SIGNIFICANT CHANGE UP (ref 135–145)
WBC # BLD: 5.35 K/UL — SIGNIFICANT CHANGE UP (ref 3.8–10.5)
WBC # FLD AUTO: 5.35 K/UL — SIGNIFICANT CHANGE UP (ref 3.8–10.5)

## 2021-08-09 PROCEDURE — 99232 SBSQ HOSP IP/OBS MODERATE 35: CPT

## 2021-08-09 RX ADMIN — Medication 1 TABLET(S): at 11:57

## 2021-08-09 RX ADMIN — DONEPEZIL HYDROCHLORIDE 5 MILLIGRAM(S): 10 TABLET, FILM COATED ORAL at 11:57

## 2021-08-09 RX ADMIN — ATORVASTATIN CALCIUM 40 MILLIGRAM(S): 80 TABLET, FILM COATED ORAL at 22:28

## 2021-08-09 RX ADMIN — Medication 500 MILLIGRAM(S): at 05:28

## 2021-08-09 RX ADMIN — Medication 1 MILLIGRAM(S): at 11:57

## 2021-08-09 RX ADMIN — METFORMIN HYDROCHLORIDE 850 MILLIGRAM(S): 850 TABLET ORAL at 08:42

## 2021-08-09 RX ADMIN — Medication 100 MILLIGRAM(S): at 11:57

## 2021-08-09 RX ADMIN — Medication 3 MILLIGRAM(S): at 22:28

## 2021-08-09 RX ADMIN — Medication 1: at 07:44

## 2021-08-09 RX ADMIN — ESCITALOPRAM OXALATE 10 MILLIGRAM(S): 10 TABLET, FILM COATED ORAL at 11:57

## 2021-08-09 RX ADMIN — Medication 500 MILLIGRAM(S): at 18:05

## 2021-08-09 RX ADMIN — METFORMIN HYDROCHLORIDE 850 MILLIGRAM(S): 850 TABLET ORAL at 18:05

## 2021-08-09 RX ADMIN — Medication 1 DROP(S): at 22:28

## 2021-08-09 RX ADMIN — ENOXAPARIN SODIUM 40 MILLIGRAM(S): 100 INJECTION SUBCUTANEOUS at 11:57

## 2021-08-09 NOTE — DISCHARGE NOTE PROVIDER - CARE PROVIDER_API CALL
Rojelio Coon)  Neurology  [facility], [address] [place] [address]  Phone: [phone]  Fax: [phone]  Follow Up Time: 1 week    Radha Duarte (DO)  Brain Injury Medicine; PhysicalRehab Medicine  [address] [place] [address]  Phone: [phone]  Fax: [phone]  Follow Up Time: 1 month    primary, care  Phone: (   )    -  Fax: (   )    -  Follow Up Time:

## 2021-08-09 NOTE — DISCHARGE NOTE PROVIDER - CARE PROVIDERS DIRECT ADDRESSES
no ,[email or web address],DirectAddress_Unknown
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
## 2021-08-09 NOTE — PROGRESS NOTE ADULT - SUBJECTIVE AND OBJECTIVE BOX
76y old  Male who presents with a chief complaint of Traumatic Right frontal and temporal subdural hematoma    Patient seen and examined at bedside. No acute overnight events. no acute medical complaints.   no n/v, no sob, wants to be d/c home      Vital Signs Last 24 Hrs  T(C): 36.7 (09 Aug 2021 08:00), Max: 36.9 (08 Aug 2021 19:58)  T(F): 98 (09 Aug 2021 08:00), Max: 98.4 (08 Aug 2021 19:58)  HR: 92 (09 Aug 2021 08:00) (91 - 92)  BP: 106/62 (09 Aug 2021 08:00) (100/67 - 106/62)  BP(mean): --  RR: 18 (09 Aug 2021 08:00) (16 - 18)  SpO2: 99% (09 Aug 2021 08:00) (99% - 99%)  \  GENERAL- NAD  EAR/NOSE/MOUTH/THROAT - no pharyngeal exudates, no oral leisions,  MMM  EYES- MITZI, conjunctiva and Sclera clear  NECK- supple  RESPIRATORY-  clear to auscultation bilaterally, non laboured breathing  CARDIOVASCULAR - SIS2, RRR  GI - soft NT BS present  EXTREMITIES- no pedal edema  NEUROLOGY- no gross focal deficits  PSYCHIATRY- AAO X 3  MUSCULOSKELETAL- ROM normal                9.0                  139  | 28   | 11           5.35  >-----------< 266     ------------------------< 200                   28.0                 4.0  | 104  | 1.22                                         Ca 8.8   Mg x     Ph x          CAPILLARY BLOOD GLUCOSE      POCT Blood Glucose.: 167 mg/dL (09 Aug 2021 07:41)  POCT Blood Glucose.: 136 mg/dL (08 Aug 2021 21:37)  POCT Blood Glucose.: 169 mg/dL (08 Aug 2021 16:39)  POCT Blood Glucose.: 193 mg/dL (08 Aug 2021 11:26)    A1C with Estimated Average Glucose Result: 7.3: Method: Immunoassay       Reference Range                4.0-5.6%       High risk (prediabetic)        5.7-6.4%       Diabetic, diagnostic             >=6.5%       ADA diabetic treatment goal       <7.0%  The Hemoglobin A1c testing is NGSP-certified.Reference ranges are based  upon the 2010 recommendations of  the American Diabetes Association.  Interpretation may vary for children  and adolescents. % (07.10.21 @ 10:16)        MEDICATIONS  (STANDING):  atorvastatin 40 milliGRAM(s) Oral at bedtime  dextrose 40% Gel 15 Gram(s) Oral once  dextrose 5%. 1000 milliLiter(s) (50 mL/Hr) IV Continuous <Continuous>  dextrose 5%. 1000 milliLiter(s) (100 mL/Hr) IV Continuous <Continuous>  dextrose 50% Injectable 25 Gram(s) IV Push once  dextrose 50% Injectable 12.5 Gram(s) IV Push once  dextrose 50% Injectable 25 Gram(s) IV Push once  donepezil 5 milliGRAM(s) Oral daily  enoxaparin Injectable 40 milliGRAM(s) SubCutaneous daily  escitalopram 10 milliGRAM(s) Oral daily  folic acid 1 milliGRAM(s) Oral daily  glucagon  Injectable 1 milliGRAM(s) IntraMuscular once  insulin lispro (ADMELOG) corrective regimen sliding scale   SubCutaneous three times a day before meals  insulin lispro (ADMELOG) corrective regimen sliding scale   SubCutaneous at bedtime  melatonin 3 milliGRAM(s) Oral at bedtime  metFORMIN 850 milliGRAM(s) Oral two times a day  multivitamin 1 Tablet(s) Oral daily  polyethylene glycol 3350 17 Gram(s) Oral daily  senna 2 Tablet(s) Oral at bedtime  thiamine 100 milliGRAM(s) Oral daily  timolol 0.25% Solution 1 Drop(s) Both EYES at bedtime  valproic  acid Syrup 500 milliGRAM(s) Oral every 12 hours    MEDICATIONS  (PRN):  acetaminophen   Tablet .. 650 milliGRAM(s) Oral every 6 hours PRN Temp greater or equal to 38C (100.4F)  magnesium hydroxide Suspension 30 milliLiter(s) Oral daily PRN Constipation    ALLERGIES:  Allergy Status Unknown    MEDICATIONS  (STANDING):  atorvastatin 40 milliGRAM(s) Oral at bedtime  dextrose 40% Gel 15 Gram(s) Oral once  dextrose 5%. 1000 milliLiter(s) (50 mL/Hr) IV Continuous <Continuous>  dextrose 5%. 1000 milliLiter(s) (100 mL/Hr) IV Continuous <Continuous>  dextrose 50% Injectable 25 Gram(s) IV Push once  dextrose 50% Injectable 12.5 Gram(s) IV Push once  dextrose 50% Injectable 25 Gram(s) IV Push once  donepezil 5 milliGRAM(s) Oral daily  enoxaparin Injectable 40 milliGRAM(s) SubCutaneous daily  escitalopram 10 milliGRAM(s) Oral daily  folic acid 1 milliGRAM(s) Oral daily  glucagon  Injectable 1 milliGRAM(s) IntraMuscular once  insulin lispro (ADMELOG) corrective regimen sliding scale   SubCutaneous three times a day before meals  insulin lispro (ADMELOG) corrective regimen sliding scale   SubCutaneous at bedtime  melatonin 3 milliGRAM(s) Oral at bedtime  metFORMIN 850 milliGRAM(s) Oral two times a day  multivitamin 1 Tablet(s) Oral daily  polyethylene glycol 3350 17 Gram(s) Oral daily  senna 2 Tablet(s) Oral at bedtime  thiamine 100 milliGRAM(s) Oral daily  timolol 0.25% Solution 1 Drop(s) Both EYES at bedtime  valproic  acid Syrup 500 milliGRAM(s) Oral every 12 hours    MEDICATIONS  (PRN):  acetaminophen   Tablet .. 650 milliGRAM(s) Oral every 6 hours PRN Temp greater or equal to 38C (100.4F)  magnesium hydroxide Suspension 30 milliLiter(s) Oral daily PRN Constipation    Vital Signs Last 24 Hrs  T(C): 36.5 (08 Aug 2021 08:25), Max: 36.7 (07 Aug 2021 21:36)  T(F): 97.7 (08 Aug 2021 08:25), Max: 98.1 (07 Aug 2021 21:36)  HR: 91 (08 Aug 2021 08:25) (85 - 91)  BP: 109/71 (08 Aug 2021 08:25) (109/71 - 113/68)  BP(mean): --  RR: 14 (08 Aug 2021 08:25) (14 - 15)  SpO2: 100% (08 Aug 2021 08:25) (99% - 100%)  I&O's Summary      PHYSICAL EXAM:  General: NAD, A/O x 3  ENT: MMM, no scleral icterus  Neck: Supple, No JVD  Lungs: Respirations unlabored. Clear to auscultation bilaterally, no wheezes, rales, rhonchi  Cardio: RRR, S1/S2  Abdomen: Soft, Nontender, Nondistended; Bowel sounds present  Extremities: No calf tenderness, No pitting edema LE b/l    LABS:    POCT Blood Glucose.: 145 mg/dL (08 Aug 2021 07:30)  POCT Blood Glucose.: 163 mg/dL (07 Aug 2021 21:33)  POCT Blood Glucose.: 145 mg/dL (07 Aug 2021 16:04)  POCT Blood Glucose.: 135 mg/dL (07 Aug 2021 11:18)  POCT Blood Glucose.: 186 mg/dL (07 Aug 2021 07:56)    COVID-19 PCR: NotDetec (07-29-21 @ 17:25)  COVID-19 PCR: NotDetec (07-29-21 @ 11:23)  COVID-19 PCR: NotDetec (07-09-21 @ 22:51)    RADIOLOGY & ADDITIONAL TESTS: reviewed    Care Discussed with Consultants/Other Providers: yes   76y old  Male who presents with a chief complaint of Traumatic Right frontal and temporal subdural hematoma    Patient seen and examined at bedside. No acute overnight events. no acute medical complaints.   no n/v, no sob, wants to be d/c home      Vital Signs Last 24 Hrs  T(C): 36.7 (09 Aug 2021 08:00), Max: 36.9 (08 Aug 2021 19:58)  T(F): 98 (09 Aug 2021 08:00), Max: 98.4 (08 Aug 2021 19:58)  HR: 92 (09 Aug 2021 08:00) (91 - 92)  BP: 106/62 (09 Aug 2021 08:00) (100/67 - 106/62)  BP(mean): --  RR: 18 (09 Aug 2021 08:00) (16 - 18)  SpO2: 99% (09 Aug 2021 08:00) (99% - 99%)  \  GENERAL- NAD  EAR/NOSE/MOUTH/THROAT - no pharyngeal exudates, no oral leisions,  MMM  EYES- MITZI, conjunctiva and Sclera clear  NECK- supple  RESPIRATORY-  clear to auscultation bilaterally, non laboured breathing  CARDIOVASCULAR - SIS2, RRR  GI - soft NT BS present  EXTREMITIES- no pedal edema  NEUROLOGY- no gross focal deficits  PSYCHIATRY- AAO X 3  MUSCULOSKELETAL- ROM normal                9.0                  139  | 28   | 11           5.35  >-----------< 266     ------------------------< 200                   28.0                 4.0  | 104  | 1.22                                         Ca 8.8   Mg x     Ph x          CAPILLARY BLOOD GLUCOSE      POCT Blood Glucose.: 167 mg/dL (09 Aug 2021 07:41)  POCT Blood Glucose.: 136 mg/dL (08 Aug 2021 21:37)  POCT Blood Glucose.: 169 mg/dL (08 Aug 2021 16:39)  POCT Blood Glucose.: 193 mg/dL (08 Aug 2021 11:26)    A1C with Estimated Average Glucose Result: 7.3: Method: Immunoassay       Reference Range                4.0-5.6%       High risk (prediabetic)        5.7-6.4%       Diabetic, diagnostic             >=6.5%       ADA diabetic treatment goal       <7.0%  The Hemoglobin A1c testing is NGSP-certified.Reference ranges are based  upon the 2010 recommendations of  the American Diabetes Association.  Interpretation may vary for children  and adolescents. % (07.10.21 @ 10:16)        MEDICATIONS  (STANDING):  atorvastatin 40 milliGRAM(s) Oral at bedtime  dextrose 40% Gel 15 Gram(s) Oral once  dextrose 5%. 1000 milliLiter(s) (50 mL/Hr) IV Continuous <Continuous>  dextrose 5%. 1000 milliLiter(s) (100 mL/Hr) IV Continuous <Continuous>  dextrose 50% Injectable 25 Gram(s) IV Push once  dextrose 50% Injectable 12.5 Gram(s) IV Push once  dextrose 50% Injectable 25 Gram(s) IV Push once  donepezil 5 milliGRAM(s) Oral daily  enoxaparin Injectable 40 milliGRAM(s) SubCutaneous daily  escitalopram 10 milliGRAM(s) Oral daily  folic acid 1 milliGRAM(s) Oral daily  glucagon  Injectable 1 milliGRAM(s) IntraMuscular once  insulin lispro (ADMELOG) corrective regimen sliding scale   SubCutaneous three times a day before meals  insulin lispro (ADMELOG) corrective regimen sliding scale   SubCutaneous at bedtime  melatonin 3 milliGRAM(s) Oral at bedtime  metFORMIN 850 milliGRAM(s) Oral two times a day  multivitamin 1 Tablet(s) Oral daily  polyethylene glycol 3350 17 Gram(s) Oral daily  senna 2 Tablet(s) Oral at bedtime  thiamine 100 milliGRAM(s) Oral daily  timolol 0.25% Solution 1 Drop(s) Both EYES at bedtime  valproic  acid Syrup 500 milliGRAM(s) Oral every 12 hours    MEDICATIONS  (PRN):  acetaminophen   Tablet .. 650 milliGRAM(s) Oral every 6 hours PRN Temp greater or equal to 38C (100.4F)  magnesium hydroxide Suspension 30 milliLiter(s) Oral daily PRN Constipation    ALLERGIES:  Allergy Status Unknown    MEDICATIONS  (STANDING):  atorvastatin 40 milliGRAM(s) Oral at bedtime  dextrose 40% Gel 15 Gram(s) Oral once  dextrose 5%. 1000 milliLiter(s) (50 mL/Hr) IV Continuous <Continuous>  dextrose 5%. 1000 milliLiter(s) (100 mL/Hr) IV Continuous <Continuous>  dextrose 50% Injectable 25 Gram(s) IV Push once  dextrose 50% Injectable 12.5 Gram(s) IV Push once  dextrose 50% Injectable 25 Gram(s) IV Push once  donepezil 5 milliGRAM(s) Oral daily  enoxaparin Injectable 40 milliGRAM(s) SubCutaneous daily  escitalopram 10 milliGRAM(s) Oral daily  folic acid 1 milliGRAM(s) Oral daily  glucagon  Injectable 1 milliGRAM(s) IntraMuscular once  insulin lispro (ADMELOG) corrective regimen sliding scale   SubCutaneous three times a day before meals  insulin lispro (ADMELOG) corrective regimen sliding scale   SubCutaneous at bedtime  melatonin 3 milliGRAM(s) Oral at bedtime  metFORMIN 850 milliGRAM(s) Oral two times a day  multivitamin 1 Tablet(s) Oral daily  polyethylene glycol 3350 17 Gram(s) Oral daily  senna 2 Tablet(s) Oral at bedtime  thiamine 100 milliGRAM(s) Oral daily  timolol 0.25% Solution 1 Drop(s) Both EYES at bedtime  valproic  acid Syrup 500 milliGRAM(s) Oral every 12 hours    MEDICATIONS  (PRN):  acetaminophen   Tablet .. 650 milliGRAM(s) Oral every 6 hours PRN Temp greater or equal to 38C (100.4F)  magnesium hydroxide Suspension 30 milliLiter(s) Oral daily PRN Constipation     76y old  Male who presents with a chief complaint of Traumatic Right frontal and temporal subdural hematoma    Patient seen and examined at bedside. No acute overnight events. no acute medical complaints.   no n/v, no sob, wants to be d/c home      Vital Signs Last 24 Hrs  T(C): 36.7 (09 Aug 2021 08:00), Max: 36.9 (08 Aug 2021 19:58)  T(F): 98 (09 Aug 2021 08:00), Max: 98.4 (08 Aug 2021 19:58)  HR: 92 (09 Aug 2021 08:00) (91 - 92)  BP: 106/62 (09 Aug 2021 08:00) (100/67 - 106/62)  BP(mean): --  RR: 18 (09 Aug 2021 08:00) (16 - 18)  SpO2: 99% (09 Aug 2021 08:00) (99% - 99%)  \  GENERAL- NAD  EAR/NOSE/MOUTH/THROAT - no pharyngeal exudates, no oral leisions,  MMM  EYES- MITZI, conjunctiva and Sclera clear  NECK- supple  RESPIRATORY-  clear to auscultation bilaterally, non laboured breathing  CARDIOVASCULAR - SIS2, RRR  GI - soft NT BS present  EXTREMITIES- no pedal edema  NEUROLOGY- no gross focal deficits  PSYCHIATRY- AAO X 3  MUSCULOSKELETAL- ROM normal                9.0                  139  | 28   | 11           5.35  >-----------< 266     ------------------------< 200                   28.0                 4.0  | 104  | 1.22                                         Ca 8.8   Mg x     Ph x          CAPILLARY BLOOD GLUCOSE      POCT Blood Glucose.: 167 mg/dL (09 Aug 2021 07:41)  POCT Blood Glucose.: 136 mg/dL (08 Aug 2021 21:37)  POCT Blood Glucose.: 169 mg/dL (08 Aug 2021 16:39)  POCT Blood Glucose.: 193 mg/dL (08 Aug 2021 11:26)    A1C with Estimated Average Glucose Result: 7.3: Method: Immunoassay       Reference Range                4.0-5.6%       High risk (prediabetic)        5.7-6.4%       Diabetic, diagnostic             >=6.5%       ADA diabetic treatment goal       <7.0%  The Hemoglobin A1c testing is NGSP-certified.Reference ranges are based  upon the 2010 recommendations of  the American Diabetes Association.  Interpretation may vary for children  and adolescents. % (07.10.21 @ 10:16)        MEDICATIONS  (STANDING):  atorvastatin 40 milliGRAM(s) Oral at bedtime  dextrose 40% Gel 15 Gram(s) Oral once  dextrose 5%. 1000 milliLiter(s) (50 mL/Hr) IV Continuous <Continuous>  dextrose 5%. 1000 milliLiter(s) (100 mL/Hr) IV Continuous <Continuous>  dextrose 50% Injectable 25 Gram(s) IV Push once  dextrose 50% Injectable 12.5 Gram(s) IV Push once  dextrose 50% Injectable 25 Gram(s) IV Push once  donepezil 5 milliGRAM(s) Oral daily  enoxaparin Injectable 40 milliGRAM(s) SubCutaneous daily  escitalopram 10 milliGRAM(s) Oral daily  folic acid 1 milliGRAM(s) Oral daily  glucagon  Injectable 1 milliGRAM(s) IntraMuscular once  insulin lispro (ADMELOG) corrective regimen sliding scale   SubCutaneous three times a day before meals  insulin lispro (ADMELOG) corrective regimen sliding scale   SubCutaneous at bedtime  melatonin 3 milliGRAM(s) Oral at bedtime  metFORMIN 850 milliGRAM(s) Oral two times a day  multivitamin 1 Tablet(s) Oral daily  polyethylene glycol 3350 17 Gram(s) Oral daily  senna 2 Tablet(s) Oral at bedtime  thiamine 100 milliGRAM(s) Oral daily  timolol 0.25% Solution 1 Drop(s) Both EYES at bedtime  valproic  acid Syrup 500 milliGRAM(s) Oral every 12 hours    MEDICATIONS  (PRN):  acetaminophen   Tablet .. 650 milliGRAM(s) Oral every 6 hours PRN Temp greater or equal to 38C (100.4F)  magnesium hydroxide Suspension 30 milliLiter(s) Oral daily PRN Constipation
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
## 2021-08-09 NOTE — PROGRESS NOTE ADULT - NSICDXPILOT_GEN_ALL_CORE
[place]

## 2021-08-09 NOTE — DISCHARGE NOTE PROVIDER - NSDCACTIVITY_GEN_ALL_CORE
Do not drive or operate machinery Sex allowed/Do not drive or operate machinery/No heavy lifting/straining
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
## 2021-08-09 NOTE — DISCHARGE NOTE PROVIDER - NSDCMRMEDTOKEN_GEN_ALL_CORE_FT
atorvastatin 40 mg oral tablet: 1 tab(s) orally once a day  enalapril 10 mg oral tablet: 1 tab(s) orally once a day  escitalopram 5 mg oral tablet: 1 tab(s) orally once a day  folic acid 1 mg oral tablet: 1 tab(s) orally once a day  glipiZIDE 10 mg oral tablet: 1 tab(s) orally once a day  Janumet XR 50 mg-1000 mg oral tablet, extended release: 1 tab(s) orally once a day  melatonin 3 mg oral tablet: 1 tab(s) orally once a day (at bedtime)  Multiple Vitamins with Minerals oral tablet: 1 tab(s) orally once a day  polyethylene glycol 3350 oral powder for reconstitution: 17 gram(s) orally once a day  senna oral tablet: 2 tab(s) orally once a day (at bedtime)  thiamine 100 mg oral tablet: 1 tab(s) orally once a day  timolol maleate 0.25% ophthalmic solution: 1 drop(s) to each affected eye once a day (at bedtime)  valproic acid 250 mg/5 mL oral liquid: 10 milliliter(s) orally every 12 hours  Vitamin D2 50,000 intl units (1.25 mg) oral capsule: 1 cap(s) orally once a day   acetaminophen 325 mg oral tablet: 2 tab(s) orally every 6 hours, As needed, Temp greater or equal to 38C (100.4F)   acetaminophen 325 mg oral tablet: 2 tab(s) orally every 6 hours, As needed, Temp greater or equal to 38C (100.4F)  atorvastatin 40 mg oral tablet: 1 tab(s) orally once a day (at bedtime)  donepezil 10 mg oral tablet: 1 tab(s) orally once a day  escitalopram 10 mg oral tablet: 1 tab(s) orally once a day  folic acid 1 mg oral tablet: 1 tab(s) orally once a day  metFORMIN 850 mg oral tablet: 1 tab(s) orally 2 times a day  thiamine 100 mg oral tablet: 1 tab(s) orally once a day  valproic acid 250 mg oral capsule: 1 cap(s) orally 2 times a day    acetaminophen 325 mg oral tablet: 2 tab(s) orally every 6 hours, As needed, Temp greater or equal to 38C (100.4F)  atorvastatin 40 mg oral tablet: 1 tab(s) orally once a day (at bedtime)  divalproex sodium 500 mg oral tablet, extended release: 1 tab(s) orally 2 times a day   donepezil 10 mg oral tablet: 1 tab(s) orally once a day  escitalopram 10 mg oral tablet: 1 tab(s) orally once a day  folic acid 1 mg oral tablet: 1 tab(s) orally once a day  metFORMIN 850 mg oral tablet: 1 tab(s) orally 2 times a day  thiamine 100 mg oral tablet: 1 tab(s) orally once a day

## 2021-08-09 NOTE — DISCHARGE NOTE PROVIDER - PROVIDER TOKENS
PROVIDER:[TOKEN:[[id number]],FOLLOWUP:[1 week]],PROVIDER:[TOKEN:[[id number]],FOLLOWUP:[1 month]],FREE:[LAST:[primary],FIRST:[care],PHONE:[(   )    -],FAX:[(   )    -]]

## 2021-08-10 LAB
ANION GAP SERPL CALC-SCNC: 7 MMOL/L — SIGNIFICANT CHANGE UP (ref 5–17)
BUN SERPL-MCNC: 11 MG/DL — SIGNIFICANT CHANGE UP (ref 7–23)
CALCIUM SERPL-MCNC: 8.8 MG/DL — SIGNIFICANT CHANGE UP (ref 8.4–10.5)
CHLORIDE SERPL-SCNC: 104 MMOL/L — SIGNIFICANT CHANGE UP (ref 96–108)
CO2 SERPL-SCNC: 28 MMOL/L — SIGNIFICANT CHANGE UP (ref 22–31)
CREAT SERPL-MCNC: 1.12 MG/DL — SIGNIFICANT CHANGE UP (ref 0.5–1.3)
GLUCOSE BLDC GLUCOMTR-MCNC: 124 MG/DL — HIGH (ref 70–99)
GLUCOSE BLDC GLUCOMTR-MCNC: 158 MG/DL — HIGH (ref 70–99)
GLUCOSE SERPL-MCNC: 154 MG/DL — HIGH (ref 70–99)
POTASSIUM SERPL-MCNC: 3.9 MMOL/L — SIGNIFICANT CHANGE UP (ref 3.5–5.3)
POTASSIUM SERPL-SCNC: 3.9 MMOL/L — SIGNIFICANT CHANGE UP (ref 3.5–5.3)
SODIUM SERPL-SCNC: 139 MMOL/L — SIGNIFICANT CHANGE UP (ref 135–145)

## 2021-08-10 PROCEDURE — 99232 SBSQ HOSP IP/OBS MODERATE 35: CPT

## 2021-08-10 RX ADMIN — SENNA PLUS 2 TABLET(S): 8.6 TABLET ORAL at 22:08

## 2021-08-10 RX ADMIN — Medication 1 TABLET(S): at 11:49

## 2021-08-10 RX ADMIN — Medication 500 MILLIGRAM(S): at 05:01

## 2021-08-10 RX ADMIN — Medication 1: at 08:34

## 2021-08-10 RX ADMIN — ESCITALOPRAM OXALATE 10 MILLIGRAM(S): 10 TABLET, FILM COATED ORAL at 11:49

## 2021-08-10 RX ADMIN — Medication 100 MILLIGRAM(S): at 11:49

## 2021-08-10 RX ADMIN — METFORMIN HYDROCHLORIDE 850 MILLIGRAM(S): 850 TABLET ORAL at 18:39

## 2021-08-10 RX ADMIN — ATORVASTATIN CALCIUM 40 MILLIGRAM(S): 80 TABLET, FILM COATED ORAL at 22:08

## 2021-08-10 RX ADMIN — Medication 500 MILLIGRAM(S): at 18:39

## 2021-08-10 RX ADMIN — DONEPEZIL HYDROCHLORIDE 5 MILLIGRAM(S): 10 TABLET, FILM COATED ORAL at 11:49

## 2021-08-10 RX ADMIN — Medication 1 MILLIGRAM(S): at 11:49

## 2021-08-10 RX ADMIN — ENOXAPARIN SODIUM 40 MILLIGRAM(S): 100 INJECTION SUBCUTANEOUS at 11:48

## 2021-08-10 RX ADMIN — METFORMIN HYDROCHLORIDE 850 MILLIGRAM(S): 850 TABLET ORAL at 05:01

## 2021-08-10 RX ADMIN — Medication 3 MILLIGRAM(S): at 22:08

## 2021-08-10 NOTE — PROGRESS NOTE ADULT - SUBJECTIVE AND OBJECTIVE BOX
Patient is a 76y old  Male who presents with a chief complaint of Traumatic Right frontal and temporal subdural hematoma (09 Aug 2021 13:19)    HPI:  Patient is a 76 year old male with PMHx of Diabetes Mellitus type 2, initially presented as a Level 2 Trauma at [facility] on  after found down at a bus stop with altered mental status. It was thought to be due totraumatic cause. Patient appears intoxicated at the time with  on arrival. CT scan on admission demonstrated bifrontal SAH, a R SDH, a L parietal SAH with pneumocephalus, a L possible parietal non-displaced skull fracture, and a C6 inferior endplate fracture into disc space with air into the canal.     Patient was admitted to NSICU. Neurosurgery was consulted who decided to treat conservatively with no progression seen on repeat imaging. During patient hospital course he seemed to have worsening AMS which was thought to be due to delirium and or dementia. Pt also had a UTI & has completed ABx course. Pt was also found to be hyponatremic possibly due to SIADH, which has now resolved.     Pt recommended for acute inpatient rehabilitation and was transferred to [facility] on 21.           (2021 14:07)    PAST MEDICAL & SURGICAL HISTORY:  DM (diabetes mellitus)      Allergies    Allergy Status Unknown    Intolerances      ----------------------------------------------------------------------    SUBJECTIVE: Patient seen this morning. No events reported overnight. Patient is anxious to go home but is working with staff in the meantime.       ROS:  Positive:  Denies: headache, lightheadedness, CP, SOB, abdominal pain, dysuria, nausea, constipation      ----------------------------------------------------------------------  PHYSICAL EXAM:    Vital Signs Last 24 Hrs  T(C): 36.3 (10 Aug 2021 08:31), Max: 36.7 (09 Aug 2021 22:21)  T(F): 97.4 (10 Aug 2021 08:31), Max: 98.1 (09 Aug 2021 22:21)  HR: 84 (10 Aug 2021 08:31) (84 - 91)  BP: 100/84 (10 Aug 2021 08:31) (100/84 - 101/67)  BP(mean): --  RR: 16 (10 Aug 2021 08:31) (16 - 17)  SpO2: 99% (10 Aug 2021 08:31) (99% - 99%)  Daily     Daily Weight in k.6 (10 Aug 2021 02:12)      Constitutional - NAD, Comfortable  HEENT -  EOMI  Neck - Supple,   Chest - breathing comfortably on RA  Cardio - warm and well perfused,   Abdomen -  Soft, NTND  Extremities - No peripheral edema,   Neurologic Exam:                    Cognitive -             Orientation: Awake, Alert, AAO x 3. .  Knew year and month, but not date.              Attention:  impaired,  tangential speech.  difficulty following conversation.  unable to complete days of week backward            Memory: impaired-- 0/3 spontaneously,  3/3 with cat. cues     Speech - Fluent, Comprehensible, No dysarthria, No aphasia      Cranial Nerves -PERRLA, No facial asymmetry, Tongue midline, EOMI, Shoulder shrug intact     Motor -                      LEFT    UE - ShAB 5/5, EF 5/5, EE 5/5, WE 5/5,  WNL                    RIGHT UE - ShAB 5/5, EF 5/5, EE 5/5, WE 5/5,  WNL                    LEFT    LE - HF 5/5, KE 5/5, DF 5/5, PF 5/5                    RIGHT LE - HF 5/5, KE 5/5, DF 5/5, PF 5/5        Sensory - Intact to LT bilateral     Coordination - FTN / HTS intact    Psychiatric - anxious,   irritable at times.        ----------------------------------------------------------------------  RECENT LABS:                          9.0    5.35  )-----------( 266      ( 09 Aug 2021 07:10 )             28.0     08-10    139  |  104  |  11  ----------------------------<  154<H>  3.9   |  28  |  1.12    Ca    8.8      10 Aug 2021 06:34    TPro  6.4  /  Alb  2.4<L>  /  TBili  0.2  /  DBili  x   /  AST  28  /  ALT  40  /  AlkPhos  91  0809    LIVER FUNCTIONS - ( 09 Aug 2021 07:10 )  Alb: 2.4 g/dL / Pro: 6.4 g/dL / ALK PHOS: 91 U/L / ALT: 40 U/L / AST: 28 U/L / GGT: x               CAPILLARY BLOOD GLUCOSE      POCT Blood Glucose.: 158 mg/dL (10 Aug 2021 08:28)  POCT Blood Glucose.: 145 mg/dL (09 Aug 2021 22:26)  POCT Blood Glucose.: 135 mg/dL (09 Aug 2021 16:59)  POCT Blood Glucose.: 147 mg/dL (09 Aug 2021 11:55)    ----------------------------------------------------------------------  RECENT IMAGING:    CT Head :  There is redemonstration of an evolving acute right convexity subdural hematoma extending along the posterior falx and right tentorium, unchanged when compared to the most recent prior CT exam. The greatest transverse depth measures up to 8 mm along the posterior right frontal convexity.  There is also redemonstration of small mixed density subdural hemorrhage along the left cerebral convexity which also appears grossly unchanged.  Hemorrhagic contusions along the bifrontal lobes are again seen. There is also a small hemorrhagic contusion involving the left posterior temporal cortical junction. Scattered areas of subarachnoid hemorrhage are again seen. No new areas of acute intracranial hemorrhage are noted.  There is no shift of the midline structures or herniation. There is no hydrocephalus.  A nondisplaced left-sided occipital temporal fracture is again seen.  The paranasal sinuses and right mastoid air cells remain clear. Trace left-sided mastoid air cell opacification is again seen and is nonspecific. The orbits appear unremarkable.    ----------------------------------------------------------------------  MEDICATIONS:  MEDICATIONS  (STANDING):  atorvastatin 40 milliGRAM(s) Oral at bedtime  dextrose 40% Gel 15 Gram(s) Oral once  dextrose 5%. 1000 milliLiter(s) (50 mL/Hr) IV Continuous <Continuous>  dextrose 5%. 1000 milliLiter(s) (100 mL/Hr) IV Continuous <Continuous>  dextrose 50% Injectable 25 Gram(s) IV Push once  dextrose 50% Injectable 12.5 Gram(s) IV Push once  dextrose 50% Injectable 25 Gram(s) IV Push once  donepezil 5 milliGRAM(s) Oral daily  enoxaparin Injectable 40 milliGRAM(s) SubCutaneous daily  escitalopram 10 milliGRAM(s) Oral daily  folic acid 1 milliGRAM(s) Oral daily  glucagon  Injectable 1 milliGRAM(s) IntraMuscular once  insulin lispro (ADMELOG) corrective regimen sliding scale   SubCutaneous three times a day before meals  insulin lispro (ADMELOG) corrective regimen sliding scale   SubCutaneous at bedtime  melatonin 3 milliGRAM(s) Oral at bedtime  metFORMIN 850 milliGRAM(s) Oral two times a day  multivitamin 1 Tablet(s) Oral daily  polyethylene glycol 3350 17 Gram(s) Oral daily  senna 2 Tablet(s) Oral at bedtime  thiamine 100 milliGRAM(s) Oral daily  timolol 0.25% Solution 1 Drop(s) Both EYES at bedtime  valproic  acid Syrup 500 milliGRAM(s) Oral every 12 hours    MEDICATIONS  (PRN):  acetaminophen   Tablet .. 650 milliGRAM(s) Oral every 6 hours PRN Temp greater or equal to 38C (100.4F)  magnesium hydroxide Suspension 30 milliLiter(s) Oral daily PRN Constipation    ----------------------------------------------------------------------      Assessment and Plan:   · Assessment	  · Assessment	  Patient is a 76 year old male with PMHx of Diabetes Mellitus type 2, initially presented as a Level 2 Trauma at [facility] on  after found down at a bus stop with altered mental status. Patient appears intoxicated at the time with  on arrival. CT scan on admission demonstrated bifrontal SAH, a R SDH, a L parietal SAH with pneumocephalus, a L possible parietal non-displaced skull fracture, and a C6 inferior endplate fracture into disc space with air into the canal.     # bifrontal SAH & R SDH.   - continue comprehensive rehab program, PT/OT/SLP 3 hours a day, 5 days a week  - neuropsychology evaluation--reviewed and appreciated  - Precautions: DM, spinal, AMS, fall, Seizure   - Donepezil 5 mg started  for memory/cognition. monitor for SE-- appears to be tolerating.    --Neuroendocrine w/u Neg.  Had normal TSH and free T4 on , AM cortisol WNL    #HTN --Orthostatic Hypotension--  --BP borderline-Monitor  - Previously on enalapril which was discontinued  - Encourage adequate P.O hydration  --Cr. improving       # Diabetes   - HBA1c -  7.3  - ISS  --Metformin 850 mg BID.   --FS controlled     #Depression   - cont. Escitalopram to 10mg  - Neuropsychology following    # Delirium & seizure   - Continue on Valproate 500 mg Q12H for agitation.  - neuropsychology & SLP cognitive treatment.   - VPA level - 70 - WNL  --ammonia level 8/ WNL    Sleep  --melatonin     #ETOH Abuse   - Continue Multivitamin, Folic Acid, Thiamine   - psychology and SW follow up    #HLD   - Continue Atorvastatin.     #Pain  - Tylenol PRN    #GI  - Dulcolax PRN, Senna  - Protonix  - Milk of Magnesia PRN.     #Diet  - Regular - Carb controlled - DASH diet.     # DVT Prophylaxis   -previously on heparin Q12H but now on lovenox to decrease # of injections and improve compliance      IDT 21  -SW: Lives with significant other in a private home with 1 JESSIE. Daughter is involved in his care.   -OT: supervision eating, UBD; min A for grooming, LBD, toilet trans; Mod A toileting; Min A lower body dressing. Agitated and inappropriate  -PT: Stairs: 8 steps Min A;  CG transfers; Min A amb. 50ft.   -Speech: Regular consistency diet. Memory is Mod A. Needs frequent redirection and reorientation.  Compr min A, Soc Max A, Poor attention, disoriented.  poor interaction  D/C plannin/14 home.     Nutritional Assessment:  · Nutritional Assessment	This patient has been assessed with a concern for Malnutrition and has been determined to have a diagnosis/diagnoses of Severe protein-calorie malnutrition.    This patient is being managed with:   Diet Consistent Carbohydrate w/Evening Snack-  Supplement Feeding Modality:  Oral  Glucerna Shake Cans or Servings Per Day:  1       Frequency:  Three Times a day  Entered: 2021 10:45AM           Patient is a 76y old  Male who presents with a chief complaint of Traumatic Right frontal and temporal subdural hematoma (09 Aug 2021 13:19)    HPI:  Patient is a 76 year old male with PMHx of Diabetes Mellitus type 2, initially presented as a Level 2 Trauma at [facility] on  after found down at a bus stop with altered mental status. It was thought to be due totraumatic cause. Patient appears intoxicated at the time with  on arrival. CT scan on admission demonstrated bifrontal SAH, a R SDH, a L parietal SAH with pneumocephalus, a L possible parietal non-displaced skull fracture, and a C6 inferior endplate fracture into disc space with air into the canal.     Patient was admitted to NSICU. Neurosurgery was consulted who decided to treat conservatively with no progression seen on repeat imaging. During patient hospital course he seemed to have worsening AMS which was thought to be due to delirium and or dementia. Pt also had a UTI & has completed ABx course. Pt was also found to be hyponatremic possibly due to SIADH, which has now resolved.     Pt recommended for acute inpatient rehabilitation and was transferred to [facility] on 21.           (2021 14:07)    PAST MEDICAL & SURGICAL HISTORY:  DM (diabetes mellitus)      Allergies    Allergy Status Unknown    Intolerances      ----------------------------------------------------------------------    SUBJECTIVE: Patient seen this morning. No events reported overnight. Patient is anxious to go home but is working with staff in the meantime.       ROS:  Positive:  Denies: headache, lightheadedness, CP, SOB, abdominal pain, dysuria, nausea, constipation      ----------------------------------------------------------------------  PHYSICAL EXAM:    Vital Signs Last 24 Hrs  T(C): 36.3 (10 Aug 2021 08:31), Max: 36.7 (09 Aug 2021 22:21)  T(F): 97.4 (10 Aug 2021 08:31), Max: 98.1 (09 Aug 2021 22:21)  HR: 84 (10 Aug 2021 08:31) (84 - 91)  BP: 100/84 (10 Aug 2021 08:31) (100/84 - 101/67)  BP(mean): --  RR: 16 (10 Aug 2021 08:31) (16 - 17)  SpO2: 99% (10 Aug 2021 08:31) (99% - 99%)  Daily     Daily Weight in k.6 (10 Aug 2021 02:12)      Constitutional - NAD, Comfortable  HEENT -  EOMI  Neck - Supple,   Chest - breathing comfortably on RA  Cardio - warm and well perfused,   Abdomen -  Soft, NTND  Extremities - No peripheral edema,   Neurologic Exam:                    Cognitive -             Orientation: Awake, Alert, AAO x 3. .  Knew year and month, but not date.              Attention:  impaired,  tangential speech.  difficulty following conversation.  unable to complete days of week backward            Memory: impaired-- 0/3 spontaneously,  3/3 with cat. cues     Speech - Fluent, Comprehensible, No dysarthria, No aphasia      Cranial Nerves -PERRLA, No facial asymmetry, Tongue midline, EOMI, Shoulder shrug intact     Motor -                      LEFT    UE - ShAB 5/5, EF 5/5, EE 5/5, WE 5/5,  WNL                    RIGHT UE - ShAB 5/5, EF 5/5, EE 5/5, WE 5/5,  WNL                    LEFT    LE - HF 5/5, KE 5/5, DF 5/5, PF 5/5                    RIGHT LE - HF 5/5, KE 5/5, DF 5/5, PF 5/5        Sensory - Intact to LT bilateral     Coordination - FTN / HTS intact    Psychiatric - anxious,   irritable at times.        ----------------------------------------------------------------------  RECENT LABS:                          9.0    5.35  )-----------( 266      ( 09 Aug 2021 07:10 )             28.0     08-10    139  |  104  |  11  ----------------------------<  154<H>  3.9   |  28  |  1.12    Ca    8.8      10 Aug 2021 06:34    TPro  6.4  /  Alb  2.4<L>  /  TBili  0.2  /  DBili  x   /  AST  28  /  ALT  40  /  AlkPhos  91  0809    LIVER FUNCTIONS - ( 09 Aug 2021 07:10 )  Alb: 2.4 g/dL / Pro: 6.4 g/dL / ALK PHOS: 91 U/L / ALT: 40 U/L / AST: 28 U/L / GGT: x               CAPILLARY BLOOD GLUCOSE      POCT Blood Glucose.: 158 mg/dL (10 Aug 2021 08:28)  POCT Blood Glucose.: 145 mg/dL (09 Aug 2021 22:26)  POCT Blood Glucose.: 135 mg/dL (09 Aug 2021 16:59)  POCT Blood Glucose.: 147 mg/dL (09 Aug 2021 11:55)    ----------------------------------------------------------------------  RECENT IMAGING:    CT Head :  There is redemonstration of an evolving acute right convexity subdural hematoma extending along the posterior falx and right tentorium, unchanged when compared to the most recent prior CT exam. The greatest transverse depth measures up to 8 mm along the posterior right frontal convexity.  There is also redemonstration of small mixed density subdural hemorrhage along the left cerebral convexity which also appears grossly unchanged.  Hemorrhagic contusions along the bifrontal lobes are again seen. There is also a small hemorrhagic contusion involving the left posterior temporal cortical junction. Scattered areas of subarachnoid hemorrhage are again seen. No new areas of acute intracranial hemorrhage are noted.  There is no shift of the midline structures or herniation. There is no hydrocephalus.  A nondisplaced left-sided occipital temporal fracture is again seen.  The paranasal sinuses and right mastoid air cells remain clear. Trace left-sided mastoid air cell opacification is again seen and is nonspecific. The orbits appear unremarkable.    ----------------------------------------------------------------------  MEDICATIONS:  MEDICATIONS  (STANDING):  atorvastatin 40 milliGRAM(s) Oral at bedtime  dextrose 40% Gel 15 Gram(s) Oral once  dextrose 5%. 1000 milliLiter(s) (50 mL/Hr) IV Continuous <Continuous>  dextrose 5%. 1000 milliLiter(s) (100 mL/Hr) IV Continuous <Continuous>  dextrose 50% Injectable 25 Gram(s) IV Push once  dextrose 50% Injectable 12.5 Gram(s) IV Push once  dextrose 50% Injectable 25 Gram(s) IV Push once  donepezil 5 milliGRAM(s) Oral daily  enoxaparin Injectable 40 milliGRAM(s) SubCutaneous daily  escitalopram 10 milliGRAM(s) Oral daily  folic acid 1 milliGRAM(s) Oral daily  glucagon  Injectable 1 milliGRAM(s) IntraMuscular once  insulin lispro (ADMELOG) corrective regimen sliding scale   SubCutaneous three times a day before meals  insulin lispro (ADMELOG) corrective regimen sliding scale   SubCutaneous at bedtime  melatonin 3 milliGRAM(s) Oral at bedtime  metFORMIN 850 milliGRAM(s) Oral two times a day  multivitamin 1 Tablet(s) Oral daily  polyethylene glycol 3350 17 Gram(s) Oral daily  senna 2 Tablet(s) Oral at bedtime  thiamine 100 milliGRAM(s) Oral daily  timolol 0.25% Solution 1 Drop(s) Both EYES at bedtime  valproic  acid Syrup 500 milliGRAM(s) Oral every 12 hours    MEDICATIONS  (PRN):  acetaminophen   Tablet .. 650 milliGRAM(s) Oral every 6 hours PRN Temp greater or equal to 38C (100.4F)  magnesium hydroxide Suspension 30 milliLiter(s) Oral daily PRN Constipation    ----------------------------------------------------------------------      Assessment and Plan:   · Assessment	  · Assessment	  Patient is a 76 year old male with PMHx of Diabetes Mellitus type 2, initially presented as a Level 2 Trauma at [facility] on  after found down at a bus stop with altered mental status. Patient appears intoxicated at the time with  on arrival. CT scan on admission demonstrated bifrontal SAH, a R SDH, a L parietal SAH with pneumocephalus, a L possible parietal non-displaced skull fracture, and a C6 inferior endplate fracture into disc space with air into the canal.     # bifrontal SAH & R SDH.   - continue comprehensive rehab program, PT/OT/SLP 3 hours a day, 5 days a week  - neuropsychology evaluation--reviewed and appreciated  - Precautions: DM, spinal, AMS, fall, Seizure   - Donepezil 5 mg started  for memory/cognition. monitor for SE-- appears to be tolerating.    --Neuroendocrine w/u Neg.  Had normal TSH and free T4 on , AM cortisol WNL    #HTN --Orthostatic Hypotension--  --BP borderline-Monitor  - Previously on enalapril which was discontinued  - Encourage adequate P.O hydration--  --Cr. improved today      # Diabetes   - HBA1c -  7.3  - ISS  --Metformin 850 mg BID.   --FS controlled     #Depression   - cont. Escitalopram to 10mg  - Neuropsychology following    # Delirium & seizure   - Continue on Valproate 500 mg Q12H for agitation.  - neuropsychology & SLP cognitive treatment.   - VPA level - 70 - WNL  --ammonia level  WNL    Sleep  --melatonin     #ETOH Abuse   - Continue Multivitamin, Folic Acid, Thiamine   - psychology and SW follow up    #HLD   - Continue Atorvastatin.     #Pain  - Tylenol PRN    #GI  - Dulcolax PRN, Senna  - Protonix  - Milk of Magnesia PRN.     #Diet  - Regular - Carb controlled - DASH diet.     # DVT Prophylaxis   -previously on heparin Q12H but now on lovenox to decrease # of injections and improve compliance      IDT 21  -SW: Lives with significant other in a private home with 1 JESSIE. Daughter is involved in his care.   -OT: supervision eating, UBD; min A for grooming, LBD, toilet trans; Mod A toileting; Min A lower body dressing. Agitated and inappropriate  -PT: Stairs: 8 steps Min A;  CG transfers; Min A amb. 50ft.   -Speech: Regular consistency diet. Memory is Mod A. Needs frequent redirection and reorientation.  Compr min A, Soc Max A, Poor attention, disoriented.  poor interaction  D/C plannin/14 home.     Nutritional Assessment:  · Nutritional Assessment	This patient has been assessed with a concern for Malnutrition and has been determined to have a diagnosis/diagnoses of Severe protein-calorie malnutrition.    This patient is being managed with:   Diet Consistent Carbohydrate w/Evening Snack-  Supplement Feeding Modality:  Oral  Glucerna Shake Cans or Servings Per Day:  1       Frequency:  Three Times a day  Entered: 2021 10:45AM           Patient is a 76y old  Male who presents with a chief complaint of Traumatic Right frontal and temporal subdural hematoma (09 Aug 2021 13:19)    HPI:  Patient is a 76 year old male with PMHx of Diabetes Mellitus type 2, initially presented as a Level 2 Trauma at [facility] on  after found down at a bus stop with altered mental status. It was thought to be due totraumatic cause. Patient appears intoxicated at the time with  on arrival. CT scan on admission demonstrated bifrontal SAH, a R SDH, a L parietal SAH with pneumocephalus, a L possible parietal non-displaced skull fracture, and a C6 inferior endplate fracture into disc space with air into the canal.     Patient was admitted to NSICU. Neurosurgery was consulted who decided to treat conservatively with no progression seen on repeat imaging. During patient hospital course he seemed to have worsening AMS which was thought to be due to delirium and or dementia. Pt also had a UTI & has completed ABx course. Pt was also found to be hyponatremic possibly due to SIADH, which has now resolved.     Pt recommended for acute inpatient rehabilitation and was transferred to [facility] on 21.           (2021 14:07)    PAST MEDICAL & SURGICAL HISTORY:  DM (diabetes mellitus)      Allergies    Allergy Status Unknown    Intolerances      ----------------------------------------------------------------------    SUBJECTIVE: Patient seen this morning. No events reported overnight. Patient is anxious to go home but is working with staff in the meantime.       ROS:  Positive:  Denies: headache, lightheadedness, CP, SOB, abdominal pain, dysuria, nausea, constipation      ----------------------------------------------------------------------  PHYSICAL EXAM:    Vital Signs Last 24 Hrs  T(C): 36.3 (10 Aug 2021 08:31), Max: 36.7 (09 Aug 2021 22:21)  T(F): 97.4 (10 Aug 2021 08:31), Max: 98.1 (09 Aug 2021 22:21)  HR: 84 (10 Aug 2021 08:31) (84 - 91)  BP: 100/84 (10 Aug 2021 08:31) (100/84 - 101/67)  BP(mean): --  RR: 16 (10 Aug 2021 08:31) (16 - 17)  SpO2: 99% (10 Aug 2021 08:31) (99% - 99%)  Daily     Daily Weight in k.6 (10 Aug 2021 02:12)      Constitutional - NAD, Comfortable  HEENT -  EOMI  Neck - Supple,   Chest - breathing comfortably on RA  Cardio - warm and well perfused,   Abdomen -  Soft, NTND  Extremities - No peripheral edema,   Neurologic Exam:                    Cognitive -             Orientation: Awake, Alert, AAO x 3. .  Knew year and month, but not date.              Attention:  impaired,  tangential speech.  difficulty following conversation.  unable to complete days of week backward            Memory: impaired-- 0/3 spontaneously,  3/3 with cat. cues     Speech - Fluent, Comprehensible, No dysarthria, No aphasia      Cranial Nerves -PERRLA, No facial asymmetry, Tongue midline, EOMI, Shoulder shrug intact     Motor -                      LEFT    UE - ShAB 5/5, EF 5/5, EE 5/5, WE 5/5,  WNL                    RIGHT UE - ShAB 5/5, EF 5/5, EE 5/5, WE 5/5,  WNL                    LEFT    LE - HF 5/5, KE 5/5, DF 5/5, PF 5/5                    RIGHT LE - HF 5/5, KE 5/5, DF 5/5, PF 5/5        Sensory - Intact to LT bilateral     Coordination - FTN / HTS intact    Psychiatric - anxious,   irritable at times.        ----------------------------------------------------------------------  RECENT LABS:                          9.0    5.35  )-----------( 266      ( 09 Aug 2021 07:10 )             28.0     08-10    139  |  104  |  11  ----------------------------<  154<H>  3.9   |  28  |  1.12    Ca    8.8      10 Aug 2021 06:34    TPro  6.4  /  Alb  2.4<L>  /  TBili  0.2  /  DBili  x   /  AST  28  /  ALT  40  /  AlkPhos  91  0809    LIVER FUNCTIONS - ( 09 Aug 2021 07:10 )  Alb: 2.4 g/dL / Pro: 6.4 g/dL / ALK PHOS: 91 U/L / ALT: 40 U/L / AST: 28 U/L / GGT: x               CAPILLARY BLOOD GLUCOSE      POCT Blood Glucose.: 158 mg/dL (10 Aug 2021 08:28)  POCT Blood Glucose.: 145 mg/dL (09 Aug 2021 22:26)  POCT Blood Glucose.: 135 mg/dL (09 Aug 2021 16:59)  POCT Blood Glucose.: 147 mg/dL (09 Aug 2021 11:55)    ----------------------------------------------------------------------  RECENT IMAGING:    CT Head :  There is redemonstration of an evolving acute right convexity subdural hematoma extending along the posterior falx and right tentorium, unchanged when compared to the most recent prior CT exam. The greatest transverse depth measures up to 8 mm along the posterior right frontal convexity.  There is also redemonstration of small mixed density subdural hemorrhage along the left cerebral convexity which also appears grossly unchanged.  Hemorrhagic contusions along the bifrontal lobes are again seen. There is also a small hemorrhagic contusion involving the left posterior temporal cortical junction. Scattered areas of subarachnoid hemorrhage are again seen. No new areas of acute intracranial hemorrhage are noted.  There is no shift of the midline structures or herniation. There is no hydrocephalus.  A nondisplaced left-sided occipital temporal fracture is again seen.  The paranasal sinuses and right mastoid air cells remain clear. Trace left-sided mastoid air cell opacification is again seen and is nonspecific. The orbits appear unremarkable.    ----------------------------------------------------------------------  MEDICATIONS:  MEDICATIONS  (STANDING):  atorvastatin 40 milliGRAM(s) Oral at bedtime  dextrose 40% Gel 15 Gram(s) Oral once  dextrose 5%. 1000 milliLiter(s) (50 mL/Hr) IV Continuous <Continuous>  dextrose 5%. 1000 milliLiter(s) (100 mL/Hr) IV Continuous <Continuous>  dextrose 50% Injectable 25 Gram(s) IV Push once  dextrose 50% Injectable 12.5 Gram(s) IV Push once  dextrose 50% Injectable 25 Gram(s) IV Push once  donepezil 5 milliGRAM(s) Oral daily  enoxaparin Injectable 40 milliGRAM(s) SubCutaneous daily  escitalopram 10 milliGRAM(s) Oral daily  folic acid 1 milliGRAM(s) Oral daily  glucagon  Injectable 1 milliGRAM(s) IntraMuscular once  insulin lispro (ADMELOG) corrective regimen sliding scale   SubCutaneous three times a day before meals  insulin lispro (ADMELOG) corrective regimen sliding scale   SubCutaneous at bedtime  melatonin 3 milliGRAM(s) Oral at bedtime  metFORMIN 850 milliGRAM(s) Oral two times a day  multivitamin 1 Tablet(s) Oral daily  polyethylene glycol 3350 17 Gram(s) Oral daily  senna 2 Tablet(s) Oral at bedtime  thiamine 100 milliGRAM(s) Oral daily  timolol 0.25% Solution 1 Drop(s) Both EYES at bedtime  valproic  acid Syrup 500 milliGRAM(s) Oral every 12 hours    MEDICATIONS  (PRN):  acetaminophen   Tablet .. 650 milliGRAM(s) Oral every 6 hours PRN Temp greater or equal to 38C (100.4F)  magnesium hydroxide Suspension 30 milliLiter(s) Oral daily PRN Constipation    ----------------------------------------------------------------------      Assessment and Plan:   · Assessment	  · Assessment	  Patient is a 76 year old male with PMHx of Diabetes Mellitus type 2, initially presented as a Level 2 Trauma at [facility] on  after found down at a bus stop with altered mental status. Patient appears intoxicated at the time with  on arrival. CT scan on admission demonstrated bifrontal SAH, a R SDH, a L parietal SAH with pneumocephalus, a L possible parietal non-displaced skull fracture, and a C6 inferior endplate fracture into disc space with air into the canal.     # bifrontal SAH & R SDH.   - continue comprehensive rehab program, PT/OT/SLP 3 hours a day, 5 days a week  - neuropsychology evaluation--reviewed and appreciated  - Precautions: DM, spinal, AMS, fall, Seizure   - Donepezil 5 mg started  for memory/cognition. monitor for SE-- appears to be tolerating.    --Neuroendocrine w/u Neg.  Had normal TSH and free T4 on , AM cortisol WNL    #HTN --Orthostatic Hypotension--  --BP borderline-Monitor  - Previously on enalapril which was discontinued  - Encourage adequate P.O hydration--  --Cr. improved today      # Diabetes   - HBA1c -  7.3  - ISS  --Metformin 850 mg BID.   --FS controlled     #Depression   - cont. Escitalopram to 10mg  - Neuropsychology following    # Delirium & seizure   - Continue on Valproate 500 mg Q12H for agitation.  - neuropsychology & SLP cognitive treatment.   - VPA level - 70 - WNL  --ammonia level  WNL    Sleep  --melatonin     #ETOH Abuse   - Continue Multivitamin, Folic Acid, Thiamine   - psychology and SW follow up    #HLD   - Continue Atorvastatin.     #Pain  - Tylenol PRN    #GI  - Dulcolax PRN, Senna  - Protonix  - Milk of Magnesia PRN.     #Diet  - Regular - Carb controlled - DASH diet.     # DVT Prophylaxis   -previously on heparin Q12H but now on lovenox to decrease # of injections and improve compliance      IDT 8/10/21  -SW: Lives with significant other in a private home with 1 JESSIE. Daughter is involved in his care.   -OT: set-up/supervision eating, UBD; min A for grooming, LBD, toilet trans; Mod A toileting; Min A lower body dressing. Agitated and inappropriate  -PT: Stairs: CG stairs 1 flight;  supervision transfers; supervision amb. 150ft. without device   -Speech: Regular consistency diet. Memory is Mod A. Needs frequent redirection and reorientation.  Mild-mod cog deficits, confabulatory. Inappropriate behavior improved. Compr min A, Soc Max A, Poor attention, disoriented.  poor interaction  D/C plannin/14 home.     Nutritional Assessment:  · Nutritional Assessment	This patient has been assessed with a concern for Malnutrition and has been determined to have a diagnosis/diagnoses of Severe protein-calorie malnutrition.    This patient is being managed with:   Diet Consistent Carbohydrate w/Evening Snack-  Supplement Feeding Modality:  Oral  Glucerna Shake Cans or Servings Per Day:  1       Frequency:  Three Times a day  Entered: 2021 10:45AM           Patient is a 76y old  Male who presents with a chief complaint of Traumatic Right frontal and temporal subdural hematoma (09 Aug 2021 13:19)    HPI:  Patient is a 76 year old male with PMHx of Diabetes Mellitus type 2, initially presented as a Level 2 Trauma at [facility] on  after found down at a bus stop with altered mental status. It was thought to be due totraumatic cause. Patient appears intoxicated at the time with  on arrival. CT scan on admission demonstrated bifrontal SAH, a R SDH, a L parietal SAH with pneumocephalus, a L possible parietal non-displaced skull fracture, and a C6 inferior endplate fracture into disc space with air into the canal.     Patient was admitted to NSICU. Neurosurgery was consulted who decided to treat conservatively with no progression seen on repeat imaging. During patient hospital course he seemed to have worsening AMS which was thought to be due to delirium and or dementia. Pt also had a UTI & has completed ABx course. Pt was also found to be hyponatremic possibly due to SIADH, which has now resolved.     Pt recommended for acute inpatient rehabilitation and was transferred to [facility] on 21.           (2021 14:07)    PAST MEDICAL & SURGICAL HISTORY:  DM (diabetes mellitus)      Allergies    Allergy Status Unknown    Intolerances      ----------------------------------------------------------------------    SUBJECTIVE: Patient seen this morning. No events reported overnight. Patient is anxious to go home but is working with staff in the meantime.       ROS:  Positive:  Denies: headache, lightheadedness, CP, SOB, abdominal pain, dysuria, nausea, constipation      ----------------------------------------------------------------------  PHYSICAL EXAM:    Vital Signs Last 24 Hrs  T(C): 36.3 (10 Aug 2021 08:31), Max: 36.7 (09 Aug 2021 22:21)  T(F): 97.4 (10 Aug 2021 08:31), Max: 98.1 (09 Aug 2021 22:21)  HR: 84 (10 Aug 2021 08:31) (84 - 91)  BP: 100/84 (10 Aug 2021 08:31) (100/84 - 101/67)  BP(mean): --  RR: 16 (10 Aug 2021 08:31) (16 - 17)  SpO2: 99% (10 Aug 2021 08:31) (99% - 99%)  Daily     Daily Weight in k.6 (10 Aug 2021 02:12)      Constitutional - NAD, Comfortable  HEENT -  EOMI  Neck - Supple,   Chest - breathing comfortably on RA  Cardio - warm and well perfused,   Abdomen -  Soft, NTND  Extremities - No peripheral edema,   Neurologic Exam:                    Cognitive -             Orientation: Awake, Alert, AAO x 3. .  Knew year and month, but not date.              Attention:  impaired,  tangential speech.  difficulty following conversation.  unable to complete days of week backward            Memory: impaired-- 0/3 spontaneously,  3/3 with cat. cues     Speech - Fluent, Comprehensible, No dysarthria, No aphasia      Cranial Nerves -PERRLA, No facial asymmetry, Tongue midline, EOMI, Shoulder shrug intact     Motor -                      LEFT    UE - ShAB 5/5, EF 5/5, EE 5/5, WE 5/5,  WNL                    RIGHT UE - ShAB 5/5, EF 5/5, EE 5/5, WE 5/5,  WNL                    LEFT    LE - HF 5/5, KE 5/5, DF 5/5, PF 5/5                    RIGHT LE - HF 5/5, KE 5/5, DF 5/5, PF 5/5        Sensory - Intact to LT bilateral     Coordination - FTN / HTS intact    Psychiatric - anxious,   irritable at times.        ----------------------------------------------------------------------  RECENT LABS:                          9.0    5.35  )-----------( 266      ( 09 Aug 2021 07:10 )             28.0     08-10    139  |  104  |  11  ----------------------------<  154<H>  3.9   |  28  |  1.12    Ca    8.8      10 Aug 2021 06:34    TPro  6.4  /  Alb  2.4<L>  /  TBili  0.2  /  DBili  x   /  AST  28  /  ALT  40  /  AlkPhos  91  0809    LIVER FUNCTIONS - ( 09 Aug 2021 07:10 )  Alb: 2.4 g/dL / Pro: 6.4 g/dL / ALK PHOS: 91 U/L / ALT: 40 U/L / AST: 28 U/L / GGT: x               CAPILLARY BLOOD GLUCOSE      POCT Blood Glucose.: 158 mg/dL (10 Aug 2021 08:28)  POCT Blood Glucose.: 145 mg/dL (09 Aug 2021 22:26)  POCT Blood Glucose.: 135 mg/dL (09 Aug 2021 16:59)  POCT Blood Glucose.: 147 mg/dL (09 Aug 2021 11:55)    ----------------------------------------------------------------------  RECENT IMAGING:    CT Head :  There is redemonstration of an evolving acute right convexity subdural hematoma extending along the posterior falx and right tentorium, unchanged when compared to the most recent prior CT exam. The greatest transverse depth measures up to 8 mm along the posterior right frontal convexity.  There is also redemonstration of small mixed density subdural hemorrhage along the left cerebral convexity which also appears grossly unchanged.  Hemorrhagic contusions along the bifrontal lobes are again seen. There is also a small hemorrhagic contusion involving the left posterior temporal cortical junction. Scattered areas of subarachnoid hemorrhage are again seen. No new areas of acute intracranial hemorrhage are noted.  There is no shift of the midline structures or herniation. There is no hydrocephalus.  A nondisplaced left-sided occipital temporal fracture is again seen.  The paranasal sinuses and right mastoid air cells remain clear. Trace left-sided mastoid air cell opacification is again seen and is nonspecific. The orbits appear unremarkable.    ----------------------------------------------------------------------  MEDICATIONS:  MEDICATIONS  (STANDING):  atorvastatin 40 milliGRAM(s) Oral at bedtime  dextrose 40% Gel 15 Gram(s) Oral once  dextrose 5%. 1000 milliLiter(s) (50 mL/Hr) IV Continuous <Continuous>  dextrose 5%. 1000 milliLiter(s) (100 mL/Hr) IV Continuous <Continuous>  dextrose 50% Injectable 25 Gram(s) IV Push once  dextrose 50% Injectable 12.5 Gram(s) IV Push once  dextrose 50% Injectable 25 Gram(s) IV Push once  donepezil 5 milliGRAM(s) Oral daily  enoxaparin Injectable 40 milliGRAM(s) SubCutaneous daily  escitalopram 10 milliGRAM(s) Oral daily  folic acid 1 milliGRAM(s) Oral daily  glucagon  Injectable 1 milliGRAM(s) IntraMuscular once  insulin lispro (ADMELOG) corrective regimen sliding scale   SubCutaneous three times a day before meals  insulin lispro (ADMELOG) corrective regimen sliding scale   SubCutaneous at bedtime  melatonin 3 milliGRAM(s) Oral at bedtime  metFORMIN 850 milliGRAM(s) Oral two times a day  multivitamin 1 Tablet(s) Oral daily  polyethylene glycol 3350 17 Gram(s) Oral daily  senna 2 Tablet(s) Oral at bedtime  thiamine 100 milliGRAM(s) Oral daily  timolol 0.25% Solution 1 Drop(s) Both EYES at bedtime  valproic  acid Syrup 500 milliGRAM(s) Oral every 12 hours    MEDICATIONS  (PRN):  acetaminophen   Tablet .. 650 milliGRAM(s) Oral every 6 hours PRN Temp greater or equal to 38C (100.4F)  magnesium hydroxide Suspension 30 milliLiter(s) Oral daily PRN Constipation    ----------------------------------------------------------------------      Assessment and Plan:   · Assessment	  · Assessment	  Patient is a 76 year old male with PMHx of Diabetes Mellitus type 2, initially presented as a Level 2 Trauma at [facility] on  after found down at a bus stop with altered mental status. Patient appears intoxicated at the time with  on arrival. CT scan on admission demonstrated bifrontal SAH, a R SDH, a L parietal SAH with pneumocephalus, a L possible parietal non-displaced skull fracture, and a C6 inferior endplate fracture into disc space with air into the canal.     # bifrontal SAH & R SDH.   - continue comprehensive rehab program, PT/OT/SLP 3 hours a day, 5 days a week  - neuropsychology evaluation--reviewed and appreciated  - Precautions: DM, spinal, AMS, fall, Seizure   - Donepezil 5 mg started  for memory/cognition. monitor for SE-- appears to be tolerating.    --Neuroendocrine w/u Neg.  Had normal TSH and free T4 on , AM cortisol WNL    #HTN --Orthostatic Hypotension--  --BP borderline-Monitor  - Previously on enalapril which was discontinued  - Encourage adequate P.O hydration--  --Cr. improved today      # Diabetes   - HBA1c -  7.3  - ISS  --Metformin 850 mg BID.   --FS controlled     #Depression   - cont. Escitalopram to 10mg  - Neuropsychology following    # Delirium & seizure   - Continue on Valproate 500 mg Q12H for agitation.  - neuropsychology & SLP cognitive treatment.   - VPA level - 70 - WNL  --ammonia level  WNL    Sleep  --melatonin     #ETOH Abuse   - Continue Multivitamin, Folic Acid, Thiamine   - psychology and SW follow up    #HLD   - Continue Atorvastatin.     #Pain  - Tylenol PRN    #GI  - Dulcolax PRN, Senna  - Protonix  - Milk of Magnesia PRN.     #Diet  - Regular - Carb controlled - DASH diet.     # DVT Prophylaxis   -previously on heparin Q12H but now on lovenox to decrease # of injections and improve compliance      IDT 8/10/21  -SW: Lives with significant other in a private home with 1 JESSIE. Daughter is involved in his care.   -OT: set-up/supervision eating, UBD; min A for grooming, LBD, toilet trans, toileting; Mod A bathing  -PT: Stairs: CG stairs 1 flight;  supervision transfers; supervision amb. 150ft. without device   -Speech: Regular consistency diet. Memory is Mod A. Needs frequent redirection and reorientation.  Mild-mod cog deficits, confabulatory. Inappropriate behavior improved. Compr min A, Soc Max A, Poor attention, disoriented.  poor interaction  D/C plannin/14 home.     Nutritional Assessment:  · Nutritional Assessment	This patient has been assessed with a concern for Malnutrition and has been determined to have a diagnosis/diagnoses of Severe protein-calorie malnutrition.    This patient is being managed with:   Diet Consistent Carbohydrate w/Evening Snack-  Supplement Feeding Modality:  Oral  Glucerna Shake Cans or Servings Per Day:  1       Frequency:  Three Times a day  Entered: 2021 10:45AM

## 2021-08-10 NOTE — PROGRESS NOTE ADULT - SUBJECTIVE AND OBJECTIVE BOX
76y old  Male who presents with a chief complaint of Traumatic Right frontal and temporal subdural hematoma    Patient seen and examined at bedside. No acute overnight events. no acute medical complaints.   no n/v, no sob, wants to be d/c home    Vital Signs Last 24 Hrs  T(C): 36.3 (10 Aug 2021 08:31), Max: 36.7 (09 Aug 2021 22:21)  T(F): 97.4 (10 Aug 2021 08:31), Max: 98.1 (09 Aug 2021 22:21)  HR: 84 (10 Aug 2021 08:31) (84 - 91)  BP: 100/84 (10 Aug 2021 08:31) (100/84 - 101/67)  BP(mean): --  RR: 16 (10 Aug 2021 08:31) (16 - 17)  SpO2: 99% (10 Aug 2021 08:31) (99% - 99%)    GENERAL- NAD  EAR/NOSE/MOUTH/THROAT - no pharyngeal exudates, no oral leisions,  MMM  EYES- MITZI, conjunctiva and Sclera clear  NECK- supple  RESPIRATORY-  clear to auscultation bilaterally, non laboured breathing  CARDIOVASCULAR - SIS2, RRR  GI - soft NT BS present  EXTREMITIES- no pedal edema  NEUROLOGY- no gross focal deficits  PSYCHIATRY- AAO X 3                 x                    139  | 28   | 11           x     >-----------< x       ------------------------< 154                   x                    3.9  | 104  | 1.12                                         Ca 8.8   Mg x     Ph x          CAPILLARY BLOOD GLUCOSE      POCT Blood Glucose.: 124 mg/dL (10 Aug 2021 11:42)  POCT Blood Glucose.: 158 mg/dL (10 Aug 2021 08:28)  POCT Blood Glucose.: 145 mg/dL (09 Aug 2021 22:26)  POCT Blood Glucose.: 135 mg/dL (09 Aug 2021 16:59)  POCT Blood Glucose.: 147 mg/dL (09 Aug 2021 11:55)    MEDICATIONS  (STANDING):  atorvastatin 40 milliGRAM(s) Oral at bedtime  dextrose 40% Gel 15 Gram(s) Oral once  dextrose 5%. 1000 milliLiter(s) (50 mL/Hr) IV Continuous <Continuous>  dextrose 5%. 1000 milliLiter(s) (100 mL/Hr) IV Continuous <Continuous>  dextrose 50% Injectable 25 Gram(s) IV Push once  dextrose 50% Injectable 12.5 Gram(s) IV Push once  dextrose 50% Injectable 25 Gram(s) IV Push once  donepezil 5 milliGRAM(s) Oral daily  enoxaparin Injectable 40 milliGRAM(s) SubCutaneous daily  escitalopram 10 milliGRAM(s) Oral daily  folic acid 1 milliGRAM(s) Oral daily  glucagon  Injectable 1 milliGRAM(s) IntraMuscular once  insulin lispro (ADMELOG) corrective regimen sliding scale   SubCutaneous three times a day before meals  insulin lispro (ADMELOG) corrective regimen sliding scale   SubCutaneous at bedtime  melatonin 3 milliGRAM(s) Oral at bedtime  metFORMIN 850 milliGRAM(s) Oral two times a day  multivitamin 1 Tablet(s) Oral daily  polyethylene glycol 3350 17 Gram(s) Oral daily  senna 2 Tablet(s) Oral at bedtime  thiamine 100 milliGRAM(s) Oral daily  timolol 0.25% Solution 1 Drop(s) Both EYES at bedtime  valproic  acid Syrup 500 milliGRAM(s) Oral every 12 hours    MEDICATIONS  (PRN):  acetaminophen   Tablet .. 650 milliGRAM(s) Oral every 6 hours PRN Temp greater or equal to 38C (100.4F)  magnesium hydroxide Suspension 30 milliLiter(s) Oral daily PRN Constipation

## 2021-08-10 NOTE — PROGRESS NOTE ADULT - ASSESSMENT
77 y/o M PMHx of Diabetes Mellitus type 2, initially presented as a Level 2 Trauma at [facility] on 7/9 after found down at a bus stop with altered mental status. Patient appears intoxicated at the time with  on arrival. CT scan on admission demonstrated bifrontal SAH, a R SDH, a L parietal SAH with pneumocephalus, a L possible parietal non-displaced skull fracture, and a C6 inferior endplate fracture into disc space with air into the canal, admitted for rehab- pt/ot/dvt ppx    #Bifrontal SAH & R SDH  -Donepezil     #HTN  -Previously on enalapril which was discontinued, due to orthostatic  -Monitor BP. Keep BP<130/80  -Off IVF. Encourage PO    #T2DM -HbA1c 7.3  -d/c Accu-Cheks and ISS  -Metformin 850mg BID      #Depression   -Lexapro     #Delirium & seizure   -Continue on Valproate for agitation    #ETOH Abuse - stable  -Continue Multivitamin, Folic Acid, Thiamine     #HLD   -Atorvastatin    #DVT ppx - Lovenox    will follow  d/w dr. buchanan

## 2021-08-11 PROCEDURE — 99232 SBSQ HOSP IP/OBS MODERATE 35: CPT

## 2021-08-11 RX ORDER — DONEPEZIL HYDROCHLORIDE 10 MG/1
10 TABLET, FILM COATED ORAL DAILY
Refills: 0 | Status: DISCONTINUED | OUTPATIENT
Start: 2021-08-11 | End: 2021-08-14

## 2021-08-11 RX ADMIN — ESCITALOPRAM OXALATE 10 MILLIGRAM(S): 10 TABLET, FILM COATED ORAL at 12:26

## 2021-08-11 RX ADMIN — DONEPEZIL HYDROCHLORIDE 10 MILLIGRAM(S): 10 TABLET, FILM COATED ORAL at 12:26

## 2021-08-11 RX ADMIN — POLYETHYLENE GLYCOL 3350 17 GRAM(S): 17 POWDER, FOR SOLUTION ORAL at 12:26

## 2021-08-11 RX ADMIN — METFORMIN HYDROCHLORIDE 850 MILLIGRAM(S): 850 TABLET ORAL at 17:28

## 2021-08-11 RX ADMIN — ENOXAPARIN SODIUM 40 MILLIGRAM(S): 100 INJECTION SUBCUTANEOUS at 12:27

## 2021-08-11 RX ADMIN — Medication 500 MILLIGRAM(S): at 05:31

## 2021-08-11 RX ADMIN — ATORVASTATIN CALCIUM 40 MILLIGRAM(S): 80 TABLET, FILM COATED ORAL at 21:29

## 2021-08-11 RX ADMIN — METFORMIN HYDROCHLORIDE 850 MILLIGRAM(S): 850 TABLET ORAL at 08:35

## 2021-08-11 RX ADMIN — Medication 1 TABLET(S): at 12:26

## 2021-08-11 RX ADMIN — Medication 100 MILLIGRAM(S): at 12:26

## 2021-08-11 RX ADMIN — Medication 1 MILLIGRAM(S): at 12:26

## 2021-08-11 RX ADMIN — Medication 3 MILLIGRAM(S): at 21:29

## 2021-08-11 RX ADMIN — Medication 500 MILLIGRAM(S): at 17:28

## 2021-08-11 NOTE — PROGRESS NOTE ADULT - ASSESSMENT
77 y/o M PMHx of Diabetes Mellitus type 2, initially presented as a Level 2 Trauma at [facility] on 7/9 after found down at a bus stop with altered mental status. Patient appears intoxicated at the time with  on arrival. CT scan on admission demonstrated bifrontal SAH, a R SDH, a L parietal SAH with pneumocephalus, a L possible parietal non-displaced skull fracture, and a C6 inferior endplate fracture into disc space with air into the canal, admitted for rehab- pt/ot/dvt ppx    #Bifrontal SAH & R SDH  -Donepezil     #HTN  -Previously on enalapril which was discontinued, due to orthostatic  -Monitor BP. Keep BP<130/80  -Off IVF. Encourage PO    #T2DM -HbA1c 7.3  -d/c Accu-Cheks and ISS  -Metformin 850mg BID      #Depression   -Lexapro     #Delirium & seizure   -Continue on Valproate for agitation    #ETOH Abuse - stable  -Continue Multivitamin, Folic Acid, Thiamine     #HLD   -Atorvastatin    #DVT ppx - Lovenox    will follow  d/w dr. buchanan

## 2021-08-11 NOTE — PROGRESS NOTE ADULT - ATTENDING COMMENTS
Pt. seen with fellow.  Agree with documentation above as per fellow with amendments made as appropriate. Patient medically stable. Making progress towards rehab goals.     TBI  increase aricept for cognition  Virtual family training with daughter today.
Pt. seen with resident & fellow.  Agree with documentation above as per fellow with amendments made as appropriate. Patient medically stable. Making progress towards rehab goals.     TBI  Cr. improved as pt. hydrating better PO.  Family training scheduled tomorrow-- therapy will bring up with family possible earlier discharge based on how family training going.
Patient medically and neurologically stable. Making progress towards rehab goals.   Continue rehab program.
Pt. seen with fellow.  Agree with documentation above as per fellow with amendments made as appropriate. Patient medically stable. Making slow progress towards rehab goals.     TBI  Cognitive impairment-- trial aricept    Likely orthostatic hypotension limiting in therapy as well as pt. keeps wanting to sit down or lie down when therapy tries to get him to stand or do activity.  Subj report of sx limited due to cognition.  BP runs low.  d/w hospitalist-- r/o infectious etiology.  IVFs ordered.  Pt. with poor hydration.

## 2021-08-11 NOTE — PROGRESS NOTE ADULT - SUBJECTIVE AND OBJECTIVE BOX
76y old  Male who presents with a chief complaint of Traumatic Right frontal and temporal subdural hematoma    Patient seen and examined at bedside. No acute overnight events. no acute medical complaints.   no n/v, no sob, wants to be d/c home      Vital Signs Last 24 Hrs  T(C): 36.4 (11 Aug 2021 07:45), Max: 36.4 (10 Aug 2021 22:07)  T(F): 97.5 (11 Aug 2021 07:45), Max: 97.5 (10 Aug 2021 22:07)  HR: 89 (11 Aug 2021 07:45) (81 - 89)  BP: 93/58 (11 Aug 2021 07:45) (93/58 - 101/60)  BP(mean): --  RR: 15 (11 Aug 2021 07:45) (15 - 15)  SpO2: 99% (11 Aug 2021 07:45) (96% - 99%)    GENERAL- NAD  EAR/NOSE/MOUTH/THROAT - no pharyngeal exudates, no oral leisions,  MMM  EYES- MITZI, conjunctiva and Sclera clear  NECK- supple  RESPIRATORY-  clear to auscultation bilaterally, non laboured breathing  CARDIOVASCULAR - SIS2, RRR  GI - soft NT BS present  EXTREMITIES- no pedal edema  NEUROLOGY- no gross focal deficits  PSYCHIATRY- AAO X 3                  x                    139  | 28   | 11           x     >-----------< x       ------------------------< 154                   x                    3.9  | 104  | 1.12                                         Ca 8.8   Mg x     Ph x          CAPILLARY BLOOD GLUCOSE      POCT Blood Glucose.: 124 mg/dL (10 Aug 2021 11:42)    MEDICATIONS  (STANDING):  atorvastatin 40 milliGRAM(s) Oral at bedtime  donepezil 5 milliGRAM(s) Oral daily  enoxaparin Injectable 40 milliGRAM(s) SubCutaneous daily  escitalopram 10 milliGRAM(s) Oral daily  folic acid 1 milliGRAM(s) Oral daily  melatonin 3 milliGRAM(s) Oral at bedtime  metFORMIN 850 milliGRAM(s) Oral two times a day  multivitamin 1 Tablet(s) Oral daily  polyethylene glycol 3350 17 Gram(s) Oral daily  senna 2 Tablet(s) Oral at bedtime  thiamine 100 milliGRAM(s) Oral daily  timolol 0.25% Solution 1 Drop(s) Both EYES at bedtime  valproic  acid Syrup 500 milliGRAM(s) Oral every 12 hours

## 2021-08-11 NOTE — PROGRESS NOTE ADULT - SUBJECTIVE AND OBJECTIVE BOX
Patient is a 76y old  Male who presents with a chief complaint of Traumatic Right frontal and temporal subdural hematoma (10 Aug 2021 11:49)    HPI:  Patient is a 76 year old male with PMHx of Diabetes Mellitus type 2, initially presented as a Level 2 Trauma at [facility] on  after found down at a bus stop with altered mental status. It was thought to be due totraumatic cause. Patient appears intoxicated at the time with  on arrival. CT scan on admission demonstrated bifrontal SAH, a R SDH, a L parietal SAH with pneumocephalus, a L possible parietal non-displaced skull fracture, and a C6 inferior endplate fracture into disc space with air into the canal.     Patient was admitted to NSICU. Neurosurgery was consulted who decided to treat conservatively with no progression seen on repeat imaging. During patient hospital course he seemed to have worsening AMS which was thought to be due to delirium and or dementia. Pt also had a UTI & has completed ABx course. Pt was also found to be hyponatremic possibly due to SIADH, which has now resolved.     Pt recommended for acute inpatient rehabilitation and was transferred to [facility] on 21.           (2021 14:07)    PAST MEDICAL & SURGICAL HISTORY:  DM (diabetes mellitus)      Allergies    Allergy Status Unknown    Intolerances      ----------------------------------------------------------------------    SUBJECTIVE: Patient seen this morning. No events overnight. More pleasant today. Slept well and eating well.      ROS:  Positive:  Denies: headache, lightheadedness, CP, SOB, abdominal pain, dysuria, nausea, constipation      ----------------------------------------------------------------------  PHYSICAL EXAM:    Vital Signs Last 24 Hrs  T(C): 36.4 (11 Aug 2021 07:45), Max: 36.4 (10 Aug 2021 22:07)  T(F): 97.5 (11 Aug 2021 07:45), Max: 97.5 (10 Aug 2021 22:07)  HR: 89 (11 Aug 2021 07:45) (81 - 89)  BP: 93/58 (11 Aug 2021 07:45) (93/58 - 101/60)  BP(mean): --  RR: 15 (11 Aug 2021 07:45) (15 - 15)  SpO2: 99% (11 Aug 2021 07:45) (96% - 99%)  Daily     Daily Weight in k.7 (10 Aug 2021 22:54)      Constitutional - NAD, Comfortable  HEENT -  EOMI  Neck - Supple,   Chest - breathing comfortably on RA  Cardio - warm and well perfused,   Abdomen -  Soft, NTND  Extremities - No peripheral edema,   Neurologic Exam:                    Cognitive -             Orientation: Awake, Alert, AAO x 3. .  Knew year and month, but not date.              Attention:  impaired,  tangential speech.  difficulty following conversation.  unable to complete days of week backward            Memory: impaired-- 0/3 spontaneously,  3/3 with cat. cues     Speech - Fluent, Comprehensible, No dysarthria, No aphasia      Cranial Nerves -PERRLA, No facial asymmetry, Tongue midline, EOMI, Shoulder shrug intact     Motor -                      LEFT    UE - ShAB 5/5, EF 5/5, EE 5/5, WE 5/5,  WNL                    RIGHT UE - ShAB 5/5, EF 5/5, EE 5/5, WE 5/5,  WNL                    LEFT    LE - HF 5/5, KE 5/5, DF 5/5, PF 5/5                    RIGHT LE - HF 5/5, KE 5/5, DF 5/5, PF 5/5        Sensory - Intact to LT bilateral     Coordination - FTN / HTS intact    Psychiatric - calm today.        ----------------------------------------------------------------------  RECENT LABS:      08-10    139  |  104  |  11  ----------------------------<  154<H>  3.9   |  28  |  1.12    Ca    8.8      10 Aug 2021 06:34            CAPILLARY BLOOD GLUCOSE      POCT Blood Glucose.: 124 mg/dL (10 Aug 2021 11:42)    ----------------------------------------------------------------------  RECENT IMAGING:    CT Head :  There is redemonstration of an evolving acute right convexity subdural hematoma extending along the posterior falx and right tentorium, unchanged when compared to the most recent prior CT exam. The greatest transverse depth measures up to 8 mm along the posterior right frontal convexity.  There is also redemonstration of small mixed density subdural hemorrhage along the left cerebral convexity which also appears grossly unchanged.  Hemorrhagic contusions along the bifrontal lobes are again seen. There is also a small hemorrhagic contusion involving the left posterior temporal cortical junction. Scattered areas of subarachnoid hemorrhage are again seen. No new areas of acute intracranial hemorrhage are noted.  There is no shift of the midline structures or herniation. There is no hydrocephalus.  A nondisplaced left-sided occipital temporal fracture is again seen.  The paranasal sinuses and right mastoid air cells remain clear. Trace left-sided mastoid air cell opacification is again seen and is nonspecific. The orbits appear unremarkable.  ----------------------------------------------------------------------  MEDICATIONS:  MEDICATIONS  (STANDING):  atorvastatin 40 milliGRAM(s) Oral at bedtime  donepezil 5 milliGRAM(s) Oral daily  enoxaparin Injectable 40 milliGRAM(s) SubCutaneous daily  escitalopram 10 milliGRAM(s) Oral daily  folic acid 1 milliGRAM(s) Oral daily  melatonin 3 milliGRAM(s) Oral at bedtime  metFORMIN 850 milliGRAM(s) Oral two times a day  multivitamin 1 Tablet(s) Oral daily  polyethylene glycol 3350 17 Gram(s) Oral daily  senna 2 Tablet(s) Oral at bedtime  thiamine 100 milliGRAM(s) Oral daily  timolol 0.25% Solution 1 Drop(s) Both EYES at bedtime  valproic  acid Syrup 500 milliGRAM(s) Oral every 12 hours    MEDICATIONS  (PRN):  acetaminophen   Tablet .. 650 milliGRAM(s) Oral every 6 hours PRN Temp greater or equal to 38C (100.4F)  magnesium hydroxide Suspension 30 milliLiter(s) Oral daily PRN Constipation    ----------------------------------------------------------------------    Assessment and Plan:   · Assessment	  · Assessment	  Patient is a 76 year old male with PMHx of Diabetes Mellitus type 2, initially presented as a Level 2 Trauma at [facility] on  after found down at a bus stop with altered mental status. Patient appears intoxicated at the time with  on arrival. CT scan on admission demonstrated bifrontal SAH, a R SDH, a L parietal SAH with pneumocephalus, a L possible parietal non-displaced skull fracture, and a C6 inferior endplate fracture into disc space with air into the canal.     # bifrontal SAH & R SDH.   - continue comprehensive rehab program, PT/OT/SLP 3 hours a day, 5 days a week  - neuropsychology evaluation--reviewed and appreciated  - Precautions: DM, spinal, AMS, fall, Seizure   - Donepezil 5 mg started  for memory/cognition and increased to 10 mg . monitor for SE-- appears to be tolerating.    --Neuroendocrine w/u Neg.  Had normal TSH and free T4 on , AM cortisol WNL    #HTN --Orthostatic Hypotension--  --BP borderline-Monitor  - Previously on enalapril which was discontinued  - Continue to encourage adequate P.O hydration--  --Cr. improving      # Diabetes   - HBA1c -  7.3  - ISS  --Metformin 850 mg BID.   --FS controlled     #Depression   - cont. Escitalopram 10mg  - Neuropsychology following    # Delirium & seizure   - Continue on Valproate 500 mg Q12H for agitation.  - neuropsychology & SLP cognitive treatment.   - VPA level - 70 - WNL  --ammonia level 8/6 WNL    Sleep  --melatonin     #ETOH Abuse   - Continue Multivitamin, Folic Acid, Thiamine   - psychology and SW follow up    #HLD   - Continue Atorvastatin.     #Pain  - Tylenol PRN    #GI  - Dulcolax PRN, Senna  - Protonix  - Milk of Magnesia PRN.     #Diet  - Regular - Carb controlled - DASH diet.     # DVT Prophylaxis   -previously on heparin Q12H but now on lovenox to decrease # of injections and improve compliance      IDT 8/10/21  -SW: Lives with significant other in a private home with 1 JESSIE. Daughter is involved in his care.   -OT: set-up/supervision eating, UBD; min A for grooming, LBD, toilet trans, toileting; Mod A bathing  -PT: Stairs: CG stairs 1 flight;  supervision transfers; supervision amb. 150ft. without device   -Speech: Regular consistency diet. Memory is Mod A. Needs frequent redirection and reorientation.  Mild-mod cog deficits, confabulatory. Inappropriate behavior improved. Compr min A, Soc Max A, Poor attention, disoriented.  poor interaction  D/C plannin/14 home.     Nutritional Assessment:  · Nutritional Assessment	This patient has been assessed with a concern for Malnutrition and has been determined to have a diagnosis/diagnoses of Severe protein-calorie malnutrition.    This patient is being managed with:   Diet Consistent Carbohydrate w/Evening Snack-  Supplement Feeding Modality:  Oral  Glucerna Shake Cans or Servings Per Day:  1       Frequency:  Three Times a day  Entered: 2021 10:45AM

## 2021-08-12 ENCOUNTER — TRANSCRIPTION ENCOUNTER (OUTPATIENT)
Age: 76
End: 2021-08-12

## 2021-08-12 LAB
ALBUMIN SERPL ELPH-MCNC: 2.3 G/DL — LOW (ref 3.3–5)
ALP SERPL-CCNC: 99 U/L — SIGNIFICANT CHANGE UP (ref 40–120)
ALT FLD-CCNC: 39 U/L — SIGNIFICANT CHANGE UP (ref 10–45)
ANION GAP SERPL CALC-SCNC: 7 MMOL/L — SIGNIFICANT CHANGE UP (ref 5–17)
AST SERPL-CCNC: 24 U/L — SIGNIFICANT CHANGE UP (ref 10–40)
BILIRUB SERPL-MCNC: 0.1 MG/DL — LOW (ref 0.2–1.2)
BUN SERPL-MCNC: 11 MG/DL — SIGNIFICANT CHANGE UP (ref 7–23)
CALCIUM SERPL-MCNC: 8.7 MG/DL — SIGNIFICANT CHANGE UP (ref 8.4–10.5)
CHLORIDE SERPL-SCNC: 108 MMOL/L — SIGNIFICANT CHANGE UP (ref 96–108)
CO2 SERPL-SCNC: 28 MMOL/L — SIGNIFICANT CHANGE UP (ref 22–31)
CREAT SERPL-MCNC: 1.19 MG/DL — SIGNIFICANT CHANGE UP (ref 0.5–1.3)
GLUCOSE SERPL-MCNC: 173 MG/DL — HIGH (ref 70–99)
HCT VFR BLD CALC: 26.7 % — LOW (ref 39–50)
HGB BLD-MCNC: 8.8 G/DL — LOW (ref 13–17)
MCHC RBC-ENTMCNC: 32.5 PG — SIGNIFICANT CHANGE UP (ref 27–34)
MCHC RBC-ENTMCNC: 33 GM/DL — SIGNIFICANT CHANGE UP (ref 32–36)
MCV RBC AUTO: 98.5 FL — SIGNIFICANT CHANGE UP (ref 80–100)
NRBC # BLD: 0 /100 WBCS — SIGNIFICANT CHANGE UP (ref 0–0)
PLATELET # BLD AUTO: 262 K/UL — SIGNIFICANT CHANGE UP (ref 150–400)
POTASSIUM SERPL-MCNC: 4.2 MMOL/L — SIGNIFICANT CHANGE UP (ref 3.5–5.3)
POTASSIUM SERPL-SCNC: 4.2 MMOL/L — SIGNIFICANT CHANGE UP (ref 3.5–5.3)
PROT SERPL-MCNC: 6 G/DL — SIGNIFICANT CHANGE UP (ref 6–8.3)
RBC # BLD: 2.71 M/UL — LOW (ref 4.2–5.8)
RBC # FLD: 11.7 % — SIGNIFICANT CHANGE UP (ref 10.3–14.5)
SODIUM SERPL-SCNC: 143 MMOL/L — SIGNIFICANT CHANGE UP (ref 135–145)
WBC # BLD: 4.37 K/UL — SIGNIFICANT CHANGE UP (ref 3.8–10.5)
WBC # FLD AUTO: 4.37 K/UL — SIGNIFICANT CHANGE UP (ref 3.8–10.5)

## 2021-08-12 PROCEDURE — 99232 SBSQ HOSP IP/OBS MODERATE 35: CPT

## 2021-08-12 RX ADMIN — Medication 1 MILLIGRAM(S): at 11:50

## 2021-08-12 RX ADMIN — METFORMIN HYDROCHLORIDE 850 MILLIGRAM(S): 850 TABLET ORAL at 17:32

## 2021-08-12 RX ADMIN — Medication 1 TABLET(S): at 11:50

## 2021-08-12 RX ADMIN — METFORMIN HYDROCHLORIDE 850 MILLIGRAM(S): 850 TABLET ORAL at 07:38

## 2021-08-12 RX ADMIN — Medication 500 MILLIGRAM(S): at 05:45

## 2021-08-12 RX ADMIN — Medication 3 MILLIGRAM(S): at 21:24

## 2021-08-12 RX ADMIN — ENOXAPARIN SODIUM 40 MILLIGRAM(S): 100 INJECTION SUBCUTANEOUS at 11:49

## 2021-08-12 RX ADMIN — Medication 100 MILLIGRAM(S): at 11:50

## 2021-08-12 RX ADMIN — POLYETHYLENE GLYCOL 3350 17 GRAM(S): 17 POWDER, FOR SOLUTION ORAL at 11:50

## 2021-08-12 RX ADMIN — Medication 500 MILLIGRAM(S): at 17:32

## 2021-08-12 RX ADMIN — ATORVASTATIN CALCIUM 40 MILLIGRAM(S): 80 TABLET, FILM COATED ORAL at 21:23

## 2021-08-12 RX ADMIN — ESCITALOPRAM OXALATE 10 MILLIGRAM(S): 10 TABLET, FILM COATED ORAL at 11:50

## 2021-08-12 RX ADMIN — DONEPEZIL HYDROCHLORIDE 10 MILLIGRAM(S): 10 TABLET, FILM COATED ORAL at 11:50

## 2021-08-12 NOTE — PROGRESS NOTE ADULT - SUBJECTIVE AND OBJECTIVE BOX
HPI:  Patient is a 76 year old male with PMHx of Diabetes Mellitus type 2, initially presented as a Level 2 Trauma at [facility] on 7/9 after found down at a bus stop with altered mental status. It was thought to be due totraumatic cause. Patient appears intoxicated at the time with  on arrival. CT scan on admission demonstrated bifrontal SAH, a R SDH, a L parietal SAH with pneumocephalus, a L possible parietal non-displaced skull fracture, and a C6 inferior endplate fracture into disc space with air into the canal.     Patient was admitted to NSICU. Neurosurgery was consulted who decided to treat conservatively with no progression seen on repeat imaging. During patient hospital course he seemed to have worsening AMS which was thought to be due to delirium and or dementia. Pt also had a UTI & has completed ABx course. Pt was also found to be hyponatremic possibly due to SIADH, which has now resolved.     Pt recommended for acute inpatient rehabilitation and was transferred to [facility] on 7/29/21.           (29 Jul 2021 14:07)      PAST MEDICAL & SURGICAL HISTORY:  DM (diabetes mellitus)        Subjective: No new complaints or overnight issues. Cooperative      VITALS  Vital Signs Last 24 Hrs  T(C): 36.3 (12 Aug 2021 07:40), Max: 36.8 (11 Aug 2021 20:04)  T(F): 97.4 (12 Aug 2021 07:40), Max: 98.2 (11 Aug 2021 20:04)  HR: 79 (12 Aug 2021 07:40) (79 - 95)  BP: 104/67 (12 Aug 2021 07:40) (94/57 - 104/67)  BP(mean): --  RR: 16 (12 Aug 2021 07:40) (15 - 16)  SpO2: 100% (12 Aug 2021 07:40) (96% - 100%)    REVIEW OF SYMPTOMS  Positive: cognitive deficits,  irritability improved.    Denies: headache, lightheadedness, CP, SOB, abdominal pain, dysuria, nausea, constipation      ----------------------------------------------------------------------  PHYSICAL EXAM    Constitutional - NAD, Comfortable  HEENT -  EOMI  Neck - Supple,   Chest - breathing comfortably on RA  Cardio - warm and well perfused,   Abdomen -  Soft, NTND  Extremities - No peripheral edema,   Neurologic Exam:                    Cognitive -             Orientation: Awake, Alert, AAO x 3. .  Knows year and month, but not date.              Attention:  impaired,  tangential speech.  difficulty following conversation.  unable to complete days of week backward            Memory: impaired-- 0/3 spontaneously,  3/3 with cat. cues     Speech - Fluent, Comprehensible, No dysarthria, No aphasia      Cranial Nerves -PERRLA, No facial asymmetry, Tongue midline, EOMI, Shoulder shrug intact     Motor -                      LEFT    UE - ShAB 5/5, EF 5/5, EE 5/5, WE 5/5,  WNL                    RIGHT UE - ShAB 5/5, EF 5/5, EE 5/5, WE 5/5,  WNL                    LEFT    LE - HF 5/5, KE 5/5, DF 5/5, PF 5/5                    RIGHT LE - HF 5/5, KE 5/5, DF 5/5, PF 5/5        Sensory - Intact to LT bilateral     Coordination - FTN / HTS intact    Psychiatric - calm today.      RECENT LABS                        8.8    4.37  )-----------( 262      ( 12 Aug 2021 06:15 )             26.7     08-12    143  |  108  |  11  ----------------------------<  173<H>  4.2   |  28  |  1.19    Ca    8.7      12 Aug 2021 06:15    TPro  6.0  /  Alb  2.3<L>  /  TBili  0.1<L>  /  DBili  x   /  AST  24  /  ALT  39  /  AlkPhos  99  08-12            RADIOLOGY/OTHER RESULTS      MEDICATIONS  (STANDING):  atorvastatin 40 milliGRAM(s) Oral at bedtime  donepezil 10 milliGRAM(s) Oral daily  enoxaparin Injectable 40 milliGRAM(s) SubCutaneous daily  escitalopram 10 milliGRAM(s) Oral daily  folic acid 1 milliGRAM(s) Oral daily  melatonin 3 milliGRAM(s) Oral at bedtime  metFORMIN 850 milliGRAM(s) Oral two times a day  multivitamin 1 Tablet(s) Oral daily  polyethylene glycol 3350 17 Gram(s) Oral daily  senna 2 Tablet(s) Oral at bedtime  thiamine 100 milliGRAM(s) Oral daily  timolol 0.25% Solution 1 Drop(s) Both EYES at bedtime  valproic  acid Syrup 500 milliGRAM(s) Oral every 12 hours    MEDICATIONS  (PRN):  acetaminophen   Tablet .. 650 milliGRAM(s) Oral every 6 hours PRN Temp greater or equal to 38C (100.4F)  magnesium hydroxide Suspension 30 milliLiter(s) Oral daily PRN Constipation

## 2021-08-12 NOTE — PROGRESS NOTE ADULT - ASSESSMENT
· Assessment	  Patient is a 76 year old male with PMHx of Diabetes Mellitus type 2, initially presented as a Level 2 Trauma at [facility] on  after found down at a bus stop with altered mental status. Patient appears intoxicated at the time with  on arrival. CT scan on admission demonstrated bifrontal SAH, a R SDH, a L parietal SAH with pneumocephalus, a L possible parietal non-displaced skull fracture, and a C6 inferior endplate fracture into disc space with air into the canal.     # bifrontal SAH & R SDH.   - continue comprehensive rehab program, PT/OT/SLP 3 hours a day, 5 days a week  - neuropsychology evaluation--reviewed and appreciated  - Precautions: DM, spinal, AMS, fall, Seizure   - Donepezil  increased to 10 mg . monitor for SE-- appears to be tolerating.    --Neuroendocrine w/u Neg.  Had normal TSH and free T4 on , AM cortisol WNL    #HTN --Orthostatic Hypotension--  --BP borderline-Monitor  - Previously on enalapril which was discontinued  - Continue to encourage adequate P.O hydration--  --Cr. improving      # Diabetes   - HBA1c -  7.3  - ISS  --Metformin 850 mg BID.   --FS controlled     #Depression   - cont. Escitalopram 10mg  - Neuropsychology following    # Delirium & seizure   - Continue on Valproate 500 mg Q12H for agitation.  - neuropsychology & SLP cognitive treatment.   - VPA level - 70 - WNL  --ammonia level  WNL    Sleep  --melatonin     #ETOH Abuse   - Continue Multivitamin, Folic Acid, Thiamine   - psychology and SW follow up    #HLD   - Continue Atorvastatin.     #Pain  - Tylenol PRN    #GI  - Dulcolax PRN, Senna  - Protonix  - Milk of Magnesia PRN.     #Diet  - Regular - Carb controlled - DASH diet.     # DVT Prophylaxis   -previously on heparin Q12H but now on lovenox to decrease # of injections and improve compliance      IDT 8/10/21  -SW: Lives with significant other in a private home with 1 JESSIE. Daughter is involved in his care.   -OT: set-up/supervision eating, UBD; min A for grooming, LBD, toilet trans, toileting; Mod A bathing  -PT: Stairs: CG stairs 1 flight;  supervision transfers; supervision amb. 150ft. without device   -Speech: Regular consistency diet. Memory is Mod A. Needs frequent redirection and reorientation.  Mild-mod cog deficits, confabulatory. Inappropriate behavior improved. Compr min A, Soc Max A, Poor attention, disoriented.  poor interaction  D/C plannin/14 home.    · Assessment	  Patient is a 76 year old male with PMHx of Diabetes Mellitus type 2, initially presented as a Level 2 Trauma at [facility] on  after found down at a bus stop with altered mental status. Patient appears intoxicated at the time with  on arrival. CT scan on admission demonstrated bifrontal SAH, a R SDH, a L parietal SAH with pneumocephalus, a L possible parietal non-displaced skull fracture, and a C6 inferior endplate fracture into disc space with air into the canal.     # bifrontal SAH & R SDH.   - continue comprehensive rehab program, PT/OT/SLP 3 hours a day, 5 days a week  - neuropsychology evaluation--reviewed and appreciated  - Precautions: DM, spinal, AMS, fall, Seizure   - Donepezil  increased to 10 mg . monitor for SE-- appears to be tolerating.    --Neuroendocrine w/u Neg.  Had normal TSH and free T4 on , AM cortisol WNL    #HTN --Orthostatic Hypotension--  --BP stable  - Previously on enalapril which was discontinued  - Continue to encourage adequate P.O hydration--  --Cr. improving      # Diabetes   - HBA1c -  7.3  - ISS  --Metformin 850 mg BID.   --FS controlled     #Depression   - cont. Escitalopram 10mg  - Neuropsychology following    # Delirium & seizure   - Continue on Valproate 500 mg Q12H for agitation.  - neuropsychology & SLP cognitive treatment.   - VPA level - 70 - WNL  --ammonia level  WNL    Sleep  --melatonin     #ETOH Abuse   - Continue Multivitamin, Folic Acid, Thiamine   - psychology and SW follow up    #HLD   - Continue Atorvastatin.     #Pain  - Tylenol PRN    #GI  - Dulcolax PRN, Senna  - Protonix  - Milk of Magnesia PRN.     #Diet  - Regular - Carb controlled - DASH diet.     # DVT Prophylaxis   -previously on heparin Q12H but now on lovenox to decrease # of injections and improve compliance      IDT 8/10/21  -SW: Lives with significant other in a private home with 1 JESSIE. Daughter is involved in his care.   -OT: set-up/supervision eating, UBD; min A for grooming, LBD, toilet trans, toileting; Mod A bathing  -PT: Stairs: CG stairs 1 flight;  supervision transfers; supervision amb. 150ft. without device   -Speech: Regular consistency diet. Memory is Mod A. Needs frequent redirection and reorientation.  Mild-mod cog deficits, confabulatory. Inappropriate behavior improved. Compr min A, Soc Max A, Poor attention, disoriented.  poor interaction  D/C plannin/14 home.    · Assessment	  Patient is a 76 year old male with PMHx of Diabetes Mellitus type 2, initially presented as a Level 2 Trauma at [facility] on  after found down at a bus stop with altered mental status. Patient appears intoxicated at the time with  on arrival. CT scan on admission demonstrated bifrontal SAH, a R SDH, a L parietal SAH with pneumocephalus, a L possible parietal non-displaced skull fracture, and a C6 inferior endplate fracture into disc space with air into the canal.     # bifrontal SAH & R SDH.   - continue comprehensive rehab program, PT/OT/SLP 3 hours a day, 5 days a week  - neuropsychology evaluation--reviewed and appreciated  - Precautions: DM, spinal, AMS, fall, Seizure   - Donepezil  increased to 10 mg . monitor for SE-- appears to be tolerating.    --Neuroendocrine w/u Neg.  Had normal TSH and free T4 on , AM cortisol WNL    #HTN --Orthostatic Hypotension--  --BP stable  - Previously on enalapril which was discontinued  - Continue to encourage adequate P.O hydration--  --Cr. stable      # Diabetes   - HBA1c -  7.3  - ISS  --Metformin 850 mg BID.   --FS controlled     #Depression   - cont. Escitalopram 10mg  - Neuropsychology following    # Delirium & seizure   - Continue on Valproate 500 mg Q12H for agitation.  - neuropsychology & SLP cognitive treatment.   - VPA level - 70 - WNL  --ammonia level  WNL    Sleep  --melatonin     #ETOH Abuse   - Continue Multivitamin, Folic Acid, Thiamine   - psychology and SW follow up    #HLD   - Continue Atorvastatin.     #Pain  - Tylenol PRN    #GI  - Dulcolax PRN, Senna  - Protonix  - Milk of Magnesia PRN.     #Diet  - Regular - Carb controlled - DASH diet.     # DVT Prophylaxis   -previously on heparin Q12H but now on lovenox to decrease # of injections and improve compliance      IDT 8/10/21  -SW: Lives with significant other in a private home with 1 JESSIE. Daughter is involved in his care.   -OT: set-up/supervision eating, UBD; min A for grooming, LBD, toilet trans, toileting; Mod A bathing  -PT: Stairs: CG stairs 1 flight;  supervision transfers; supervision amb. 150ft. without device   -Speech: Regular consistency diet. Memory is Mod A. Needs frequent redirection and reorientation.  Mild-mod cog deficits, confabulatory. Inappropriate behavior improved. Compr min A, Soc Max A, Poor attention, disoriented.  poor interaction  D/C plannin/14 home.

## 2021-08-12 NOTE — DISCHARGE NOTE NURSING/CASE MANAGEMENT/SOCIAL WORK - NSDCFUADDAPPT_GEN_ALL_CORE_FT
Please follow up with PCP Dr. Sheri Concepcion in 2 weeks [phone]. Please follow up with PCP Dr. Sheri Concepcion in 2 weeks [phone].    was unable to schedule appointment with [facility] and was informed to provide contact of family and the center will call family to schedule follow up.

## 2021-08-12 NOTE — DISCHARGE NOTE NURSING/CASE MANAGEMENT/SOCIAL WORK - PATIENT PORTAL LINK FT
You can access the FollowMyHealth Patient Portal offered by [facility] by registering at the following website: [email or web address]. By joining [employer]’s FollowMyHealth portal, you will also be able to view your health information using other applications (apps) compatible with our system.

## 2021-08-13 PROCEDURE — 99232 SBSQ HOSP IP/OBS MODERATE 35: CPT

## 2021-08-13 RX ORDER — DONEPEZIL HYDROCHLORIDE 10 MG/1
1 TABLET, FILM COATED ORAL
Qty: 30 | Refills: 0
Start: 2021-08-13 | End: 2021-09-11

## 2021-08-13 RX ORDER — METFORMIN HYDROCHLORIDE 850 MG/1
1 TABLET ORAL
Qty: 60 | Refills: 0
Start: 2021-08-13 | End: 2021-09-11

## 2021-08-13 RX ORDER — VALPROIC ACID (AS SODIUM SALT) 250 MG/5ML
500 SOLUTION, ORAL ORAL
Qty: 300 | Refills: 0
Start: 2021-08-13 | End: 2021-09-11

## 2021-08-13 RX ORDER — DIVALPROEX SODIUM 500 MG/1
1 TABLET, DELAYED RELEASE ORAL
Qty: 60 | Refills: 0
Start: 2021-08-13 | End: 2021-09-11

## 2021-08-13 RX ORDER — ACETAMINOPHEN 500 MG
2 TABLET ORAL
Qty: 0 | Refills: 0 | DISCHARGE
Start: 2021-08-13

## 2021-08-13 RX ORDER — VALPROIC ACID (AS SODIUM SALT) 250 MG/5ML
1 SOLUTION, ORAL ORAL
Qty: 60 | Refills: 0
Start: 2021-08-13 | End: 2021-09-11

## 2021-08-13 RX ORDER — ATORVASTATIN CALCIUM 80 MG/1
1 TABLET, FILM COATED ORAL
Qty: 30 | Refills: 0
Start: 2021-08-13 | End: 2021-09-11

## 2021-08-13 RX ORDER — THIAMINE MONONITRATE (VIT B1) 100 MG
1 TABLET ORAL
Qty: 30 | Refills: 0
Start: 2021-08-13 | End: 2021-09-11

## 2021-08-13 RX ORDER — FOLIC ACID 0.8 MG
1 TABLET ORAL
Qty: 30 | Refills: 0
Start: 2021-08-13 | End: 2021-09-11

## 2021-08-13 RX ORDER — ESCITALOPRAM OXALATE 10 MG/1
1 TABLET, FILM COATED ORAL
Qty: 30 | Refills: 0
Start: 2021-08-13 | End: 2021-09-11

## 2021-08-13 RX ADMIN — Medication 500 MILLIGRAM(S): at 17:15

## 2021-08-13 RX ADMIN — METFORMIN HYDROCHLORIDE 850 MILLIGRAM(S): 850 TABLET ORAL at 17:15

## 2021-08-13 RX ADMIN — Medication 500 MILLIGRAM(S): at 05:36

## 2021-08-13 RX ADMIN — Medication 1 MILLIGRAM(S): at 11:16

## 2021-08-13 RX ADMIN — ATORVASTATIN CALCIUM 40 MILLIGRAM(S): 80 TABLET, FILM COATED ORAL at 21:26

## 2021-08-13 RX ADMIN — METFORMIN HYDROCHLORIDE 850 MILLIGRAM(S): 850 TABLET ORAL at 08:40

## 2021-08-13 RX ADMIN — ENOXAPARIN SODIUM 40 MILLIGRAM(S): 100 INJECTION SUBCUTANEOUS at 11:16

## 2021-08-13 RX ADMIN — ESCITALOPRAM OXALATE 10 MILLIGRAM(S): 10 TABLET, FILM COATED ORAL at 11:16

## 2021-08-13 RX ADMIN — Medication 3 MILLIGRAM(S): at 21:25

## 2021-08-13 RX ADMIN — Medication 100 MILLIGRAM(S): at 11:16

## 2021-08-13 RX ADMIN — Medication 1 DROP(S): at 21:26

## 2021-08-13 RX ADMIN — Medication 1 TABLET(S): at 11:16

## 2021-08-13 RX ADMIN — DONEPEZIL HYDROCHLORIDE 10 MILLIGRAM(S): 10 TABLET, FILM COATED ORAL at 11:16

## 2021-08-13 NOTE — CHART NOTE - NSCHARTNOTEFT_GEN_A_CORE
Nutrition Follow Up Note  Hospital Course (Per Electronic Medical Record): 76 year old male with PMHx of Diabetes Mellitus type 2, initially presented as a Level 2 Trauma at [facility] on 7/9 after found down at a bus stop with altered mental status. Patient appears intoxicated at the time with  on arrival. CT scan on admission demonstrated bifrontal SAH, a R SDH, a L parietal SAH with pneumocephalus, a L possible parietal non-displaced skull fracture, and a C6 inferior endplate fracture into disc space with air into the canal.     Source: Medical Record [X] Patient [X] Family [ ]         Diet: consistent carbohydrate w/ evening snack, Glucerna TID  Pt with variable PO intake, but pt reports that he is eating well. Pt observed with fruit & dessert by bedside that he didn't eat. Encouraged consumption of protein at meals. Pt receptive to written education on consistent carb diet to follow at home as plan is for discharge tomorrow.     Enteral/Parenteral Nutrition: N/A    Current Weight: 128 lbs (8/12)  127.6 lbs (8/12)  125 lbs (8/10)  123.4 lbs (8/9)  122.7 lbs (8/5)  122.3 lbs (8/3)  119.7 lbs (7/31)      Pertinent Medications: MEDICATIONS  (STANDING):  atorvastatin 40 milliGRAM(s) Oral at bedtime  donepezil 10 milliGRAM(s) Oral daily  enoxaparin Injectable 40 milliGRAM(s) SubCutaneous daily  escitalopram 10 milliGRAM(s) Oral daily  folic acid 1 milliGRAM(s) Oral daily  melatonin 3 milliGRAM(s) Oral at bedtime  metFORMIN 850 milliGRAM(s) Oral two times a day  multivitamin 1 Tablet(s) Oral daily  polyethylene glycol 3350 17 Gram(s) Oral daily  senna 2 Tablet(s) Oral at bedtime  thiamine 100 milliGRAM(s) Oral daily  timolol 0.25% Solution 1 Drop(s) Both EYES at bedtime  valproic  acid Syrup 500 milliGRAM(s) Oral every 12 hours    MEDICATIONS  (PRN):  acetaminophen   Tablet .. 650 milliGRAM(s) Oral every 6 hours PRN Temp greater or equal to 38C (100.4F)  magnesium hydroxide Suspension 30 milliLiter(s) Oral daily PRN Constipation      Pertinent Labs:  08-12 Na143 mmol/L Glu 173 mg/dL<H> K+ 4.2 mmol/L Cr  1.19 mg/dL BUN 11 mg/dL 08-12 Alb 2.3 g/dL<L>  POCT glucose x 4: 124 (H), 158 (H), 145 (H), 135 (H)      Skin: Skin intact per nursing flow sheets     Edema: No edema per nursing flow sheets     Last BM: on  8/11 Per nursing flowsheets     Estimated Needs:   [X] No Change since Previous Assessment  [ ] Recalculated:     Previous Nutrition Diagnosis:   Severe malnutrition     Nutrition Diagnosis is [X] Ongoing    [ ] Resolved   [ ] Not Applicable      New Nutrition Diagnosis: [X] Not Applicable  [ ] Inadequate Protein Energy Intake   [ ] Inadequate Oral Intake   [ ] Excessive Energy Intake   [ ] Increased Nutrient Needs   [ ] Obesity   [ ] Altered GI Function   [ ] Unintended Weight Loss   [ ] Food & Nutrition Related Knowledge Deficit  [ ] Limited Adherence to nutrition related recommendations   [ ] Malnutrition      Interventions:   1. Recommend continuing with current diet  2. Encourage PO intake  3. Provided with written nutrition education on heart healthy consistent carbohydrate nutrition therapy    Monitoring & Evaluation:   [X] Weights   [X] PO Intake   [X] Follow Up (Per Protocol)  [X] Tolerance to Diet Prescription   [X] Other: Labs & POCT glucose     RD Remains Available.  Ariana Moe RD
Nutrition Follow Up Note  Hospital Course (Per Electronic Medical Record): 77 y/o M PMHx of Diabetes Mellitus type 2, initially presented as a Level 2 Trauma at [facility] on 7/9 after found down at a bus stop with altered mental status. Patient appears intoxicated at the time with  on arrival. CT scan on admission demonstrated bifrontal SAH, a R SDH, a L parietal SAH with pneumocephalus, a L possible parietal non-displaced skull fracture, and a C6 inferior endplate fracture into disc space with air into the canal.     Source: Medical Record [X] Patient [X] Family [ ]         Diet: Consistent Carbohydrate w/ evening snack, Glucerna TID  Pt with suboptimal PO intake. Pt unable to provide food preferences or why he isn't eating, became distracted during interview. Pt reports that his daughter brought him food. Observed with juice by bedside. Encouraged compliance with consistent carbohydrate diet and encouraged consumption of Glucerna supplement.     Enteral/Parenteral Nutrition: N/A    Current Weight: 123.4 lbs (8/9)  123.4 lbs (8/6)  122.7 lbs (7/5)  122.3 lbs (8/2)  123.6 lbs (8/2)  119.7 lbs (7/29)      Pertinent Medications: MEDICATIONS  (STANDING):  atorvastatin 40 milliGRAM(s) Oral at bedtime  dextrose 40% Gel 15 Gram(s) Oral once  dextrose 5%. 1000 milliLiter(s) (50 mL/Hr) IV Continuous <Continuous>  dextrose 5%. 1000 milliLiter(s) (100 mL/Hr) IV Continuous <Continuous>  dextrose 50% Injectable 25 Gram(s) IV Push once  dextrose 50% Injectable 12.5 Gram(s) IV Push once  dextrose 50% Injectable 25 Gram(s) IV Push once  donepezil 5 milliGRAM(s) Oral daily  enoxaparin Injectable 40 milliGRAM(s) SubCutaneous daily  escitalopram 10 milliGRAM(s) Oral daily  folic acid 1 milliGRAM(s) Oral daily  glucagon  Injectable 1 milliGRAM(s) IntraMuscular once  insulin lispro (ADMELOG) corrective regimen sliding scale   SubCutaneous three times a day before meals  insulin lispro (ADMELOG) corrective regimen sliding scale   SubCutaneous at bedtime  melatonin 3 milliGRAM(s) Oral at bedtime  metFORMIN 850 milliGRAM(s) Oral two times a day  multivitamin 1 Tablet(s) Oral daily  polyethylene glycol 3350 17 Gram(s) Oral daily  senna 2 Tablet(s) Oral at bedtime  thiamine 100 milliGRAM(s) Oral daily  timolol 0.25% Solution 1 Drop(s) Both EYES at bedtime  valproic  acid Syrup 500 milliGRAM(s) Oral every 12 hours    MEDICATIONS  (PRN):  acetaminophen   Tablet .. 650 milliGRAM(s) Oral every 6 hours PRN Temp greater or equal to 38C (100.4F)  magnesium hydroxide Suspension 30 milliLiter(s) Oral daily PRN Constipation      Pertinent Labs:  08-09 Na139 mmol/L Glu 200 mg/dL<H> K+ 4.0 mmol/L Cr  1.22 mg/dL BUN 11 mg/dL 08-09 Alb 2.4 g/dL<L>        Skin: Skin intact per nursing flow sheets     Edema: No edema per nursing flow sheets     Last BM: on 8/7 per nursing flow sheets    Estimated Needs:   [X] No Change since Previous Assessment  [ ] Recalculated:     Previous Nutrition Diagnosis:   Severe malnutrition    Nutrition Diagnosis is [X] Ongoing    [ ] Resolved   [ ] Not Applicable      New Nutrition Diagnosis: [X] Not Applicable  [ ] Inadequate Protein Energy Intake   [ ] Inadequate Oral Intake   [ ] Excessive Energy Intake   [ ] Increased Nutrient Needs   [ ] Obesity   [ ] Altered GI Function   [ ] Unintended Weight Loss   [ ] Food & Nutrition Related Knowledge Deficit  [ ] Limited Adherence to nutrition related recommendations   [ ] Malnutrition      Interventions:   1. Recommend continuing with current plan of care  2. Encourage PO intake  3. Observe during meal rounds to obtain food preferences    Monitoring & Evaluation:   [X] Weights   [X] PO Intake   [X] Follow Up (Per Protocol)  [X] Tolerance to Diet Prescription   [X] Other: Labs     RD Remains Available.  Ariana Moe RD
Nutrition Follow Up Note  Hospital Course (Per Electronic Medical Record): 76 year old male with PMHx of Diabetes Mellitus type 2, initially presented as a Level 2 Trauma at [facility] on 7/9 after found down at a bus stop with altered mental status. Patient appears intoxicated at the time with  on arrival. CT scan on admission demonstrated bifrontal SAH, a R SDH, a L parietal SAH with pneumocephalus, a L possible parietal non-displaced skull fracture, and a C6 inferior endplate fracture into disc space with air into the canal.   Source: Medical Record [X] Patient [X] Family [ ]         Diet: Consistent carbohydrate w/ Evening Snack, Glucerna TID  Pt with poor PO intake, but reporting that he "eats enough", pt unable to participate in meaningful nutrition education on consistent carb diet. Encouraged consumption of Glucerna between and after meals.     Enteral/Parenteral Nutrition: N/A    Current Weight: 122.3 lbs (8/2)  123.6 lbs (8/2)  119.7 lbs (7/29)    Pertinent Medications: MEDICATIONS  (STANDING):  atorvastatin 40 milliGRAM(s) Oral at bedtime  dextrose 40% Gel 15 Gram(s) Oral once  dextrose 5%. 1000 milliLiter(s) (50 mL/Hr) IV Continuous <Continuous>  dextrose 5%. 1000 milliLiter(s) (100 mL/Hr) IV Continuous <Continuous>  dextrose 50% Injectable 25 Gram(s) IV Push once  dextrose 50% Injectable 12.5 Gram(s) IV Push once  dextrose 50% Injectable 25 Gram(s) IV Push once  enoxaparin Injectable 40 milliGRAM(s) SubCutaneous daily  escitalopram 10 milliGRAM(s) Oral daily  folic acid 1 milliGRAM(s) Oral daily  glucagon  Injectable 1 milliGRAM(s) IntraMuscular once  insulin lispro (ADMELOG) corrective regimen sliding scale   SubCutaneous three times a day before meals  insulin lispro (ADMELOG) corrective regimen sliding scale   SubCutaneous at bedtime  melatonin 3 milliGRAM(s) Oral at bedtime  metFORMIN 850 milliGRAM(s) Oral two times a day  multivitamin/minerals 1 Tablet(s) Oral daily  polyethylene glycol 3350 17 Gram(s) Oral daily  senna 2 Tablet(s) Oral at bedtime  thiamine 100 milliGRAM(s) Oral daily  timolol 0.25% Solution 1 Drop(s) Both EYES at bedtime  valproic  acid Syrup 500 milliGRAM(s) Oral every 12 hours    MEDICATIONS  (PRN):  acetaminophen   Tablet .. 650 milliGRAM(s) Oral every 6 hours PRN Temp greater or equal to 38C (100.4F)  magnesium hydroxide Suspension 30 milliLiter(s) Oral daily PRN Constipation      Pertinent Labs:  08-02 Na138 mmol/L Glu 241 mg/dL<H> K+ 4.0 mmol/L Cr  1.29 mg/dL BUN 17 mg/dL 08-02 Alb 2.3 g/dL<L>  POCT glucose x 4: 247 (H), 315 (H), 276 (H), 186 (H)      Skin: Skin intact per nursing flow sheets     Edema: No edema per nursing flow sheets     Last BM: on 8/1 Per nursing flowsheets     Estimated Needs:   [X] No Change since Previous Assessment  [ ] Recalculated:     Previous Nutrition Diagnosis:   Severe malnutrition    Nutrition Diagnosis is [X] Ongoing    [ ] Resolved   [ ] Not Applicable      New Nutrition Diagnosis: [X] Not Applicable  [ ] Inadequate Protein Energy Intake   [ ] Inadequate Oral Intake   [ ] Excessive Energy Intake   [ ] Increased Nutrient Needs   [ ] Obesity   [ ] Altered GI Function   [ ] Unintended Weight Loss   [ ] Food & Nutrition Related Knowledge Deficit  [ ] Limited Adherence to nutrition related recommendations   [ ] Malnutrition      Interventions:   1. Recommend continue with current plan of care  2. Encourage PO intake  3. Encourage consumption of oral nutrition supplement    Monitoring & Evaluation:   [X] Weights   [X] PO Intake   [X] Follow Up (Per Protocol)  [X] Tolerance to Diet Prescription   [X] Other: Labs & PCOT    RD Remains Available.  Ariana Moe RD

## 2021-08-13 NOTE — PROGRESS NOTE ADULT - ASSESSMENT
· Assessment	  Patient is a 76 year old male with PMHx of Diabetes Mellitus type 2, initially presented as a Level 2 Trauma at [facility] on  after found down at a bus stop with altered mental status. Patient appears intoxicated at the time with  on arrival. CT scan on admission demonstrated bifrontal SAH, a R SDH, a L parietal SAH with pneumocephalus, a L possible parietal non-displaced skull fracture, and a C6 inferior endplate fracture into disc space with air into the canal.     # bifrontal SAH & R SDH.   - continue comprehensive rehab program, PT/OT/SLP 3 hours a day, 5 days a week  - neuropsychology evaluation--reviewed and appreciated  - Precautions: DM, spinal, AMS, fall, Seizure   - Donepezil  increased to 10 mg . monitor for SE-- appears to be tolerating.    --Neuroendocrine w/u Neg.  Had normal TSH and free T4 on , AM cortisol WNL    #HTN --Orthostatic Hypotension--  --BP stable  - Previously on enalapril which was discontinued  - Continue to encourage adequate P.O hydration--  --Cr. stable      # Diabetes   - HBA1c -  7.3  - ISS  --Metformin 850 mg BID.   --FS controlled     #Depression   - cont. Escitalopram 10mg  - Neuropsychology following    # Delirium   - Continue on Valproate 500 mg Q12H for agitation.  - neuropsychology & SLP cognitive treatment.   - VPA level - 70 - WNL  --ammonia level 8 WNL    Sleep  --melatonin     #ETOH Abuse   - Continue Multivitamin, Folic Acid, Thiamine   - psychology and SW follow up    #HLD   - Continue Atorvastatin.     #Pain  - Tylenol PRN    #GI  - Dulcolax PRN, Senna  - Protonix  - Milk of Magnesia PRN.     #Diet  - Regular - Carb controlled - DASH diet.     # DVT Prophylaxis   -previously on heparin Q12H but now on lovenox to decrease # of injections and improve compliance      IDT 8/10/21  -SW: Lives with significant other in a private home with 1 JESSIE. Daughter is involved in his care.   -OT: set-up/supervision eating, UBD; min A for grooming, LBD, toilet trans, toileting; Mod A bathing  -PT: Stairs: CG stairs 1 flight;  supervision transfers; supervision amb. 150ft. without device   -Speech: Regular consistency diet. Memory is Mod A. Needs frequent redirection and reorientation.  Mild-mod cog deficits, confabulatory. Inappropriate behavior improved. Compr min A, Soc Max A, Poor attention, disoriented.  poor interaction  D/C plannin/14 home.

## 2021-08-13 NOTE — PROGRESS NOTE ADULT - SUBJECTIVE AND OBJECTIVE BOX
HPI:  Patient is a 76 year old male with PMHx of Diabetes Mellitus type 2, initially presented as a Level 2 Trauma at [facility] on 7/9 after found down at a bus stop with altered mental status. It was thought to be due totraumatic cause. Patient appears intoxicated at the time with  on arrival. CT scan on admission demonstrated bifrontal SAH, a R SDH, a L parietal SAH with pneumocephalus, a L possible parietal non-displaced skull fracture, and a C6 inferior endplate fracture into disc space with air into the canal.     Patient was admitted to NSICU. Neurosurgery was consulted who decided to treat conservatively with no progression seen on repeat imaging. During patient hospital course he seemed to have worsening AMS which was thought to be due to delirium and or dementia. Pt also had a UTI & has completed ABx course. Pt was also found to be hyponatremic possibly due to SIADH, which has now resolved.     Pt recommended for acute inpatient rehabilitation and was transferred to [facility] on 7/29/21.           (29 Jul 2021 14:07)      PAST MEDICAL & SURGICAL HISTORY:  DM (diabetes mellitus)        Subjective:  No new complaints or overnight issues.  Pt. cooperative and pleasant      VITALS  Vital Signs Last 24 Hrs  T(C): 36.4 (13 Aug 2021 07:30), Max: 36.5 (12 Aug 2021 19:34)  T(F): 97.5 (13 Aug 2021 07:30), Max: 97.7 (12 Aug 2021 19:34)  HR: 80 (13 Aug 2021 07:30) (80 - 90)  BP: 133/84 (13 Aug 2021 07:30) (130/75 - 133/84)  BP(mean): --  RR: 15 (13 Aug 2021 07:30) (15 - 15)  SpO2: 98% (13 Aug 2021 07:30) (98% - 99%)    REVIEW OF SYMPTOMS  [Positive: cognitive deficits,  irritability improved.    Denies: headache, lightheadedness, CP, SOB, abdominal pain, dysuria, nausea, constipation      ----------------------------------------------------------------------  PHYSICAL EXAM    Constitutional - NAD, Comfortable  HEENT -  EOMI  Neck - Supple,   Chest - breathing comfortably on RA  Cardio - warm and well perfused,   Abdomen -  Soft, NTND  Extremities - No peripheral edema,   Neurologic Exam:                    Cognitive -             Orientation: Awake, Alert, AAO x 3. .  Knows year and month, but not date.              Attention:  improved            Memory: impaired-- 0/3 spontaneously,  3/3 with cat. cues     Speech - Fluent, Comprehensible, No dysarthria, No aphasia      Cranial Nerves -PERRLA, No facial asymmetry, Tongue midline, EOMI, Shoulder shrug intact     Motor -                      LEFT    UE - ShAB 5/5, EF 5/5, EE 5/5, WE 5/5,  WNL                    RIGHT UE - ShAB 5/5, EF 5/5, EE 5/5, WE 5/5,  WNL                    LEFT    LE - HF 5/5, KE 5/5, DF 5/5, PF 5/5                    RIGHT LE - HF 5/5, KE 5/5, DF 5/5, PF 5/5        Sensory - Intact to LT bilateral     Coordination - FTN / HTS intact    Psychiatric - calm today.        RECENT LABS                        8.8    4.37  )-----------( 262      ( 12 Aug 2021 06:15 )             26.7     08-12    143  |  108  |  11  ----------------------------<  173<H>  4.2   |  28  |  1.19    Ca    8.7      12 Aug 2021 06:15    TPro  6.0  /  Alb  2.3<L>  /  TBili  0.1<L>  /  DBili  x   /  AST  24  /  ALT  39  /  AlkPhos  99  08-12            RADIOLOGY/OTHER RESULTS      MEDICATIONS  (STANDING):  atorvastatin 40 milliGRAM(s) Oral at bedtime  donepezil 10 milliGRAM(s) Oral daily  enoxaparin Injectable 40 milliGRAM(s) SubCutaneous daily  escitalopram 10 milliGRAM(s) Oral daily  folic acid 1 milliGRAM(s) Oral daily  melatonin 3 milliGRAM(s) Oral at bedtime  metFORMIN 850 milliGRAM(s) Oral two times a day  multivitamin 1 Tablet(s) Oral daily  polyethylene glycol 3350 17 Gram(s) Oral daily  senna 2 Tablet(s) Oral at bedtime  thiamine 100 milliGRAM(s) Oral daily  timolol 0.25% Solution 1 Drop(s) Both EYES at bedtime  valproic  acid Syrup 500 milliGRAM(s) Oral every 12 hours    MEDICATIONS  (PRN):  acetaminophen   Tablet .. 650 milliGRAM(s) Oral every 6 hours PRN Temp greater or equal to 38C (100.4F)  magnesium hydroxide Suspension 30 milliLiter(s) Oral daily PRN Constipation

## 2021-08-14 VITALS
RESPIRATION RATE: 15 BRPM | DIASTOLIC BLOOD PRESSURE: 74 MMHG | SYSTOLIC BLOOD PRESSURE: 116 MMHG | TEMPERATURE: 97 F | HEART RATE: 77 BPM | OXYGEN SATURATION: 100 %

## 2021-08-14 PROCEDURE — 99238 HOSP IP/OBS DSCHRG MGMT 30/<: CPT

## 2021-08-14 PROCEDURE — 99232 SBSQ HOSP IP/OBS MODERATE 35: CPT

## 2021-08-14 RX ADMIN — Medication 1 TABLET(S): at 11:34

## 2021-08-14 RX ADMIN — ESCITALOPRAM OXALATE 10 MILLIGRAM(S): 10 TABLET, FILM COATED ORAL at 11:34

## 2021-08-14 RX ADMIN — Medication 100 MILLIGRAM(S): at 11:34

## 2021-08-14 RX ADMIN — METFORMIN HYDROCHLORIDE 850 MILLIGRAM(S): 850 TABLET ORAL at 08:19

## 2021-08-14 RX ADMIN — DONEPEZIL HYDROCHLORIDE 10 MILLIGRAM(S): 10 TABLET, FILM COATED ORAL at 11:34

## 2021-08-14 RX ADMIN — Medication 500 MILLIGRAM(S): at 05:32

## 2021-08-14 RX ADMIN — ENOXAPARIN SODIUM 40 MILLIGRAM(S): 100 INJECTION SUBCUTANEOUS at 11:34

## 2021-08-14 RX ADMIN — Medication 1 MILLIGRAM(S): at 11:34

## 2021-08-14 NOTE — PROGRESS NOTE ADULT - SUBJECTIVE AND OBJECTIVE BOX
Patient is a 76y old  Male who presents with a chief complaint of Traumatic Right frontal and temporal subdural hematoma (13 Aug 2021 11:15)      Patient seen and examined at bedside.    ALLERGIES:  Allergy Status Unknown    MEDICATIONS  (STANDING):  atorvastatin 40 milliGRAM(s) Oral at bedtime  donepezil 10 milliGRAM(s) Oral daily  enoxaparin Injectable 40 milliGRAM(s) SubCutaneous daily  escitalopram 10 milliGRAM(s) Oral daily  folic acid 1 milliGRAM(s) Oral daily  melatonin 3 milliGRAM(s) Oral at bedtime  metFORMIN 850 milliGRAM(s) Oral two times a day  multivitamin 1 Tablet(s) Oral daily  polyethylene glycol 3350 17 Gram(s) Oral daily  senna 2 Tablet(s) Oral at bedtime  thiamine 100 milliGRAM(s) Oral daily  timolol 0.25% Solution 1 Drop(s) Both EYES at bedtime  valproic  acid Syrup 500 milliGRAM(s) Oral every 12 hours    MEDICATIONS  (PRN):  acetaminophen   Tablet .. 650 milliGRAM(s) Oral every 6 hours PRN Temp greater or equal to 38C (100.4F)  magnesium hydroxide Suspension 30 milliLiter(s) Oral daily PRN Constipation    Vital Signs Last 24 Hrs  T(F): 97.9 (13 Aug 2021 20:57), Max: 97.9 (13 Aug 2021 20:57)  HR: 90 (13 Aug 2021 20:57) (90 - 90)  BP: 123/79 (13 Aug 2021 20:57) (123/79 - 123/79)  RR: 17 (13 Aug 2021 20:57) (17 - 17)  SpO2: 99% (13 Aug 2021 20:57) (99% - 99%)  I&O's Summary    13 Aug 2021 07:01  -  14 Aug 2021 07:00  --------------------------------------------------------  IN: 0 mL / OUT: 500 mL / NET: -500 mL    PHYSICAL EXAM:  General: NAD, A/O x 3  ENT: MMM, no scleral icterus  Neck: Supple, No JVD  Lungs: Respirations unlabored. Clear to auscultation bilaterally, no wheezes, rales, rhonchi  Cardio: RRR, S1/S2  Abdomen: Soft, Nontender, Nondistended; Bowel sounds present  Extremities: No calf tenderness, No pitting edema LE b/l    LABS:                        8.8    4.37  )-----------( 262      ( 12 Aug 2021 06:15 )             26.7       08-12    143  |  108  |  11  ----------------------------<  173  4.2   |  28  |  1.19    Ca    8.7      12 Aug 2021 06:15    TPro  6.0  /  Alb  2.3  /  TBili  0.1  /  DBili  x   /  AST  24  /  ALT  39  /  AlkPhos  99  08-12     eGFR if Non African American: 60 mL/min/1.73M2 (08-12-21 @ 06:15)  eGFR if : 69 mL/min/1.73M2 (08-12-21 @ 06:15)    COVID-19 PCR: NotDetec (07-29-21 @ 17:25)  COVID-19 PCR: NotDetec (07-29-21 @ 11:23)    RADIOLOGY & ADDITIONAL TESTS: reviewed    Care Discussed with Consultants/Other Providers: yes   Patient is a 76y old  Male who presents with a chief complaint of Traumatic Right frontal and temporal subdural hematoma (13 Aug 2021 11:15)      Patient seen and examined at bedside. doing well. no acute medical complaints. excited for discharge    ALLERGIES:  Allergy Status Unknown    MEDICATIONS  (STANDING):  atorvastatin 40 milliGRAM(s) Oral at bedtime  donepezil 10 milliGRAM(s) Oral daily  enoxaparin Injectable 40 milliGRAM(s) SubCutaneous daily  escitalopram 10 milliGRAM(s) Oral daily  folic acid 1 milliGRAM(s) Oral daily  melatonin 3 milliGRAM(s) Oral at bedtime  metFORMIN 850 milliGRAM(s) Oral two times a day  multivitamin 1 Tablet(s) Oral daily  polyethylene glycol 3350 17 Gram(s) Oral daily  senna 2 Tablet(s) Oral at bedtime  thiamine 100 milliGRAM(s) Oral daily  timolol 0.25% Solution 1 Drop(s) Both EYES at bedtime  valproic  acid Syrup 500 milliGRAM(s) Oral every 12 hours    MEDICATIONS  (PRN):  acetaminophen   Tablet .. 650 milliGRAM(s) Oral every 6 hours PRN Temp greater or equal to 38C (100.4F)  magnesium hydroxide Suspension 30 milliLiter(s) Oral daily PRN Constipation    Vital Signs Last 24 Hrs  T(F): 97.9 (13 Aug 2021 20:57), Max: 97.9 (13 Aug 2021 20:57)  HR: 90 (13 Aug 2021 20:57) (90 - 90)  BP: 123/79 (13 Aug 2021 20:57) (123/79 - 123/79)  RR: 17 (13 Aug 2021 20:57) (17 - 17)  SpO2: 99% (13 Aug 2021 20:57) (99% - 99%)  I&O's Summary    13 Aug 2021 07:01  -  14 Aug 2021 07:00  --------------------------------------------------------  IN: 0 mL / OUT: 500 mL / NET: -500 mL    PHYSICAL EXAM:  General: NAD, A/O x 3  ENT: MMM, no scleral icterus  Neck: Supple, No JVD  Lungs: Respirations unlabored. Clear to auscultation bilaterally, no wheezes, rales, rhonchi  Cardio: RRR, S1/S2  Abdomen: Soft, Nontender, Nondistended; Bowel sounds present  Extremities: No calf tenderness, No pitting edema LE b/l    LABS:                        8.8    4.37  )-----------( 262      ( 12 Aug 2021 06:15 )             26.7       08-12    143  |  108  |  11  ----------------------------<  173  4.2   |  28  |  1.19    Ca    8.7      12 Aug 2021 06:15    TPro  6.0  /  Alb  2.3  /  TBili  0.1  /  DBili  x   /  AST  24  /  ALT  39  /  AlkPhos  99  08-12     eGFR if Non African American: 60 mL/min/1.73M2 (08-12-21 @ 06:15)  eGFR if : 69 mL/min/1.73M2 (08-12-21 @ 06:15)    COVID-19 PCR: NotDetec (07-29-21 @ 17:25)  COVID-19 PCR: NotDetec (07-29-21 @ 11:23)    RADIOLOGY & ADDITIONAL TESTS: reviewed    Care Discussed with Consultants/Other Providers: yes

## 2021-08-14 NOTE — PROGRESS NOTE ADULT - PROVIDER SPECIALTY LIST ADULT
Rehab Medicine
Hospitalist
Rehab Medicine
Hospitalist
Psychology
Rehab Medicine
Hospitalist
Hospitalist
Physiatry
Rehab Medicine
Hospitalist
Physiatry

## 2021-08-14 NOTE — PROGRESS NOTE ADULT - SUBJECTIVE AND OBJECTIVE BOX
Pt. seen and examined at bedside.  No overnight events.        REVIEW OF SYSTEMS  Constitutional - No fever,  No fatigue  Neurological - No headaches, No loss of strength  Musculoskeletal - No joint pain, No joint swelling, No muscle pain    VITALS  T(C): 36.3 (08-14-21 @ 08:20), Max: 36.6 (08-13-21 @ 20:57)  HR: 77 (08-14-21 @ 08:20) (77 - 90)  BP: 116/74 (08-14-21 @ 08:20) (116/74 - 123/79)  RR: 15 (08-14-21 @ 08:20) (15 - 17)  SpO2: 100% (08-14-21 @ 08:20) (99% - 100%)  Wt(kg): --       MEDICATIONS   acetaminophen   Tablet .. 650 milliGRAM(s) every 6 hours PRN  atorvastatin 40 milliGRAM(s) at bedtime  donepezil 10 milliGRAM(s) daily  enoxaparin Injectable 40 milliGRAM(s) daily  escitalopram 10 milliGRAM(s) daily  folic acid 1 milliGRAM(s) daily  magnesium hydroxide Suspension 30 milliLiter(s) daily PRN  melatonin 3 milliGRAM(s) at bedtime  metFORMIN 850 milliGRAM(s) two times a day  multivitamin 1 Tablet(s) daily  polyethylene glycol 3350 17 Gram(s) daily  senna 2 Tablet(s) at bedtime  thiamine 100 milliGRAM(s) daily  timolol 0.25% Solution 1 Drop(s) at bedtime  valproic  acid Syrup 500 milliGRAM(s) every 12 hours      RECENT LABS/IMAGING                        ---------  PHYSICAL EXAM  Constitutional - NAD, Comfortable  Pulm - Breathing comfortably, No wheezing  Abd - Soft, NTND  Extremities - No edema, No calf tenderness  Neurologic Exam -                    Cognitive - Awake, Alert     Communication - Fluent     Motor - No focal deficits     Sensory - Intact to LT  Psychiatric - Mood WNL, Affect WNL    ASSESSMENT/PLAN  76y Male with functional deficits s/p B/L BIFRONTAL SAH, R SDH, L PARIETAL SKULL FX and C6 FX  Continue current medical management  Pain - Tylenol PRN  DVT PPX - enoxaparin Injectable 40 milliGRAM(s) daily  Active issues - NONE  PT. FOR D/C HOME TODAY.
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
## 2021-08-14 NOTE — PROGRESS NOTE ADULT - NUTRITIONAL ASSESSMENT
This patient has been assessed with a concern for Malnutrition and has been determined to have a diagnosis/diagnoses of Severe protein-calorie malnutrition.    This patient is being managed with:   Diet Consistent Carbohydrate w/Evening Snack-  Supplement Feeding Modality:  Oral  Glucerna Shake Cans or Servings Per Day:  1       Frequency:  Three Times a day  Entered: Jul 30 2021 10:45AM    
This patient has been assessed with a concern for Malnutrition and has been determined to have a diagnosis/diagnoses of Severe protein-calorie malnutrition.    This patient is being managed with:   Diet Consistent Carbohydrate/No Snacks-  Entered: Jul 29 2021  4:54PM    The following pending diet order is being considered for treatment of Severe protein-calorie malnutrition:  Diet Consistent Carbohydrate w/Evening Snack-  Supplement Feeding Modality:  Oral  Glucerna Shake Cans or Servings Per Day:  1       Frequency:  Three Times a day  Entered: Jul 30 2021 10:45AM  
This patient has been assessed with a concern for Malnutrition and has been determined to have a diagnosis/diagnoses of Severe protein-calorie malnutrition.    This patient is being managed with:   Diet Consistent Carbohydrate/No Snacks-  Entered: Jul 29 2021  4:54PM    The following pending diet order is being considered for treatment of Severe protein-calorie malnutrition:  Diet Consistent Carbohydrate w/Evening Snack-  Supplement Feeding Modality:  Oral  Glucerna Shake Cans or Servings Per Day:  1       Frequency:  Three Times a day  Entered: Jul 30 2021 10:45AM    This patient has been assessed with a concern for Malnutrition and has been determined to have a diagnosis/diagnoses of Severe protein-calorie malnutrition.    This patient is being managed with:   Diet Consistent Carbohydrate/No Snacks-  Entered: Jul 29 2021  4:54PM    The following pending diet order is being considered for treatment of Severe protein-calorie malnutrition:  Diet Consistent Carbohydrate w/Evening Snack-  Supplement Feeding Modality:  Oral  Glucerna Shake Cans or Servings Per Day:  1       Frequency:  Three Times a day  Entered: Jul 30 2021 10:45AM  
This patient has been assessed with a concern for Malnutrition and has been determined to have a diagnosis/diagnoses of Severe protein-calorie malnutrition.    This patient is being managed with:   Diet Consistent Carbohydrate/No Snacks-  Entered: Jul 29 2021  4:54PM    The following pending diet order is being considered for treatment of Severe protein-calorie malnutrition:  Diet Consistent Carbohydrate w/Evening Snack-  Supplement Feeding Modality:  Oral  Glucerna Shake Cans or Servings Per Day:  1       Frequency:  Three Times a day  Entered: Jul 30 2021 10:45AM  
This patient has been assessed with a concern for Malnutrition and has been determined to have a diagnosis/diagnoses of Severe protein-calorie malnutrition.    This patient is being managed with:   Diet Consistent Carbohydrate/No Snacks-  Entered: Jul 29 2021  4:54PM    The following pending diet order is being considered for treatment of Severe protein-calorie malnutrition:  Diet Consistent Carbohydrate w/Evening Snack-  Supplement Feeding Modality:  Oral  Glucerna Shake Cans or Servings Per Day:  1       Frequency:  Three Times a day  Entered: Jul 30 2021 10:45AM

## 2021-08-14 NOTE — PROGRESS NOTE ADULT - TIME BILLING
POST - DISCHARGE DISCUSSION
** Spoke with pt's  _daughter, Flaca___, to provide update on pt's status,  medical update, progress in therapy, general prognosis, discharge planning and discharge needs/expectations.  She understands patient will need supervision and family working on plan to provide.  All questions answered.

## 2021-08-23 ENCOUNTER — APPOINTMENT (OUTPATIENT)
Dept: NEUROLOGY | Facility: CLINIC | Age: 76
End: 2021-08-23

## 2021-09-07 ENCOUNTER — APPOINTMENT (OUTPATIENT)
Dept: PHYSICAL MEDICINE AND REHAB | Facility: CLINIC | Age: 76
End: 2021-09-07
Payer: MEDICARE

## 2021-09-07 ENCOUNTER — NON-APPOINTMENT (OUTPATIENT)
Age: 76
End: 2021-09-07

## 2021-09-07 VITALS
HEIGHT: 66 IN | OXYGEN SATURATION: 100 % | HEART RATE: 85 BPM | SYSTOLIC BLOOD PRESSURE: 120 MMHG | RESPIRATION RATE: 16 BRPM | DIASTOLIC BLOOD PRESSURE: 63 MMHG | TEMPERATURE: 98.3 F

## 2021-09-07 DIAGNOSIS — R26.89 OTHER ABNORMALITIES OF GAIT AND MOBILITY: ICD-10-CM

## 2021-09-07 DIAGNOSIS — S06.9X9A UNSPECIFIED INTRACRANIAL INJURY WITH LOSS OF CONSCIOUSNESS OF UNSPECIFIED DURATION, INITIAL ENCOUNTER: ICD-10-CM

## 2021-09-07 DIAGNOSIS — R45.4 IRRITABILITY AND ANGER: ICD-10-CM

## 2021-09-07 DIAGNOSIS — Z86.39 PERSONAL HISTORY OF OTHER ENDOCRINE, NUTRITIONAL AND METABOLIC DISEASE: ICD-10-CM

## 2021-09-07 DIAGNOSIS — R41.89 OTHER SYMPTOMS AND SIGNS INVOLVING COGNITIVE FUNCTIONS AND AWARENESS: ICD-10-CM

## 2021-09-07 DIAGNOSIS — F32.9 MAJOR DEPRESSIVE DISORDER, SINGLE EPISODE, UNSPECIFIED: ICD-10-CM

## 2021-09-07 PROCEDURE — 99215 OFFICE O/P EST HI 40 MIN: CPT

## 2021-09-07 RX ORDER — ATORVASTATIN CALCIUM 40 MG/1
40 TABLET, FILM COATED ORAL
Refills: 0 | Status: ACTIVE | COMMUNITY

## 2021-09-07 RX ORDER — DIVALPROEX SODIUM 500 MG/1
500 TABLET, DELAYED RELEASE ORAL
Qty: 60 | Refills: 4 | Status: ACTIVE | COMMUNITY
Start: 2021-09-07 | End: 1900-01-01

## 2021-09-07 RX ORDER — METFORMIN HYDROCHLORIDE 850 MG/1
850 TABLET, COATED ORAL
Refills: 0 | Status: ACTIVE | COMMUNITY

## 2021-09-07 RX ORDER — ESCITALOPRAM OXALATE 10 MG/1
10 TABLET ORAL DAILY
Qty: 30 | Refills: 4 | Status: ACTIVE | COMMUNITY
Start: 2021-09-07 | End: 1900-01-01

## 2021-09-07 RX ORDER — FOLIC ACID 1 MG/1
1 TABLET ORAL
Refills: 0 | Status: ACTIVE | COMMUNITY

## 2021-09-07 RX ORDER — ESCITALOPRAM OXALATE 10 MG/1
10 TABLET ORAL
Refills: 0 | Status: ACTIVE | COMMUNITY

## 2021-09-07 RX ORDER — DONEPEZIL HYDROCHLORIDE 10 MG/1
10 TABLET ORAL DAILY
Qty: 30 | Refills: 4 | Status: ACTIVE | COMMUNITY
Start: 2021-09-07 | End: 1900-01-01

## 2021-09-07 RX ORDER — DIVALPROEX SODIUM 500 MG/1
500 TABLET, DELAYED RELEASE ORAL
Refills: 0 | Status: ACTIVE | COMMUNITY

## 2021-09-07 RX ORDER — DONEPEZIL HYDROCHLORIDE 10 MG/1
10 TABLET ORAL
Refills: 0 | Status: ACTIVE | COMMUNITY

## 2021-09-10 NOTE — REASON FOR VISIT
[Post Hospitalization] : a post hospitalization visit [Family Member] : family member [FreeTextEntry1] : TBI

## 2021-09-10 NOTE — PHYSICAL EXAM
[FreeTextEntry1] : Constitutional: Alert, awake, no acute distress\par HEENT: EOMI, NC/AT, neck supple\par Cardiovascular: All extremities warm and well perfused\par Pulmonary: No respiratory distress, normal chest wall expansion\par Gastrointestinal: No abdominal distention\par \par Neuro:\par AAOx3, MOCA 23/30\par Recall 3/5 spontaneously. 4/5 with cues\par Attention--able to complete MOYB,.  Difficulty with serial 7's and reverse digit span\par Executive function--difficulty with Trails B and clock drawing. \par \par CN: intact no nystagmus, visual fields intact, PERRLA, EOMI, no facial weakness or sensory deficit, shoulder josh intact, tongue midline\par Motor 5/5 bilat UE and LE\par Sens intact bilat UE and LE\par Coordination --sliightly impaired FNF on right HTS intact\par \par Gait--reciprocal gait pattern,  good foot clearance, \par Balance--impaired tandem gait\par
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
## 2021-09-22 PROCEDURE — 92610 EVALUATE SWALLOWING FUNCTION: CPT

## 2021-09-22 PROCEDURE — 97535 SELF CARE MNGMENT TRAINING: CPT

## 2021-09-22 PROCEDURE — 82140 ASSAY OF AMMONIA: CPT

## 2021-09-22 PROCEDURE — 97129 THER IVNTJ 1ST 15 MIN: CPT

## 2021-09-22 PROCEDURE — 97530 THERAPEUTIC ACTIVITIES: CPT

## 2021-09-22 PROCEDURE — 80053 COMPREHEN METABOLIC PANEL: CPT

## 2021-09-22 PROCEDURE — 82533 TOTAL CORTISOL: CPT

## 2021-09-22 PROCEDURE — 92507 TX SP LANG VOICE COMM INDIV: CPT

## 2021-09-22 PROCEDURE — 85027 COMPLETE CBC AUTOMATED: CPT

## 2021-09-22 PROCEDURE — 82962 GLUCOSE BLOOD TEST: CPT

## 2021-09-22 PROCEDURE — 80164 ASSAY DIPROPYLACETIC ACD TOT: CPT

## 2021-09-22 PROCEDURE — 97110 THERAPEUTIC EXERCISES: CPT

## 2021-09-22 PROCEDURE — 97112 NEUROMUSCULAR REEDUCATION: CPT

## 2021-09-22 PROCEDURE — 71045 X-RAY EXAM CHEST 1 VIEW: CPT

## 2021-09-22 PROCEDURE — 92523 SPEECH SOUND LANG COMPREHEN: CPT

## 2021-09-22 PROCEDURE — 97116 GAIT TRAINING THERAPY: CPT

## 2021-09-22 PROCEDURE — 36415 COLL VENOUS BLD VENIPUNCTURE: CPT

## 2021-09-22 PROCEDURE — U0005: CPT

## 2021-09-22 PROCEDURE — 80048 BASIC METABOLIC PNL TOTAL CA: CPT

## 2021-09-22 PROCEDURE — 97163 PT EVAL HIGH COMPLEX 45 MIN: CPT

## 2021-09-22 PROCEDURE — U0003: CPT

## 2021-11-16 ENCOUNTER — APPOINTMENT (OUTPATIENT)
Dept: PHYSICAL MEDICINE AND REHAB | Facility: CLINIC | Age: 76
End: 2021-11-16

## 2022-04-11 NOTE — PROGRESS NOTE ADULT - SUBJECTIVE AND OBJECTIVE BOX
[address] visit attempted. Mr. Trevor Judd is [facility]. Due to medical restrictions, he is unable to receive communion. Prayer for spiritual communion offered outside his room.     EMMA Cobb, RN, ACSW, LCSW   Page:  [phone] Patient is a 76y old  Male who presents with a chief complaint of Intracranial hemorrhage (14 Jul 2021 20:52)      INTERVAL HPI/OVERNIGHT EVENTS: remain confused , spiked fever   T(C): 37.7 (07-15-21 @ 20:00), Max: 38.4 (07-15-21 @ 16:35)  HR: 80 (07-15-21 @ 20:00) (72 - 88)  BP: 180/91 (07-15-21 @ 20:00) (165/98 - 196/87)  RR: 18 (07-15-21 @ 20:00) (16 - 18)  SpO2: 95% (07-15-21 @ 20:00) (95% - 99%)  Wt(kg): --  I&O's Summary    14 Jul 2021 07:01  -  15 Jul 2021 07:00  --------------------------------------------------------  IN: 1635 mL / OUT: 1250 mL / NET: 385 mL    15 Jul 2021 07:01  -  15 Jul 2021 21:18  --------------------------------------------------------  IN: 180 mL / OUT: 500 mL / NET: -320 mL        PAST MEDICAL & SURGICAL HISTORY:  DM (diabetes mellitus)        SOCIAL HISTORY  Alcohol:  Tobacco:  Illicit substance use:    FAMILY HISTORY:    REVIEW OF SYSTEMS:  CONSTITUTIONAL: No fever, weight loss, or fatigue  EYES: No eye pain, visual disturbances, or discharge  ENMT:  No difficulty hearing, tinnitus, vertigo; No sinus or throat pain  NECK: No pain or stiffness  RESPIRATORY: No cough, wheezing, chills or hemoptysis; No shortness of breath  CARDIOVASCULAR: No chest pain, palpitations, dizziness, or leg swelling  GASTROINTESTINAL: No abdominal or epigastric pain. No nausea, vomiting, or hematemesis; No diarrhea or constipation. No melena or hematochezia.  GENITOURINARY: No dysuria, frequency, hematuria, or incontinence  NEUROLOGICAL: No headaches, memory loss, loss of strength, numbness, or tremors  SKIN: No itching, burning, rashes, or lesions   LYMPH NODES: No enlarged glands  ENDOCRINE: No heat or cold intolerance; No hair loss  MUSCULOSKELETAL: No joint pain or swelling; No muscle, back, or extremity pain  PSYCHIATRIC: No depression, anxiety, mood swings, or difficulty sleeping  HEME/LYMPH: No easy bruising, or bleeding gums  ALLERY AND IMMUNOLOGIC: No hives or eczema    RADIOLOGY & ADDITIONAL TESTS:    Imaging Personally Reviewed:  [ ] YES  [ ] NO    Consultant(s) Notes Reviewed:  [ ] YES  [ ] NO    PHYSICAL EXAM:  GENERAL: NAD, well-groomed, well-developed  HEAD:  Atraumatic, Normocephalic  EYES: EOMI, PERRLA, conjunctiva and sclera clear  ENMT: No tonsillar erythema, exudates, or enlargement; Moist mucous membranes, Good dentition, No lesions  NECK: Supple, No JVD, Normal thyroid  NERVOUS SYSTEM:  Alert & Oriented X3, Good concentration; Motor Strength 5/5 B/L upper and lower extremities; DTRs 2+ intact and symmetric  CHEST/LUNG: Clear to percussion bilaterally; No rales, rhonchi, wheezing, or rubs  HEART: Regular rate and rhythm; No murmurs, rubs, or gallops  ABDOMEN: Soft, Nontender, Nondistended; Bowel sounds present  EXTREMITIES:  2+ Peripheral Pulses, No clubbing, cyanosis, or edema  LYMPH: No lymphadenopathy noted  SKIN: No rashes or lesions    LABS:                        10.8   6.80  )-----------( 313      ( 15 Jul 2021 09:18 )             32.3     07-14    136  |  100  |  13  ----------------------------<  226<H>  4.1   |  17<L>  |  1.01    Ca    9.7      14 Jul 2021 10:08  Phos  2.9     07-14  Mg     1.9     07-14          CAPILLARY BLOOD GLUCOSE      POCT Blood Glucose.: 225 mg/dL (15 Jul 2021 17:25)  POCT Blood Glucose.: 178 mg/dL (15 Jul 2021 12:34)  POCT Blood Glucose.: 208 mg/dL (15 Jul 2021 08:55)  POCT Blood Glucose.: 172 mg/dL (14 Jul 2021 22:05)            MEDICATIONS  (STANDING):  atorvastatin 40 milliGRAM(s) Oral at bedtime  folic acid Injectable 1 milliGRAM(s) IV Push daily  heparin   Injectable 5000 Unit(s) SubCutaneous every 12 hours  insulin lispro (ADMELOG) corrective regimen sliding scale   SubCutaneous Before meals and at bedtime  lisinopril 40 milliGRAM(s) Oral daily  ondansetron Injectable 8 milliGRAM(s) IV Push two times a day  PHENobarbital Injectable 32 milliGRAM(s) IV Push two times a day  polyethylene glycol 3350 17 Gram(s) Oral daily  sodium chloride 0.9%. 1000 milliLiter(s) (50 mL/Hr) IV Continuous <Continuous>  thiamine Injectable 100 milliGRAM(s) IV Push daily  timolol 0.25% Solution 1 Drop(s) Both EYES at bedtime    MEDICATIONS  (PRN):      Care Discussed with Consultants/Other Providers [ ] YES  [ ] NO

## 2022-05-24 ENCOUNTER — EMERGENCY (EMERGENCY)
Facility: HOSPITAL | Age: 77
LOS: 1 days | Discharge: HOME | End: 2022-05-24
Attending: EMERGENCY MEDICINE | Admitting: EMERGENCY MEDICINE
Payer: MEDICARE

## 2022-05-24 VITALS
HEART RATE: 70 BPM | SYSTOLIC BLOOD PRESSURE: 136 MMHG | OXYGEN SATURATION: 97 % | TEMPERATURE: 98 F | DIASTOLIC BLOOD PRESSURE: 88 MMHG | RESPIRATION RATE: 18 BRPM

## 2022-05-24 LAB
ALBUMIN SERPL ELPH-MCNC: 4.3 G/DL — SIGNIFICANT CHANGE UP (ref 3.3–5)
ALP SERPL-CCNC: 139 U/L — HIGH (ref 40–120)
ALT FLD-CCNC: 36 U/L — SIGNIFICANT CHANGE UP (ref 10–45)
ANION GAP SERPL CALC-SCNC: 14 MMOL/L — SIGNIFICANT CHANGE UP (ref 5–17)
APPEARANCE UR: CLEAR — SIGNIFICANT CHANGE UP
AST SERPL-CCNC: 20 U/L — SIGNIFICANT CHANGE UP (ref 10–40)
B-OH-BUTYR SERPL-SCNC: 1.4 MMOL/L — HIGH
BASE EXCESS BLDV CALC-SCNC: 0.3 MMOL/L — SIGNIFICANT CHANGE UP (ref -2–3)
BASOPHILS # BLD AUTO: 0.02 K/UL — SIGNIFICANT CHANGE UP (ref 0–0.2)
BASOPHILS NFR BLD AUTO: 0.4 % — SIGNIFICANT CHANGE UP (ref 0–2)
BILIRUB SERPL-MCNC: 0.5 MG/DL — SIGNIFICANT CHANGE UP (ref 0.2–1.2)
BILIRUB UR-MCNC: NEGATIVE — SIGNIFICANT CHANGE UP
BUN SERPL-MCNC: 15 MG/DL — SIGNIFICANT CHANGE UP (ref 7–23)
CALCIUM SERPL-MCNC: 9.7 MG/DL — SIGNIFICANT CHANGE UP (ref 8.4–10.5)
CHLORIDE SERPL-SCNC: 93 MMOL/L — LOW (ref 96–108)
CO2 BLDV-SCNC: 28 MMOL/L — HIGH (ref 22–26)
CO2 SERPL-SCNC: 24 MMOL/L — SIGNIFICANT CHANGE UP (ref 22–31)
COLOR SPEC: YELLOW — SIGNIFICANT CHANGE UP
CREAT SERPL-MCNC: 1.04 MG/DL — SIGNIFICANT CHANGE UP (ref 0.5–1.3)
DIFF PNL FLD: NEGATIVE — SIGNIFICANT CHANGE UP
EGFR: 75 ML/MIN/1.73M2 — SIGNIFICANT CHANGE UP
EOSINOPHIL # BLD AUTO: 0.03 K/UL — SIGNIFICANT CHANGE UP (ref 0–0.5)
EOSINOPHIL NFR BLD AUTO: 0.5 % — SIGNIFICANT CHANGE UP (ref 0–6)
GAS PNL BLDV: SIGNIFICANT CHANGE UP
GLUCOSE SERPL-MCNC: 504 MG/DL — CRITICAL HIGH (ref 70–99)
GLUCOSE UR QL: >=1000
HCO3 BLDV-SCNC: 26 MMOL/L — SIGNIFICANT CHANGE UP (ref 22–29)
HCT VFR BLD CALC: 30.1 % — LOW (ref 39–50)
HGB BLD-MCNC: 10.2 G/DL — LOW (ref 13–17)
IMM GRANULOCYTES NFR BLD AUTO: 0.5 % — SIGNIFICANT CHANGE UP (ref 0–1.5)
KETONES UR-MCNC: ABNORMAL MG/DL
LEUKOCYTE ESTERASE UR-ACNC: NEGATIVE — SIGNIFICANT CHANGE UP
LYMPHOCYTES # BLD AUTO: 0.88 K/UL — LOW (ref 1–3.3)
LYMPHOCYTES # BLD AUTO: 15.9 % — SIGNIFICANT CHANGE UP (ref 13–44)
MAGNESIUM SERPL-MCNC: 1.6 MG/DL — SIGNIFICANT CHANGE UP (ref 1.6–2.6)
MCHC RBC-ENTMCNC: 31.7 PG — SIGNIFICANT CHANGE UP (ref 27–34)
MCHC RBC-ENTMCNC: 33.9 GM/DL — SIGNIFICANT CHANGE UP (ref 32–36)
MCV RBC AUTO: 93.5 FL — SIGNIFICANT CHANGE UP (ref 80–100)
MONOCYTES # BLD AUTO: 0.46 K/UL — SIGNIFICANT CHANGE UP (ref 0–0.9)
MONOCYTES NFR BLD AUTO: 8.3 % — SIGNIFICANT CHANGE UP (ref 2–14)
NEUTROPHILS # BLD AUTO: 4.12 K/UL — SIGNIFICANT CHANGE UP (ref 1.8–7.4)
NEUTROPHILS NFR BLD AUTO: 74.4 % — SIGNIFICANT CHANGE UP (ref 43–77)
NITRITE UR-MCNC: NEGATIVE — SIGNIFICANT CHANGE UP
NRBC # BLD: 0 /100 WBCS — SIGNIFICANT CHANGE UP (ref 0–0)
PCO2 BLDV: 48 MMHG — SIGNIFICANT CHANGE UP (ref 42–55)
PH BLDV: 7.35 — SIGNIFICANT CHANGE UP (ref 7.32–7.43)
PH UR: 6.5 — SIGNIFICANT CHANGE UP (ref 5–8)
PHOSPHATE SERPL-MCNC: 3.1 MG/DL — SIGNIFICANT CHANGE UP (ref 2.5–4.5)
PLATELET # BLD AUTO: 336 K/UL — SIGNIFICANT CHANGE UP (ref 150–400)
PO2 BLDV: <29 MMHG — LOW (ref 25–45)
POTASSIUM SERPL-MCNC: 4.4 MMOL/L — SIGNIFICANT CHANGE UP (ref 3.5–5.3)
POTASSIUM SERPL-SCNC: 4.4 MMOL/L — SIGNIFICANT CHANGE UP (ref 3.5–5.3)
PROT SERPL-MCNC: 7.1 G/DL — SIGNIFICANT CHANGE UP (ref 6–8.3)
PROT UR-MCNC: NEGATIVE MG/DL — SIGNIFICANT CHANGE UP
RBC # BLD: 3.22 M/UL — LOW (ref 4.2–5.8)
RBC # FLD: 12.1 % — SIGNIFICANT CHANGE UP (ref 10.3–14.5)
SAO2 % BLDV: 26.7 % — LOW (ref 67–88)
SARS-COV-2 RNA SPEC QL NAA+PROBE: NEGATIVE — SIGNIFICANT CHANGE UP
SODIUM SERPL-SCNC: 131 MMOL/L — LOW (ref 135–145)
SP GR SPEC: 1.01 — SIGNIFICANT CHANGE UP (ref 1–1.03)
TROPONIN T SERPL-MCNC: <0.01 NG/ML — SIGNIFICANT CHANGE UP (ref 0–0.01)
UROBILINOGEN FLD QL: 0.2 E.U./DL — SIGNIFICANT CHANGE UP
WBC # BLD: 5.54 K/UL — SIGNIFICANT CHANGE UP (ref 3.8–10.5)
WBC # FLD AUTO: 5.54 K/UL — SIGNIFICANT CHANGE UP (ref 3.8–10.5)

## 2022-05-24 PROCEDURE — 99285 EMERGENCY DEPT VISIT HI MDM: CPT

## 2022-05-24 PROCEDURE — 71045 X-RAY EXAM CHEST 1 VIEW: CPT | Mod: 26

## 2022-05-24 RX ORDER — LABETALOL HCL 100 MG
10 TABLET ORAL ONCE
Refills: 0 | Status: COMPLETED | OUTPATIENT
Start: 2022-05-24 | End: 2022-05-24

## 2022-05-24 RX ORDER — SODIUM CHLORIDE 9 MG/ML
1000 INJECTION, SOLUTION INTRAVENOUS ONCE
Refills: 0 | Status: COMPLETED | OUTPATIENT
Start: 2022-05-24 | End: 2022-05-24

## 2022-05-24 RX ORDER — INSULIN LISPRO 100/ML
5 VIAL (ML) SUBCUTANEOUS ONCE
Refills: 0 | Status: COMPLETED | OUTPATIENT
Start: 2022-05-24 | End: 2022-05-24

## 2022-05-24 RX ORDER — INSULIN HUMAN 100 [IU]/ML
7 INJECTION, SOLUTION SUBCUTANEOUS ONCE
Refills: 0 | Status: COMPLETED | OUTPATIENT
Start: 2022-05-24 | End: 2022-05-24

## 2022-05-24 RX ADMIN — Medication 10 MILLIGRAM(S): at 23:26

## 2022-05-24 RX ADMIN — SODIUM CHLORIDE 1000 MILLILITER(S): 9 INJECTION, SOLUTION INTRAVENOUS at 20:38

## 2022-05-24 RX ADMIN — Medication 5 UNIT(S): at 23:16

## 2022-05-24 NOTE — ED PROVIDER NOTE - OBJECTIVE STATEMENT
77yoM evangelina comes in for evaluation  says "I like to get checked, I get checked every day"  denies all complaints at this time  says he was at [facility] yesterday    I spoke with his 77yoM evangelina comes in for evaluation  says "I like to get checked, I get checked every day"  denies all complaints at this time  says he was at [facility] yesterday    I spoke with his family on phone who says pt has many issues and refuses help, they kicked him out of house and he does not like the shelter so frequents EDs for place to stay no (get order)

## 2022-05-24 NOTE — ED ADULT TRIAGE NOTE - CHIEF COMPLAINT QUOTE
"I'm a 77 year old man I just want to get checked out." Denies pain, A&Ox3 on arrival. Denies any pain, reports urinary frequency. Had episode of urinary incontinence in the waiting room.

## 2022-05-24 NOTE — ED ADULT NURSE REASSESSMENT NOTE - NS ED NURSE REASSESS COMMENT FT1
223/119 BP. pt asymptomatic. MD made aware. states she will order labetalol. pt states he did not take BP medication today.

## 2022-05-24 NOTE — ED PROVIDER NOTE - NSFOLLOWUPINSTRUCTIONS_ED_ALL_ED_FT
Your blood sugar was elevated in the Emergency Department today.   It came down with IV hydration and insulin.     Take your medications as prescribed.    Follow up with your primary medical doctor within one week for further evaluation.     Return to the Emergency Department for persistent, worsening, or new symptoms including severely high blood sugar >250, severely low blood sugar, persistent nausea or vomiting, excessive diarrhea, if your breath smells fruity, if you feel short of breath, feel very sleepy, have any numbness or tingling in your hands or feet, have chest pain, or have any other serious concerns.

## 2022-05-24 NOTE — ED PROVIDER NOTE - PATIENT PORTAL LINK FT
You can access the FollowMyHealth Patient Portal offered by [facility] by registering at the following website: [email or web address]. By joining [facility]’s FollowMyHealth portal, you will also be able to view your health information using other applications (apps) compatible with our system.

## 2022-05-24 NOTE — ED PROVIDER NOTE - CLINICAL SUMMARY MEDICAL DECISION MAKING FREE TEXT BOX
Pt hyperglycemic on arrival, r/o DKA vs simple hyperglycemia  treat with IVF and insulin  consider case management for help with shelter and meds outpatient Pt hyperglycemic on arrival, r/o DKA vs simple hyperglycemia  treat with IVF and insulin  anticipate dc- patient showed me he has metformin and anti-HTN meds, doesn't use insulin bc is scared of needles.

## 2022-05-24 NOTE — ED ADULT NURSE NOTE - ED CARDIAC CAPILLARY REFILL
Subjective:     @Patient ID: Val Horvath is a 61 y.o. female.    Chief Complaint: Annual Exam    HPI  62 yo F presents for annual exam and med refill. Pt reports doing ok. Has been working during pandemic. Reports she has a skin mole that she would like checked out    Lipid disorders/ASCVD risk (ages >/= 45 or >/= 20 if increased risk ): ordered  DM (>45y yearly or if obese, HTN): A1c ordered  Hepatitis C (one time if born between 2959-3265): ordered     Eye exam:  last eye   Breast Cancer (40-50y discretion of pt, 50-74y every 1-2 years): Mammogram scheduled for 7/2020   Cervical Cancer (Pap Smear ages 21-65 every 3 years or Pap + HPV q5 years after 30 years of age): done 6/2018. Dr. Darrell Valdez.   Colorectal Cancer (normal risk 50-75yr): Colonoscopy Last done 4465-4482. Follows with GI Dr Davenport. Plans for cscope later this year        Vaccines:   Influenza (yearly): n/a  Tetanus (every 10 yrs - 1st tdap) done 1/2018  PPSV23(>64yo or <65 w/ lung dz, smoking, DM): n/a   Zoster (>61yo) n/a        Exercise: biking, walking   Diet: regular    Review of Systems   Constitutional: Negative for chills and fever.   HENT: Negative for congestion and sore throat.    Eyes: Negative for pain and visual disturbance.   Respiratory: Negative for cough and shortness of breath.    Cardiovascular: Negative for chest pain and leg swelling.   Gastrointestinal: Negative for abdominal pain, nausea and vomiting.   Endocrine: Negative for polydipsia and polyuria.   Genitourinary: Negative for difficulty urinating and dysuria.   Musculoskeletal: Negative for arthralgias and back pain.   Skin: Negative for rash and wound.   Neurological: Negative for dizziness, weakness and headaches.   Psychiatric/Behavioral: Negative for agitation and confusion.     Past medical history, surgical history, and family medical history reviewed and updated as appropriate.    Medications and allergies reviewed.     Objective:     Vitals:    05/20/20 1546    BP: 118/60   BP Location: Right arm   Patient Position: Sitting   BP Method: Medium (Manual)   Pulse: 66   Resp: 16   Temp: 98.3 °F (36.8 °C)   TempSrc: Temporal   SpO2: 98%   Weight: 67.7 kg (149 lb 4 oz)   Height: 5' (1.524 m)     Body mass index is 29.15 kg/m².  Physical Exam   Constitutional: She is oriented to person, place, and time. She appears well-developed and well-nourished. No distress.   HENT:   Head: Normocephalic and atraumatic.   Right Ear: External ear normal.   Left Ear: External ear normal.   Mouth/Throat: Oropharynx is clear and moist. No oropharyngeal exudate.   Ear wax b/l   Eyes: Conjunctivae are normal. Right eye exhibits no discharge. Left eye exhibits no discharge.   Neck: Normal range of motion. Neck supple.   Cardiovascular: Normal rate, regular rhythm and intact distal pulses. Exam reveals no friction rub.   No murmur heard.  Pulmonary/Chest: Effort normal and breath sounds normal.   Abdominal: Soft. Bowel sounds are normal. She exhibits no distension. There is no tenderness. There is no guarding.   Musculoskeletal: Normal range of motion. She exhibits no edema.   Neurological: She is alert and oriented to person, place, and time.   Skin: Skin is warm and dry.   Psychiatric: She has a normal mood and affect. Her behavior is normal.   Vitals reviewed.      Lab Results   Component Value Date    WBC 4.3 05/04/2019    HGB 13.2 05/04/2019    HCT 39.3 05/04/2019     05/04/2019    CHOL 223 (H) 05/04/2019    TRIG 102 05/04/2019    HDL 74 05/04/2019    ALT 18 05/04/2019    AST 24 05/04/2019     05/04/2019    K 4.4 05/04/2019     05/04/2019    CREATININE 0.67 05/04/2019    BUN 19 05/04/2019    CO2 22 05/04/2019    TSH 2.11 05/04/2019    HGBA1C 5.4 05/04/2019       Assessment:     1. Annual physical exam    2. Generalized anxiety disorder with panic attacks    3. Gastroesophageal reflux disease without esophagitis    4. Skin mole    5. Bilateral impacted cerumen      Plan:    Val was seen today for annual exam.    Diagnoses and all orders for this visit:    Annual physical exam  -     Comprehensive metabolic panel; Future  -     CBC auto differential; Future  -     TSH; Future  -     Vitamin D; Future  -     Lipid Panel; Future  -     Urinalysis; Future  -     HEMOGLOBIN A1C; Future  -     Comprehensive metabolic panel  -     CBC auto differential  -     TSH  -     Vitamin D  -     Lipid Panel  -     Urinalysis  -     HEMOGLOBIN A1C    Generalized anxiety disorder with panic attacks        - Stable on xanax prn    Gastroesophageal reflux disease without esophagitis        - Stable on prilosec    Skin mole  -     Ambulatory referral/consult to Dermatology; Future    Bilateral impacted cerumen  -     Ambulatory referral/consult to ENT; Future    Other orders  -     Urinalysis          RTC 1 year    India Camacho MD  Internal Medicine    5/20/2020     2 seconds or less

## 2022-05-24 NOTE — ED ADULT NURSE NOTE - OBJECTIVE STATEMENT
Pt received aaox3 c/o worsening urinary frequency and 1x episode of urinary incontinence toady. Pt denies any pain or burning with urination. Pt denies any N/V/D, fever, or chills. Pt provided with urine cup, pending sample.

## 2022-05-24 NOTE — ED PROVIDER NOTE - PROGRESS NOTE DETAILS
hyperglycemic, not DKA  BGM improving, will give insulin and recheck then dc john paul / claudia -  received on sign out. pt hyperglycemic, no DKA on labs. thus far given 1L fluids and 5un insulin without improvement of BG (still 408). getting another 7un now.  BP now noted to be 200s/100s. did not take BP meds tonight, getting 10mg IV labetalol now.   at time of sign out pending rpt fingerstick, rpt BP and final dispo. if both improving pt ok for dc. shoemaker -  BP improved 140/90s. fingerstick still elevated to 400s.  will give another liter of fluids and 6un IV insulin and recheck. shoemaker -   rpt fingerstick now improved to 217.   pt feeling well, no symptoms. vitals ok. Ok for dc at this time.

## 2022-05-24 NOTE — ED PROVIDER NOTE - PHYSICAL EXAMINATION
CONST: nontoxic NAD speaking in full sentences thin man  HEAD: atraumatic  EYES: conjunctivae clear, PERRL, EOMI  ENT: mmm  NECK: supple/FROM, nttp, no jvd  CARD: rrr no murmurs  CHEST: ctab no r/r/w, no stridor/retractions/tripoding  ABD: soft, nd, nttp, no rebound/guarding  EXT: FROM, symmetric distal pulses intact  SKIN: warm, dry, no rash, no pedal edema/ttp/rash, cap refill <2sec  NEURO: a+ox3, 5/5 strength x4, gross sensation intact x4, baseline gait

## 2022-05-25 VITALS
DIASTOLIC BLOOD PRESSURE: 78 MMHG | HEART RATE: 88 BPM | TEMPERATURE: 98 F | RESPIRATION RATE: 18 BRPM | OXYGEN SATURATION: 98 % | SYSTOLIC BLOOD PRESSURE: 136 MMHG

## 2022-05-25 PROCEDURE — 82962 GLUCOSE BLOOD TEST: CPT

## 2022-05-25 PROCEDURE — 82010 KETONE BODYS QUAN: CPT

## 2022-05-25 PROCEDURE — 83735 ASSAY OF MAGNESIUM: CPT

## 2022-05-25 PROCEDURE — 84100 ASSAY OF PHOSPHORUS: CPT

## 2022-05-25 PROCEDURE — 99285 EMERGENCY DEPT VISIT HI MDM: CPT | Mod: 25

## 2022-05-25 PROCEDURE — 36415 COLL VENOUS BLD VENIPUNCTURE: CPT

## 2022-05-25 PROCEDURE — 87086 URINE CULTURE/COLONY COUNT: CPT

## 2022-05-25 PROCEDURE — 96375 TX/PRO/DX INJ NEW DRUG ADDON: CPT

## 2022-05-25 PROCEDURE — 93005 ELECTROCARDIOGRAM TRACING: CPT

## 2022-05-25 PROCEDURE — 82803 BLOOD GASES ANY COMBINATION: CPT

## 2022-05-25 PROCEDURE — 84484 ASSAY OF TROPONIN QUANT: CPT

## 2022-05-25 PROCEDURE — 80053 COMPREHEN METABOLIC PANEL: CPT

## 2022-05-25 PROCEDURE — 85025 COMPLETE CBC W/AUTO DIFF WBC: CPT

## 2022-05-25 PROCEDURE — 96374 THER/PROPH/DIAG INJ IV PUSH: CPT

## 2022-05-25 PROCEDURE — 87635 SARS-COV-2 COVID-19 AMP PRB: CPT

## 2022-05-25 PROCEDURE — 71045 X-RAY EXAM CHEST 1 VIEW: CPT

## 2022-05-25 PROCEDURE — 81003 URINALYSIS AUTO W/O SCOPE: CPT

## 2022-05-25 RX ORDER — INSULIN HUMAN 100 [IU]/ML
6 INJECTION, SOLUTION SUBCUTANEOUS ONCE
Refills: 0 | Status: COMPLETED | OUTPATIENT
Start: 2022-05-25 | End: 2022-05-25

## 2022-05-25 RX ORDER — SITAGLIPTIN AND METFORMIN HYDROCHLORIDE 500; 50 MG/1; MG/1
1 TABLET, FILM COATED ORAL
Qty: 0 | Refills: 0 | DISCHARGE

## 2022-05-25 RX ORDER — TIMOLOL 0.5 %
1 DROPS OPHTHALMIC (EYE)
Qty: 0 | Refills: 0 | DISCHARGE

## 2022-05-25 RX ORDER — SODIUM CHLORIDE 9 MG/ML
1000 INJECTION INTRAMUSCULAR; INTRAVENOUS; SUBCUTANEOUS ONCE
Refills: 0 | Status: COMPLETED | OUTPATIENT
Start: 2022-05-25 | End: 2022-05-25

## 2022-05-25 RX ORDER — ATORVASTATIN CALCIUM 80 MG/1
1 TABLET, FILM COATED ORAL
Qty: 0 | Refills: 0 | DISCHARGE

## 2022-05-25 RX ORDER — ERGOCALCIFEROL 1.25 MG/1
1 CAPSULE ORAL
Qty: 0 | Refills: 0 | DISCHARGE

## 2022-05-25 RX ADMIN — SODIUM CHLORIDE 1000 MILLILITER(S): 9 INJECTION INTRAMUSCULAR; INTRAVENOUS; SUBCUTANEOUS at 01:53

## 2022-05-25 RX ADMIN — INSULIN HUMAN 7 UNIT(S): 100 INJECTION, SOLUTION SUBCUTANEOUS at 00:04

## 2022-05-25 RX ADMIN — INSULIN HUMAN 6 UNIT(S): 100 INJECTION, SOLUTION SUBCUTANEOUS at 01:53

## 2022-05-25 NOTE — ED ADULT NURSE REASSESSMENT NOTE - NS ED NURSE REASSESS COMMENT FT1
Pt refusing to leave, states hospital should give him money before he leaves. security notified, discharge papers, IV line removed.

## 2022-05-26 LAB
CULTURE RESULTS: SIGNIFICANT CHANGE UP
SPECIMEN SOURCE: SIGNIFICANT CHANGE UP

## 2022-05-27 DIAGNOSIS — R73.9 HYPERGLYCEMIA, UNSPECIFIED: ICD-10-CM

## 2022-05-27 DIAGNOSIS — Z20.822 CONTACT WITH AND (SUSPECTED) EXPOSURE TO COVID-19: ICD-10-CM

## 2022-05-27 DIAGNOSIS — F17.200 NICOTINE DEPENDENCE, UNSPECIFIED, UNCOMPLICATED: ICD-10-CM

## 2022-06-11 ENCOUNTER — INPATIENT (INPATIENT)
Facility: HOSPITAL | Age: 77
LOS: 11 days | Discharge: NURSING, REHAB OR LONG-TERM CARE | End: 2022-06-23
Attending: INTERNAL MEDICINE | Admitting: INTERNAL MEDICINE
Payer: MEDICARE

## 2022-06-11 VITALS
HEIGHT: 66 IN | DIASTOLIC BLOOD PRESSURE: 69 MMHG | OXYGEN SATURATION: 100 % | RESPIRATION RATE: 18 BRPM | SYSTOLIC BLOOD PRESSURE: 139 MMHG | HEART RATE: 90 BPM | TEMPERATURE: 97 F

## 2022-06-11 PROCEDURE — 99285 EMERGENCY DEPT VISIT HI MDM: CPT

## 2022-06-11 RX ORDER — SODIUM CHLORIDE 9 MG/ML
1000 INJECTION INTRAMUSCULAR; INTRAVENOUS; SUBCUTANEOUS ONCE
Refills: 0 | Status: COMPLETED | OUTPATIENT
Start: 2022-06-11 | End: 2022-06-11

## 2022-06-11 RX ORDER — THIAMINE MONONITRATE (VIT B1) 100 MG
100 TABLET ORAL ONCE
Refills: 0 | Status: COMPLETED | OUTPATIENT
Start: 2022-06-11 | End: 2022-06-11

## 2022-06-11 NOTE — ED PROVIDER NOTE - PROGRESS NOTE DETAILS
Dr. Theo Mancia DO (ED ATTENDING):  glucose noted to be high to 700s but labs not c/w DKA. IVF ordered. Ct head concerning for normal pressure hydrocephaly. clinical symptoms questionably consistent. glucose down trending.  pt agrees to admission for workup and rehab/assisted care placement Ct head concerning for normal pressure hydrocephaly. clinical symptoms questionably consistent. glucose down trending. pt will need inpt neuro workup, Dr. Nunez aware  pt agrees to admission for workup and rehab/assisted care placement

## 2022-06-11 NOTE — ED PROVIDER NOTE - PHYSICAL EXAMINATION
Marleni Elliott MD  GENERAL: Patient awake alert NAD.  HEENT: NC/AT, Moist mucous membranes, PERRL, EOMI.  bilateral eye discharge  LUNGS: CTAB, no wheezes or crackles.   CARDIAC: RRR, no m/r/g.    ABDOMEN: Soft, NT, ND, No rebound, guarding. No CVA tenderness.   EXT: No edema. No calf tenderness.   MSK: No spinal tenderness, no pain with movement, no deformities.  NEURO: A&Ox3. Moving all extremities. cn 2- 12 intact  SKIN: Warm and dry. No rash.  PSYCH: Normal affect.

## 2022-06-11 NOTE — ED ADULT TRIAGE NOTE - CHIEF COMPLAINT QUOTE
brought in by daughter for psych/med eval    as per daughter (agnieszka carrero- [phone]), pt is alcoholic.. has been labile and aggressive with his wife who threw him out... pt now homeless.  a neighbor contacted daughter stating father was outside and not looking well.  pt has multiple er visits. denies etoh for 1 month but is dropping things. slow to move.  denies any other drugs.  daughter states last er visit at another hospital, he became aggressive with , refused rehab.  pt's eyes has discharge and red.  pt has pmhx diabetes, htn. accucheck HI

## 2022-06-11 NOTE — ED PROVIDER NOTE - CLINICAL SUMMARY MEDICAL DECISION MAKING FREE TEXT BOX
Earl - pt is a 76 yo M a DM2, bifrontal SAH, a R SDH, a L parietal SAH with pneumocephalus, a L possible parietal non-displaced skull fracture, and a C6 inferior endplate fracture into disc space with air into the canal in 2021 who presents with AMS and hyperglycemia. Will check labs, CT head, urine. Likely Wernicke vs rpt head trauma

## 2022-06-11 NOTE — ED ADULT TRIAGE NOTE - AS HEIGHT TYPE
----- Message from Gabi GERALDO Garcia sent at 4/1/2021  9:02 AM CDT -----  Regarding: patient requests callback  Good morning. Patient returned our callback message this morning to schedule her MRI that is due in May.  She states she must have an open MRI unit due to severe anxiety and claustrophobia. She said she's tried being pre medicated and it didn't work.  I explained that unfortunately [facility] does not have any open MRI units. She asked for some options of where she can go for open units. She requests for you to call her back to discuss this. She states she does have to leave for a while this morning but stated it is ok to leave voice mail with names of a couple plates that have open MRI units. Thank you for your time, have a wonderful day.      stated

## 2022-06-11 NOTE — ED ADULT NURSE NOTE - OBJECTIVE STATEMENT
77 year old male A&Ox 3( unsure exact date but known month/year), ambulatory with PHX: HTN, DM2, ETOH abuse brought in by family for medical eval. As per daughter PT undomicilied, got a call from neighbor that PT more confused, difficulty picking things up and fine motor movements. PT endorses drinking ETOH, but labile on last date used, will not quantify amount. Denies any pain, nausea, vomiting, recent illness. Endorses polyurea, polydipsia, polyphagia. FS >600 in triage. MD evaluated, VS as noted. Family at bedside. Awaiting further orders at this time.

## 2022-06-11 NOTE — ED PROVIDER NOTE - NS ED ROS FT
+confusion, eye discharge, inability to care for self, hyperglycemia  10 point ROS other than as per HPI neg

## 2022-06-11 NOTE — ED PROVIDER NOTE - ATTENDING CONTRIBUTION TO CARE
78yo M with PMHX DM, HTN, etoh abuse, estranged from family and intermittently undomiciled and staying in shelters, presenting to ED with daughter after neighborhood friend called daughter saying that father did not look well, appeared more confused than baseline and also with b/l red eye with discharge and hoarse voice.  Pt says last etoh intake 1 months ago but per daughter pt told her he drank yesterday.  No sob, cough, fevers, n/v/d. 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    General: Patient alert in no apparent distress  Skin: Dry and intact  HEENT: Head atraumatic. Oral mucosa moist.   Eyes: Conjunctiva normal  Cardiac: Regular rhythm and rate. No pretibial edema b/l  Respiratory: Lungs clear b/l and symmetric. No respiratory distress. Able to speak in complete sentences.  Gastrointestinal: Abdomen soft, nondistended, nontender  Musculoskeletal: Moves all extremities spontaneously  Neurological: alert and oriented to person, place, and time. No pronator drift. Motor strength 5/5 in all distal extremities.   Psychiatric: Calm and cooperative    a/p  elderly AMS  eval for infectious vs metabolic etiologies  not meningitic  cth, labs, ua, cxr, IVF      Daughter Mercedes Huizar- [phone]

## 2022-06-11 NOTE — ED ADULT NURSE NOTE - CHIEF COMPLAINT QUOTE
brought in by daughter for psych/med eval    as per daughter (agnieszka carrero- [phone]), pt is alcoholic.. has been labile and aggressive with his wife who threw him out... pt now homeless.  a neighbor contacted daughter stating father was outside and not looking well.  pt has multiple er visits. denies etoh for 1 month but is dropping things. slow to move.  denies any other drugs.  daughter states last er visit at another hospital, he became aggressive with , refused rehab.  pt's eyes has discharge and red.  pt has pmhx diabetes, htn. accucheck HI

## 2022-06-11 NOTE — ED PROVIDER NOTE - OBJECTIVE STATEMENT
pt is a 78 yo M a DM2, bifrontal SAH, a R SDH, a L parietal SAH with pneumocephalus, a L possible parietal non-displaced skull fracture, and a C6 inferior endplate fracture into disc space with air into the canal in 2021 who presents with AMS and hyperglycemia. Pt's son and daughter at bedside. They state that pt is homeless bc kicked out of their house 1 year ago for being verbally abusive and agitated. Pt frequently goes to the ED for hyperglycemia but refuses admission or help. Today was found looking unwell on the street by a neighbor who called daughter. Pt says last drink was 1 month ago and denies any other drug use or symptoms. Endorses eye discharge. Says he is willing this time to be admitted for placement.  Has been increasingly confused and dropping things., per daughter, over past several weeks.

## 2022-06-11 NOTE — ED ADULT NURSE NOTE - NSIMPLEMENTINTERV_GEN_ALL_ED
Implemented All Universal Safety Interventions:  [place]ervais to call system. Call bell, personal items and telephone within reach. Instruct patient to call for assistance. Room bathroom lighting operational. Non-slip footwear when patient is off stretcher. Physically safe environment: no spills, clutter or unnecessary equipment. Stretcher in lowest position, wheels locked, appropriate side rails in place.

## 2022-06-12 DIAGNOSIS — I60.9 NONTRAUMATIC SUBARACHNOID HEMORRHAGE, UNSPECIFIED: ICD-10-CM

## 2022-06-12 DIAGNOSIS — Z29.9 ENCOUNTER FOR PROPHYLACTIC MEASURES, UNSPECIFIED: ICD-10-CM

## 2022-06-12 DIAGNOSIS — G91.2 (IDIOPATHIC) NORMAL PRESSURE HYDROCEPHALUS: ICD-10-CM

## 2022-06-12 DIAGNOSIS — F10.10 ALCOHOL ABUSE, UNCOMPLICATED: ICD-10-CM

## 2022-06-12 DIAGNOSIS — E11.65 TYPE 2 DIABETES MELLITUS WITH HYPERGLYCEMIA: ICD-10-CM

## 2022-06-12 DIAGNOSIS — Z59.00 HOMELESSNESS UNSPECIFIED: ICD-10-CM

## 2022-06-12 DIAGNOSIS — G93.40 ENCEPHALOPATHY, UNSPECIFIED: ICD-10-CM

## 2022-06-12 PROBLEM — E11.9 TYPE 2 DIABETES MELLITUS WITHOUT COMPLICATIONS: Chronic | Status: ACTIVE | Noted: 2021-07-09

## 2022-06-12 LAB
A1C WITH ESTIMATED AVERAGE GLUCOSE RESULT: 15.5 % — HIGH (ref 4–5.6)
A1C WITH ESTIMATED AVERAGE GLUCOSE RESULT: 16.1 % — HIGH (ref 4–5.6)
ALBUMIN SERPL ELPH-MCNC: 4 G/DL — SIGNIFICANT CHANGE UP (ref 3.3–5)
ALP SERPL-CCNC: 130 U/L — HIGH (ref 40–120)
ALT FLD-CCNC: 24 U/L — SIGNIFICANT CHANGE UP (ref 4–41)
AMPHET UR-MCNC: NEGATIVE — SIGNIFICANT CHANGE UP
ANION GAP SERPL CALC-SCNC: 13 MMOL/L — SIGNIFICANT CHANGE UP (ref 7–14)
ANION GAP SERPL CALC-SCNC: 14 MMOL/L — SIGNIFICANT CHANGE UP (ref 7–14)
ANION GAP SERPL CALC-SCNC: 16 MMOL/L — HIGH (ref 7–14)
APAP SERPL-MCNC: <10 UG/ML — LOW (ref 15–25)
APPEARANCE UR: CLEAR — SIGNIFICANT CHANGE UP
AST SERPL-CCNC: 17 U/L — SIGNIFICANT CHANGE UP (ref 4–40)
B PERT DNA SPEC QL NAA+PROBE: SIGNIFICANT CHANGE UP
B PERT+PARAPERT DNA PNL SPEC NAA+PROBE: SIGNIFICANT CHANGE UP
B-OH-BUTYR SERPL-SCNC: <0 MMOL/L — SIGNIFICANT CHANGE UP (ref 0–0.4)
B-OH-BUTYR SERPL-SCNC: <0 MMOL/L — SIGNIFICANT CHANGE UP (ref 0–0.4)
BACTERIA # UR AUTO: NEGATIVE — SIGNIFICANT CHANGE UP
BARBITURATES UR SCN-MCNC: NEGATIVE — SIGNIFICANT CHANGE UP
BASE EXCESS BLDV CALC-SCNC: -1.4 MMOL/L — SIGNIFICANT CHANGE UP (ref -2–3)
BASE EXCESS BLDV CALC-SCNC: -3 MMOL/L — LOW (ref -2–3)
BASOPHILS # BLD AUTO: 0.02 K/UL — SIGNIFICANT CHANGE UP (ref 0–0.2)
BASOPHILS NFR BLD AUTO: 0.3 % — SIGNIFICANT CHANGE UP (ref 0–2)
BENZODIAZ UR-MCNC: NEGATIVE — SIGNIFICANT CHANGE UP
BILIRUB SERPL-MCNC: 0.4 MG/DL — SIGNIFICANT CHANGE UP (ref 0.2–1.2)
BILIRUB UR-MCNC: NEGATIVE — SIGNIFICANT CHANGE UP
BLOOD GAS VENOUS COMPREHENSIVE RESULT: SIGNIFICANT CHANGE UP
BLOOD GAS VENOUS COMPREHENSIVE RESULT: SIGNIFICANT CHANGE UP
BORDETELLA PARAPERTUSSIS (RAPRVP): SIGNIFICANT CHANGE UP
BUN SERPL-MCNC: 20 MG/DL — SIGNIFICANT CHANGE UP (ref 7–23)
BUN SERPL-MCNC: 20 MG/DL — SIGNIFICANT CHANGE UP (ref 7–23)
BUN SERPL-MCNC: 31 MG/DL — HIGH (ref 7–23)
C PNEUM DNA SPEC QL NAA+PROBE: SIGNIFICANT CHANGE UP
CALCIUM SERPL-MCNC: 9.3 MG/DL — SIGNIFICANT CHANGE UP (ref 8.4–10.5)
CALCIUM SERPL-MCNC: 9.5 MG/DL — SIGNIFICANT CHANGE UP (ref 8.4–10.5)
CALCIUM SERPL-MCNC: 9.6 MG/DL — SIGNIFICANT CHANGE UP (ref 8.4–10.5)
CHLORIDE BLDV-SCNC: 106 MMOL/L — SIGNIFICANT CHANGE UP (ref 96–108)
CHLORIDE BLDV-SCNC: 97 MMOL/L — SIGNIFICANT CHANGE UP (ref 96–108)
CHLORIDE SERPL-SCNC: 104 MMOL/L — SIGNIFICANT CHANGE UP (ref 98–107)
CHLORIDE SERPL-SCNC: 104 MMOL/L — SIGNIFICANT CHANGE UP (ref 98–107)
CHLORIDE SERPL-SCNC: 95 MMOL/L — LOW (ref 98–107)
CHOLEST SERPL-MCNC: 231 MG/DL — HIGH
CO2 BLDV-SCNC: 24 MMOL/L — SIGNIFICANT CHANGE UP (ref 22–26)
CO2 BLDV-SCNC: 26.2 MMOL/L — HIGH (ref 22–26)
CO2 SERPL-SCNC: 20 MMOL/L — LOW (ref 22–31)
CO2 SERPL-SCNC: 20 MMOL/L — LOW (ref 22–31)
CO2 SERPL-SCNC: 21 MMOL/L — LOW (ref 22–31)
COCAINE METAB.OTHER UR-MCNC: NEGATIVE — SIGNIFICANT CHANGE UP
COLOR SPEC: COLORLESS — SIGNIFICANT CHANGE UP
CREAT SERPL-MCNC: 1.1 MG/DL — SIGNIFICANT CHANGE UP (ref 0.5–1.3)
CREAT SERPL-MCNC: 1.13 MG/DL — SIGNIFICANT CHANGE UP (ref 0.5–1.3)
CREAT SERPL-MCNC: 1.3 MG/DL — SIGNIFICANT CHANGE UP (ref 0.5–1.3)
CREATININE URINE RESULT, DAU: 18 MG/DL — SIGNIFICANT CHANGE UP
CULTURE RESULTS: SIGNIFICANT CHANGE UP
DIFF PNL FLD: NEGATIVE — SIGNIFICANT CHANGE UP
EGFR: 57 ML/MIN/1.73M2 — LOW
EGFR: 68 ML/MIN/1.73M2 — SIGNIFICANT CHANGE UP
EGFR: 70 ML/MIN/1.73M2 — SIGNIFICANT CHANGE UP
EOSINOPHIL # BLD AUTO: 0.06 K/UL — SIGNIFICANT CHANGE UP (ref 0–0.5)
EOSINOPHIL NFR BLD AUTO: 1 % — SIGNIFICANT CHANGE UP (ref 0–6)
EPI CELLS # UR: 2 /HPF — SIGNIFICANT CHANGE UP (ref 0–5)
ESTIMATED AVERAGE GLUCOSE: 398 — SIGNIFICANT CHANGE UP
ESTIMATED AVERAGE GLUCOSE: 415 — SIGNIFICANT CHANGE UP
ETHANOL SERPL-MCNC: <10 MG/DL — SIGNIFICANT CHANGE UP
ETHANOL SERPL-MCNC: <10 MG/DL — SIGNIFICANT CHANGE UP
FLUAV SUBTYP SPEC NAA+PROBE: SIGNIFICANT CHANGE UP
FLUBV RNA SPEC QL NAA+PROBE: SIGNIFICANT CHANGE UP
GAS PNL BLDV: 130 MMOL/L — LOW (ref 136–145)
GAS PNL BLDV: 138 MMOL/L — SIGNIFICANT CHANGE UP (ref 136–145)
GAS PNL BLDV: SIGNIFICANT CHANGE UP
GLUCOSE BLDC GLUCOMTR-MCNC: 195 MG/DL — HIGH (ref 70–99)
GLUCOSE BLDC GLUCOMTR-MCNC: 204 MG/DL — HIGH (ref 70–99)
GLUCOSE BLDC GLUCOMTR-MCNC: 289 MG/DL — HIGH (ref 70–99)
GLUCOSE BLDC GLUCOMTR-MCNC: 362 MG/DL — HIGH (ref 70–99)
GLUCOSE BLDV-MCNC: 185 MG/DL — HIGH (ref 70–99)
GLUCOSE BLDV-MCNC: SIGNIFICANT CHANGE UP MG/DL (ref 70–99)
GLUCOSE SERPL-MCNC: 182 MG/DL — HIGH (ref 70–99)
GLUCOSE SERPL-MCNC: 183 MG/DL — HIGH (ref 70–99)
GLUCOSE SERPL-MCNC: 718 MG/DL — CRITICAL HIGH (ref 70–99)
GLUCOSE UR QL: ABNORMAL
HADV DNA SPEC QL NAA+PROBE: SIGNIFICANT CHANGE UP
HCO3 BLDV-SCNC: 23 MMOL/L — SIGNIFICANT CHANGE UP (ref 22–29)
HCO3 BLDV-SCNC: 25 MMOL/L — SIGNIFICANT CHANGE UP (ref 22–29)
HCOV 229E RNA SPEC QL NAA+PROBE: SIGNIFICANT CHANGE UP
HCOV HKU1 RNA SPEC QL NAA+PROBE: SIGNIFICANT CHANGE UP
HCOV NL63 RNA SPEC QL NAA+PROBE: SIGNIFICANT CHANGE UP
HCOV OC43 RNA SPEC QL NAA+PROBE: SIGNIFICANT CHANGE UP
HCT VFR BLD CALC: 30.9 % — LOW (ref 39–50)
HCT VFR BLD CALC: 34.5 % — LOW (ref 39–50)
HCT VFR BLD CALC: 34.5 % — LOW (ref 39–50)
HCT VFR BLDA CALC: 23 % — LOW (ref 39–51)
HCT VFR BLDA CALC: 33 % — LOW (ref 39–51)
HDLC SERPL-MCNC: 54 MG/DL — SIGNIFICANT CHANGE UP
HGB BLD CALC-MCNC: 11 G/DL — LOW (ref 13–17)
HGB BLD CALC-MCNC: 7.5 G/DL — LOW (ref 13–17)
HGB BLD-MCNC: 10 G/DL — LOW (ref 13–17)
HGB BLD-MCNC: 11 G/DL — LOW (ref 13–17)
HGB BLD-MCNC: 11 G/DL — LOW (ref 13–17)
HMPV RNA SPEC QL NAA+PROBE: SIGNIFICANT CHANGE UP
HPIV1 RNA SPEC QL NAA+PROBE: SIGNIFICANT CHANGE UP
HPIV2 RNA SPEC QL NAA+PROBE: SIGNIFICANT CHANGE UP
HPIV3 RNA SPEC QL NAA+PROBE: SIGNIFICANT CHANGE UP
HPIV4 RNA SPEC QL NAA+PROBE: SIGNIFICANT CHANGE UP
IANC: 3.99 K/UL — SIGNIFICANT CHANGE UP (ref 1.8–7.4)
IMM GRANULOCYTES NFR BLD AUTO: 0.3 % — SIGNIFICANT CHANGE UP (ref 0–1.5)
KETONES UR-MCNC: NEGATIVE — SIGNIFICANT CHANGE UP
LACTATE BLDV-MCNC: 1.5 MMOL/L — SIGNIFICANT CHANGE UP (ref 0.5–2)
LACTATE BLDV-MCNC: 1.8 MMOL/L — SIGNIFICANT CHANGE UP (ref 0.5–2)
LEUKOCYTE ESTERASE UR-ACNC: NEGATIVE — SIGNIFICANT CHANGE UP
LIPID PNL WITH DIRECT LDL SERPL: 150 MG/DL — HIGH
LYMPHOCYTES # BLD AUTO: 1.05 K/UL — SIGNIFICANT CHANGE UP (ref 1–3.3)
LYMPHOCYTES # BLD AUTO: 18.3 % — SIGNIFICANT CHANGE UP (ref 13–44)
M PNEUMO DNA SPEC QL NAA+PROBE: SIGNIFICANT CHANGE UP
MAGNESIUM SERPL-MCNC: 1.8 MG/DL — SIGNIFICANT CHANGE UP (ref 1.6–2.6)
MAGNESIUM SERPL-MCNC: 1.8 MG/DL — SIGNIFICANT CHANGE UP (ref 1.6–2.6)
MCHC RBC-ENTMCNC: 31 PG — SIGNIFICANT CHANGE UP (ref 27–34)
MCHC RBC-ENTMCNC: 31.1 PG — SIGNIFICANT CHANGE UP (ref 27–34)
MCHC RBC-ENTMCNC: 31.4 PG — SIGNIFICANT CHANGE UP (ref 27–34)
MCHC RBC-ENTMCNC: 31.9 GM/DL — LOW (ref 32–36)
MCHC RBC-ENTMCNC: 31.9 GM/DL — LOW (ref 32–36)
MCHC RBC-ENTMCNC: 32.4 GM/DL — SIGNIFICANT CHANGE UP (ref 32–36)
MCV RBC AUTO: 97.2 FL — SIGNIFICANT CHANGE UP (ref 80–100)
MCV RBC AUTO: 97.2 FL — SIGNIFICANT CHANGE UP (ref 80–100)
MCV RBC AUTO: 97.5 FL — SIGNIFICANT CHANGE UP (ref 80–100)
METHADONE UR-MCNC: NEGATIVE — SIGNIFICANT CHANGE UP
MONOCYTES # BLD AUTO: 0.59 K/UL — SIGNIFICANT CHANGE UP (ref 0–0.9)
MONOCYTES NFR BLD AUTO: 10.3 % — SIGNIFICANT CHANGE UP (ref 2–14)
NEUTROPHILS # BLD AUTO: 3.99 K/UL — SIGNIFICANT CHANGE UP (ref 1.8–7.4)
NEUTROPHILS NFR BLD AUTO: 69.8 % — SIGNIFICANT CHANGE UP (ref 43–77)
NITRITE UR-MCNC: NEGATIVE — SIGNIFICANT CHANGE UP
NON HDL CHOLESTEROL: 177 MG/DL — HIGH
NRBC # BLD: 0 /100 WBCS — SIGNIFICANT CHANGE UP
NRBC # FLD: 0 K/UL — SIGNIFICANT CHANGE UP
OPIATES UR-MCNC: NEGATIVE — SIGNIFICANT CHANGE UP
OXYCODONE UR-MCNC: NEGATIVE — SIGNIFICANT CHANGE UP
PCO2 BLDV: 43 MMHG — SIGNIFICANT CHANGE UP (ref 42–55)
PCO2 BLDV: 47 MMHG — SIGNIFICANT CHANGE UP (ref 42–55)
PCP SPEC-MCNC: SIGNIFICANT CHANGE UP
PCP UR-MCNC: NEGATIVE — SIGNIFICANT CHANGE UP
PH BLDV: 7.33 — SIGNIFICANT CHANGE UP (ref 7.32–7.43)
PH BLDV: 7.33 — SIGNIFICANT CHANGE UP (ref 7.32–7.43)
PH UR: 6 — SIGNIFICANT CHANGE UP (ref 5–8)
PHOSPHATE SERPL-MCNC: 2.6 MG/DL — SIGNIFICANT CHANGE UP (ref 2.5–4.5)
PHOSPHATE SERPL-MCNC: 2.7 MG/DL — SIGNIFICANT CHANGE UP (ref 2.5–4.5)
PLATELET # BLD AUTO: 374 K/UL — SIGNIFICANT CHANGE UP (ref 150–400)
PLATELET # BLD AUTO: 406 K/UL — HIGH (ref 150–400)
PLATELET # BLD AUTO: 406 K/UL — HIGH (ref 150–400)
PO2 BLDV: 26 MMHG — SIGNIFICANT CHANGE UP
PO2 BLDV: 28 MMHG — SIGNIFICANT CHANGE UP
POTASSIUM BLDV-SCNC: 3.7 MMOL/L — SIGNIFICANT CHANGE UP (ref 3.5–5.1)
POTASSIUM BLDV-SCNC: 4.7 MMOL/L — SIGNIFICANT CHANGE UP (ref 3.5–5.1)
POTASSIUM SERPL-MCNC: 3.6 MMOL/L — SIGNIFICANT CHANGE UP (ref 3.5–5.3)
POTASSIUM SERPL-MCNC: 3.7 MMOL/L — SIGNIFICANT CHANGE UP (ref 3.5–5.3)
POTASSIUM SERPL-MCNC: 4.8 MMOL/L — SIGNIFICANT CHANGE UP (ref 3.5–5.3)
POTASSIUM SERPL-SCNC: 3.6 MMOL/L — SIGNIFICANT CHANGE UP (ref 3.5–5.3)
POTASSIUM SERPL-SCNC: 3.7 MMOL/L — SIGNIFICANT CHANGE UP (ref 3.5–5.3)
POTASSIUM SERPL-SCNC: 4.8 MMOL/L — SIGNIFICANT CHANGE UP (ref 3.5–5.3)
PROT SERPL-MCNC: 7.3 G/DL — SIGNIFICANT CHANGE UP (ref 6–8.3)
PROT UR-MCNC: NEGATIVE — SIGNIFICANT CHANGE UP
RAPID RVP RESULT: SIGNIFICANT CHANGE UP
RBC # BLD: 3.18 M/UL — LOW (ref 4.2–5.8)
RBC # BLD: 3.54 M/UL — LOW (ref 4.2–5.8)
RBC # BLD: 3.55 M/UL — LOW (ref 4.2–5.8)
RBC # FLD: 12.4 % — SIGNIFICANT CHANGE UP (ref 10.3–14.5)
RBC # FLD: 12.4 % — SIGNIFICANT CHANGE UP (ref 10.3–14.5)
RBC # FLD: 12.6 % — SIGNIFICANT CHANGE UP (ref 10.3–14.5)
RBC CASTS # UR COMP ASSIST: 0 /HPF — SIGNIFICANT CHANGE UP (ref 0–4)
RSV RNA SPEC QL NAA+PROBE: SIGNIFICANT CHANGE UP
RV+EV RNA SPEC QL NAA+PROBE: SIGNIFICANT CHANGE UP
SALICYLATES SERPL-MCNC: <0.3 MG/DL — LOW (ref 15–30)
SAO2 % BLDV: 32.6 % — SIGNIFICANT CHANGE UP
SAO2 % BLDV: 41.3 % — SIGNIFICANT CHANGE UP
SARS-COV-2 RNA SPEC QL NAA+PROBE: SIGNIFICANT CHANGE UP
SODIUM SERPL-SCNC: 129 MMOL/L — LOW (ref 135–145)
SODIUM SERPL-SCNC: 138 MMOL/L — SIGNIFICANT CHANGE UP (ref 135–145)
SODIUM SERPL-SCNC: 140 MMOL/L — SIGNIFICANT CHANGE UP (ref 135–145)
SP GR SPEC: 1.03 — SIGNIFICANT CHANGE UP (ref 1–1.05)
SPECIMEN SOURCE: SIGNIFICANT CHANGE UP
THC UR QL: NEGATIVE — SIGNIFICANT CHANGE UP
TOXICOLOGY SCREEN, DRUGS OF ABUSE, SERUM RESULT: SIGNIFICANT CHANGE UP
TRIGL SERPL-MCNC: 133 MG/DL — SIGNIFICANT CHANGE UP
TSH SERPL-MCNC: 1.16 UIU/ML — SIGNIFICANT CHANGE UP (ref 0.27–4.2)
UROBILINOGEN FLD QL: SIGNIFICANT CHANGE UP
WBC # BLD: 5.58 K/UL — SIGNIFICANT CHANGE UP (ref 3.8–10.5)
WBC # BLD: 5.71 K/UL — SIGNIFICANT CHANGE UP (ref 3.8–10.5)
WBC # BLD: 5.73 K/UL — SIGNIFICANT CHANGE UP (ref 3.8–10.5)
WBC # FLD AUTO: 5.58 K/UL — SIGNIFICANT CHANGE UP (ref 3.8–10.5)
WBC # FLD AUTO: 5.71 K/UL — SIGNIFICANT CHANGE UP (ref 3.8–10.5)
WBC # FLD AUTO: 5.73 K/UL — SIGNIFICANT CHANGE UP (ref 3.8–10.5)
WBC UR QL: 0 /HPF — SIGNIFICANT CHANGE UP (ref 0–5)

## 2022-06-12 PROCEDURE — 70450 CT HEAD/BRAIN W/O DYE: CPT | Mod: 26,MA

## 2022-06-12 PROCEDURE — 71045 X-RAY EXAM CHEST 1 VIEW: CPT | Mod: 26

## 2022-06-12 PROCEDURE — 99223 1ST HOSP IP/OBS HIGH 75: CPT

## 2022-06-12 PROCEDURE — 99232 SBSQ HOSP IP/OBS MODERATE 35: CPT

## 2022-06-12 RX ORDER — DEXTROSE 50 % IN WATER 50 %
25 SYRINGE (ML) INTRAVENOUS ONCE
Refills: 0 | Status: DISCONTINUED | OUTPATIENT
Start: 2022-06-12 | End: 2022-06-23

## 2022-06-12 RX ORDER — SODIUM CHLORIDE 9 MG/ML
1000 INJECTION, SOLUTION INTRAVENOUS
Refills: 0 | Status: DISCONTINUED | OUTPATIENT
Start: 2022-06-12 | End: 2022-06-23

## 2022-06-12 RX ORDER — INSULIN LISPRO 100/ML
VIAL (ML) SUBCUTANEOUS
Refills: 0 | Status: DISCONTINUED | OUTPATIENT
Start: 2022-06-12 | End: 2022-06-12

## 2022-06-12 RX ORDER — FOLIC ACID 0.8 MG
1 TABLET ORAL DAILY
Refills: 0 | Status: DISCONTINUED | OUTPATIENT
Start: 2022-06-12 | End: 2022-06-23

## 2022-06-12 RX ORDER — INSULIN LISPRO 100/ML
VIAL (ML) SUBCUTANEOUS AT BEDTIME
Refills: 0 | Status: DISCONTINUED | OUTPATIENT
Start: 2022-06-12 | End: 2022-06-18

## 2022-06-12 RX ORDER — ATORVASTATIN CALCIUM 80 MG/1
40 TABLET, FILM COATED ORAL AT BEDTIME
Refills: 0 | Status: DISCONTINUED | OUTPATIENT
Start: 2022-06-12 | End: 2022-06-23

## 2022-06-12 RX ORDER — DEXTROSE 50 % IN WATER 50 %
12.5 SYRINGE (ML) INTRAVENOUS ONCE
Refills: 0 | Status: DISCONTINUED | OUTPATIENT
Start: 2022-06-12 | End: 2022-06-23

## 2022-06-12 RX ORDER — SODIUM CHLORIDE 9 MG/ML
1000 INJECTION INTRAMUSCULAR; INTRAVENOUS; SUBCUTANEOUS ONCE
Refills: 0 | Status: COMPLETED | OUTPATIENT
Start: 2022-06-12 | End: 2022-06-12

## 2022-06-12 RX ORDER — THIAMINE MONONITRATE (VIT B1) 100 MG
100 TABLET ORAL DAILY
Refills: 0 | Status: DISCONTINUED | OUTPATIENT
Start: 2022-06-12 | End: 2022-06-16

## 2022-06-12 RX ORDER — ACETAMINOPHEN 500 MG
650 TABLET ORAL EVERY 6 HOURS
Refills: 0 | Status: DISCONTINUED | OUTPATIENT
Start: 2022-06-12 | End: 2022-06-23

## 2022-06-12 RX ORDER — DEXTROSE 50 % IN WATER 50 %
15 SYRINGE (ML) INTRAVENOUS ONCE
Refills: 0 | Status: DISCONTINUED | OUTPATIENT
Start: 2022-06-12 | End: 2022-06-23

## 2022-06-12 RX ORDER — INSULIN LISPRO 100/ML
VIAL (ML) SUBCUTANEOUS
Refills: 0 | Status: DISCONTINUED | OUTPATIENT
Start: 2022-06-12 | End: 2022-06-23

## 2022-06-12 RX ORDER — LANOLIN ALCOHOL/MO/W.PET/CERES
3 CREAM (GRAM) TOPICAL AT BEDTIME
Refills: 0 | Status: DISCONTINUED | OUTPATIENT
Start: 2022-06-12 | End: 2022-06-23

## 2022-06-12 RX ORDER — INSULIN LISPRO 100/ML
VIAL (ML) SUBCUTANEOUS AT BEDTIME
Refills: 0 | Status: DISCONTINUED | OUTPATIENT
Start: 2022-06-12 | End: 2022-06-12

## 2022-06-12 RX ORDER — INSULIN GLARGINE 100 [IU]/ML
12 INJECTION, SOLUTION SUBCUTANEOUS AT BEDTIME
Refills: 0 | Status: DISCONTINUED | OUTPATIENT
Start: 2022-06-12 | End: 2022-06-14

## 2022-06-12 RX ORDER — INSULIN LISPRO 100/ML
10 VIAL (ML) SUBCUTANEOUS ONCE
Refills: 0 | Status: COMPLETED | OUTPATIENT
Start: 2022-06-12 | End: 2022-06-12

## 2022-06-12 RX ORDER — GLUCAGON INJECTION, SOLUTION 0.5 MG/.1ML
1 INJECTION, SOLUTION SUBCUTANEOUS ONCE
Refills: 0 | Status: DISCONTINUED | OUTPATIENT
Start: 2022-06-12 | End: 2022-06-23

## 2022-06-12 RX ORDER — DIVALPROEX SODIUM 500 MG/1
500 TABLET, DELAYED RELEASE ORAL
Refills: 0 | Status: DISCONTINUED | OUTPATIENT
Start: 2022-06-12 | End: 2022-06-16

## 2022-06-12 RX ORDER — INSULIN LISPRO 100/ML
4 VIAL (ML) SUBCUTANEOUS
Refills: 0 | Status: DISCONTINUED | OUTPATIENT
Start: 2022-06-12 | End: 2022-06-14

## 2022-06-12 RX ADMIN — Medication 10 UNIT(S): at 03:35

## 2022-06-12 RX ADMIN — INSULIN GLARGINE 12 UNIT(S): 100 INJECTION, SOLUTION SUBCUTANEOUS at 22:04

## 2022-06-12 RX ADMIN — SODIUM CHLORIDE 1000 MILLILITER(S): 9 INJECTION INTRAMUSCULAR; INTRAVENOUS; SUBCUTANEOUS at 01:48

## 2022-06-12 RX ADMIN — Medication 100 MILLIGRAM(S): at 12:21

## 2022-06-12 RX ADMIN — Medication 3: at 22:05

## 2022-06-12 RX ADMIN — ATORVASTATIN CALCIUM 40 MILLIGRAM(S): 80 TABLET, FILM COATED ORAL at 22:04

## 2022-06-12 RX ADMIN — Medication 3: at 17:45

## 2022-06-12 RX ADMIN — Medication 4 UNIT(S): at 17:45

## 2022-06-12 RX ADMIN — Medication 100 MILLIGRAM(S): at 00:29

## 2022-06-12 RX ADMIN — Medication 1 MILLIGRAM(S): at 12:21

## 2022-06-12 RX ADMIN — Medication 1 TABLET(S): at 12:21

## 2022-06-12 RX ADMIN — Medication 4: at 12:10

## 2022-06-12 RX ADMIN — SODIUM CHLORIDE 1000 MILLILITER(S): 9 INJECTION INTRAMUSCULAR; INTRAVENOUS; SUBCUTANEOUS at 00:14

## 2022-06-12 RX ADMIN — DIVALPROEX SODIUM 500 MILLIGRAM(S): 500 TABLET, DELAYED RELEASE ORAL at 19:04

## 2022-06-12 RX ADMIN — Medication 4: at 09:34

## 2022-06-12 SDOH — ECONOMIC STABILITY - HOUSING INSECURITY: HOMELESSNESS UNSPECIFIED: Z59.00

## 2022-06-12 NOTE — H&P ADULT - PROBLEM SELECTOR PLAN 3
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  -was discharged on depakote in 2021 from rehab  -restart depakote for seizure ppx  -neurosurgery eval to comment on NPH -CTH with moderate ventricular dilatation greater than the degree of peripheral volume loss has increased since prior study and may be due to normal pressure hydrocephalus  -secondary NPH from past traumatic brain injury?  -can NPH explain current cognitive decline?  -neurosurgery eval

## 2022-06-12 NOTE — CONSULT NOTE ADULT - SUBJECTIVE AND OBJECTIVE BOX
HPI:  78 y/o M with PMH of DM2, bifrontal SAH, a R SDH, a L parietal SAH with pneumocephalus, a L possible parietal non-displaced skull fracture, and a C6 inferior endplate fracture into disc space with air into the canal in  who presents with AMS and hyperglycemia. Patient seen and examined. He is AAO to self and place. Does not know date (day, month or year). Says he does not have a place to live currently. Per ED documentation he was kicked out of his home last year after being agitated and aggressive with his family. He states that he takes metformin however unclear compliance. Patient today wants to eat. Denies any chest pain, abdominal pain, SOB, fevers or chills.     Patient denies headaches, nausea, vomiting, incontinence, or gait instability. Ambulates without any assistive device.     Vital Signs Last 24 Hrs  T(C): 36.7 (2022 06:18), Max: 36.9 (2022 03:39)  T(F): 98.1 (2022 06:18), Max: 98.4 (2022 03:39)  HR: 82 (2022 06:18) (74 - 90)  BP: 143/87 (2022 06:18) (108/67 - 147/74)  BP(mean): --  RR: 18 (2022 06:18) (16 - 18)  SpO2: 100% (2022 06:18) (100% - 100%) (2022 10:10)    PAST MEDICAL & SURGICAL HISTORY:  DM (diabetes mellitus)      No significant past surgical history        FAMILY HISTORY:    Home Medications:      MEDICATIONS  (STANDING):  atorvastatin 40 milliGRAM(s) Oral at bedtime  dextrose 5%. 1000 milliLiter(s) (100 mL/Hr) IV Continuous <Continuous>  dextrose 5%. 1000 milliLiter(s) (50 mL/Hr) IV Continuous <Continuous>  dextrose 50% Injectable 25 Gram(s) IV Push once  dextrose 50% Injectable 12.5 Gram(s) IV Push once  dextrose 50% Injectable 25 Gram(s) IV Push once  diVALproex  milliGRAM(s) Oral two times a day  folic acid 1 milliGRAM(s) Oral daily  glucagon  Injectable 1 milliGRAM(s) IntraMuscular once  insulin lispro (ADMELOG) corrective regimen sliding scale   SubCutaneous three times a day before meals  multivitamin 1 Tablet(s) Oral daily  thiamine 100 milliGRAM(s) Oral daily    MEDICATIONS  (PRN):  acetaminophen     Tablet .. 650 milliGRAM(s) Oral every 6 hours PRN Temp greater or equal to 38C (100.4F), Mild Pain (1 - 3)  dextrose Oral Gel 15 Gram(s) Oral once PRN Blood Glucose LESS THAN 70 milliGRAM(s)/deciliter  LORazepam     Tablet 1 milliGRAM(s) Oral every 1 hour PRN CIWA-Ar score 8 or greater  melatonin 3 milliGRAM(s) Oral at bedtime PRN Insomnia    Vital Signs Last 24 Hrs  T(C): 37.1 (2022 14:25), Max: 37.1 (2022 14:25)  T(F): 98.7 (2022 14:25), Max: 98.7 (2022 14:25)  HR: 79 (2022 14:25) (74 - 90)  BP: 156/78 (2022 14:25) (108/67 - 156/78)  BP(mean): --  RR: 18 (2022 14:25) (16 - 18)  SpO2: 99% (2022 14:25) (99% - 100%)    PHYSICAL EXAM:  Awake Alert Oriented x 3 No distress, Speech is clear  Right eye erythematous, eyes slightly proptotic   PERRL, EOMI, Tongue midline, No facial drop  Motor:             RUE 5/5        LUE 5/5             RLE 5/5         LLE 5/5  Sensory intac to light touch  No dysmetria  No drift    LABS:  Urinalysis Basic - ( 2022 23:50 )    Color: Colorless / Appearance: Clear / S.026 / pH: x  Gluc: x / Ketone: Negative  / Bili: Negative / Urobili: <2 mg/dL   Blood: x / Protein: Negative / Nitrite: Negative   Leuk Esterase: Negative / RBC: 0 /HPF / WBC 0 /HPF   Sq Epi: x / Non Sq Epi: 2 /HPF / Bacteria: Negative                              11.0   5.71  )-----------( 406      ( 2022 11:00 )             34.5     06-12    138  |  104  |  20  ----------------------------<  183<H>  3.6   |  21<L>  |  1.10    Ca    9.6      2022 11:00  Phos  2.7       Mg     1.80     12    TPro  7.3  /  Alb  4.0  /  TBili  0.4  /  DBili  x   /  AST  17  /  ALT  24  /  AlkPhos  130<H>        RADIOLOGY:  < from: CT Head No Cont (22 @ 00:27) >    ACC: [id number] EXAM:  CT BRAIN                          PROCEDURE DATE:  2022          INTERPRETATION:  INDICATIONS:  Altered mental status    TECHNIQUE:  Serial axial images were obtained from the skull base to the   vertex without intravenous contrast. Sagittal and coronal reformats were   performed.    COMPARISON EXAMINATION: Head CT dated 2021.    FINDINGS: There is no obvious acute intracranial hemorrhage, large   cortical infarct, mass effect or midline shift, given the extent of   artifact. There is encephalomalacia in the right > left frontal and   temporal region as well as left posterior temporal region at the site of   previous hemorrhagic contusion. Moderate ventricular dilatation greater   than the degree of peripheral volume loss has increased since prior study   and may be due to normal pressure hydrocephalus (given relatively   short-term development) and/or predominantly central atrophy. Nonspecific   mild to moderate periventricular and subcortical white matter lucencies   may represent chronic microvascular ischemic changes although   transependymal CSF resorption is difficult to assess.    There is no depressed skull fracture. There is sinus mucosal thickening.   The tympanomastoid region is unremarkable.    IMPRESSION:    No obvious acute intracranial hemorrhage or mass effect. Additional   findings as described. If clinically indicated, short-term follow-up or   MRI may be obtained for further evaluation.    < end of copied text >

## 2022-06-12 NOTE — PHYSICAL THERAPY INITIAL EVALUATION ADULT - ADDITIONAL COMMENTS
prior to admission pt was independent with all mobility and ambulated without an assistive device.     Pt. left comfortable in bed, call bell in reach and in NAD.

## 2022-06-12 NOTE — ED ADULT NURSE REASSESSMENT NOTE - NS ED NURSE REASSESS COMMENT FT1
Pt A&Ox2, respirations even and unlabored, sating 100% on RA, NSR on monitor. pt offers no complaints at this time. VSS upon leaving ED. Pt well appearing, NAD noted.

## 2022-06-12 NOTE — H&P ADULT - PROBLEM SELECTOR PLAN 4
-needs evaluation for placement -h/o trauma in 2021 with bifrontal SAH, a R SDH, a L parietal SAH with pneumocephalus, a L possible parietal non-displaced skull fracture, and a C6 inferior endplate fracture into disc space with air into the canal   -CTH now with NPH - secondary NPH due to prior trauma???  -was discharged on depakote in 2021 from rehab  -restart depakote for seizure ppx  -neurosurgery eval to comment on NPH -h/o alcohol abuse  -pt denies recent alcohol use  -tox negative for alcohol  -unreliable historian- start ciwa protocol with symptom trigger ciwa

## 2022-06-12 NOTE — H&P ADULT - PROBLEM SELECTOR PLAN 1
-patient with h/o DM2 with unclear compliance to metformin  -was discharged on metformin 850BID in august 2021 from rehab per chart review.   -hgbA1c 7.3 in 2021; rechech A1c now  -on admission FS in 700s but no signs of DKA (ph normal and beta hydroxybutyrate was 0)  -start on moderate dose sliding scale  -may need to start on basal insulin depending on FS  -start statin for secondary prevention  -consider endo eval in AM

## 2022-06-12 NOTE — H&P ADULT - HISTORY OF PRESENT ILLNESS
76 y/o M with PMH of DM2, bifrontal SAH, a R SDH, a L parietal SAH with pneumocephalus, a L possible parietal non-displaced skull fracture, and a C6 inferior endplate fracture into disc space with air into the canal in 2021 who presents with AMS and hyperglycemia. Patient seen and examined. He is AAO to self and place. Does not know date (day, month or year). Says he does not have a place to live currently. Per ED documentation he was kicked out of his home last year after being agitated and aggressive with his family. He states that he takes metformin however unclear compliance. Patient today wants to eat. Denies any chest pain, abdominal pain, SOB, fevers or chills.     Vital Signs Last 24 Hrs  T(C): 36.7 (12 Jun 2022 06:18), Max: 36.9 (12 Jun 2022 03:39)  T(F): 98.1 (12 Jun 2022 06:18), Max: 98.4 (12 Jun 2022 03:39)  HR: 82 (12 Jun 2022 06:18) (74 - 90)  BP: 143/87 (12 Jun 2022 06:18) (108/67 - 147/74)  BP(mean): --  RR: 18 (12 Jun 2022 06:18) (16 - 18)  SpO2: 100% (12 Jun 2022 06:18) (100% - 100%)

## 2022-06-12 NOTE — PHYSICAL THERAPY INITIAL EVALUATION ADULT - PATIENT PROFILE REVIEW, REHAB EVAL
yes PT orders received, chart reviewed. ACTIVITY: ambulate with assist RN, Geo, endorsed pt to PT initial evaluation. Pt willing and able to participate in therapy session./yes

## 2022-06-12 NOTE — H&P ADULT - ASSESSMENT
78 y/o M with PMH of DM2, bifrontal SAH, a R SDH, a L parietal SAH with pneumocephalus, a L possible parietal non-displaced skull fracture, and a C6 inferior endplate fracture into disc space with air into the canal in 2021 who presents with AMS and hyperglycemia.

## 2022-06-12 NOTE — H&P ADULT - PROBLEM SELECTOR PLAN 5
-scds -needs evaluation for placement -h/o trauma in 2021 with bifrontal SAH, a R SDH, a L parietal SAH with pneumocephalus, a L possible parietal non-displaced skull fracture, and a C6 inferior endplate fracture into disc space with air into the canal   -CTH now with NPH - secondary NPH due to prior trauma???  -was discharged on depakote in 2021 from rehab  -restart depakote for seizure ppx  -neurosurgery eval to comment on NPH

## 2022-06-12 NOTE — H&P ADULT - PROBLEM SELECTOR PLAN 2
-AAox 2  -tox screen negative  -RVP, covid, UA negative, no signs of infectious etiology   -CTH with possible NPH?  -secondary NPH due to past trauma? Is cognitive impairment from NPH?  -neurosurgery eval

## 2022-06-12 NOTE — PHYSICAL THERAPY INITIAL EVALUATION ADULT - PERTINENT HX OF CURRENT PROBLEM, REHAB EVAL
Pt is a 77 year old male who presented to [facility] with bilateral eye redness/discharge, AMS and hyperglycemia. PMH below.

## 2022-06-12 NOTE — ED ADULT NURSE REASSESSMENT NOTE - NS ED NURSE REASSESS COMMENT FT1
Pt currently sleeping on stretcher. Respirations even and unlabored, NSR on monitor, sating 100% on RA. Pt well appearing, NAD noted. Pending admission. bed in lowest position, side rails up, call bell in hand, safety maintained.

## 2022-06-12 NOTE — CONSULT NOTE ADULT - PROBLEM SELECTOR RECOMMENDATION 9
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   - Case Dw Dr. Clark with review of imaging

## 2022-06-13 DIAGNOSIS — I10 ESSENTIAL (PRIMARY) HYPERTENSION: ICD-10-CM

## 2022-06-13 DIAGNOSIS — E78.5 HYPERLIPIDEMIA, UNSPECIFIED: ICD-10-CM

## 2022-06-13 LAB
ALBUMIN SERPL ELPH-MCNC: 3.1 G/DL — LOW (ref 3.3–5)
ALP SERPL-CCNC: 105 U/L — SIGNIFICANT CHANGE UP (ref 40–120)
ALT FLD-CCNC: 19 U/L — SIGNIFICANT CHANGE UP (ref 4–41)
ANION GAP SERPL CALC-SCNC: 9 MMOL/L — SIGNIFICANT CHANGE UP (ref 7–14)
AST SERPL-CCNC: 12 U/L — SIGNIFICANT CHANGE UP (ref 4–40)
BILIRUB SERPL-MCNC: 0.2 MG/DL — SIGNIFICANT CHANGE UP (ref 0.2–1.2)
BUN SERPL-MCNC: 23 MG/DL — SIGNIFICANT CHANGE UP (ref 7–23)
CALCIUM SERPL-MCNC: 8.7 MG/DL — SIGNIFICANT CHANGE UP (ref 8.4–10.5)
CHLORIDE SERPL-SCNC: 106 MMOL/L — SIGNIFICANT CHANGE UP (ref 98–107)
CO2 SERPL-SCNC: 21 MMOL/L — LOW (ref 22–31)
CREAT SERPL-MCNC: 1.24 MG/DL — SIGNIFICANT CHANGE UP (ref 0.5–1.3)
EGFR: 61 ML/MIN/1.73M2 — SIGNIFICANT CHANGE UP
FOLATE SERPL-MCNC: 15.4 NG/ML — SIGNIFICANT CHANGE UP (ref 3.1–17.5)
GLUCOSE BLDC GLUCOMTR-MCNC: 103 MG/DL — HIGH (ref 70–99)
GLUCOSE BLDC GLUCOMTR-MCNC: 123 MG/DL — HIGH (ref 70–99)
GLUCOSE BLDC GLUCOMTR-MCNC: 220 MG/DL — HIGH (ref 70–99)
GLUCOSE BLDC GLUCOMTR-MCNC: 313 MG/DL — HIGH (ref 70–99)
GLUCOSE BLDC GLUCOMTR-MCNC: 352 MG/DL — HIGH (ref 70–99)
GLUCOSE SERPL-MCNC: 131 MG/DL — HIGH (ref 70–99)
HCT VFR BLD CALC: 29.4 % — LOW (ref 39–50)
HGB BLD-MCNC: 9.2 G/DL — LOW (ref 13–17)
MCHC RBC-ENTMCNC: 30.4 PG — SIGNIFICANT CHANGE UP (ref 27–34)
MCHC RBC-ENTMCNC: 31.3 GM/DL — LOW (ref 32–36)
MCV RBC AUTO: 97 FL — SIGNIFICANT CHANGE UP (ref 80–100)
NRBC # BLD: 0 /100 WBCS — SIGNIFICANT CHANGE UP
NRBC # FLD: 0 K/UL — SIGNIFICANT CHANGE UP
PLATELET # BLD AUTO: 373 K/UL — SIGNIFICANT CHANGE UP (ref 150–400)
POTASSIUM SERPL-MCNC: 3.8 MMOL/L — SIGNIFICANT CHANGE UP (ref 3.5–5.3)
POTASSIUM SERPL-SCNC: 3.8 MMOL/L — SIGNIFICANT CHANGE UP (ref 3.5–5.3)
PROT SERPL-MCNC: 5.9 G/DL — LOW (ref 6–8.3)
RBC # BLD: 3.03 M/UL — LOW (ref 4.2–5.8)
RBC # FLD: 12.3 % — SIGNIFICANT CHANGE UP (ref 10.3–14.5)
SODIUM SERPL-SCNC: 136 MMOL/L — SIGNIFICANT CHANGE UP (ref 135–145)
VIT B12 SERPL-MCNC: 914 PG/ML — HIGH (ref 200–900)
WBC # BLD: 4.72 K/UL — SIGNIFICANT CHANGE UP (ref 3.8–10.5)
WBC # FLD AUTO: 4.72 K/UL — SIGNIFICANT CHANGE UP (ref 3.8–10.5)

## 2022-06-13 PROCEDURE — 99255 IP/OBS CONSLTJ NEW/EST HI 80: CPT | Mod: GC

## 2022-06-13 PROCEDURE — 99223 1ST HOSP IP/OBS HIGH 75: CPT | Mod: GC

## 2022-06-13 RX ADMIN — Medication 4 UNIT(S): at 12:39

## 2022-06-13 RX ADMIN — Medication 1 MILLIGRAM(S): at 17:23

## 2022-06-13 RX ADMIN — Medication 4 UNIT(S): at 17:23

## 2022-06-13 RX ADMIN — Medication 2: at 22:39

## 2022-06-13 RX ADMIN — Medication 1 TABLET(S): at 17:23

## 2022-06-13 RX ADMIN — INSULIN GLARGINE 12 UNIT(S): 100 INJECTION, SOLUTION SUBCUTANEOUS at 22:39

## 2022-06-13 RX ADMIN — DIVALPROEX SODIUM 500 MILLIGRAM(S): 500 TABLET, DELAYED RELEASE ORAL at 06:30

## 2022-06-13 RX ADMIN — ATORVASTATIN CALCIUM 40 MILLIGRAM(S): 80 TABLET, FILM COATED ORAL at 22:33

## 2022-06-13 RX ADMIN — Medication 4 UNIT(S): at 08:01

## 2022-06-13 RX ADMIN — Medication 5: at 08:02

## 2022-06-13 RX ADMIN — DIVALPROEX SODIUM 500 MILLIGRAM(S): 500 TABLET, DELAYED RELEASE ORAL at 17:23

## 2022-06-13 RX ADMIN — Medication 100 MILLIGRAM(S): at 17:23

## 2022-06-13 NOTE — CONSULT NOTE ADULT - ATTENDING COMMENTS
Uncontrolled DM suspected type 2 (but sending workup to rule out other types), HbA1c 15.5%, glucose 718 on admission, might be homeless/shelter. Previously on metformin. Former ETOH. Will need insulin based plan for dc. Clarify living situation.  Endocrine team consulted for uncontrolled diabetes. Patient is high risk with high level decision making due to uncontrolled diabetes which places patient at high risk for cardiovascular and cerebrovascular events. Patient with lability of glucose requiring close monitoring and insulin adjustments.    Joann Rahman MD  Division of Endocrinology  Pager: [phone]    If after 6PM or before 9AM, or on weekends/holidays, please call endocrine answering service for assistance ([phone]).  For nonurgent matters email [email or web address] for assistance. Uncontrolled DM suspected type 2 (but sending workup to rule out other types), HbA1c 15.5%, glucose 718 on admission, might be homeless/shelter. Previously on metformin. Former ETOH. Will need insulin based plan for dc. Clarify living situation. medicaid pending - would either need coupons for basal bolus coverage vs. mixed insulin for dc TBD.  Endocrine team consulted for uncontrolled diabetes. Patient is high risk with high level decision making due to uncontrolled diabetes which places patient at high risk for cardiovascular and cerebrovascular events. Patient with lability of glucose requiring close monitoring and insulin adjustments.    Joann Rahman MD  Division of Endocrinology  Pager: [phone]    If after 6PM or before 9AM, or on weekends/holidays, please call endocrine answering service for assistance ([phone]).  For nonurgent matters email [email or web address] for assistance.

## 2022-06-13 NOTE — PROGRESS NOTE ADULT - SUBJECTIVE AND OBJECTIVE BOX
Patient is a 77y old  Male who presents with a chief complaint of AMS/hyperglycemia (2022 21:52)      INTERVAL HPI/OVERNIGHT EVENTS:  T(C): 36.9 (22 @ 21:13), Max: 37.3 (22 @ 01:50)  HR: 87 (22 @ 21:13) (71 - 87)  BP: 99/53 (22 @ 21:13) (99/53 - 127/69)  RR: 19 (22 @ 21:13) (18 - 19)  SpO2: 99% (22 @ 21:13) (99% - 100%)  Wt(kg): --  I&O's Summary    2022 07:01  -  2022 07:00  --------------------------------------------------------  IN: 0 mL / OUT: 1700 mL / NET: -1700 mL        PAST MEDICAL & SURGICAL HISTORY:  DM (diabetes mellitus)      No significant past surgical history          SOCIAL HISTORY  Alcohol:  Tobacco:  Illicit substance use:    FAMILY HISTORY:    REVIEW OF SYSTEMS:  CONSTITUTIONAL: No fever, weight loss, or fatigue  EYES: No eye pain, visual disturbances, or discharge  ENMT:  No difficulty hearing, tinnitus, vertigo; No sinus or throat pain  NECK: No pain or stiffness  RESPIRATORY: No cough, wheezing, chills or hemoptysis; No shortness of breath  CARDIOVASCULAR: No chest pain, palpitations, dizziness, or leg swelling  GASTROINTESTINAL: No abdominal or epigastric pain. No nausea, vomiting, or hematemesis; No diarrhea or constipation. No melena or hematochezia.  GENITOURINARY: No dysuria, frequency, hematuria, or incontinence  NEUROLOGICAL: No headaches, memory loss, loss of strength, numbness, or tremors  SKIN: No itching, burning, rashes, or lesions   LYMPH NODES: No enlarged glands  ENDOCRINE: No heat or cold intolerance; No hair loss  MUSCULOSKELETAL: No joint pain or swelling; No muscle, back, or extremity pain  PSYCHIATRIC: No depression, anxiety, mood swings, or difficulty sleeping  HEME/LYMPH: No easy bruising, or bleeding gums  ALLERY AND IMMUNOLOGIC: No hives or eczema    RADIOLOGY & ADDITIONAL TESTS:    Imaging Personally Reviewed:  [ ] YES  [ ] NO    Consultant(s) Notes Reviewed:  [ ] YES  [ ] NO    PHYSICAL EXAM:  GENERAL: NAD, well-groomed, well-developed  HEAD:  Atraumatic, Normocephalic  EYES: EOMI, PERRLA, conjunctiva and sclera clear  ENMT: No tonsillar erythema, exudates, or enlargement; Moist mucous membranes, Good dentition, No lesions  NECK: Supple, No JVD, Normal thyroid  NERVOUS SYSTEM:  Alert & Oriented X3, Good concentration; Motor Strength 5/5 B/L upper and lower extremities; DTRs 2+ intact and symmetric  CHEST/LUNG: Clear to percussion bilaterally; No rales, rhonchi, wheezing, or rubs  HEART: Regular rate and rhythm; No murmurs, rubs, or gallops  ABDOMEN: Soft, Nontender, Nondistended; Bowel sounds present  EXTREMITIES:  2+ Peripheral Pulses, No clubbing, cyanosis, or edema  LYMPH: No lymphadenopathy noted  SKIN: No rashes or lesions    LABS:                        9.2    4.72  )-----------( 373      ( 2022 06:00 )             29.4     06-13    136  |  106  |  23  ----------------------------<  131<H>  3.8   |  21<L>  |  1.24    Ca    8.7      2022 06:00  Phos  2.7     06-12  Mg     1.80     06-12    TPro  5.9<L>  /  Alb  3.1<L>  /  TBili  0.2  /  DBili  x   /  AST  12  /  ALT  19  /  AlkPhos  105  06-13      Urinalysis Basic - ( 2022 23:50 )    Color: Colorless / Appearance: Clear / S.026 / pH: x  Gluc: x / Ketone: Negative  / Bili: Negative / Urobili: <2 mg/dL   Blood: x / Protein: Negative / Nitrite: Negative   Leuk Esterase: Negative / RBC: 0 /HPF / WBC 0 /HPF   Sq Epi: x / Non Sq Epi: 2 /HPF / Bacteria: Negative      CAPILLARY BLOOD GLUCOSE      POCT Blood Glucose.: 313 mg/dL (2022 22:38)  POCT Blood Glucose.: 220 mg/dL (2022 20:58)  POCT Blood Glucose.: 123 mg/dL (2022 16:42)  POCT Blood Glucose.: 103 mg/dL (2022 12:07)  POCT Blood Glucose.: 352 mg/dL (2022 07:35)        Urinalysis Basic - ( 2022 23:50 )    Color: Colorless / Appearance: Clear / S.026 / pH: x  Gluc: x / Ketone: Negative  / Bili: Negative / Urobili: <2 mg/dL   Blood: x / Protein: Negative / Nitrite: Negative   Leuk Esterase: Negative / RBC: 0 /HPF / WBC 0 /HPF   Sq Epi: x / Non Sq Epi: 2 /HPF / Bacteria: Negative        MEDICATIONS  (STANDING):  atorvastatin 40 milliGRAM(s) Oral at bedtime  dextrose 5%. 1000 milliLiter(s) (100 mL/Hr) IV Continuous <Continuous>  dextrose 5%. 1000 milliLiter(s) (50 mL/Hr) IV Continuous <Continuous>  dextrose 50% Injectable 25 Gram(s) IV Push once  dextrose 50% Injectable 12.5 Gram(s) IV Push once  dextrose 50% Injectable 25 Gram(s) IV Push once  diVALproex  milliGRAM(s) Oral two times a day  folic acid 1 milliGRAM(s) Oral daily  glucagon  Injectable 1 milliGRAM(s) IntraMuscular once  insulin glargine Injectable (LANTUS) 12 Unit(s) SubCutaneous at bedtime  insulin lispro (ADMELOG) corrective regimen sliding scale   SubCutaneous three times a day before meals  insulin lispro (ADMELOG) corrective regimen sliding scale   SubCutaneous at bedtime  insulin lispro Injectable (ADMELOG) 4 Unit(s) SubCutaneous three times a day before meals  multivitamin 1 Tablet(s) Oral daily  thiamine 100 milliGRAM(s) Oral daily    MEDICATIONS  (PRN):  acetaminophen     Tablet .. 650 milliGRAM(s) Oral every 6 hours PRN Temp greater or equal to 38C (100.4F), Mild Pain (1 - 3)  dextrose Oral Gel 15 Gram(s) Oral once PRN Blood Glucose LESS THAN 70 milliGRAM(s)/deciliter  LORazepam     Tablet 1 milliGRAM(s) Oral every 1 hour PRN CIWA-Ar score 8 or greater  melatonin 3 milliGRAM(s) Oral at bedtime PRN Insomnia      Care Discussed with Consultants/Other Providers [ ] YES  [ ] NO
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
## 2022-06-13 NOTE — CONSULT NOTE ADULT - PROBLEM SELECTOR RECOMMENDATION 9
Uncontrolled T2DM on home oral agent, unclear compliance given patient is a poor historian. A1C 15.5  -c/w lantus 12u qhs, admelog 4u TIDAC, low dose ISS  -patient is new to insulin. Will need CM/SW consult for living situation and insurance to determine discharge insulin choice and regimen (basal bolus vs mix insulin)  -nutrition consult  -please check c-peptide level (ordered) with AM labs  -please check ICA, GADA and Insulin ab (ordered) to evaluate whether patient has a mix picture of DM Uncontrolled T2DM on home oral agent, unclear compliance given patient is a poor historian. A1C 15.5  -c/w lantus 12 units qhs, admelog 4 units TIDAC, low dose ISS  -patient is new to insulin. Will need CM/SW consult for living situation and insurance to determine discharge insulin choice and regimen (basal bolus vs mix insulin)  -nutrition consult  -please check c-peptide level (ordered) with AM labs  -please check ICA, GADA and Insulin ab (ordered) to evaluate whether patient has a mix picture of DM

## 2022-06-13 NOTE — CONSULT NOTE ADULT - PROBLEM SELECTOR RECOMMENDATION 2
this admission. Per pharmacy on file, patient last filled atorvastatin 40mg qd in aug 2021  -c/w atorvastatin

## 2022-06-13 NOTE — CONSULT NOTE ADULT - PROBLEM SELECTOR RECOMMENDATION 3
Acute Care - Occupational Therapy Discharge Summary  [facility]     Patient Name: Ford High  : 1963  MRN: [id number]    Today's Date: 2020  Onset of Illness/Injury or Date of Surgery: 20    Date of Referral to OT: 20  Referring Physician: DENISHA Bassett      Admit Date: 2020        OT Recommendation and Plan    Visit Dx:    ICD-10-CM ICD-9-CM   1. Pneumonia of right lower lobe due to infectious organism (CMS/[facility]) J18.1 486   2. Decreased activities of daily living (ADL) Z78.9 V49.89   3. Gait abnormality R26.9 781.2               Rehab Goal Summary     Row Name 20 0700             Transfer Goal 1 (OT)    Activity/Assistive Device (Transfer Goal 1, OT)  sit-to-stand/stand-to-sit;bed-to-chair/chair-to-bed;toilet;shower chair;walk-in shower  -TS      [place] Level/Cues Needed (Transfer Goal 1, OT)  conditional independence  -TS      Time Frame (Transfer Goal 1, OT)  long term goal (LTG);by discharge  -TS      Progress/Outcome (Transfer Goal 1, OT)  goal not met  -TS         Bathing Goal 1 (OT)    Activity/Assistive Device (Bathing Goal 1, OT)  bathing skills, all;shower chair  -TS      [place] Level/Cues Needed (Bathing Goal 1, OT)  supervision required  -TS      Time Frame (Bathing Goal 1, OT)  long term goal (LTG);by discharge  -TS      Progress/Outcomes (Bathing Goal 1, OT)  goal not met  -TS         Toileting Goal 1 (OT)    Activity/Device (Toileting Goal 1, OT)  toileting skills, all;commode  -TS      [place] Level/Cues Needed (Toileting Goal 1, OT)  conditional independence  -TS      Time Frame (Toileting Goal 1, OT)  long term goal (LTG);by discharge  -TS      Progress/Outcome (Toileting Goal 1, OT)  goal not met  -TS         Safety Awareness Goal 1 (OT)    Activity (Safety Awareness Goal 1, OT)  awareness of need for assistance;insight into deficits/self awareness;impulse control;judgment;demonstration of safe behaviors  -TS       [place]/Cues/Accuracy (Safety Awareness Goal 1, OT)  with minimum;verbal cues/redirection;with 90% accuracy  -TS      Time Frame (Safety Awareness Goal 1, OT)  long term goal (LTG);by discharge  -TS      Progress/Outcome (Safety Awareness Goal 1, OT)  goal not met  -TS        User Key  (r) = Recorded By, (t) = Taken By, (c) = Cosigned By    Initials Name Provider Type Discipline     Monica Patino COTA/L Occupational Therapy Assistant OT          Outcome Measures     Row Name 02/11/20 1130 02/10/20 1420          How much help from another is currently needed...    Putting on and taking off regular lower body clothing?  3  -CJ  3  -CJ     Bathing (including washing, rinsing, and drying)  3  -CJ  3  -CJ     Toileting (which includes using toilet bed pan or urinal)  3  -CJ  3  -CJ     Putting on and taking off regular upper body clothing  4  -CJ  4  -CJ     Taking care of personal grooming (such as brushing teeth)  4  -CJ  4  -CJ     Eating meals  4  -CJ  4  -CJ     AM-PAC 6 Clicks Score (OT)  21  -CJ  21  -        Functional Assessment    Outcome Measure Options  AM-PAC 6 Clicks Daily Activity (OT)  -CJ  AM-PAC 6 Clicks Daily Activity (OT)  -CJ       User Key  (r) = Recorded By, (t) = Taken By, (c) = Cosigned By    Initials Name Provider Type    CJ Celso Ruff COTA/L Occupational Therapy Assistant          Therapy Suggested Charges     Code   Minutes Charges    None                 OT Discharge Summary  Reason for Discharge: Discharge from facility  Outcomes Achieved: Refer to plan of care for updates on goals achieved  Discharge Destination: Home with assist, Home with home health      JOSE Jackson  2/12/2020    hx of HTN, unclear home regimen for HTN  -Goal less than 130/80  -BP acceptable so far, continue to monitor  -if persistently elevated, would consider initiating an ACE or ARB for renal protection

## 2022-06-13 NOTE — CONSULT NOTE ADULT - SUBJECTIVE AND OBJECTIVE BOX
[facility]        Patient is a 77y old  Male who presents with a chief complaint of AMS/hyperglycemia (2022 11:07)    Excerpt from H&P,"   78 y/o M with PMH of DM2, bifrontal SAH, a R SDH, a L parietal SAH with pneumocephalus, a L possible parietal non-displaced skull fracture, and a C6 inferior endplate fracture into disc space with air into the canal in  who presents with AMS and hyperglycemia. Patient seen and examined. He is AAO to self and place. Does not know date (day, month or year). Says he does not have a place to live currently. Per ED documentation he was kicked out of his home last year after being agitated and aggressive with his family. He states that he takes metformin however unclear compliance. Patient today wants to eat. Denies any chest pain, abdominal pain, SOB, fevers or chills.     Vital Signs Last 24 Hrs  T(C): 36.7 (2022 06:18), Max: 36.9 (2022 03:39)  T(F): 98.1 (2022 06:18), Max: 98.4 (2022 03:39)  HR: 82 (2022 06:18) (74 - 90)  BP: 143/87 (2022 06:18) (108/67 - 147/74)  BP(mean): --  RR: 18 (2022 06:18) (16 - 18)  SpO2: 100% (2022 06:18) (100% - 100%) (2022 10:10)           *****PAST MEDICAL / Surgical  HISTORY:  PAST MEDICAL & SURGICAL HISTORY:  DM (diabetes mellitus)      No significant past surgical history               *****FAMILY HISTORY:  FAMILY HISTORY:           *****SOCIAL HISTORY:  Alcohol: None  Smoking: None         *****ALLERGIES:   Allergies    Allergy Status Unknown  No Known Allergies    Intolerances             *****MEDICATIONS: current medication reviewed and documented.   MEDICATIONS  (STANDING):  atorvastatin 40 milliGRAM(s) Oral at bedtime  dextrose 5%. 1000 milliLiter(s) (100 mL/Hr) IV Continuous <Continuous>  dextrose 5%. 1000 milliLiter(s) (50 mL/Hr) IV Continuous <Continuous>  dextrose 50% Injectable 25 Gram(s) IV Push once  dextrose 50% Injectable 12.5 Gram(s) IV Push once  dextrose 50% Injectable 25 Gram(s) IV Push once  diVALproex  milliGRAM(s) Oral two times a day  folic acid 1 milliGRAM(s) Oral daily  glucagon  Injectable 1 milliGRAM(s) IntraMuscular once  insulin glargine Injectable (LANTUS) 12 Unit(s) SubCutaneous at bedtime  insulin lispro (ADMELOG) corrective regimen sliding scale   SubCutaneous three times a day before meals  insulin lispro (ADMELOG) corrective regimen sliding scale   SubCutaneous at bedtime  insulin lispro Injectable (ADMELOG) 4 Unit(s) SubCutaneous three times a day before meals  multivitamin 1 Tablet(s) Oral daily  thiamine 100 milliGRAM(s) Oral daily    MEDICATIONS  (PRN):  acetaminophen     Tablet .. 650 milliGRAM(s) Oral every 6 hours PRN Temp greater or equal to 38C (100.4F), Mild Pain (1 - 3)  dextrose Oral Gel 15 Gram(s) Oral once PRN Blood Glucose LESS THAN 70 milliGRAM(s)/deciliter  LORazepam     Tablet 1 milliGRAM(s) Oral every 1 hour PRN CIWA-Ar score 8 or greater  melatonin 3 milliGRAM(s) Oral at bedtime PRN Insomnia           *****REVIEW OF SYSTEM:  GEN: no fever, no chills, no pain  RESP: no SOB, no cough, no sputum  CVS: no chest pain, no palpitations, no edema  GI: no abdominal pain, no nausea, no vomiting, no constipation, no diarrhea  : no dysurea, no frequency, no hematurea  Neuro: no headache, no dizziness  PSYCH: no anxiety, no depression  Derm : no itching, no rash         *****VITAL SIGNS:  T(C): 36.9 (22 @ 21:13), Max: 37.3 (22 @ 01:50)  HR: 87 (22 @ 21:13) (71 - 87)  BP: 99/53 (22 @ 21:13) (99/53 - 127/69)  RR: 19 (22 @ 21:13) (18 - 19)  SpO2: 99% (22 @ 21:13) (99% - 100%)  Wt(kg): --     @ 07:01  -   @ 07:00  --------------------------------------------------------  IN: 0 mL / OUT: 1700 mL / NET: -1700 mL             *****PHYSICAL EXAM:   Alert oriented x2  Attention comprehension are fair. Able to name, repeat,  without any difficulty.   Able to follow 1 step commands.     EOMI fundi not visualized,  blinks to threat  able to look up   No facial asymmetry   Tongue is midline      Moving all 4 ext symmetrically no pronator drift   Reflexes are symmetric throughout   sensation is grossly symmetric  no dysmetria     Gait : not assessed.  B/L down going toes               *****LAB AND IMAGIN.2    4.72  )-----------( 373      ( 2022 06:00 )             29.4               06-13    136  |  106  |  23  ----------------------------<  131<H>  3.8   |  21<L>  |  1.24    Ca    8.7      2022 06:00  Phos  2.7     06-12  Mg     1.80     12    TPro  5.9<L>  /  Alb  3.1<L>  /  TBili  0.2  /  DBili  x   /  AST  12  /  ALT  19  /  AlkPhos  105  06-13                            Urinalysis Basic - ( 2022 23:50 )    Color: Colorless / Appearance: Clear / S.026 / pH: x  Gluc: x / Ketone: Negative  / Bili: Negative / Urobili: <2 mg/dL   Blood: x / Protein: Negative / Nitrite: Negative   Leuk Esterase: Negative / RBC: 0 /HPF / WBC 0 /HPF   Sq Epi: x / Non Sq Epi: 2 /HPF / Bacteria: Negative        [All pertinent recent Imaging reports reviewed]         *****A S S E S S M E N T   A N D   P L A N :   Excerpt from H&P,"   78 y/o M with PMH of DM2, bifrontal SAH, a R SDH, a L parietal SAH with pneumocephalus, a L possible parietal non-displaced skull fracture, and a C6 inferior endplate fracture into disc space with air into the canal in  who presents with AMS and hyperglycemia. Patient seen and examined. He is AAO to self and place. Does not know date (day, month or year). Says he does not have a place to live currently. Per ED documentation he was kicked out of his home last year after being agitated and aggressive with his family. He states that he takes metformin however unclear compliance. Patient today wants to eat. Denies any chest pain, abdominal pain, SOB, fevers or chills.      Problem/Recommendations 1:  ams of unclear etiology   likely related to hyperglycemia , hyponatremia on admission  compliance to medication is questioned   pt does not have any overt signs of nph, although ct suggestive, likely related to prior tbi, doubt any acute porcess   denies headache, visual changes, sunsetting not appreciated   pt eval        ___________________________  Will follow with you.  Thank you,  Radha Alexander MD  Diplomate of the American Board of Neurology and Psychiatry.  Diplomate of the American Board of Vascular Neurology.   [facility] ([facility]), [facility]   Ph: [phone]    Differential diagnosis and plan of care discussed with patient after the evaluation.   Advanced care planning options discussed.   Pain assessed and judicious use of narcotics when appropriate was discussed.  Importance of Fall prevention discussed.  Counseling on Smoking and Alcohol cessation was offered when appropriate.  Counseling on Diet, exercise, and medication compliance was done.   83 minutes spent on the total encounter;  more than 50 % of the visit was spent on counseling  and or coordinating care by the attending physician.    Thank you for allowing me to participate in the care of this carlo patient. Please do not hesitate to call me if you have any questions.     This and subsequent notes  will  inherently be subject to errors including those of syntax and substitutions which may escape proofreading. In such instances original meaning may be extrapolated by contextual derivation.

## 2022-06-13 NOTE — CONSULT NOTE ADULT - ASSESSMENT
77 year old DM2, TBI sustained 1 year ago with non-operative Subarachnoid, subdural and intraparenchymal hemorrhage along with left occipital temporal bone fracture in JULY 2021  Patient presented today for AMS brought in by daughter in settting on blood sugars in 700s and A1c 15  CT head showed ventricular enlargement- NPH vs cerebral atrophy  Denies incontinence, no gait instability per patient, per PT notes ambulated 150 feet without assisted device
76 y/o M with PMH of DM2, HTN, bifrontal SAH, a R SDH, a L parietal SAH with pneumocephalus, a L possible parietal non-displaced skull fracture, and a C6 inferior endplate fracture into disc space with air into the canal in 2021 who presents with AMS and hyperglycemia. Endocrinology consulted for uncontrolled DM.

## 2022-06-14 LAB
ALBUMIN SERPL ELPH-MCNC: 3.2 G/DL — LOW (ref 3.3–5)
ALP SERPL-CCNC: 105 U/L — SIGNIFICANT CHANGE UP (ref 40–120)
ALT FLD-CCNC: 18 U/L — SIGNIFICANT CHANGE UP (ref 4–41)
ANION GAP SERPL CALC-SCNC: 10 MMOL/L — SIGNIFICANT CHANGE UP (ref 7–14)
AST SERPL-CCNC: 16 U/L — SIGNIFICANT CHANGE UP (ref 4–40)
BILIRUB SERPL-MCNC: 0.2 MG/DL — SIGNIFICANT CHANGE UP (ref 0.2–1.2)
BUN SERPL-MCNC: 19 MG/DL — SIGNIFICANT CHANGE UP (ref 7–23)
C PEPTIDE SERPL-MCNC: 0.8 NG/ML — LOW (ref 1.1–4.4)
CALCIUM SERPL-MCNC: 8.8 MG/DL — SIGNIFICANT CHANGE UP (ref 8.4–10.5)
CHLORIDE SERPL-SCNC: 103 MMOL/L — SIGNIFICANT CHANGE UP (ref 98–107)
CO2 SERPL-SCNC: 23 MMOL/L — SIGNIFICANT CHANGE UP (ref 22–31)
CREAT SERPL-MCNC: 1.13 MG/DL — SIGNIFICANT CHANGE UP (ref 0.5–1.3)
EGFR: 68 ML/MIN/1.73M2 — SIGNIFICANT CHANGE UP
GLUCOSE BLDC GLUCOMTR-MCNC: 176 MG/DL — HIGH (ref 70–99)
GLUCOSE BLDC GLUCOMTR-MCNC: 196 MG/DL — HIGH (ref 70–99)
GLUCOSE BLDC GLUCOMTR-MCNC: 223 MG/DL — HIGH (ref 70–99)
GLUCOSE BLDC GLUCOMTR-MCNC: 391 MG/DL — HIGH (ref 70–99)
GLUCOSE SERPL-MCNC: 221 MG/DL — HIGH (ref 70–99)
HCT VFR BLD CALC: 29.5 % — LOW (ref 39–50)
HGB BLD-MCNC: 9.6 G/DL — LOW (ref 13–17)
MCHC RBC-ENTMCNC: 30.8 PG — SIGNIFICANT CHANGE UP (ref 27–34)
MCHC RBC-ENTMCNC: 32.5 GM/DL — SIGNIFICANT CHANGE UP (ref 32–36)
MCV RBC AUTO: 94.6 FL — SIGNIFICANT CHANGE UP (ref 80–100)
NRBC # BLD: 0 /100 WBCS — SIGNIFICANT CHANGE UP
NRBC # FLD: 0 K/UL — SIGNIFICANT CHANGE UP
PLATELET # BLD AUTO: 376 K/UL — SIGNIFICANT CHANGE UP (ref 150–400)
POTASSIUM SERPL-MCNC: 4.1 MMOL/L — SIGNIFICANT CHANGE UP (ref 3.5–5.3)
POTASSIUM SERPL-SCNC: 4.1 MMOL/L — SIGNIFICANT CHANGE UP (ref 3.5–5.3)
PROT SERPL-MCNC: 5.9 G/DL — LOW (ref 6–8.3)
RBC # BLD: 3.12 M/UL — LOW (ref 4.2–5.8)
RBC # FLD: 12.6 % — SIGNIFICANT CHANGE UP (ref 10.3–14.5)
SODIUM SERPL-SCNC: 136 MMOL/L — SIGNIFICANT CHANGE UP (ref 135–145)
WBC # BLD: 4.65 K/UL — SIGNIFICANT CHANGE UP (ref 3.8–10.5)
WBC # FLD AUTO: 4.65 K/UL — SIGNIFICANT CHANGE UP (ref 3.8–10.5)

## 2022-06-14 PROCEDURE — 99232 SBSQ HOSP IP/OBS MODERATE 35: CPT | Mod: GC

## 2022-06-14 RX ORDER — INSULIN GLARGINE 100 [IU]/ML
14 INJECTION, SOLUTION SUBCUTANEOUS AT BEDTIME
Refills: 0 | Status: DISCONTINUED | OUTPATIENT
Start: 2022-06-14 | End: 2022-06-15

## 2022-06-14 RX ORDER — INSULIN LISPRO 100/ML
5 VIAL (ML) SUBCUTANEOUS
Refills: 0 | Status: DISCONTINUED | OUTPATIENT
Start: 2022-06-14 | End: 2022-06-15

## 2022-06-14 RX ADMIN — Medication 4 UNIT(S): at 17:16

## 2022-06-14 RX ADMIN — Medication 1 TABLET(S): at 12:23

## 2022-06-14 RX ADMIN — Medication 4 UNIT(S): at 12:22

## 2022-06-14 RX ADMIN — Medication 1 MILLIGRAM(S): at 12:24

## 2022-06-14 RX ADMIN — Medication 3: at 21:54

## 2022-06-14 RX ADMIN — Medication 1: at 17:16

## 2022-06-14 RX ADMIN — ATORVASTATIN CALCIUM 40 MILLIGRAM(S): 80 TABLET, FILM COATED ORAL at 21:53

## 2022-06-14 RX ADMIN — Medication 2: at 08:21

## 2022-06-14 RX ADMIN — INSULIN GLARGINE 14 UNIT(S): 100 INJECTION, SOLUTION SUBCUTANEOUS at 21:53

## 2022-06-14 RX ADMIN — Medication 100 MILLIGRAM(S): at 12:23

## 2022-06-14 RX ADMIN — DIVALPROEX SODIUM 500 MILLIGRAM(S): 500 TABLET, DELAYED RELEASE ORAL at 07:43

## 2022-06-14 RX ADMIN — DIVALPROEX SODIUM 500 MILLIGRAM(S): 500 TABLET, DELAYED RELEASE ORAL at 17:17

## 2022-06-14 RX ADMIN — Medication 1: at 12:22

## 2022-06-14 RX ADMIN — Medication 4 UNIT(S): at 08:21

## 2022-06-14 NOTE — PROGRESS NOTE ADULT - PROBLEM SELECTOR PLAN 2
this admission. Per pharmacy on file, patient last filled atorvastatin 40mg qd in aug 2021  -c/w atorvastatin.

## 2022-06-14 NOTE — PROGRESS NOTE ADULT - PROBLEM SELECTOR PLAN 1
Uncontrolled T2DM on home oral agent, unclear compliance given patient is a poor historian. A1C 15.5  -c/w lantus 12 units qhs, admelog 4 units TIDAC, low dose ISS  -patient is new to insulin. Will need CM/SW consult for living situation and insurance to determine discharge insulin choice and regimen (basal bolus vs mix insulin)  -nutrition consult  -c-peptide level 0.8  -f/u ICA, GADA   -please collect zinc transporter with AM lab Uncontrolled T2DM on home oral agent, unclear compliance given patient is a poor historian. A1C 15.5  -increase lantus from 12 units to 14 units at bedtime, admelog 4 units to 5 units three times a day with meals  -continue low dose insulin correctional scale  -patient is new to insulin and will be discharged on insulin so he will need to have access to a fridge after discharge. Will need CM/SW help with living situation and insurance to determine discharge insulin choice and regimen. (If no insurance, patient likely be discharged on novolin 70/30)  -c-peptide level 0.8  -f/u ICA, GADA   -please collect zinc transporter with AM lab

## 2022-06-14 NOTE — PROGRESS NOTE ADULT - SUBJECTIVE AND OBJECTIVE BOX
Overnight event: patient had COVID-19 exposure and is now placed on isolation    Subjective: reports feeling well. Ambulated overnight. Eating well.     MEDICATIONS  (STANDING):  atorvastatin 40 milliGRAM(s) Oral at bedtime  dextrose 5%. 1000 milliLiter(s) (100 mL/Hr) IV Continuous <Continuous>  dextrose 5%. 1000 milliLiter(s) (50 mL/Hr) IV Continuous <Continuous>  dextrose 50% Injectable 25 Gram(s) IV Push once  dextrose 50% Injectable 12.5 Gram(s) IV Push once  dextrose 50% Injectable 25 Gram(s) IV Push once  diVALproex  milliGRAM(s) Oral two times a day  folic acid 1 milliGRAM(s) Oral daily  glucagon  Injectable 1 milliGRAM(s) IntraMuscular once  insulin glargine Injectable (LANTUS) 12 Unit(s) SubCutaneous at bedtime  insulin lispro (ADMELOG) corrective regimen sliding scale   SubCutaneous three times a day before meals  insulin lispro (ADMELOG) corrective regimen sliding scale   SubCutaneous at bedtime  insulin lispro Injectable (ADMELOG) 4 Unit(s) SubCutaneous three times a day before meals  multivitamin 1 Tablet(s) Oral daily  thiamine 100 milliGRAM(s) Oral daily    MEDICATIONS  (PRN):  acetaminophen     Tablet .. 650 milliGRAM(s) Oral every 6 hours PRN Temp greater or equal to 38C (100.4F), Mild Pain (1 - 3)  dextrose Oral Gel 15 Gram(s) Oral once PRN Blood Glucose LESS THAN 70 milliGRAM(s)/deciliter  LORazepam     Tablet 1 milliGRAM(s) Oral every 1 hour PRN CIWA-Ar score 8 or greater  melatonin 3 milliGRAM(s) Oral at bedtime PRN Insomnia      Allergies    Allergy Status Unknown  No Known Allergies    Intolerances    PHYSICAL EXAM:  VITALS: T(C): 36.8 (06-14-22 @ 10:00)  T(F): 98.3 (06-14-22 @ 10:00), Max: 98.7 (06-13-22 @ 17:08)  HR: 89 (06-14-22 @ 10:00) (71 - 89)  BP: 103/68 (06-14-22 @ 10:00) (99/53 - 129/86)  RR:  (18 - 19)  SpO2:  (99% - 100%)  Wt(kg): --  GENERAL: NAD, well-groomed, well-developed  EYES: No proptosis, no lid lag, anicteric  HEENT:  Atraumatic, Normocephalic  THYROID: Normal size, no palpable nodules  RESPIRATORY: Clear to auscultation bilaterally; No rales, rhonchi, wheezing  CARDIOVASCULAR: Regular rate and rhythm; No murmurs; no peripheral edema  GI: Soft, nontender, non distended  SKIN: Dry, intact, No rashes or lesions  MUSCULOSKELETAL: Full range of motion, normal strength  NEURO: extraocular movements intact, no tremor  PSYCH: Alert and oriented x 3, normal affect, normal mood    CAPILLARY BLOOD GLUCOSE      POCT Blood Glucose.: 176 mg/dL (14 Jun 2022 12:00)  POCT Blood Glucose.: 223 mg/dL (14 Jun 2022 07:25)  POCT Blood Glucose.: 313 mg/dL (13 Jun 2022 22:38)  POCT Blood Glucose.: 220 mg/dL (13 Jun 2022 20:58)  POCT Blood Glucose.: 123 mg/dL (13 Jun 2022 16:42)      06-14    136  |  103  |  19  ----------------------------<  221<H>  4.1   |  23  |  1.13    eGFR: 68    Ca    8.8      06-14  Mg     1.80     06-12  Phos  2.7     06-12    TPro  5.9<L>  /  Alb  3.2<L>  /  TBili  0.2  /  DBili  x   /  AST  16  /  ALT  18  /  AlkPhos  105  06-14      A1C with Estimated Average Glucose Result: 15.5 % (06-12-22 @ 11:00)  A1C with Estimated Average Glucose Result: 16.1 % (06-12-22 @ 11:00)  A1C with Estimated Average Glucose Result: 7.3 % (07-10-21 @ 10:16)      Thyroid Function Tests:  06-11 @ 23:50 TSH 1.16 FreeT4 -- T3 -- Anti TPO -- Anti Thyroglobulin Ab -- TSI --

## 2022-06-14 NOTE — PHARMACOTHERAPY INTERVENTION NOTE - COMMENTS
Pt instructed on 70/30 mixed insulin, mixed peaks, BID administration, and risks of hypoglycemia - needed to reiterate directions multiple times, pt did not understand well. Pt instructed on need to mix insulin vial (gently roll 10x) - showed patient first and then had him practice - he needed a lot of direction (kept getting distracted and asking unrelated questions). Pt educated on A1c, hypoglycemia and treatment, healthy plate, basics of using glucometer, and when to check BG; pt did not understand well, needed repetition multiple times, will need continued reinforcement. Counseled pt on where to inject insulin (abdomen, outer thighs - he kept pointing to inner thighs, had to keep correcting), rotating injection site, proper insulin storage, and sharps disposal.  Pt encouraged for outpt f/u with medicine and endocrine. At this time, would not be safe for patient to administer insulin and manage diabetes on his own - needs continued reinforcement (would benefit from ?family involvement - tried to ask SW, left for day, will follow-up tomorrow).

## 2022-06-14 NOTE — DIETITIAN INITIAL EVALUATION ADULT - NUTRITION DIAGNOSITC TERMINOLOGY #1
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
Altered Nutrition Related Lab Values

## 2022-06-14 NOTE — DIETITIAN INITIAL EVALUATION ADULT - PERTINENT MEDS FT
MEDICATIONS  (STANDING):  atorvastatin 40 milliGRAM(s) Oral at bedtime  dextrose 5%. 1000 milliLiter(s) (100 mL/Hr) IV Continuous <Continuous>  dextrose 5%. 1000 milliLiter(s) (50 mL/Hr) IV Continuous <Continuous>  dextrose 50% Injectable 25 Gram(s) IV Push once  dextrose 50% Injectable 12.5 Gram(s) IV Push once  dextrose 50% Injectable 25 Gram(s) IV Push once  diVALproex  milliGRAM(s) Oral two times a day  folic acid 1 milliGRAM(s) Oral daily  glucagon  Injectable 1 milliGRAM(s) IntraMuscular once  insulin glargine Injectable (LANTUS) 12 Unit(s) SubCutaneous at bedtime  insulin lispro (ADMELOG) corrective regimen sliding scale   SubCutaneous three times a day before meals  insulin lispro (ADMELOG) corrective regimen sliding scale   SubCutaneous at bedtime  insulin lispro Injectable (ADMELOG) 4 Unit(s) SubCutaneous three times a day before meals  multivitamin 1 Tablet(s) Oral daily  thiamine 100 milliGRAM(s) Oral daily    MEDICATIONS  (PRN):  acetaminophen     Tablet .. 650 milliGRAM(s) Oral every 6 hours PRN Temp greater or equal to 38C (100.4F), Mild Pain (1 - 3)  dextrose Oral Gel 15 Gram(s) Oral once PRN Blood Glucose LESS THAN 70 milliGRAM(s)/deciliter  LORazepam     Tablet 1 milliGRAM(s) Oral every 1 hour PRN CIWA-Ar score 8 or greater  melatonin 3 milliGRAM(s) Oral at bedtime PRN Insomnia

## 2022-06-14 NOTE — DIETITIAN INITIAL EVALUATION ADULT - ADD RECOMMEND
Honor food and beverage preferences within diet restriction of patient in an effort to maximize level of nutrient intake   Monitor PO intake, tolerance to nutrition supplement, weight trends, nutrition related lab values, BMs/GI distress, hydration status, skin integrity.

## 2022-06-14 NOTE — DIETITIAN INITIAL EVALUATION ADULT - PERTINENT LABORATORY DATA
06-14    136  |  103  |  19  ----------------------------<  221<H>  4.1   |  23  |  1.13    Ca    8.8      14 Jun 2022 07:35    TPro  5.9<L>  /  Alb  3.2<L>  /  TBili  0.2  /  DBili  x   /  AST  16  /  ALT  18  /  AlkPhos  105  06-14  POCT Blood Glucose.: 176 mg/dL (06-14-22 @ 12:00)  A1C with Estimated Average Glucose Result: 15.5 % (06-12-22 @ 11:00)  A1C with Estimated Average Glucose Result: 16.1 % (06-12-22 @ 11:00)  A1C with Estimated Average Glucose Result: 7.3 % (07-10-21 @ 10:16)

## 2022-06-14 NOTE — DIETITIAN INITIAL EVALUATION ADULT - OTHER INFO
78 y/o M with PMH of DM2, bifrontal SAH, a R SDH, a L parietal SAH with pneumocephalus, a L possible parietal non-displaced skull fracture, and a C6 inferior endplate fracture into disc space with air into the canal in 2021 who presents with AMS and hyperglycemia. Patient seen and examined. He is AAO to self and place. Does not know date (day, month or year). Says he does not have a place to live currently. Per ED documentation he was kicked out of his home last year after being agitated and aggressive with his family. He states that he takes metformin however unclear compliance.    Pt seen and reports 100% intake of meals with No GI distress (nausea/vomiting/diarrhea/constipation.) at present.  Current wt- 130#(6/12/22), Previous wt- 119# (7/30/21)per Ebony FLORES.   Labs reviewed.  A1c- 15.5% (H).  Skin intact, No edema per RN flow sheet.   76 y/o M with PMH of DM2, bifrontal SAH, a R SDH, a L parietal SAH with pneumocephalus, a L possible parietal non-displaced skull fracture, and a C6 inferior endplate fracture into disc space with air into the canal in 2021 who presents with AMS and hyperglycemia. Patient seen and examined. He is AAO to self and place. Does not know date (day, month or year). Says he does not have a place to live currently. Per ED documentation he was kicked out of his home last year after being agitated and aggressive with his family. He states that he takes metformin however unclear compliance.    Pt seen and reports 100% intake of meals with No GI distress (nausea/vomiting/diarrhea/constipation.) at present.  Current wt- 130#(6/12/22), Previous wt- 119# (7/30/21)per Ebony FLORES.  Noted wt gain 11#/ 9.7% in past one year.  Labs reviewed.  A1c- 15.5% (H).  Skin intact, No edema per RN flow sheet.

## 2022-06-14 NOTE — PROGRESS NOTE ADULT - PROBLEM SELECTOR PLAN 3
hx of HTN, unclear home regimen for HTN  -Goal less than 130/80  -BP acceptable so far, continue to monitor  -if persistently elevated, would consider initiating an ACE or ARB for renal protection.

## 2022-06-14 NOTE — DIETITIAN INITIAL EVALUATION ADULT - ORAL INTAKE PTA/DIET HISTORY
Pt reports good appetite but  as per chart review- pt is homeless and lives in random shelters.  Not always able to comply to CHO consistent diet but avoids eating sweets.  Also reports to not  drinking alcohol.

## 2022-06-14 NOTE — PROGRESS NOTE ADULT - SUBJECTIVE AND OBJECTIVE BOX
Patient is a 77y old  Male who presents with a chief complaint of Type 2 diabetes mellitus with hyperglycemia     (14 Jun 2022 15:53)      INTERVAL HPI/OVERNIGHT EVENTS:  T(C): 37.1 (06-14-22 @ 20:00), Max: 37.1 (06-14-22 @ 20:00)  HR: 91 (06-14-22 @ 20:00) (77 - 91)  BP: 101/67 (06-14-22 @ 20:00) (101/67 - 129/86)  RR: 19 (06-14-22 @ 20:00) (18 - 19)  SpO2: 99% (06-14-22 @ 20:00) (99% - 100%)  Wt(kg): --  I&O's Summary    13 Jun 2022 07:01  -  14 Jun 2022 07:00  --------------------------------------------------------  IN: 480 mL / OUT: 1250 mL / NET: -770 mL    14 Jun 2022 07:01  -  14 Jun 2022 23:50  --------------------------------------------------------  IN: 0 mL / OUT: 500 mL / NET: -500 mL        PAST MEDICAL & SURGICAL HISTORY:  DM (diabetes mellitus)      No significant past surgical history          SOCIAL HISTORY  Alcohol:  Tobacco:  Illicit substance use:    FAMILY HISTORY:    REVIEW OF SYSTEMS:  CONSTITUTIONAL: No fever, weight loss, or fatigue  EYES: No eye pain, visual disturbances, or discharge  ENMT:  No difficulty hearing, tinnitus, vertigo; No sinus or throat pain  NECK: No pain or stiffness  RESPIRATORY: No cough, wheezing, chills or hemoptysis; No shortness of breath  CARDIOVASCULAR: No chest pain, palpitations, dizziness, or leg swelling  GASTROINTESTINAL: No abdominal or epigastric pain. No nausea, vomiting, or hematemesis; No diarrhea or constipation. No melena or hematochezia.  GENITOURINARY: No dysuria, frequency, hematuria, or incontinence  NEUROLOGICAL: No headaches, memory loss, loss of strength, numbness, or tremors  SKIN: No itching, burning, rashes, or lesions   LYMPH NODES: No enlarged glands  ENDOCRINE: No heat or cold intolerance; No hair loss  MUSCULOSKELETAL: No joint pain or swelling; No muscle, back, or extremity pain  PSYCHIATRIC: No depression, anxiety, mood swings, or difficulty sleeping  HEME/LYMPH: No easy bruising, or bleeding gums  ALLERY AND IMMUNOLOGIC: No hives or eczema    RADIOLOGY & ADDITIONAL TESTS:    Imaging Personally Reviewed:  [ ] YES  [ ] NO    Consultant(s) Notes Reviewed:  [ ] YES  [ ] NO    PHYSICAL EXAM:  GENERAL: NAD, well-groomed, well-developed  HEAD:  Atraumatic, Normocephalic  EYES: EOMI, PERRLA, conjunctiva and sclera clear  ENMT: No tonsillar erythema, exudates, or enlargement; Moist mucous membranes, Good dentition, No lesions  NECK: Supple, No JVD, Normal thyroid  NERVOUS SYSTEM:  Alert & Oriented X3, Good concentration; Motor Strength 5/5 B/L upper and lower extremities; DTRs 2+ intact and symmetric  CHEST/LUNG: Clear to percussion bilaterally; No rales, rhonchi, wheezing, or rubs  HEART: Regular rate and rhythm; No murmurs, rubs, or gallops  ABDOMEN: Soft, Nontender, Nondistended; Bowel sounds present  EXTREMITIES:  2+ Peripheral Pulses, No clubbing, cyanosis, or edema  LYMPH: No lymphadenopathy noted  SKIN: No rashes or lesions    LABS:                        9.6    4.65  )-----------( 376      ( 14 Jun 2022 07:35 )             29.5     06-14    136  |  103  |  19  ----------------------------<  221<H>  4.1   |  23  |  1.13    Ca    8.8      14 Jun 2022 07:35    TPro  5.9<L>  /  Alb  3.2<L>  /  TBili  0.2  /  DBili  x   /  AST  16  /  ALT  18  /  AlkPhos  105  06-14        CAPILLARY BLOOD GLUCOSE      POCT Blood Glucose.: 391 mg/dL (14 Jun 2022 21:45)  POCT Blood Glucose.: 196 mg/dL (14 Jun 2022 16:44)  POCT Blood Glucose.: 176 mg/dL (14 Jun 2022 12:00)  POCT Blood Glucose.: 223 mg/dL (14 Jun 2022 07:25)            MEDICATIONS  (STANDING):  atorvastatin 40 milliGRAM(s) Oral at bedtime  dextrose 5%. 1000 milliLiter(s) (100 mL/Hr) IV Continuous <Continuous>  dextrose 5%. 1000 milliLiter(s) (50 mL/Hr) IV Continuous <Continuous>  dextrose 50% Injectable 25 Gram(s) IV Push once  dextrose 50% Injectable 12.5 Gram(s) IV Push once  dextrose 50% Injectable 25 Gram(s) IV Push once  diVALproex  milliGRAM(s) Oral two times a day  folic acid 1 milliGRAM(s) Oral daily  glucagon  Injectable 1 milliGRAM(s) IntraMuscular once  insulin glargine Injectable (LANTUS) 14 Unit(s) SubCutaneous at bedtime  insulin lispro (ADMELOG) corrective regimen sliding scale   SubCutaneous three times a day before meals  insulin lispro (ADMELOG) corrective regimen sliding scale   SubCutaneous at bedtime  insulin lispro Injectable (ADMELOG) 5 Unit(s) SubCutaneous three times a day before meals  multivitamin 1 Tablet(s) Oral daily  thiamine 100 milliGRAM(s) Oral daily    MEDICATIONS  (PRN):  acetaminophen     Tablet .. 650 milliGRAM(s) Oral every 6 hours PRN Temp greater or equal to 38C (100.4F), Mild Pain (1 - 3)  dextrose Oral Gel 15 Gram(s) Oral once PRN Blood Glucose LESS THAN 70 milliGRAM(s)/deciliter  LORazepam     Tablet 1 milliGRAM(s) Oral every 1 hour PRN CIWA-Ar score 8 or greater  melatonin 3 milliGRAM(s) Oral at bedtime PRN Insomnia      Care Discussed with Consultants/Other Providers [ ] YES  [ ] NO Patient is a 77y old  Male who presents with a chief complaint of Type 2 diabetes mellitus with hyperglycemia     (14 Jun 2022 15:53)      INTERVAL HPI/OVERNIGHT EVENTS: seen and examined   T(C): 37.1 (06-14-22 @ 20:00), Max: 37.1 (06-14-22 @ 20:00)  HR: 91 (06-14-22 @ 20:00) (77 - 91)  BP: 101/67 (06-14-22 @ 20:00) (101/67 - 129/86)  RR: 19 (06-14-22 @ 20:00) (18 - 19)  SpO2: 99% (06-14-22 @ 20:00) (99% - 100%)  Wt(kg): --  I&O's Summary    13 Jun 2022 07:01  -  14 Jun 2022 07:00  --------------------------------------------------------  IN: 480 mL / OUT: 1250 mL / NET: -770 mL    14 Jun 2022 07:01  -  14 Jun 2022 23:50  --------------------------------------------------------  IN: 0 mL / OUT: 500 mL / NET: -500 mL        PAST MEDICAL & SURGICAL HISTORY:  DM (diabetes mellitus)      No significant past surgical history          SOCIAL HISTORY  Alcohol:  Tobacco:  Illicit substance use:    FAMILY HISTORY:    REVIEW OF SYSTEMS:  CONSTITUTIONAL: No fever, weight loss, or fatigue  EYES: No eye pain, visual disturbances, or discharge  ENMT:  No difficulty hearing, tinnitus, vertigo; No sinus or throat pain  NECK: No pain or stiffness  RESPIRATORY: No cough, wheezing, chills or hemoptysis; No shortness of breath  CARDIOVASCULAR: No chest pain, palpitations, dizziness, or leg swelling  GASTROINTESTINAL: No abdominal or epigastric pain. No nausea, vomiting, or hematemesis; No diarrhea or constipation. No melena or hematochezia.  GENITOURINARY: No dysuria, frequency, hematuria, or incontinence  NEUROLOGICAL: No headaches, memory loss, loss of strength, numbness, or tremors  SKIN: No itching, burning, rashes, or lesions   LYMPH NODES: No enlarged glands  ENDOCRINE: No heat or cold intolerance; No hair loss  MUSCULOSKELETAL: No joint pain or swelling; No muscle, back, or extremity pain  PSYCHIATRIC: No depression, anxiety, mood swings, or difficulty sleeping  HEME/LYMPH: No easy bruising, or bleeding gums  ALLERY AND IMMUNOLOGIC: No hives or eczema    RADIOLOGY & ADDITIONAL TESTS:    Imaging Personally Reviewed:  [ ] YES  [ ] NO    Consultant(s) Notes Reviewed:  [ ] YES  [ ] NO    PHYSICAL EXAM:  GENERAL: NAD, well-groomed, well-developed  HEAD:  Atraumatic, Normocephalic  EYES: EOMI, PERRLA, conjunctiva and sclera clear  ENMT: No tonsillar erythema, exudates, or enlargement; Moist mucous membranes, Good dentition, No lesions  NECK: Supple, No JVD, Normal thyroid  NERVOUS SYSTEM:  Alert & Oriented X3, Good concentration; Motor Strength 5/5 B/L upper and lower extremities; DTRs 2+ intact and symmetric  CHEST/LUNG: Clear to percussion bilaterally; No rales, rhonchi, wheezing, or rubs  HEART: Regular rate and rhythm; No murmurs, rubs, or gallops  ABDOMEN: Soft, Nontender, Nondistended; Bowel sounds present  EXTREMITIES:  2+ Peripheral Pulses, No clubbing, cyanosis, or edema  LYMPH: No lymphadenopathy noted  SKIN: No rashes or lesions    LABS:                        9.6    4.65  )-----------( 376      ( 14 Jun 2022 07:35 )             29.5     06-14    136  |  103  |  19  ----------------------------<  221<H>  4.1   |  23  |  1.13    Ca    8.8      14 Jun 2022 07:35    TPro  5.9<L>  /  Alb  3.2<L>  /  TBili  0.2  /  DBili  x   /  AST  16  /  ALT  18  /  AlkPhos  105  06-14        CAPILLARY BLOOD GLUCOSE      POCT Blood Glucose.: 391 mg/dL (14 Jun 2022 21:45)  POCT Blood Glucose.: 196 mg/dL (14 Jun 2022 16:44)  POCT Blood Glucose.: 176 mg/dL (14 Jun 2022 12:00)  POCT Blood Glucose.: 223 mg/dL (14 Jun 2022 07:25)            MEDICATIONS  (STANDING):  atorvastatin 40 milliGRAM(s) Oral at bedtime  dextrose 5%. 1000 milliLiter(s) (100 mL/Hr) IV Continuous <Continuous>  dextrose 5%. 1000 milliLiter(s) (50 mL/Hr) IV Continuous <Continuous>  dextrose 50% Injectable 25 Gram(s) IV Push once  dextrose 50% Injectable 12.5 Gram(s) IV Push once  dextrose 50% Injectable 25 Gram(s) IV Push once  diVALproex  milliGRAM(s) Oral two times a day  folic acid 1 milliGRAM(s) Oral daily  glucagon  Injectable 1 milliGRAM(s) IntraMuscular once  insulin glargine Injectable (LANTUS) 14 Unit(s) SubCutaneous at bedtime  insulin lispro (ADMELOG) corrective regimen sliding scale   SubCutaneous three times a day before meals  insulin lispro (ADMELOG) corrective regimen sliding scale   SubCutaneous at bedtime  insulin lispro Injectable (ADMELOG) 5 Unit(s) SubCutaneous three times a day before meals  multivitamin 1 Tablet(s) Oral daily  thiamine 100 milliGRAM(s) Oral daily    MEDICATIONS  (PRN):  acetaminophen     Tablet .. 650 milliGRAM(s) Oral every 6 hours PRN Temp greater or equal to 38C (100.4F), Mild Pain (1 - 3)  dextrose Oral Gel 15 Gram(s) Oral once PRN Blood Glucose LESS THAN 70 milliGRAM(s)/deciliter  LORazepam     Tablet 1 milliGRAM(s) Oral every 1 hour PRN CIWA-Ar score 8 or greater  melatonin 3 milliGRAM(s) Oral at bedtime PRN Insomnia      Care Discussed with Consultants/Other Providers [ ] YES  [ ] NO

## 2022-06-14 NOTE — PROVIDER CONTACT NOTE (OTHER) - SITUATION
Patient Bhupendra txf to 8[facility] today from 3[facility]. Patient has orders for CIWA monitoring r/t Hx of ETOH abuse. Patient is a poor historian. [facility] is not CIWA capable.

## 2022-06-15 DIAGNOSIS — S06.9X9A UNSPECIFIED INTRACRANIAL INJURY WITH LOSS OF CONSCIOUSNESS OF UNSPECIFIED DURATION, INITIAL ENCOUNTER: ICD-10-CM

## 2022-06-15 LAB
GLUCOSE BLDC GLUCOMTR-MCNC: 123 MG/DL — HIGH (ref 70–99)
GLUCOSE BLDC GLUCOMTR-MCNC: 180 MG/DL — HIGH (ref 70–99)
GLUCOSE BLDC GLUCOMTR-MCNC: 219 MG/DL — HIGH (ref 70–99)
GLUCOSE BLDC GLUCOMTR-MCNC: 223 MG/DL — HIGH (ref 70–99)
HCT VFR BLD CALC: 35.5 % — LOW (ref 39–50)
HGB BLD-MCNC: 11.2 G/DL — LOW (ref 13–17)
MCHC RBC-ENTMCNC: 31.3 PG — SIGNIFICANT CHANGE UP (ref 27–34)
MCHC RBC-ENTMCNC: 31.5 GM/DL — LOW (ref 32–36)
MCV RBC AUTO: 99.2 FL — SIGNIFICANT CHANGE UP (ref 80–100)
NRBC # BLD: 0 /100 WBCS — SIGNIFICANT CHANGE UP
NRBC # FLD: 0 K/UL — SIGNIFICANT CHANGE UP
PLATELET # BLD AUTO: 403 K/UL — HIGH (ref 150–400)
RBC # BLD: 3.58 M/UL — LOW (ref 4.2–5.8)
RBC # FLD: 12.4 % — SIGNIFICANT CHANGE UP (ref 10.3–14.5)
WBC # BLD: 6.52 K/UL — SIGNIFICANT CHANGE UP (ref 3.8–10.5)
WBC # FLD AUTO: 6.52 K/UL — SIGNIFICANT CHANGE UP (ref 3.8–10.5)

## 2022-06-15 PROCEDURE — 99232 SBSQ HOSP IP/OBS MODERATE 35: CPT | Mod: GC

## 2022-06-15 PROCEDURE — 93010 ELECTROCARDIOGRAM REPORT: CPT

## 2022-06-15 RX ORDER — INSULIN GLARGINE 100 [IU]/ML
16 INJECTION, SOLUTION SUBCUTANEOUS AT BEDTIME
Refills: 0 | Status: DISCONTINUED | OUTPATIENT
Start: 2022-06-15 | End: 2022-06-16

## 2022-06-15 RX ORDER — INSULIN LISPRO 100/ML
6 VIAL (ML) SUBCUTANEOUS
Refills: 0 | Status: DISCONTINUED | OUTPATIENT
Start: 2022-06-15 | End: 2022-06-16

## 2022-06-15 RX ORDER — INSULIN LISPRO 100/ML
5 VIAL (ML) SUBCUTANEOUS
Qty: 450 | Refills: 0
Start: 2022-06-15 | End: 2022-07-14

## 2022-06-15 RX ORDER — ENOXAPARIN SODIUM 100 MG/ML
14 INJECTION SUBCUTANEOUS
Qty: 420 | Refills: 0
Start: 2022-06-15 | End: 2022-07-14

## 2022-06-15 RX ADMIN — Medication 100 MILLIGRAM(S): at 13:31

## 2022-06-15 RX ADMIN — Medication 1: at 09:25

## 2022-06-15 RX ADMIN — Medication 1 MILLIGRAM(S): at 13:31

## 2022-06-15 RX ADMIN — Medication 1 TABLET(S): at 13:32

## 2022-06-15 RX ADMIN — Medication 2: at 13:21

## 2022-06-15 RX ADMIN — Medication 2: at 18:09

## 2022-06-15 RX ADMIN — INSULIN GLARGINE 16 UNIT(S): 100 INJECTION, SOLUTION SUBCUTANEOUS at 22:42

## 2022-06-15 RX ADMIN — Medication 5 UNIT(S): at 09:24

## 2022-06-15 RX ADMIN — DIVALPROEX SODIUM 500 MILLIGRAM(S): 500 TABLET, DELAYED RELEASE ORAL at 07:29

## 2022-06-15 RX ADMIN — Medication 6 UNIT(S): at 18:10

## 2022-06-15 RX ADMIN — DIVALPROEX SODIUM 500 MILLIGRAM(S): 500 TABLET, DELAYED RELEASE ORAL at 18:09

## 2022-06-15 RX ADMIN — Medication 5 UNIT(S): at 13:20

## 2022-06-15 RX ADMIN — ATORVASTATIN CALCIUM 40 MILLIGRAM(S): 80 TABLET, FILM COATED ORAL at 22:42

## 2022-06-15 NOTE — PROGRESS NOTE ADULT - SUBJECTIVE AND OBJECTIVE BOX
Interval event: patient moved to 9S    Subjective: reports feeling well and eating well. Doesn't remember how to inject insulin.     MEDICATIONS  (STANDING):  atorvastatin 40 milliGRAM(s) Oral at bedtime  dextrose 5%. 1000 milliLiter(s) (100 mL/Hr) IV Continuous <Continuous>  dextrose 5%. 1000 milliLiter(s) (50 mL/Hr) IV Continuous <Continuous>  dextrose 50% Injectable 25 Gram(s) IV Push once  dextrose 50% Injectable 12.5 Gram(s) IV Push once  dextrose 50% Injectable 25 Gram(s) IV Push once  diVALproex  milliGRAM(s) Oral two times a day  folic acid 1 milliGRAM(s) Oral daily  glucagon  Injectable 1 milliGRAM(s) IntraMuscular once  insulin glargine Injectable (LANTUS) 14 Unit(s) SubCutaneous at bedtime  insulin lispro (ADMELOG) corrective regimen sliding scale   SubCutaneous three times a day before meals  insulin lispro (ADMELOG) corrective regimen sliding scale   SubCutaneous at bedtime  insulin lispro Injectable (ADMELOG) 5 Unit(s) SubCutaneous three times a day before meals  multivitamin 1 Tablet(s) Oral daily  thiamine 100 milliGRAM(s) Oral daily    MEDICATIONS  (PRN):  acetaminophen     Tablet .. 650 milliGRAM(s) Oral every 6 hours PRN Temp greater or equal to 38C (100.4F), Mild Pain (1 - 3)  dextrose Oral Gel 15 Gram(s) Oral once PRN Blood Glucose LESS THAN 70 milliGRAM(s)/deciliter  LORazepam     Tablet 1 milliGRAM(s) Oral every 1 hour PRN CIWA-Ar score 8 or greater  melatonin 3 milliGRAM(s) Oral at bedtime PRN Insomnia      Allergies    Allergy Status Unknown  No Known Allergies    Intolerances      PHYSICAL EXAM:  VITALS: T(C): 36.8 (06-15-22 @ 08:00)  T(F): 98.2 (06-15-22 @ 08:00), Max: 98.7 (06-14-22 @ 20:00)  HR: 81 (06-15-22 @ 08:00) (81 - 91)  BP: 115/70 (06-15-22 @ 08:00) (101/67 - 115/70)  RR:  (17 - 19)  SpO2:  (99% - 100%)  Wt(kg): --  GENERAL: NAD, well-groomed, well-developed  EYES: No proptosis, no lid lag, anicteric  HEENT:  Atraumatic, Normocephalic  THYROID: Normal size, no palpable nodules  RESPIRATORY: Clear to auscultation bilaterally; No rales, rhonchi, wheezing  CARDIOVASCULAR: Regular rate and rhythm; No murmurs; no peripheral edema  GI: Soft, nontender, non distended  SKIN: Dry, intact, No rashes or lesions  MUSCULOSKELETAL: Full range of motion, normal strength  NEURO: extraocular movements intact, no tremor  PSYCH: Alert and oriented x 3, normal affect, normal mood    CAPILLARY BLOOD GLUCOSE      POCT Blood Glucose.: 219 mg/dL (15 Ole 2022 12:28)  POCT Blood Glucose.: 180 mg/dL (15 Ole 2022 09:06)  POCT Blood Glucose.: 391 mg/dL (14 Jun 2022 21:45)  POCT Blood Glucose.: 196 mg/dL (14 Jun 2022 16:44)      06-14    136  |  103  |  19  ----------------------------<  221<H>  4.1   |  23  |  1.13    eGFR: 68    Ca    8.8      06-14    TPro  5.9<L>  /  Alb  3.2<L>  /  TBili  0.2  /  DBili  x   /  AST  16  /  ALT  18  /  AlkPhos  105  06-14      A1C with Estimated Average Glucose Result: 15.5 % (06-12-22 @ 11:00)  A1C with Estimated Average Glucose Result: 16.1 % (06-12-22 @ 11:00)  A1C with Estimated Average Glucose Result: 7.3 % (07-10-21 @ 10:16)      Thyroid Function Tests:  06-11 @ 23:50 TSH 1.16 FreeT4 -- T3 -- Anti TPO -- Anti Thyroglobulin Ab -- TSI --

## 2022-06-15 NOTE — PHARMACOTHERAPY INTERVENTION NOTE - COMMENTS
Returned to do diabetes education with patient - he was unable to demonstrate 70/30 vial/syringe insulin and needed significant assistance. Pt has a difficult time following instructions and says he understands (when he does not). Took ~20 mins to practice with "15" units of insulin via vial/syringe into practice pad and patient could not get it to the number 15. Patient also had a difficult time working with practice glucometer - needed step by step instructions and struggled to follow commands. Called patient's daughter Ayse - she states she cannot help the patient with insulin; she is asking for psych evaluation, will pass along to the team.  At this deysi, unsafe for patient to do vial/syringe insulin on his own - daughter states he has Medicare, need to verify if Part D is active and covers insulin.  Returned to do diabetes education with patient - he was unable to demonstrate 70/30 vial/syringe insulin and needed significant assistance. Pt has a difficult time following instructions and says he understands (when he does not). Took ~20 mins to practice with "15" units of insulin via vial/syringe into practice pad and patient could not get it to the number 15. Patient also had a difficult time working with practice glucometer - needed step by step instructions and struggled to follow commands. Called patient's daughter Ayse - she states she cannot help the patient with insulin; she is asking for psych evaluation, will pass along to the team.  At this deysi, unsafe for patient to do vial/syringe insulin on his own - daughter states he has Medicare, need to verify if Part D is active and covers insulin - then pt could be taught insulin pens instead of vial/syringe.  Returned to do diabetes education with patient - he was unable to demonstrate 70/30 vial/syringe insulin and needed significant assistance. Pt has a difficult time following instructions. Took ~20 mins to practice with "15" units of insulin via vial/syringe into practice pad and patient could not get it to the number 15 (kept drawing to 12). Patient also had a difficult time working with practice glucometer - needed step by step instructions and struggled to follow commands. Called patient's daughter Ayse - she states she cannot help the patient with insulin; she is asking for psych evaluation, will pass along to the team.  At this time, unsafe for patient to do vial/syringe insulin on his own - daughter states he has Medicare, team needs to verify if Part D is active and covers insulin - then pt could be taught insulin pens instead of vial/syringe.

## 2022-06-15 NOTE — PROGRESS NOTE ADULT - PROBLEM SELECTOR PLAN 2
- AOx2 to person and place   - Ddx: metabolic most likely (hyperglycemic, hyponatremic on admission) vs. infectious vs. structural vs. 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

## 2022-06-15 NOTE — PROGRESS NOTE ADULT - PROBLEM SELECTOR PLAN 5
- H/o alcohol abuse, though patient denies recent alcohol use (last drink 1 month ago)   - Tox negative for alcohol   - Symptom-triggered CIWA protocol started on 6/12, patient has been scoring 0 and has not required PRN Ativan thus far - H/o alcohol abuse, though patient denies recent alcohol use (last drink 1 month ago)   - Tox negative for alcohol   - Symptom-triggered CIWA protocol started on 6/12   - D/c CIWA on 6/15 as patient has been scoring 0 and has not required PRN Ativan for 3 days

## 2022-06-15 NOTE — PROGRESS NOTE ADULT - PROBLEM SELECTOR PLAN 1
Uncontrolled T2DM on home oral agent, unclear compliance given patient is a poor historian. A1C 15.5  -increase lantus from 12 units to 14 units at bedtime, admelog 4 units to 5 units three times a day with meals  -continue low dose insulin correctional scale  -patient is new to insulin and will be discharged on insulin so he will need to have access to a fridge after discharge. Will need CM/SW help with living situation and insurance to determine discharge insulin choice and regimen. (If no insurance, patient likely be discharged on novolin 70/30)  -c-peptide level 0.8  -f/u ICA, GADA   -please collect zinc transporter with AM lab Uncontrolled T2DM on home oral agent, unclear compliance given patient is a poor historian. A1C 15.5  -increase lantus from 14 units to 16 units at bedtime, admelog 5 units to 6 units three times a day with meals  -check 3am FS x1  -continue low dose insulin correctional scale  -patient is new to insulin and will be discharged on insulin so he will need to have access to a fridge after discharge. Will need CM/SW help with safe discharge planning since patient doesn't seem to have the capacity to safely administer insulin by himself. Please send lantus and humalog pen to [facility] to see if insurance covers them. If not, will probably need novolin 70/30.   -f/u ICA, GADA, zinc transporter Uncontrolled T2DM on home oral agent, unclear compliance given patient is a poor historian. A1C 15.5  -increase lantus from 14 units to 16 units at bedtime, admelog 5 units to 6 units three times a day with meals  -check 3am FS x1 on 6/16 since trend of rapid drop from bedtime FS to AM FS for the past 3 days  -continue low dose insulin correctional scale  -patient is new to insulin and will be discharged on insulin so he will need to have access to a fridge after discharge. Will need CM/SW help with safe discharge planning since patient doesn't seem to have the capacity to safely administer insulin by himself. Please send lantus and humalog pen to [facility] to see if insurance covers them. If not, will probably need novolin 70/30.   -f/u ICA, GADA, zinc transporter

## 2022-06-15 NOTE — PROGRESS NOTE ADULT - SUBJECTIVE AND OBJECTIVE BOX
%%%%INCOMPLETE NOTE%%%%%     Dr. Chana Douglass, PGY-1    Patient is a 77y old  Male who presents with a chief complaint of AMS/hyperglycemia (15 Ole 2022 13:54)    24-HR EVENTS/SUBJECTIVE: Patient seen and examined at bedside this AM. BMx1 yesterday. Denies chest pain, abdominal pain, cough, n/v, sob. Breathing comfortably on room air.    MEDICATIONS  (STANDING):  atorvastatin 40 milliGRAM(s) Oral at bedtime  dextrose 5%. 1000 milliLiter(s) (100 mL/Hr) IV Continuous <Continuous>  dextrose 5%. 1000 milliLiter(s) (50 mL/Hr) IV Continuous <Continuous>  dextrose 50% Injectable 25 Gram(s) IV Push once  dextrose 50% Injectable 12.5 Gram(s) IV Push once  dextrose 50% Injectable 25 Gram(s) IV Push once  diVALproex  milliGRAM(s) Oral two times a day  folic acid 1 milliGRAM(s) Oral daily  glucagon  Injectable 1 milliGRAM(s) IntraMuscular once  insulin glargine Injectable (LANTUS) 14 Unit(s) SubCutaneous at bedtime  insulin lispro (ADMELOG) corrective regimen sliding scale   SubCutaneous three times a day before meals  insulin lispro (ADMELOG) corrective regimen sliding scale   SubCutaneous at bedtime  insulin lispro Injectable (ADMELOG) 5 Unit(s) SubCutaneous three times a day before meals  multivitamin 1 Tablet(s) Oral daily  thiamine 100 milliGRAM(s) Oral daily    MEDICATIONS  (PRN):  acetaminophen     Tablet .. 650 milliGRAM(s) Oral every 6 hours PRN Temp greater or equal to 38C (100.4F), Mild Pain (1 - 3)  dextrose Oral Gel 15 Gram(s) Oral once PRN Blood Glucose LESS THAN 70 milliGRAM(s)/deciliter  LORazepam     Tablet 1 milliGRAM(s) Oral every 1 hour PRN CIWA-Ar score 8 or greater  melatonin 3 milliGRAM(s) Oral at bedtime PRN Insomnia        Objective:     Vitals: Vital Signs Last 24 Hrs  T(C): 36.8 (06-15-22 @ 14:30), Max: 37.1 (06-14-22 @ 20:00)  T(F): 98.3 (06-15-22 @ 14:30), Max: 98.7 (06-14-22 @ 20:00)  HR: 84 (06-15-22 @ 14:30) (81 - 91)  BP: 118/74 (06-15-22 @ 14:30) (101/67 - 118/74)  BP(mean): --  RR: 17 (06-15-22 @ 14:30) (17 - 19)  SpO2: 98% (06-15-22 @ 14:30) (98% - 100%)            I&O's Summary    14 Jun 2022 07:01  -  15 Ole 2022 07:00  --------------------------------------------------------  IN: 120 mL / OUT: 500 mL / NET: -380 mL        PHYSICAL EXAM:  GENERAL: NAD, well-groomed, well-developed  HEAD:  Atraumatic, Normocephalic  EYES: EOMI, PERRLA, conjunctiva and sclera clear  ENMT: No tonsillar erythema, exudates, or enlargement; Moist mucous membranes, Good dentition, No lesions  NECK: Supple, No JVD, Normal thyroid  CHEST/LUNG: Clear to auscultation bilaterally; No rales, rhonchi, wheezing, or rubs  HEART: Regular rate and rhythm; No murmurs, rubs, or gallops  ABDOMEN: Soft, Nontender, Nondistended; Bowel sounds present  EXTREMITIES:  2+ Peripheral Pulses, No clubbing, cyanosis, or edema  LYMPH: No lymphadenopathy noted  SKIN: No rashes or lesions  NERVOUS SYSTEM:  Alert & Oriented X4, Good concentration  PSYCH: Normal Affect. Speaking in Full Sentences. Laying in bed comfortably; not agitated     LABS:                        9.6    4.65  )-----------( 376      ( 14 Jun 2022 07:35 )             29.5                         9.2    4.72  )-----------( 373      ( 13 Jun 2022 06:00 )             29.4     Hgb Trend: 9.6<--, 9.2<--, 11.0<--, 10.0<--  06-14    136  |  103  |  19  ----------------------------<  221<H>  4.1   |  23  |  1.13  06-13    136  |  106  |  23  ----------------------------<  131<H>  3.8   |  21<L>  |  1.24    Ca    8.8      14 Jun 2022 07:35  Ca    8.7      13 Jun 2022 06:00    TPro  5.9<L>  /  Alb  3.2<L>  /  TBili  0.2  /  DBili  x   /  AST  16  /  ALT  18  /  AlkPhos  105  06-14  TPro  5.9<L>  /  Alb  3.1<L>  /  TBili  0.2  /  DBili  x   /  AST  12  /  ALT  19  /  AlkPhos  105  06-13    Creatinine Trend: 1.13<--, 1.24<--, 1.10<--, 1.30<--, 1.04<--                    CAPILLARY BLOOD GLUCOSE      POCT Blood Glucose.: 219 mg/dL (15 Ole 2022 12:28)  POCT Blood Glucose.: 180 mg/dL (15 Loe 2022 09:06)  POCT Blood Glucose.: 391 mg/dL (14 Jun 2022 21:45)  POCT Blood Glucose.: 196 mg/dL (14 Jun 2022 16:44)     Dr. Chana Douglass, PGY-1    Patient is a 77y old  Male who presents with a chief complaint of AMS/hyperglycemia (15 Ole 2022 13:54)    24-HR EVENTS/SUBJECTIVE: Patient seen and examined at bedside this AM. BMx1 today. Reports cough for the past few days Denies chest pain, abdominal pain, headaches, fevers, n/v, sob. Breathing comfortably on room air. AOx3    MEDICATIONS  (STANDING):  atorvastatin 40 milliGRAM(s) Oral at bedtime  dextrose 5%. 1000 milliLiter(s) (100 mL/Hr) IV Continuous <Continuous>  dextrose 5%. 1000 milliLiter(s) (50 mL/Hr) IV Continuous <Continuous>  dextrose 50% Injectable 25 Gram(s) IV Push once  dextrose 50% Injectable 12.5 Gram(s) IV Push once  dextrose 50% Injectable 25 Gram(s) IV Push once  diVALproex  milliGRAM(s) Oral two times a day  folic acid 1 milliGRAM(s) Oral daily  glucagon  Injectable 1 milliGRAM(s) IntraMuscular once  insulin glargine Injectable (LANTUS) 14 Unit(s) SubCutaneous at bedtime  insulin lispro (ADMELOG) corrective regimen sliding scale   SubCutaneous three times a day before meals  insulin lispro (ADMELOG) corrective regimen sliding scale   SubCutaneous at bedtime  insulin lispro Injectable (ADMELOG) 5 Unit(s) SubCutaneous three times a day before meals  multivitamin 1 Tablet(s) Oral daily  thiamine 100 milliGRAM(s) Oral daily    MEDICATIONS  (PRN):  acetaminophen     Tablet .. 650 milliGRAM(s) Oral every 6 hours PRN Temp greater or equal to 38C (100.4F), Mild Pain (1 - 3)  dextrose Oral Gel 15 Gram(s) Oral once PRN Blood Glucose LESS THAN 70 milliGRAM(s)/deciliter  LORazepam     Tablet 1 milliGRAM(s) Oral every 1 hour PRN CIWA-Ar score 8 or greater  melatonin 3 milliGRAM(s) Oral at bedtime PRN Insomnia        Objective:     Vitals: Vital Signs Last 24 Hrs  T(C): 36.8 (06-15-22 @ 14:30), Max: 37.1 (06-14-22 @ 20:00)  T(F): 98.3 (06-15-22 @ 14:30), Max: 98.7 (06-14-22 @ 20:00)  HR: 84 (06-15-22 @ 14:30) (81 - 91)  BP: 118/74 (06-15-22 @ 14:30) (101/67 - 118/74)  BP(mean): --  RR: 17 (06-15-22 @ 14:30) (17 - 19)  SpO2: 98% (06-15-22 @ 14:30) (98% - 100%)            I&O's Summary    14 Jun 2022 07:01  -  15 Ole 2022 07:00  --------------------------------------------------------  IN: 120 mL / OUT: 500 mL / NET: -380 mL        PHYSICAL EXAM:  GENERAL: NAD, well-developed  HEAD:  Atraumatic, Normocephalic  EYES: EOMI, PERRLA, conjunctiva and sclera deep red. Swelling of Right eyelid   ENMT: Moist mucous membranes, poor dentition   NECK: Supple  CHEST/LUNG: Clear to auscultation bilaterally; No rales, rhonchi, wheezing, or rubs  HEART: Regular rate and rhythm; No murmurs, rubs, or gallops  ABDOMEN: Soft, Nontender, Nondistended; Bowel sounds present  EXTREMITIES:  2+ Peripheral Pulses, No clubbing, cyanosis, or edema  NERVOUS SYSTEM:  Alert & Oriented X4, NAVARRETE spontaneously, poor concentration    PSYCH: Speaking in Full Sentences. Laying in bed comfortably; not agitated.     LABS:                        9.6    4.65  )-----------( 376      ( 14 Jun 2022 07:35 )             29.5                         9.2    4.72  )-----------( 373      ( 13 Jun 2022 06:00 )             29.4     Hgb Trend: 9.6<--, 9.2<--, 11.0<--, 10.0<--  06-14    136  |  103  |  19  ----------------------------<  221<H>  4.1   |  23  |  1.13  06-13    136  |  106  |  23  ----------------------------<  131<H>  3.8   |  21<L>  |  1.24    Ca    8.8      14 Jun 2022 07:35  Ca    8.7      13 Jun 2022 06:00    TPro  5.9<L>  /  Alb  3.2<L>  /  TBili  0.2  /  DBili  x   /  AST  16  /  ALT  18  /  AlkPhos  105  06-14  TPro  5.9<L>  /  Alb  3.1<L>  /  TBili  0.2  /  DBili  x   /  AST  12  /  ALT  19  /  AlkPhos  105  06-13    Creatinine Trend: 1.13<--, 1.24<--, 1.10<--, 1.30<--, 1.04<--                    CAPILLARY BLOOD GLUCOSE      POCT Blood Glucose.: 219 mg/dL (15 Ole 2022 12:28)  POCT Blood Glucose.: 180 mg/dL (15 Ole 2022 09:06)  POCT Blood Glucose.: 391 mg/dL (14 Jun 2022 21:45)  POCT Blood Glucose.: 196 mg/dL (14 Jun 2022 16:44)
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
## 2022-06-15 NOTE — PROGRESS NOTE ADULT - PROBLEM SELECTOR PLAN 1
- Reportedly on Metformin 850mg BID outpatient, unclear adherence   - FS 700s on admission, no signs of DKA (pH normal, bHB 0)   - A1c 15.5 on this admission   - Endocrine recs appreciated   - Continue Lantus 14U qhs and Ademelog 5U TID premeal   - FS and low dose sliding scale TID premeal and qhs   - Patient unable to retain insulin teaching per documentation, daughter Ayse says she cannot help pt with insulin   - Likely will need to be discharged on Novolog 70/30 due to insurance issues   - F/u zinc transporter, ICA, GADA - Reportedly on Metformin 850mg BID outpatient, unclear adherence   - FS 700s on admission, no signs of DKA (pH normal, bHB 0)   - A1c 15.5 on this admission   - Endocrine recs appreciated   - Continue Lantus 14U qhs and Ademelog 5U TID premeal   - FS and low dose sliding scale TID premeal and qhs   - Patient unable to retain insulin teaching per documentation, daughter Ayse says she cannot help pt with insulin   - F/u zinc transporter, ICA, GADA

## 2022-06-16 LAB
ANION GAP SERPL CALC-SCNC: 14 MMOL/L — SIGNIFICANT CHANGE UP (ref 7–14)
BUN SERPL-MCNC: 19 MG/DL — SIGNIFICANT CHANGE UP (ref 7–23)
CALCIUM SERPL-MCNC: 9.4 MG/DL — SIGNIFICANT CHANGE UP (ref 8.4–10.5)
CHLORIDE SERPL-SCNC: 100 MMOL/L — SIGNIFICANT CHANGE UP (ref 98–107)
CO2 SERPL-SCNC: 21 MMOL/L — LOW (ref 22–31)
CREAT SERPL-MCNC: 1.22 MG/DL — SIGNIFICANT CHANGE UP (ref 0.5–1.3)
EGFR: 62 ML/MIN/1.73M2 — SIGNIFICANT CHANGE UP
GLUCOSE BLDC GLUCOMTR-MCNC: 101 MG/DL — HIGH (ref 70–99)
GLUCOSE BLDC GLUCOMTR-MCNC: 110 MG/DL — HIGH (ref 70–99)
GLUCOSE BLDC GLUCOMTR-MCNC: 267 MG/DL — HIGH (ref 70–99)
GLUCOSE BLDC GLUCOMTR-MCNC: 296 MG/DL — HIGH (ref 70–99)
GLUCOSE BLDC GLUCOMTR-MCNC: 53 MG/DL — CRITICAL LOW (ref 70–99)
GLUCOSE BLDC GLUCOMTR-MCNC: 64 MG/DL — LOW (ref 70–99)
GLUCOSE BLDC GLUCOMTR-MCNC: 73 MG/DL — SIGNIFICANT CHANGE UP (ref 70–99)
GLUCOSE SERPL-MCNC: 51 MG/DL — CRITICAL LOW (ref 70–99)
ISLET CELL512 AB SER-ACNC: SIGNIFICANT CHANGE UP
MAGNESIUM SERPL-MCNC: 1.6 MG/DL — SIGNIFICANT CHANGE UP (ref 1.6–2.6)
PHOSPHATE SERPL-MCNC: 3.8 MG/DL — SIGNIFICANT CHANGE UP (ref 2.5–4.5)
POTASSIUM SERPL-MCNC: 3.9 MMOL/L — SIGNIFICANT CHANGE UP (ref 3.5–5.3)
POTASSIUM SERPL-SCNC: 3.9 MMOL/L — SIGNIFICANT CHANGE UP (ref 3.5–5.3)
SODIUM SERPL-SCNC: 135 MMOL/L — SIGNIFICANT CHANGE UP (ref 135–145)
VALPROATE SERPL-MCNC: 69.6 UG/ML — SIGNIFICANT CHANGE UP (ref 50–100)

## 2022-06-16 PROCEDURE — 99232 SBSQ HOSP IP/OBS MODERATE 35: CPT | Mod: GC

## 2022-06-16 PROCEDURE — 90792 PSYCH DIAG EVAL W/MED SRVCS: CPT

## 2022-06-16 RX ORDER — DIVALPROEX SODIUM 500 MG/1
250 TABLET, DELAYED RELEASE ORAL
Refills: 0 | Status: DISCONTINUED | OUTPATIENT
Start: 2022-06-16 | End: 2022-06-23

## 2022-06-16 RX ORDER — THIAMINE MONONITRATE (VIT B1) 100 MG
500 TABLET ORAL DAILY
Refills: 0 | Status: COMPLETED | OUTPATIENT
Start: 2022-06-17 | End: 2022-06-19

## 2022-06-16 RX ORDER — INSULIN GLARGINE 100 [IU]/ML
14 INJECTION, SOLUTION SUBCUTANEOUS AT BEDTIME
Refills: 0 | Status: DISCONTINUED | OUTPATIENT
Start: 2022-06-16 | End: 2022-06-19

## 2022-06-16 RX ORDER — INSULIN LISPRO 100/ML
5 VIAL (ML) SUBCUTANEOUS
Refills: 0 | Status: DISCONTINUED | OUTPATIENT
Start: 2022-06-16 | End: 2022-06-16

## 2022-06-16 RX ORDER — INSULIN LISPRO 100/ML
5 VIAL (ML) SUBCUTANEOUS
Refills: 0 | Status: DISCONTINUED | OUTPATIENT
Start: 2022-06-17 | End: 2022-06-18

## 2022-06-16 RX ORDER — ENOXAPARIN SODIUM 100 MG/ML
14 INJECTION SUBCUTANEOUS
Qty: 420 | Refills: 0
Start: 2022-06-16 | End: 2022-07-15

## 2022-06-16 RX ORDER — INSULIN LISPRO 100/ML
5 VIAL (ML) SUBCUTANEOUS
Qty: 450 | Refills: 0
Start: 2022-06-16 | End: 2022-07-15

## 2022-06-16 RX ORDER — INSULIN LISPRO 100/ML
6 VIAL (ML) SUBCUTANEOUS
Refills: 0 | Status: DISCONTINUED | OUTPATIENT
Start: 2022-06-16 | End: 2022-06-18

## 2022-06-16 RX ORDER — INSULIN LISPRO 100/ML
5 VIAL (ML) SUBCUTANEOUS
Refills: 0 | Status: DISCONTINUED | OUTPATIENT
Start: 2022-06-16 | End: 2022-06-17

## 2022-06-16 RX ADMIN — INSULIN GLARGINE 14 UNIT(S): 100 INJECTION, SOLUTION SUBCUTANEOUS at 21:43

## 2022-06-16 RX ADMIN — Medication 1 TABLET(S): at 13:15

## 2022-06-16 RX ADMIN — ATORVASTATIN CALCIUM 40 MILLIGRAM(S): 80 TABLET, FILM COATED ORAL at 21:40

## 2022-06-16 RX ADMIN — Medication 5 UNIT(S): at 13:13

## 2022-06-16 RX ADMIN — Medication 100 MILLIGRAM(S): at 13:15

## 2022-06-16 RX ADMIN — Medication 3: at 13:13

## 2022-06-16 RX ADMIN — DIVALPROEX SODIUM 250 MILLIGRAM(S): 500 TABLET, DELAYED RELEASE ORAL at 18:04

## 2022-06-16 RX ADMIN — Medication 1 MILLIGRAM(S): at 13:15

## 2022-06-16 RX ADMIN — Medication 6 UNIT(S): at 18:03

## 2022-06-16 RX ADMIN — DIVALPROEX SODIUM 500 MILLIGRAM(S): 500 TABLET, DELAYED RELEASE ORAL at 05:48

## 2022-06-16 RX ADMIN — Medication 3: at 18:03
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
## 2022-06-16 NOTE — BH CONSULTATION LIAISON ASSESSMENT NOTE - NSBHCONSULTFOLLOWAFTERCARE_PSY_A_CORE FT
[facility]  [phone] [place] [address]  [phone]    [facility] Geriatric Psychiatry Appointment  [phone] [place] [address], [address], [address]

## 2022-06-16 NOTE — BH CONSULTATION LIAISON ASSESSMENT NOTE - CASE SUMMARY
Chart reviewed. Patient seen and examined with NP Eveline. I agree with her history, MSE, A/P with below additions. In brief patient is calm and cooperative though confused. Seems to not fully appreciate his DM needs. Denies Si/HI. No AH/VH elicited. Is grossly goal directed. Does not appear internally preoccupied.  Plan per above. Would also recommend SW work with family and patient to ensure safe discharge- unclear if he is able to care for himself from medical perspective, but defer to primary team to elucidate this.

## 2022-06-16 NOTE — ADVANCED PRACTICE NURSE CONSULT - ASSESSMENT
Patient was seen at bedside. Patient with type 2 diabetes. Patient is alert and oriented x2. Discussed with primary RN and this has been patient’s baseline. Patient did not know he was in the hospital and it was difficult to reorientate the pt. Patient was hypoglycemic this am but waited until hypoglycemia was resolved before teaching the patient.  Admits to not have a glucometer to check his blood sugar. Patient was able to know that he has high blood pressure and has diabetes and he was taking pills. He stated he was taking metformin and glipizide. Diabetes educator tried to explain to patient why he needs insulin and that the pills for his diabetes would no longer work and pt kept looking for someone that used to live here. After teaching pt times to check his blood sugar, he could not teach information back to the diabetes educator. Patient did not feel any symptoms of hypoglycemia this am. He was able to state that he could feel dizzy with low blood sugar but could not teach back how to correctly treat hypoglycemia. Attempted to teach patient how to use the insulin pen. Coverage for insulin pen still being assessed. Patient could not understand the pieces to the pen, cap, insulin pen and pen needle. He kept thinking the pen unscrews and that something comes out of the pen needle. He couldn’t understand the paper backing of the insulin pen needle needs to be taken off all the way to ensure the needle goes on correctly. Patient will not be able to use the insulin pen safely upon discharge. Recommend social work assess options for disposition after discharge. Case discussed with primary RN and endocrine team.
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
## 2022-06-16 NOTE — PROGRESS NOTE ADULT - SUBJECTIVE AND OBJECTIVE BOX
Patient is a 77y old  Male who presents with a chief complaint of AMS/hyperglycemia (16 Jun 2022 14:47)      INTERVAL HPI/OVERNIGHT EVENTS:  T(C): 37.5 (06-16-22 @ 21:51), Max: 37.5 (06-16-22 @ 21:51)  HR: 97 (06-16-22 @ 21:51) (84 - 97)  BP: 117/64 (06-16-22 @ 21:51) (112/78 - 126/84)  RR: 17 (06-16-22 @ 21:51) (17 - 17)  SpO2: 98% (06-16-22 @ 21:51) (98% - 100%)  Wt(kg): --  I&O's Summary    15 Ole 2022 07:01  -  16 Jun 2022 07:00  --------------------------------------------------------  IN: 480 mL / OUT: 0 mL / NET: 480 mL    16 Jun 2022 07:01  -  16 Jun 2022 23:34  --------------------------------------------------------  IN: 500 mL / OUT: 600 mL / NET: -100 mL        PAST MEDICAL & SURGICAL HISTORY:  DM (diabetes mellitus)      No significant past surgical history          SOCIAL HISTORY  Alcohol:  Tobacco:  Illicit substance use:    FAMILY HISTORY:    REVIEW OF SYSTEMS:  CONSTITUTIONAL: No fever, weight loss, or fatigue  EYES: No eye pain, visual disturbances, or discharge  ENMT:  No difficulty hearing, tinnitus, vertigo; No sinus or throat pain  NECK: No pain or stiffness  RESPIRATORY: No cough, wheezing, chills or hemoptysis; No shortness of breath  CARDIOVASCULAR: No chest pain, palpitations, dizziness, or leg swelling  GASTROINTESTINAL: No abdominal or epigastric pain. No nausea, vomiting, or hematemesis; No diarrhea or constipation. No melena or hematochezia.  GENITOURINARY: No dysuria, frequency, hematuria, or incontinence  NEUROLOGICAL: No headaches, memory loss, loss of strength, numbness, or tremors  SKIN: No itching, burning, rashes, or lesions   LYMPH NODES: No enlarged glands  ENDOCRINE: No heat or cold intolerance; No hair loss  MUSCULOSKELETAL: No joint pain or swelling; No muscle, back, or extremity pain  PSYCHIATRIC: No depression, anxiety, mood swings, or difficulty sleeping  HEME/LYMPH: No easy bruising, or bleeding gums  ALLERY AND IMMUNOLOGIC: No hives or eczema    RADIOLOGY & ADDITIONAL TESTS:    Imaging Personally Reviewed:  [ ] YES  [ ] NO    Consultant(s) Notes Reviewed:  [ ] YES  [ ] NO    PHYSICAL EXAM:  GENERAL: NAD, well-groomed, well-developed  HEAD:  Atraumatic, Normocephalic  EYES: EOMI, PERRLA, conjunctiva and sclera clear  ENMT: No tonsillar erythema, exudates, or enlargement; Moist mucous membranes, Good dentition, No lesions  NECK: Supple, No JVD, Normal thyroid  NERVOUS SYSTEM:  Alert & Oriented X3, Good concentration; Motor Strength 5/5 B/L upper and lower extremities; DTRs 2+ intact and symmetric  CHEST/LUNG: Clear to percussion bilaterally; No rales, rhonchi, wheezing, or rubs  HEART: Regular rate and rhythm; No murmurs, rubs, or gallops  ABDOMEN: Soft, Nontender, Nondistended; Bowel sounds present  EXTREMITIES:  2+ Peripheral Pulses, No clubbing, cyanosis, or edema  LYMPH: No lymphadenopathy noted  SKIN: No rashes or lesions    LABS:                        11.2   6.52  )-----------( 403      ( 15 Ole 2022 14:20 )             35.5     06-16    135  |  100  |  19  ----------------------------<  51<LL>  3.9   |  21<L>  |  1.22    Ca    9.4      16 Jun 2022 07:05  Phos  3.8     06-16  Mg     1.60     06-16          CAPILLARY BLOOD GLUCOSE      POCT Blood Glucose.: 101 mg/dL (16 Jun 2022 22:17)  POCT Blood Glucose.: 267 mg/dL (16 Jun 2022 17:51)  POCT Blood Glucose.: 296 mg/dL (16 Jun 2022 13:00)  POCT Blood Glucose.: 110 mg/dL (16 Jun 2022 10:08)  POCT Blood Glucose.: 73 mg/dL (16 Jun 2022 09:30)  POCT Blood Glucose.: 64 mg/dL (16 Jun 2022 09:06)  POCT Blood Glucose.: 53 mg/dL (16 Jun 2022 09:04)            MEDICATIONS  (STANDING):  atorvastatin 40 milliGRAM(s) Oral at bedtime  dextrose 5%. 1000 milliLiter(s) (100 mL/Hr) IV Continuous <Continuous>  dextrose 5%. 1000 milliLiter(s) (50 mL/Hr) IV Continuous <Continuous>  dextrose 50% Injectable 25 Gram(s) IV Push once  dextrose 50% Injectable 12.5 Gram(s) IV Push once  dextrose 50% Injectable 25 Gram(s) IV Push once  diVALproex  milliGRAM(s) Oral two times a day  folic acid 1 milliGRAM(s) Oral daily  glucagon  Injectable 1 milliGRAM(s) IntraMuscular once  insulin glargine Injectable (LANTUS) 14 Unit(s) SubCutaneous at bedtime  insulin lispro (ADMELOG) corrective regimen sliding scale   SubCutaneous three times a day before meals  insulin lispro (ADMELOG) corrective regimen sliding scale   SubCutaneous at bedtime  insulin lispro Injectable (ADMELOG) 5 Unit(s) SubCutaneous with breakfast  insulin lispro Injectable (ADMELOG) 6 Unit(s) SubCutaneous before dinner  multivitamin 1 Tablet(s) Oral daily    MEDICATIONS  (PRN):  acetaminophen     Tablet .. 650 milliGRAM(s) Oral every 6 hours PRN Temp greater or equal to 38C (100.4F), Mild Pain (1 - 3)  dextrose Oral Gel 15 Gram(s) Oral once PRN Blood Glucose LESS THAN 70 milliGRAM(s)/deciliter  melatonin 3 milliGRAM(s) Oral at bedtime PRN Insomnia      Care Discussed with Consultants/Other Providers [ ] YES  [ ] NO

## 2022-06-16 NOTE — BH CONSULTATION LIAISON ASSESSMENT NOTE - NSBHCHARTREVIEWINVESTIGATE_PSY_A_CORE FT
< from: 12 Lead ECG (07.09.21 @ 22:18) >      Ventricular Rate 91 BPM    Atrial Rate 91 BPM    P-R Interval 314 ms    QRS Duration 96 ms    Q-T Interval 350 ms    QTC Calculation(Bazett) 430 ms    P Axis 66 degrees    R Axis 54 degrees    T Axis 71 degrees    Diagnosis Line SINUS RHYTHM WITH 1ST DEGREE A-V BLOCK  POSSIBLE LEFT ATRIAL ENLARGEMENT  NONSPECIFIC T WAVE ABNORMALITY  ABNORMAL ECG  NO PREVIOUS ECGS AVAILABLE  Confirmed by MD FELIPE JONATHAN ([id number]) on 7/10/2021 1:59:44 PM    < end of copied text >     qtc 427

## 2022-06-16 NOTE — PROGRESS NOTE ADULT - PROBLEM SELECTOR PLAN 5
- H/o alcohol abuse, though patient denies recent alcohol use (last drink 1 month ago)   - Tox negative for alcohol   - Symptom-triggered CIWA protocol started on 6/12   - D/c CIWA on 6/15 as patient has been scoring 0 and has not required PRN Ativan for 3 days

## 2022-06-16 NOTE — BH CONSULTATION LIAISON ASSESSMENT NOTE - NSSUICPROTFACT_PSY_ALL_CORE
Identifies reasons for living/Supportive social network of family or friends/Engaged in work or school Identifies reasons for living/Supportive social network of family or friends

## 2022-06-16 NOTE — PROGRESS NOTE ADULT - PROBLEM SELECTOR PLAN 1
- Reportedly on Metformin 850mg BID outpatient, unclear adherence   - FS 700s on admission, no signs of DKA (pH normal, bHB 0)   - A1c 15.5 on this admission   - Endocrine recs appreciated   - Continue Lantus 16U qhs and Ademelog 6U TID premeal   - FS and low dose sliding scale TID premeal and qhs   - Patient unable to retain insulin teaching per documentation, daughter Ayse says she cannot help pt with insulin   - F/u zinc transporter, ICA, GADA

## 2022-06-16 NOTE — PROGRESS NOTE ADULT - PROBLEM SELECTOR PLAN 1
Uncontrolled T2DM on home oral agent, unclear compliance given patient is a poor historian. A1C 15.5  -given hypoglycemia in the morning, decrease lantus back to 14 units at bedtime  -change to admelog 5 units with breakfast and lunch, 6 units with dinner   -continue low dose insulin correctional scale  -patient is new to insulin and will be discharged on insulin so he will need to have access to a fridge after discharge. Will need CM/SW help with safe discharge planning since patient doesn't seem to have the capacity to safely administer insulin by himself  -f/u ICA, GADA, zinc transporter

## 2022-06-16 NOTE — PROGRESS NOTE ADULT - SUBJECTIVE AND OBJECTIVE BOX
%%%%INCOMPLETE NOTE%%%%%     Dr. Chana Douglass, PGY-1    Patient is a 77y old  Male who presents with a chief complaint of AMS/hyperglycemia (15 Ole 2022 14:33)    24-HR EVENTS/SUBJECTIVE: No acute events overnight. Patient seen and examined at bedside this AM. BMx1 yesterday. Denies chest pain, abdominal pain, cough, n/v, sob. Breathing comfortably on room air.    MEDICATIONS  (STANDING):  atorvastatin 40 milliGRAM(s) Oral at bedtime  dextrose 5%. 1000 milliLiter(s) (50 mL/Hr) IV Continuous <Continuous>  dextrose 5%. 1000 milliLiter(s) (100 mL/Hr) IV Continuous <Continuous>  dextrose 50% Injectable 25 Gram(s) IV Push once  dextrose 50% Injectable 12.5 Gram(s) IV Push once  dextrose 50% Injectable 25 Gram(s) IV Push once  diVALproex  milliGRAM(s) Oral two times a day  folic acid 1 milliGRAM(s) Oral daily  glucagon  Injectable 1 milliGRAM(s) IntraMuscular once  insulin glargine Injectable (LANTUS) 16 Unit(s) SubCutaneous at bedtime  insulin lispro (ADMELOG) corrective regimen sliding scale   SubCutaneous three times a day before meals  insulin lispro (ADMELOG) corrective regimen sliding scale   SubCutaneous at bedtime  insulin lispro Injectable (ADMELOG) 6 Unit(s) SubCutaneous three times a day before meals  multivitamin 1 Tablet(s) Oral daily  thiamine 100 milliGRAM(s) Oral daily    MEDICATIONS  (PRN):  acetaminophen     Tablet .. 650 milliGRAM(s) Oral every 6 hours PRN Temp greater or equal to 38C (100.4F), Mild Pain (1 - 3)  dextrose Oral Gel 15 Gram(s) Oral once PRN Blood Glucose LESS THAN 70 milliGRAM(s)/deciliter  melatonin 3 milliGRAM(s) Oral at bedtime PRN Insomnia        Objective:     Vitals: Vital Signs Last 24 Hrs  T(C): 36.8 (06-16-22 @ 06:46), Max: 36.9 (06-15-22 @ 23:26)  T(F): 98.2 (06-16-22 @ 06:46), Max: 98.5 (06-15-22 @ 23:26)  HR: 84 (06-16-22 @ 06:46) (81 - 96)  BP: 112/78 (06-16-22 @ 06:46) (112/78 - 125/86)  BP(mean): --  RR: 17 (06-16-22 @ 06:46) (17 - 17)  SpO2: 100% (06-16-22 @ 06:46) (98% - 100%)            I&O's Summary    15 Ole 2022 07:01  -  16 Jun 2022 07:00  --------------------------------------------------------  IN: 480 mL / OUT: 0 mL / NET: 480 mL        PHYSICAL EXAM:  GENERAL: NAD, well-developed  HEAD:  Atraumatic, Normocephalic  EYES: EOMI, PERRLA, conjunctiva and sclera deep red. Swelling of Right eyelid   ENMT: Moist mucous membranes, poor dentition   NECK: Supple  CHEST/LUNG: Clear to auscultation bilaterally; No rales, rhonchi, wheezing, or rubs  HEART: Regular rate and rhythm; No murmurs, rubs, or gallops  ABDOMEN: Soft, Nontender, Nondistended; Bowel sounds present  EXTREMITIES:  2+ Peripheral Pulses, No clubbing, cyanosis, or edema  NERVOUS SYSTEM:  Alert & Oriented X4, NAVARRETE spontaneously, poor concentration    PSYCH: Speaking in Full Sentences. Laying in bed comfortably; not agitated.     LABS:                        11.2   6.52  )-----------( 403      ( 15 Ole 2022 14:20 )             35.5                         9.6    4.65  )-----------( 376      ( 14 Jun 2022 07:35 )             29.5     Hgb Trend: 11.2<--, 9.6<--, 9.2<--, 11.0<--, 10.0<--  06-14    136  |  103  |  19  ----------------------------<  221<H>  4.1   |  23  |  1.13    Ca    8.8      14 Jun 2022 07:35    TPro  5.9<L>  /  Alb  3.2<L>  /  TBili  0.2  /  DBili  x   /  AST  16  /  ALT  18  /  AlkPhos  105  06-14    Creatinine Trend: 1.13<--, 1.24<--, 1.10<--, 1.30<--, 1.04<--                    CAPILLARY BLOOD GLUCOSE      POCT Blood Glucose.: 123 mg/dL (15 Ole 2022 22:24)  POCT Blood Glucose.: 223 mg/dL (15 Ole 2022 18:01)  POCT Blood Glucose.: 219 mg/dL (15 Ole 2022 12:28)  POCT Blood Glucose.: 180 mg/dL (15 Ole 2022 09:06)     Dr. Chana Douglass, PGY-1    Patient is a 77y old  Male who presents with a chief complaint of AMS/hyperglycemia (15 Ole 2022 14:33)    24-HR EVENTS/SUBJECTIVE: No acute events overnight. Patient seen and examined at bedside this AM. Denies headache, chest pain, abdominal pain, cough, n/v, sob. Breathing comfortably on room air. Awake, alert, AOx3. Hypoglycemic to 64 this AM, drank apple juice and consumed 100% breakfast.      MEDICATIONS  (STANDING):  atorvastatin 40 milliGRAM(s) Oral at bedtime  dextrose 5%. 1000 milliLiter(s) (50 mL/Hr) IV Continuous <Continuous>  dextrose 5%. 1000 milliLiter(s) (100 mL/Hr) IV Continuous <Continuous>  dextrose 50% Injectable 25 Gram(s) IV Push once  dextrose 50% Injectable 12.5 Gram(s) IV Push once  dextrose 50% Injectable 25 Gram(s) IV Push once  diVALproex  milliGRAM(s) Oral two times a day  folic acid 1 milliGRAM(s) Oral daily  glucagon  Injectable 1 milliGRAM(s) IntraMuscular once  insulin glargine Injectable (LANTUS) 16 Unit(s) SubCutaneous at bedtime  insulin lispro (ADMELOG) corrective regimen sliding scale   SubCutaneous three times a day before meals  insulin lispro (ADMELOG) corrective regimen sliding scale   SubCutaneous at bedtime  insulin lispro Injectable (ADMELOG) 6 Unit(s) SubCutaneous three times a day before meals  multivitamin 1 Tablet(s) Oral daily  thiamine 100 milliGRAM(s) Oral daily    MEDICATIONS  (PRN):  acetaminophen     Tablet .. 650 milliGRAM(s) Oral every 6 hours PRN Temp greater or equal to 38C (100.4F), Mild Pain (1 - 3)  dextrose Oral Gel 15 Gram(s) Oral once PRN Blood Glucose LESS THAN 70 milliGRAM(s)/deciliter  melatonin 3 milliGRAM(s) Oral at bedtime PRN Insomnia        Objective:     Vitals: Vital Signs Last 24 Hrs  T(C): 36.8 (06-16-22 @ 06:46), Max: 36.9 (06-15-22 @ 23:26)  T(F): 98.2 (06-16-22 @ 06:46), Max: 98.5 (06-15-22 @ 23:26)  HR: 84 (06-16-22 @ 06:46) (81 - 96)  BP: 112/78 (06-16-22 @ 06:46) (112/78 - 125/86)  BP(mean): --  RR: 17 (06-16-22 @ 06:46) (17 - 17)  SpO2: 100% (06-16-22 @ 06:46) (98% - 100%)            I&O's Summary    15 Ole 2022 07:01  -  16 Jun 2022 07:00  --------------------------------------------------------  IN: 480 mL / OUT: 0 mL / NET: 480 mL        PHYSICAL EXAM:  GENERAL: NAD, well-developed  HEAD:  Atraumatic, Normocephalic  EYES: EOMI, PERRLA, conjunctiva and sclera deep red. Swelling of Right eyelid   ENMT: Moist mucous membranes, poor dentition   NECK: Supple  CHEST/LUNG: Clear to auscultation bilaterally; No rales, rhonchi, wheezing, or rubs  HEART: Regular rate and rhythm; No murmurs, rubs, or gallops  ABDOMEN: Soft, Nontender, Nondistended; Bowel sounds present  EXTREMITIES:  2+ Peripheral Pulses, No clubbing, cyanosis, or edema  NERVOUS SYSTEM:  Alert & Oriented X4, NAVARRETE spontaneously, poor concentration    PSYCH: Speaking in Full Sentences. Laying in bed comfortably; not agitated.     LABS:                        11.2   6.52  )-----------( 403      ( 15 Ole 2022 14:20 )             35.5                         9.6    4.65  )-----------( 376      ( 14 Jun 2022 07:35 )             29.5     Hgb Trend: 11.2<--, 9.6<--, 9.2<--, 11.0<--, 10.0<--  06-14    136  |  103  |  19  ----------------------------<  221<H>  4.1   |  23  |  1.13    Ca    8.8      14 Jun 2022 07:35    TPro  5.9<L>  /  Alb  3.2<L>  /  TBili  0.2  /  DBili  x   /  AST  16  /  ALT  18  /  AlkPhos  105  06-14    Creatinine Trend: 1.13<--, 1.24<--, 1.10<--, 1.30<--, 1.04<--                    CAPILLARY BLOOD GLUCOSE      POCT Blood Glucose.: 123 mg/dL (15 Ole 2022 22:24)  POCT Blood Glucose.: 223 mg/dL (15 Ole 2022 18:01)  POCT Blood Glucose.: 219 mg/dL (15 Ole 2022 12:28)  POCT Blood Glucose.: 180 mg/dL (15 Ole 2022 09:06)

## 2022-06-16 NOTE — ADVANCED PRACTICE NURSE CONSULT - REASON FOR CONSULT
78 y/o M with PMH of DM2, HTN, bifrontal SAH, a R SDH, a L parietal SAH with pneumocephalus, a L possible parietal non-displaced skull fracture, and a C6 inferior endplate fracture into disc space with air into the canal in 2021 who presents with AMS and hyperglycemia. Endocrinology consulted for uncontrolled DM. The pharmacist note was reviewed. Patient was unable to correctly draw up insulin with a syringe. Patient does not have any family that can help him manage his diabetes. Patient referred to diabetes educator for ability to use insulin pen and self-manage diabetes.

## 2022-06-16 NOTE — BH CONSULTATION LIAISON ASSESSMENT NOTE - NSBHCHARTREVIEWLAB_PSY_A_CORE FT
11.3   9.92  )-----------( 259      ( 10 Jul 2021 20:05 )             34.0     07-10    132<L>  |  100  |  19  ----------------------------<  111<H>  4.3   |  16<L>  |  0.93    Ca    9.2      10 Jul 2021 20:05  Phos  3.6     07-10  Mg     1.3     07-10    TPro  7.5  /  Alb  4.4  /  TBili  0.2  /  DBili  x   /  AST  29  /  ALT  13  /  AlkPhos  127<H>  07-09    Urinalysis Basic - ( 10 Jul 2021 01:10 )    Color: Colorless / Appearance: Clear / S.009 / pH: x  Gluc: x / Ketone: Trace  / Bili: Negative / Urobili: Negative   Blood: x / Protein: Negative / Nitrite: Negative   Leuk Esterase: Negative / RBC: x / WBC x   Sq Epi: x / Non Sq Epi: x / Bacteria: x                           11.2   6.52  )-----------( 403      ( 15 Ole 2022 14:20 )             35.5   06-16    135  |  100  |  19  ----------------------------<  51<LL>  3.9   |  21<L>  |  1.22    Ca    9.4      16 Jun 2022 07:05  Phos  3.8     06-16  Mg     1.60     06-16

## 2022-06-16 NOTE — BH CONSULTATION LIAISON ASSESSMENT NOTE - NSBHCHARTREVIEWVS_PSY_A_CORE FT
Vital Signs Last 24 Hrs  T(C): 36.9 (16 Jun 2022 14:37), Max: 36.9 (15 Ole 2022 23:26)  T(F): 98.4 (16 Jun 2022 14:37), Max: 98.5 (15 Ole 2022 23:26)  HR: 85 (16 Jun 2022 14:37) (84 - 96)  BP: 126/84 (16 Jun 2022 14:37) (112/78 - 126/84)  BP(mean): --  RR: 17 (16 Jun 2022 14:37) (17 - 17)  SpO2: 98% (16 Jun 2022 14:37) (98% - 100%)

## 2022-06-16 NOTE — PROGRESS NOTE ADULT - PROBLEM SELECTOR PLAN 3
- h/o trauma in 2021 with bifrontal SAH, a R SDH, a L parietal SAH with pneumocephalus, a L possible parietal non-displaced skull fracture, and a C6 inferior endplate fracture into disc space with air into the canal  - Patient denies headaches, nausea, vomiting, incontinence, or gait instability. Ambulates without assistive devices   - PT recommends home without skilled needs  - Neuro and neurosurgery recs appreciated   - Continue Depakote 500mg BID for seizure ppx  - F/u Depakote level in AM - h/o trauma in 2021 with bifrontal SAH, a R SDH, a L parietal SAH with pneumocephalus, a L possible parietal non-displaced skull fracture, and a C6 inferior endplate fracture into disc space with air into the canal  - Patient denies headaches, nausea, vomiting, incontinence, or gait instability. Ambulates without assistive devices   - PT recommends home without skilled needs  - Neuro and neurosurgery recs appreciated   - Lowered Depakote to 250mg BID for seizure ppx

## 2022-06-16 NOTE — BH CONSULTATION LIAISON ASSESSMENT NOTE - DESCRIPTION
, lives with wife, employed working on cars, has 8 children , living in shelter as he has been kicked out of the home  8 children

## 2022-06-16 NOTE — BH CONSULTATION LIAISON ASSESSMENT NOTE - RISK ASSESSMENT
risk: male gender, irritable, substance use  Protective: no si or hi, family involvement, no hx of sa

## 2022-06-16 NOTE — PROGRESS NOTE ADULT - SUBJECTIVE AND OBJECTIVE BOX
Interval event: hypoglycemic to 51 on BMP. Got juice and glucose improved    Subjective: feeling sleepy. Otherwise feeling okay. Denies sweating or palpitation.     MEDICATIONS  (STANDING):  atorvastatin 40 milliGRAM(s) Oral at bedtime  dextrose 5%. 1000 milliLiter(s) (100 mL/Hr) IV Continuous <Continuous>  dextrose 5%. 1000 milliLiter(s) (50 mL/Hr) IV Continuous <Continuous>  dextrose 50% Injectable 25 Gram(s) IV Push once  dextrose 50% Injectable 12.5 Gram(s) IV Push once  dextrose 50% Injectable 25 Gram(s) IV Push once  diVALproex  milliGRAM(s) Oral two times a day  folic acid 1 milliGRAM(s) Oral daily  glucagon  Injectable 1 milliGRAM(s) IntraMuscular once  insulin glargine Injectable (LANTUS) 14 Unit(s) SubCutaneous at bedtime  insulin lispro (ADMELOG) corrective regimen sliding scale   SubCutaneous three times a day before meals  insulin lispro (ADMELOG) corrective regimen sliding scale   SubCutaneous at bedtime  insulin lispro Injectable (ADMELOG) 5 Unit(s) SubCutaneous with breakfast  insulin lispro Injectable (ADMELOG) 6 Unit(s) SubCutaneous before dinner  multivitamin 1 Tablet(s) Oral daily  thiamine 100 milliGRAM(s) Oral daily    MEDICATIONS  (PRN):  acetaminophen     Tablet .. 650 milliGRAM(s) Oral every 6 hours PRN Temp greater or equal to 38C (100.4F), Mild Pain (1 - 3)  dextrose Oral Gel 15 Gram(s) Oral once PRN Blood Glucose LESS THAN 70 milliGRAM(s)/deciliter  melatonin 3 milliGRAM(s) Oral at bedtime PRN Insomnia      Allergies    Allergy Status Unknown  No Known Allergies    Intolerances    PHYSICAL EXAM:  VITALS: T(C): 36.9 (06-16-22 @ 14:37)  T(F): 98.4 (06-16-22 @ 14:37), Max: 98.5 (06-15-22 @ 23:26)  HR: 85 (06-16-22 @ 14:37) (84 - 96)  BP: 126/84 (06-16-22 @ 14:37) (112/78 - 126/84)  RR:  (17 - 17)  SpO2:  (98% - 100%)  Wt(kg): --  GENERAL: NAD, sleeping in bed, arousable to voice  EYES: No proptosis, no lid lag, anicteric  HEENT:  Atraumatic, Normocephalic  RESPIRATORY: nonlabored breathing  CARDIOVASCULAR: Regular rate and rhythm; No murmurs; no peripheral edema  GI: Soft, nontender, non distended  MUSCULOSKELETAL: moving all extremities  PSYCH: Alert and oriented x 3, normal affect, normal mood    CAPILLARY BLOOD GLUCOSE      POCT Blood Glucose.: 296 mg/dL (16 Jun 2022 13:00)  POCT Blood Glucose.: 110 mg/dL (16 Jun 2022 10:08)  POCT Blood Glucose.: 73 mg/dL (16 Jun 2022 09:30)  POCT Blood Glucose.: 64 mg/dL (16 Jun 2022 09:06)  POCT Blood Glucose.: 53 mg/dL (16 Jun 2022 09:04)  POCT Blood Glucose.: 123 mg/dL (15 Ole 2022 22:24)  POCT Blood Glucose.: 223 mg/dL (15 Ole 2022 18:01)      06-16    135  |  100  |  19  ----------------------------<  51<LL>  3.9   |  21<L>  |  1.22    eGFR: 62    Ca    9.4      06-16  Mg     1.60     06-16  Phos  3.8     06-16    TPro  5.9<L>  /  Alb  3.2<L>  /  TBili  0.2  /  DBili  x   /  AST  16  /  ALT  18  /  AlkPhos  105  06-14      A1C with Estimated Average Glucose Result: 15.5 % (06-12-22 @ 11:00)  A1C with Estimated Average Glucose Result: 16.1 % (06-12-22 @ 11:00)  A1C with Estimated Average Glucose Result: 7.3 % (07-10-21 @ 10:16)      Thyroid Function Tests:  06-11 @ 23:50 TSH 1.16 FreeT4 -- T3 -- Anti TPO -- Anti Thyroglobulin Ab -- TSI --

## 2022-06-16 NOTE — PROVIDER CONTACT NOTE (HYPOGLYCEMIA EVENT) - NS PROVIDER CONTACT BACKGROUND-HYPO
Age: 77y    Gender: Male    POCT Blood Glucose:  110 mg/dL (06-16-22 @ 10:08)  73 mg/dL (06-16-22 @ 09:30)  64 mg/dL (06-16-22 @ 09:06)  53 mg/dL (06-16-22 @ 09:04)  123 mg/dL (06-15-22 @ 22:24)  223 mg/dL (06-15-22 @ 18:01)  219 mg/dL (06-15-22 @ 12:28)      eMAR:atorvastatin   40 milliGRAM(s) Oral (06-15-22 @ 22:42)    insulin glargine Injectable (LANTUS)   16 Unit(s) SubCutaneous (06-15-22 @ 22:42)    insulin lispro (ADMELOG) corrective regimen sliding scale   2 Unit(s) SubCutaneous (06-15-22 @ 18:09)   2 Unit(s) SubCutaneous (06-15-22 @ 13:21)    insulin lispro Injectable (ADMELOG)   5 Unit(s) SubCutaneous (06-15-22 @ 13:20)    insulin lispro Injectable (ADMELOG)   6 Unit(s) SubCutaneous (06-15-22 @ 18:10)

## 2022-06-16 NOTE — BH CONSULTATION LIAISON ASSESSMENT NOTE - SUMMARY
PLAN  - VPA level 69 - patient being followed by neuro who decreased Depakote to 250mg BID  -- LFTs and platelets WNL  -- Depakote can aid with mood/ irritability - agree with use of this medication for dual benefits   - defer observation to primary team  - PRN for agitation: zyprexa 1.25mg q6hrs if qtc < 500  - delirium work up? - b12, folate, tsh  - SW involvement for placement Patient is a 76 y/o M with PMH of DM2, bifrontal SAH, a R SDH, a L parietal SAH with pneumocephalus, a L possible parietal non-displaced skull fracture, and a C6 inferior endplate fracture into disc space with air into the canal in 2021 who presents with AMS and hyperglycemia. Patient comes from a shelter, was previously living with his wife until recently when she kicked him out of the home. Spoke with Mercedes (daughter) - wife had patient move out as he had become threatening to her with verbal abuse. Daughter repots patient was consistently yelling at his wife, belligerent, drunk and it was not conducive to him living there. Mercedes was called the other day as patient was found on the street by his friend with his bags, with an infected eye and altered when she decided to bring him to [facility]. Mercedes also notes patient has been an alcoholic for many years with labile mood. No formal dx as patient refuses to see a psychiatrist. Patient had head trauma in 2021, since he has been more irritable and more confused. Unable to care for self.  Psychiatry called for agitation.     PLAN  - VPA level 69 - patient being followed by neuro who decreased Depakote to 250mg BID  --- LFTs and platelets WNL  --- Depakote can aid with mood/ irritability - agree with use of this medication for dual benefits   - thiamine 500mg IV TID x 3 days  - defer observation to primary team  - PRN for agitation: zyprexa 1.25mg q6hrs if qtc < 500  - delirium work up? dementia? - b12, folate, tsh  -- neuro involved - daughter reporting patient more forgetful, confused   - SW involvement for placement

## 2022-06-16 NOTE — PROGRESS NOTE ADULT - PROBLEM SELECTOR PLAN 2
- AOx2 to person and place   - Ddx: metabolic most likely (hyperglycemic, hyponatremic on admission) vs. infectious vs. structural vs. 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 infectious vs. structural vs. 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

## 2022-06-16 NOTE — BH CONSULTATION LIAISON ASSESSMENT NOTE - CURRENT MEDICATION
MEDICATIONS  (STANDING):  atorvastatin 40 milliGRAM(s) Oral at bedtime  dextrose 5%. 1000 milliLiter(s) (100 mL/Hr) IV Continuous <Continuous>  dextrose 5%. 1000 milliLiter(s) (50 mL/Hr) IV Continuous <Continuous>  dextrose 50% Injectable 25 Gram(s) IV Push once  dextrose 50% Injectable 12.5 Gram(s) IV Push once  dextrose 50% Injectable 25 Gram(s) IV Push once  diVALproex  milliGRAM(s) Oral two times a day  folic acid 1 milliGRAM(s) Oral daily  glucagon  Injectable 1 milliGRAM(s) IntraMuscular once  insulin glargine Injectable (LANTUS) 14 Unit(s) SubCutaneous at bedtime  insulin lispro (ADMELOG) corrective regimen sliding scale   SubCutaneous three times a day before meals  insulin lispro (ADMELOG) corrective regimen sliding scale   SubCutaneous at bedtime  insulin lispro Injectable (ADMELOG) 5 Unit(s) SubCutaneous with breakfast  insulin lispro Injectable (ADMELOG) 6 Unit(s) SubCutaneous before dinner  multivitamin 1 Tablet(s) Oral daily  thiamine 100 milliGRAM(s) Oral daily    MEDICATIONS  (PRN):  acetaminophen     Tablet .. 650 milliGRAM(s) Oral every 6 hours PRN Temp greater or equal to 38C (100.4F), Mild Pain (1 - 3)  dextrose Oral Gel 15 Gram(s) Oral once PRN Blood Glucose LESS THAN 70 milliGRAM(s)/deciliter  melatonin 3 milliGRAM(s) Oral at bedtime PRN Insomnia

## 2022-06-17 ENCOUNTER — TRANSCRIPTION ENCOUNTER (OUTPATIENT)
Age: 77
End: 2022-06-17

## 2022-06-17 LAB
GLUCOSE BLDC GLUCOMTR-MCNC: 121 MG/DL — HIGH (ref 70–99)
GLUCOSE BLDC GLUCOMTR-MCNC: 251 MG/DL — HIGH (ref 70–99)
GLUCOSE BLDC GLUCOMTR-MCNC: 284 MG/DL — HIGH (ref 70–99)
GLUCOSE BLDC GLUCOMTR-MCNC: 390 MG/DL — HIGH (ref 70–99)

## 2022-06-17 PROCEDURE — 99232 SBSQ HOSP IP/OBS MODERATE 35: CPT

## 2022-06-17 RX ORDER — NEOMYCIN/BACIT/P-MYX/HYDROCORT 3.5-10K-1
1 OINTMENT (GRAM) OPHTHALMIC (EYE)
Refills: 0 | Status: DISCONTINUED | OUTPATIENT
Start: 2022-06-17 | End: 2022-06-17

## 2022-06-17 RX ORDER — BACITRACIN ZINC AND POLYMYXIN B SULFATE 500; 10000 [USP'U]/G; [USP'U]/G
1 OINTMENT OPHTHALMIC
Refills: 0 | Status: DISCONTINUED | OUTPATIENT
Start: 2022-06-17 | End: 2022-06-23

## 2022-06-17 RX ORDER — ENOXAPARIN SODIUM 100 MG/ML
40 INJECTION SUBCUTANEOUS EVERY 24 HOURS
Refills: 0 | Status: DISCONTINUED | OUTPATIENT
Start: 2022-06-17 | End: 2022-06-19

## 2022-06-17 RX ORDER — INSULIN LISPRO 100/ML
6 VIAL (ML) SUBCUTANEOUS
Refills: 0 | Status: DISCONTINUED | OUTPATIENT
Start: 2022-06-18 | End: 2022-06-18

## 2022-06-17 RX ADMIN — Medication 5 UNIT(S): at 12:56

## 2022-06-17 RX ADMIN — DIVALPROEX SODIUM 250 MILLIGRAM(S): 500 TABLET, DELAYED RELEASE ORAL at 05:51

## 2022-06-17 RX ADMIN — ATORVASTATIN CALCIUM 40 MILLIGRAM(S): 80 TABLET, FILM COATED ORAL at 22:14

## 2022-06-17 RX ADMIN — Medication 5 UNIT(S): at 09:21

## 2022-06-17 RX ADMIN — Medication 3: at 22:14

## 2022-06-17 RX ADMIN — DIVALPROEX SODIUM 250 MILLIGRAM(S): 500 TABLET, DELAYED RELEASE ORAL at 18:33

## 2022-06-17 RX ADMIN — INSULIN GLARGINE 14 UNIT(S): 100 INJECTION, SOLUTION SUBCUTANEOUS at 22:15

## 2022-06-17 RX ADMIN — Medication 105 MILLIGRAM(S): at 14:02

## 2022-06-17 RX ADMIN — BACITRACIN ZINC AND POLYMYXIN B SULFATE 1 APPLICATION(S): 500; 10000 OINTMENT OPHTHALMIC at 19:49

## 2022-06-17 RX ADMIN — ENOXAPARIN SODIUM 40 MILLIGRAM(S): 100 INJECTION SUBCUTANEOUS at 18:32

## 2022-06-17 RX ADMIN — Medication 1 TABLET(S): at 12:58

## 2022-06-17 RX ADMIN — Medication 1 MILLIGRAM(S): at 12:58

## 2022-06-17 RX ADMIN — Medication 3: at 12:56

## 2022-06-17 RX ADMIN — Medication 6 UNIT(S): at 18:31

## 2022-06-17 RX ADMIN — Medication 3: at 18:30

## 2022-06-17 NOTE — PROGRESS NOTE ADULT - PROBLEM SELECTOR PLAN 3
- h/o trauma in 2021 with bifrontal SAH, a R SDH, a L parietal SAH with pneumocephalus, a L possible parietal non-displaced skull fracture, and a C6 inferior endplate fracture into disc space with air into the canal  - Patient denies headaches, nausea, vomiting, incontinence, or gait instability. Ambulates without assistive devices   - PT recommends home without skilled needs  - Neuro and neurosurgery recs appreciated   - Lowered Depakote to 250mg BID for seizure ppx

## 2022-06-17 NOTE — PROGRESS NOTE ADULT - PROBLEM SELECTOR PLAN 6
DVT ppx: Lovenox  Diet: Regular, CC   Dispo: Homeless, needs  consult for placement  No psychiatric contraindication to discharge, can f/u psych outpatient

## 2022-06-17 NOTE — PROVIDER CONTACT NOTE (OTHER) - ASSESSMENT
Patient alert with confusion and forgetfulness. Patient observed with purulent drainage and swelling to both eyes. Denies pain/discomfort

## 2022-06-17 NOTE — PROGRESS NOTE ADULT - SUBJECTIVE AND OBJECTIVE BOX
[facility]      Seen earlier today, and examined.  - Today, patient is without complaints.           *****MEDICATIONS: Current medication reviewed and documented.    MEDICATIONS  (STANDING):  atorvastatin 40 milliGRAM(s) Oral at bedtime  bacitracin/polymyxin B Ophthalmic Ointment 1 Application(s) Both EYES two times a day  dextrose 5%. 1000 milliLiter(s) (100 mL/Hr) IV Continuous <Continuous>  dextrose 5%. 1000 milliLiter(s) (50 mL/Hr) IV Continuous <Continuous>  dextrose 50% Injectable 25 Gram(s) IV Push once  dextrose 50% Injectable 12.5 Gram(s) IV Push once  dextrose 50% Injectable 25 Gram(s) IV Push once  diVALproex  milliGRAM(s) Oral two times a day  enoxaparin Injectable 40 milliGRAM(s) SubCutaneous every 24 hours  folic acid 1 milliGRAM(s) Oral daily  glucagon  Injectable 1 milliGRAM(s) IntraMuscular once  insulin glargine Injectable (LANTUS) 14 Unit(s) SubCutaneous at bedtime  insulin lispro (ADMELOG) corrective regimen sliding scale   SubCutaneous three times a day before meals  insulin lispro (ADMELOG) corrective regimen sliding scale   SubCutaneous at bedtime  insulin lispro Injectable (ADMELOG) 5 Unit(s) SubCutaneous before lunch  insulin lispro Injectable (ADMELOG) 6 Unit(s) SubCutaneous before dinner  multivitamin 1 Tablet(s) Oral daily  thiamine IVPB 500 milliGRAM(s) IV Intermittent daily    MEDICATIONS  (PRN):  acetaminophen     Tablet .. 650 milliGRAM(s) Oral every 6 hours PRN Temp greater or equal to 38C (100.4F), Mild Pain (1 - 3)  dextrose Oral Gel 15 Gram(s) Oral once PRN Blood Glucose LESS THAN 70 milliGRAM(s)/deciliter  melatonin 3 milliGRAM(s) Oral at bedtime PRN Insomnia          ***** VITAL SIGNS:  T(F): 98.8 (22 @ 22:39), Max: 98.8 (22 @ 01:40)  HR: 95 (22 @ 22:39) (84 - 95)  BP: 139/65 (22 @ 22:39) (109/68 - 139/65)  RR: 18 (22 @ 22:39) (18 - 19)  SpO2: 100% (22 @ 22:39) (98% - 100%)  Wt(kg): --  ,   I&O's Summary    2022 07:01  -  2022 07:00  --------------------------------------------------------  IN: 500 mL / OUT: 600 mL / NET: -100 mL    2022 07:01  -  2022 23:06  --------------------------------------------------------  IN: 1060 mL / OUT: 1150 mL / NET: -90 mL             *****PHYSICAL EXAM:sleeping  not cooperative with full exam            *****LAB AND IMAGIN-16    135  |  100  |  19  ----------------------------<  51<LL>  3.9   |  21<L>  |  1.22    Ca    9.4      2022 07:05  Phos  3.8       Mg     1.60                                [All pertinent recent Imaging/Reports reviewed]           *****A S S E S S M E N T   A N D   P L A N :     Excerpt from H&P,"   78 y/o M with PMH of DM2, bifrontal SAH, a R SDH, a L parietal SAH with pneumocephalus, a L possible parietal non-displaced skull fracture, and a C6 inferior endplate fracture into disc space with air into the canal in  who presents with AMS and hyperglycemia. Patient seen and examined. He is AAO to self and place. Does not know date (day, month or year). Says he does not have a place to live currently. Per ED documentation he was kicked out of his home last year after being agitated and aggressive with his family. He states that he takes metformin however unclear compliance. Patient today wants to eat. Denies any chest pain, abdominal pain, SOB, fevers or chills.      Problem/Recommendations 1:  ams of unclear etiology   likely related to hyperglycemia , hyponatremia on admission  compliance to medication is questioned   pt does not have any overt signs of nph, although ct suggestive, likely related to prior tbi, doubt any acute porcess   denies headache, visual changes, sunsetting not appreciated   pt eval   depakote restarted for sz prophylaxis, coincedentally may help with agitation.      will get depakote level in am   given low albumin lower depakote 250 bid   tolerating med     oob to chair in am   Thank you for allowing         Thank you for allowing me to participate in the care of this patient. Will continue to follow patient periodically. Please do not hesitate to call me if you have any  questions or if there has been a change in patients neurological status     ________________  Radha Alexander MD  [facility] ([facility]  [phone]      33 minutes spent on total encounter; more than 50 % of the visit was  spent counseling about plan of care, compliance to diet/exercise and medication regimen and or  coordinating care by the attending physician.      It is advised that stroke patients follow up with GILL Chandra @ [phone] in 1- 2 weeks.   Others please follow up with Dr. Michael Nissenbaum [phone]

## 2022-06-17 NOTE — PROGRESS NOTE ADULT - SUBJECTIVE AND OBJECTIVE BOX
Chief Complaint: DM2    History: tolerating po  no hypoglycemia events noted    MEDICATIONS  (STANDING):  atorvastatin 40 milliGRAM(s) Oral at bedtime  dextrose 5%. 1000 milliLiter(s) (100 mL/Hr) IV Continuous <Continuous>  dextrose 5%. 1000 milliLiter(s) (50 mL/Hr) IV Continuous <Continuous>  dextrose 50% Injectable 25 Gram(s) IV Push once  dextrose 50% Injectable 12.5 Gram(s) IV Push once  dextrose 50% Injectable 25 Gram(s) IV Push once  diVALproex  milliGRAM(s) Oral two times a day  enoxaparin Injectable 40 milliGRAM(s) SubCutaneous every 24 hours  folic acid 1 milliGRAM(s) Oral daily  glucagon  Injectable 1 milliGRAM(s) IntraMuscular once  insulin glargine Injectable (LANTUS) 14 Unit(s) SubCutaneous at bedtime  insulin lispro (ADMELOG) corrective regimen sliding scale   SubCutaneous three times a day before meals  insulin lispro (ADMELOG) corrective regimen sliding scale   SubCutaneous at bedtime  insulin lispro Injectable (ADMELOG) 5 Unit(s) SubCutaneous with breakfast  insulin lispro Injectable (ADMELOG) 5 Unit(s) SubCutaneous before lunch  insulin lispro Injectable (ADMELOG) 6 Unit(s) SubCutaneous before dinner  multivitamin 1 Tablet(s) Oral daily  thiamine IVPB 500 milliGRAM(s) IV Intermittent daily    MEDICATIONS  (PRN):  acetaminophen     Tablet .. 650 milliGRAM(s) Oral every 6 hours PRN Temp greater or equal to 38C (100.4F), Mild Pain (1 - 3)  dextrose Oral Gel 15 Gram(s) Oral once PRN Blood Glucose LESS THAN 70 milliGRAM(s)/deciliter  melatonin 3 milliGRAM(s) Oral at bedtime PRN Insomnia      Allergies    Allergy Status Unknown  No Known Allergies    Intolerances      Review of Systems:      ALL OTHER SYSTEMS REVIEWED AND NEGATIVE      PHYSICAL EXAM:  VITALS: T(C): 37 (06-17-22 @ 06:12)  T(F): 98.6 (06-17-22 @ 06:12), Max: 99.5 (06-16-22 @ 21:51)  HR: 84 (06-17-22 @ 06:12) (84 - 97)  BP: 110/76 (06-17-22 @ 06:12) (109/68 - 126/84)  RR:  (17 - 19)  SpO2:  (98% - 98%)  Wt(kg): --  GENERAL: NAD, appears comfortable  EYES: No proptosis, no lid lag, anicteric  HEENT:  Atraumatic, Normocephalic, moist mucous membranes  RESPIRATORY: nonlabored respirations, no wheezing  PSYCH: Alert and oriented x 3, normal affect, normal mood    CAPILLARY BLOOD GLUCOSE      POCT Blood Glucose.: 251 mg/dL (17 Jun 2022 12:52)  POCT Blood Glucose.: 121 mg/dL (17 Jun 2022 08:50)  POCT Blood Glucose.: 101 mg/dL (16 Jun 2022 22:17)  POCT Blood Glucose.: 267 mg/dL (16 Jun 2022 17:51)      06-16    135  |  100  |  19  ----------------------------<  51<LL>  3.9   |  21<L>  |  1.22    eGFR: 62    Ca    9.4      06-16  Mg     1.60     06-16  Phos  3.8     06-16        A1C with Estimated Average Glucose Result: 15.5 % (06-12-22 @ 11:00)  A1C with Estimated Average Glucose Result: 16.1 % (06-12-22 @ 11:00)  A1C with Estimated Average Glucose Result: 7.3 % (07-10-21 @ 10:16)      Thyroid Function Tests:  06-11 @ 23:50 TSH 1.16 FreeT4 -- T3 -- Anti TPO -- Anti Thyroglobulin Ab -- TSI --

## 2022-06-17 NOTE — PROGRESS NOTE ADULT - PROBLEM SELECTOR PLAN 5
- H/o alcohol abuse, though patient denies recent alcohol use (last drink 1 month ago)   - Tox negative for alcohol   - Symptom-triggered CIWA protocol started on 6/12   - D/c CIWA on 6/15 as patient has been scoring 0 and has not required PRN Ativan for 3 days  - Continue multivitamin, thiamine, and folate

## 2022-06-17 NOTE — PROGRESS NOTE ADULT - SUBJECTIVE AND OBJECTIVE BOX
Patient is a 77y old  Male who presents with a chief complaint of AMS/hyperglycemia (17 Jun 2022 14:29)      INTERVAL HPI/OVERNIGHT EVENTS:  T(C): 37.1 (06-17-22 @ 22:39), Max: 37.1 (06-17-22 @ 01:40)  HR: 95 (06-17-22 @ 22:39) (84 - 95)  BP: 139/65 (06-17-22 @ 22:39) (109/68 - 139/65)  RR: 18 (06-17-22 @ 22:39) (18 - 19)  SpO2: 100% (06-17-22 @ 22:39) (98% - 100%)  Wt(kg): --  I&O's Summary    16 Jun 2022 07:01  -  17 Jun 2022 07:00  --------------------------------------------------------  IN: 500 mL / OUT: 600 mL / NET: -100 mL    17 Jun 2022 07:01  -  17 Jun 2022 23:28  --------------------------------------------------------  IN: 1060 mL / OUT: 1150 mL / NET: -90 mL        PAST MEDICAL & SURGICAL HISTORY:  DM (diabetes mellitus)      No significant past surgical history          SOCIAL HISTORY  Alcohol:  Tobacco:  Illicit substance use:    FAMILY HISTORY:    REVIEW OF SYSTEMS:  CONSTITUTIONAL: No fever, weight loss, or fatigue  EYES: No eye pain, visual disturbances, or discharge  ENMT:  No difficulty hearing, tinnitus, vertigo; No sinus or throat pain  NECK: No pain or stiffness  RESPIRATORY: No cough, wheezing, chills or hemoptysis; No shortness of breath  CARDIOVASCULAR: No chest pain, palpitations, dizziness, or leg swelling  GASTROINTESTINAL: No abdominal or epigastric pain. No nausea, vomiting, or hematemesis; No diarrhea or constipation. No melena or hematochezia.  GENITOURINARY: No dysuria, frequency, hematuria, or incontinence  NEUROLOGICAL: No headaches, memory loss, loss of strength, numbness, or tremors  SKIN: No itching, burning, rashes, or lesions   LYMPH NODES: No enlarged glands  ENDOCRINE: No heat or cold intolerance; No hair loss  MUSCULOSKELETAL: No joint pain or swelling; No muscle, back, or extremity pain  PSYCHIATRIC: No depression, anxiety, mood swings, or difficulty sleeping  HEME/LYMPH: No easy bruising, or bleeding gums  ALLERY AND IMMUNOLOGIC: No hives or eczema    RADIOLOGY & ADDITIONAL TESTS:    Imaging Personally Reviewed:  [ ] YES  [ ] NO    Consultant(s) Notes Reviewed:  [ ] YES  [ ] NO    PHYSICAL EXAM:  GENERAL: NAD, well-groomed, well-developed  HEAD:  Atraumatic, Normocephalic  EYES: EOMI, PERRLA, conjunctiva and sclera clear  ENMT: No tonsillar erythema, exudates, or enlargement; Moist mucous membranes, Good dentition, No lesions  NECK: Supple, No JVD, Normal thyroid  NERVOUS SYSTEM:  Alert & Oriented X3, Good concentration; Motor Strength 5/5 B/L upper and lower extremities; DTRs 2+ intact and symmetric  CHEST/LUNG: Clear to percussion bilaterally; No rales, rhonchi, wheezing, or rubs  HEART: Regular rate and rhythm; No murmurs, rubs, or gallops  ABDOMEN: Soft, Nontender, Nondistended; Bowel sounds present  EXTREMITIES:  2+ Peripheral Pulses, No clubbing, cyanosis, or edema  LYMPH: No lymphadenopathy noted  SKIN: No rashes or lesions    LABS:    06-16    135  |  100  |  19  ----------------------------<  51<LL>  3.9   |  21<L>  |  1.22    Ca    9.4      16 Jun 2022 07:05  Phos  3.8     06-16  Mg     1.60     06-16          CAPILLARY BLOOD GLUCOSE      POCT Blood Glucose.: 390 mg/dL (17 Jun 2022 22:07)  POCT Blood Glucose.: 284 mg/dL (17 Jun 2022 17:58)  POCT Blood Glucose.: 251 mg/dL (17 Jun 2022 12:52)  POCT Blood Glucose.: 121 mg/dL (17 Jun 2022 08:50)            MEDICATIONS  (STANDING):  atorvastatin 40 milliGRAM(s) Oral at bedtime  bacitracin/polymyxin B Ophthalmic Ointment 1 Application(s) Both EYES two times a day  dextrose 5%. 1000 milliLiter(s) (100 mL/Hr) IV Continuous <Continuous>  dextrose 5%. 1000 milliLiter(s) (50 mL/Hr) IV Continuous <Continuous>  dextrose 50% Injectable 25 Gram(s) IV Push once  dextrose 50% Injectable 12.5 Gram(s) IV Push once  dextrose 50% Injectable 25 Gram(s) IV Push once  diVALproex  milliGRAM(s) Oral two times a day  enoxaparin Injectable 40 milliGRAM(s) SubCutaneous every 24 hours  folic acid 1 milliGRAM(s) Oral daily  glucagon  Injectable 1 milliGRAM(s) IntraMuscular once  insulin glargine Injectable (LANTUS) 14 Unit(s) SubCutaneous at bedtime  insulin lispro (ADMELOG) corrective regimen sliding scale   SubCutaneous three times a day before meals  insulin lispro (ADMELOG) corrective regimen sliding scale   SubCutaneous at bedtime  insulin lispro Injectable (ADMELOG) 5 Unit(s) SubCutaneous before lunch  insulin lispro Injectable (ADMELOG) 6 Unit(s) SubCutaneous before dinner  multivitamin 1 Tablet(s) Oral daily  thiamine IVPB 500 milliGRAM(s) IV Intermittent daily    MEDICATIONS  (PRN):  acetaminophen     Tablet .. 650 milliGRAM(s) Oral every 6 hours PRN Temp greater or equal to 38C (100.4F), Mild Pain (1 - 3)  dextrose Oral Gel 15 Gram(s) Oral once PRN Blood Glucose LESS THAN 70 milliGRAM(s)/deciliter  melatonin 3 milliGRAM(s) Oral at bedtime PRN Insomnia      Care Discussed with Consultants/Other Providers [ ] YES  [ ] NO

## 2022-06-17 NOTE — DISCHARGE NOTE PROVIDER - CARE PROVIDER_API CALL
Enrique [place]y)  [place] [address]  Phone: [phone]  Fax: [phone]  Follow Up Time: Routine

## 2022-06-17 NOTE — PROGRESS NOTE ADULT - PROBLEM SELECTOR PLAN 1
Uncontrolled T2DM on home oral agent, unclear compliance given patient is a poor historian. A1C 15.5%  -given hypoglycemia in the morning after Lantus 16 units previously, decreased lantus back to 14 units at bedtime. Continue with Lantus 14 units qhs at this time as fasting glucose in goal range.  -Prelunch glucose elevated will raise breakfast Admelog 5 to 6 units  -admelog will be 6/5/6  -continue low dose insulin correctional scale premeal and low bedtime scale  -patient is new to insulin and will be discharged on insulin so he will need to have access to a fridge after discharge. Will need CM/SW help with safe discharge planning since patient doesn't seem to have the capacity to safely administer insulin by himself  -Islet cell antibody negative, EDMAR and zinc transporter Ab testing in lab  -C-peptide 0.8 (glu 221) indicating need for at least basal insulin for dc. Would plan for basal/oral regimen at dc to help minimize frequency for help with administration of insulin.  -f/u ICA, GADA, zinc transporter

## 2022-06-17 NOTE — PROVIDER CONTACT NOTE (OTHER) - ACTION/TREATMENT ORDERED:
Team advises patient requires CIWA monitoring based on Hx and status. Per team Patient should be transferred off the unit, to a floor with CIWA capabilities. Attempting to Txf patient will f/u
Ongoing monitoring for this patient

## 2022-06-17 NOTE — PROGRESS NOTE ADULT - PROBLEM SELECTOR PLAN 1
- Reportedly on Metformin 850mg BID outpatient, unclear adherence   - FS 700s on admission, no signs of DKA (pH normal, bHB 0)   - A1c 15.5 on this admission   - Endocrine recs appreciated   - Continue Lantus 14U qhs and Ademelog 6U TID premeal   - FS and low dose sliding scale TID premeal and qhs   - Patient unable to retain insulin teaching per documentation, daughter Ayse says she cannot help pt with insulin   - Per psych, patient does not to seem to fully appreciate his DM needs  - F/u zinc transporter, ICA, GADA

## 2022-06-17 NOTE — DISCHARGE NOTE PROVIDER - NSDCCPCAREPLAN_GEN_ALL_CORE_FT
PRINCIPAL DISCHARGE DIAGNOSIS  Diagnosis: Diabetes mellitus with hyperglycemia  Assessment and Plan of Treatment: You were found to have severely elevated sugars to 700s on admission. Your A1c is 15.5. This means you have uncontrolled diabetes. You were treated with insulin in the hospital. However, you were unable to learn how to administer insulin despite multiple attempts of teaching by the staff. You will be discharged on ___      SECONDARY DISCHARGE DIAGNOSES  Diagnosis: Acute encephalopathy  Assessment and Plan of Treatment: Your daughter noted that your behavior has changed.. A workup was performed to find any reversible causes of confusion, but this was unremarkable. The confused behavior was likely due to your prior head injury, which damaged your brain. The psychiatrists did not beleive you had an underlying psychaitric condition, but stated that it would be helpful to follow-up outpatient.  --  [facility]  [address] [place] [address]  [phone]  [facility]iatric Psychiatry Appointment  [phone] [place] [address], [address], [address] Floor       PRINCIPAL DISCHARGE DIAGNOSIS  Diagnosis: Diabetes mellitus with hyperglycemia  Assessment and Plan of Treatment: You were found to have severely elevated sugars to 700s on admission. Your A1c is 15.5. This means you have uncontrolled diabetes.n Your blood work shows that you may have Type 1.5 Diabetes. You were treated with insulin in the hospital. However, you were unable to learn how to administer insulin despite multiple attempts of teaching by the staff. You will be discharged on Lantus 10U at bedtime. Admelog 10U before breakfast, Admelog 12U before lunch, and Admelog 16U before dinner. You can STOP taking Metformin.  --  Please follow up with an endocrinologist in 1-2 weeks to manage your diabetes.      SECONDARY DISCHARGE DIAGNOSES  Diagnosis: 2019 novel coronavirus disease (COVID-19)  Assessment and Plan of Treatment: You tested positive for COVID in the hospital. You did not have any symptoms. If you begin to have trouble breathing, please return to the Emergency Department.    Diagnosis: Acute encephalopathy  Assessment and Plan of Treatment: Your daughter noted that your behavior has changed.. A workup was performed to find any reversible causes of confusion, but this was unremarkable. The confused behavior was likely due to your history of alcohol use and prior head injury, which damaged your brain. The psychiatrists did not beleive you had an underlying psychaitric condition, but stated that it would be helpful to follow-up outpatient. Your Valproate was lowered from 500mg to 250mg orally twice a day.  --  [facility]  [address] [facility] [place] [address]  [phone]  [facility]iatry Appointment  [phone] [place] [address], [address], Second Floor

## 2022-06-17 NOTE — PROGRESS NOTE ADULT - PROBLEM SELECTOR PLAN 6
DVT ppx:   Diet: Regular, CC   Dispo: Homeless, needs SW consult for placement  No psychiatric contraindication to discharge, can f/u psych outpatient DVT ppx: Lovenox  Diet: Regular, CC   Dispo: Homeless, needs  consult for placement  No psychiatric contraindication to discharge, can f/u psych outpatient

## 2022-06-17 NOTE — PROGRESS NOTE ADULT - PROBLEM SELECTOR PLAN 2
- AOx2 to person and place   - Ddx: metabolic most likely (hyperglycemic, hyponatremic on admission) vs. infectious vs. structural vs. 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

## 2022-06-17 NOTE — PROVIDER CONTACT NOTE (OTHER) - REASON
Patient observed with purulent drainage and swelling to both eyes
Patient requires CIWA monitoring, this is not a CIWA capable unit

## 2022-06-17 NOTE — PROGRESS NOTE ADULT - PROBLEM SELECTOR PLAN 1
- Reportedly on Metformin 850mg BID outpatient, unclear adherence   - FS 700s on admission, no signs of DKA (pH normal, bHB 0)   - A1c 15.5 on this admission   - Endocrine recs appreciated   - Continue Lantus 14U qhs and Ademelog 6U TID premeal   - FS and low dose sliding scale TID premeal and qhs   - Patient unable to retain insulin teaching per documentation, daughter Ayse says she cannot help pt with insulin   - F/u zinc transporter, ICA, GADA  - Per psych, patient does not to seem to fully appreciate his DM needs - Reportedly on Metformin 850mg BID outpatient, unclear adherence   - FS 700s on admission, no signs of DKA (pH normal, bHB 0)   - A1c 15.5 on this admission   - Endocrine recs appreciated   - Continue Lantus 14U qhs and Ademelog 6U TID premeal   - FS and low dose sliding scale TID premeal and qhs   - Patient unable to retain insulin teaching per documentation, daughter Ayse says she cannot help pt with insulin   - Per psych, patient does not to seem to fully appreciate his DM needs  - F/u zinc transporter, ICA, GADA

## 2022-06-17 NOTE — DISCHARGE NOTE PROVIDER - NSDCMRMEDTOKEN_GEN_ALL_CORE_FT
atorvastatin 40 mg oral tablet: 1 tab(s) orally once a day (at bedtime)  divalproex sodium 500 mg oral tablet, extended release: 1 tab(s) orally 2 times a day   HumaLOG KwikPen 100 units/mL injectable solution: 5 unit(s) injectable 3 times a day (before meals)     TEST SCRIPT  Lantus Solostar Pen 100 units/mL subcutaneous solution: 14 unit(s) subcutaneous once a day (at bedtime)    TEST SCRIPT   metFORMIN 850 mg oral tablet: 1 tab(s) orally 2 times a day   Admelog 100 units/mL injectable solution: 10 unit(s) subcutaneous once a day (before a meal) BREAKFAST  Admelog 100 units/mL injectable solution: 12 unit(s) subcutaneous once a day (before a meal) LUNCH   Admelog 100 units/mL injectable solution: 16 unit(s) subcutaneous once a day (before a meal)  DINNER   atorvastatin 40 mg oral tablet: 1 tab(s) orally once a day (at bedtime)  divalproex sodium 250 mg oral delayed release tablet: 1 tab(s) orally 2 times a day  folic acid 1 mg oral tablet: 1 tab(s) orally once a day  insulin glargine 100 units/mL subcutaneous solution: 15 unit(s) subcutaneous once a day (at bedtime)  Multiple Vitamins oral tablet: 1 tab(s) orally once a day  thiamine 100 mg oral tablet: 1 tab(s) orally once a day

## 2022-06-17 NOTE — PROGRESS NOTE ADULT - SUBJECTIVE AND OBJECTIVE BOX
%%%%INCOMPLETE NOTE%%%%%     Dr. Chana Douglass, PGY-1    Patient is a 77y old  Male who presents with a chief complaint of AMS/hyperglycemia (16 Jun 2022 19:33)    24-HR EVENTS/SUBJECTIVE: No acute events overnight. Patient seen and examined at bedside this AM. BMx1 yesterday. Denies chest pain, abdominal pain, cough, n/v, sob. Breathing comfortably on room air.    MEDICATIONS  (STANDING):  atorvastatin 40 milliGRAM(s) Oral at bedtime  dextrose 5%. 1000 milliLiter(s) (100 mL/Hr) IV Continuous <Continuous>  dextrose 5%. 1000 milliLiter(s) (50 mL/Hr) IV Continuous <Continuous>  dextrose 50% Injectable 25 Gram(s) IV Push once  dextrose 50% Injectable 12.5 Gram(s) IV Push once  dextrose 50% Injectable 25 Gram(s) IV Push once  diVALproex  milliGRAM(s) Oral two times a day  folic acid 1 milliGRAM(s) Oral daily  glucagon  Injectable 1 milliGRAM(s) IntraMuscular once  insulin glargine Injectable (LANTUS) 14 Unit(s) SubCutaneous at bedtime  insulin lispro (ADMELOG) corrective regimen sliding scale   SubCutaneous three times a day before meals  insulin lispro (ADMELOG) corrective regimen sliding scale   SubCutaneous at bedtime  insulin lispro Injectable (ADMELOG) 5 Unit(s) SubCutaneous with breakfast  insulin lispro Injectable (ADMELOG) 5 Unit(s) SubCutaneous before lunch  insulin lispro Injectable (ADMELOG) 6 Unit(s) SubCutaneous before dinner  multivitamin 1 Tablet(s) Oral daily  thiamine IVPB 500 milliGRAM(s) IV Intermittent daily    MEDICATIONS  (PRN):  acetaminophen     Tablet .. 650 milliGRAM(s) Oral every 6 hours PRN Temp greater or equal to 38C (100.4F), Mild Pain (1 - 3)  dextrose Oral Gel 15 Gram(s) Oral once PRN Blood Glucose LESS THAN 70 milliGRAM(s)/deciliter  melatonin 3 milliGRAM(s) Oral at bedtime PRN Insomnia        Objective:     Vitals: Vital Signs Last 24 Hrs  T(C): 37 (06-17-22 @ 06:12), Max: 37.5 (06-16-22 @ 21:51)  T(F): 98.6 (06-17-22 @ 06:12), Max: 99.5 (06-16-22 @ 21:51)  HR: 84 (06-17-22 @ 06:12) (84 - 97)  BP: 110/76 (06-17-22 @ 06:12) (109/68 - 126/84)  BP(mean): --  RR: 19 (06-17-22 @ 06:12) (17 - 19)  SpO2: 98% (06-17-22 @ 06:12) (98% - 98%)            I&O's Summary    16 Jun 2022 07:01  -  17 Jun 2022 07:00  --------------------------------------------------------  IN: 500 mL / OUT: 600 mL / NET: -100 mL        PHYSICAL EXAM:  GENERAL: NAD, well-developed  HEAD:  Atraumatic, Normocephalic  EYES: EOMI, PERRLA, conjunctiva and sclera deep red. Swelling of Right eyelid   ENMT: Moist mucous membranes, poor dentition   NECK: Supple  CHEST/LUNG: Clear to auscultation bilaterally; No rales, rhonchi, wheezing, or rubs  HEART: Regular rate and rhythm; No murmurs, rubs, or gallops  ABDOMEN: Soft, Nontender, Nondistended; Bowel sounds present  EXTREMITIES:  2+ Peripheral Pulses, No clubbing, cyanosis, or edema  NERVOUS SYSTEM:  Alert & Oriented X3, NAVARRETE spontaneously, poor concentration    PSYCH: Calm and cooperative, but confused Laying in bed comfortably; not agitated.     LABS:                        11.2   6.52  )-----------( 403      ( 15 Ole 2022 14:20 )             35.5     Hgb Trend: 11.2<--, 9.6<--, 9.2<--, 11.0<--, 10.0<--  06-16    135  |  100  |  19  ----------------------------<  51<LL>  3.9   |  21<L>  |  1.22    Ca    9.4      16 Jun 2022 07:05  Phos  3.8     06-16  Mg     1.60     06-16      Creatinine Trend: 1.22<--, 1.13<--, 1.24<--, 1.10<--, 1.30<--, 1.04<--                    CAPILLARY BLOOD GLUCOSE      POCT Blood Glucose.: 101 mg/dL (16 Jun 2022 22:17)  POCT Blood Glucose.: 267 mg/dL (16 Jun 2022 17:51)  POCT Blood Glucose.: 296 mg/dL (16 Jun 2022 13:00)  POCT Blood Glucose.: 110 mg/dL (16 Jun 2022 10:08)  POCT Blood Glucose.: 73 mg/dL (16 Jun 2022 09:30)  POCT Blood Glucose.: 64 mg/dL (16 Jun 2022 09:06)  POCT Blood Glucose.: 53 mg/dL (16 Jun 2022 09:04)     Dr. Chana Douglass, PGY-1    Patient is a 77y old  Male who presents with a chief complaint of AMS/hyperglycemia (16 Jun 2022 19:33)    24-HR EVENTS/SUBJECTIVE: No acute events overnight. Patient seen and examined at bedside this AM. Feels good. Asks "what you gonna give me to eat?" Denies chest pain, abdominal pain, cough, n/v, sob. Breathing comfortably on room air.    MEDICATIONS  (STANDING):  atorvastatin 40 milliGRAM(s) Oral at bedtime  dextrose 5%. 1000 milliLiter(s) (100 mL/Hr) IV Continuous <Continuous>  dextrose 5%. 1000 milliLiter(s) (50 mL/Hr) IV Continuous <Continuous>  dextrose 50% Injectable 25 Gram(s) IV Push once  dextrose 50% Injectable 12.5 Gram(s) IV Push once  dextrose 50% Injectable 25 Gram(s) IV Push once  diVALproex  milliGRAM(s) Oral two times a day  folic acid 1 milliGRAM(s) Oral daily  glucagon  Injectable 1 milliGRAM(s) IntraMuscular once  insulin glargine Injectable (LANTUS) 14 Unit(s) SubCutaneous at bedtime  insulin lispro (ADMELOG) corrective regimen sliding scale   SubCutaneous three times a day before meals  insulin lispro (ADMELOG) corrective regimen sliding scale   SubCutaneous at bedtime  insulin lispro Injectable (ADMELOG) 5 Unit(s) SubCutaneous with breakfast  insulin lispro Injectable (ADMELOG) 5 Unit(s) SubCutaneous before lunch  insulin lispro Injectable (ADMELOG) 6 Unit(s) SubCutaneous before dinner  multivitamin 1 Tablet(s) Oral daily  thiamine IVPB 500 milliGRAM(s) IV Intermittent daily    MEDICATIONS  (PRN):  acetaminophen     Tablet .. 650 milliGRAM(s) Oral every 6 hours PRN Temp greater or equal to 38C (100.4F), Mild Pain (1 - 3)  dextrose Oral Gel 15 Gram(s) Oral once PRN Blood Glucose LESS THAN 70 milliGRAM(s)/deciliter  melatonin 3 milliGRAM(s) Oral at bedtime PRN Insomnia        Objective:     Vitals: Vital Signs Last 24 Hrs  T(C): 37 (06-17-22 @ 06:12), Max: 37.5 (06-16-22 @ 21:51)  T(F): 98.6 (06-17-22 @ 06:12), Max: 99.5 (06-16-22 @ 21:51)  HR: 84 (06-17-22 @ 06:12) (84 - 97)  BP: 110/76 (06-17-22 @ 06:12) (109/68 - 126/84)  BP(mean): --  RR: 19 (06-17-22 @ 06:12) (17 - 19)  SpO2: 98% (06-17-22 @ 06:12) (98% - 98%)            I&O's Summary    16 Jun 2022 07:01  -  17 Jun 2022 07:00  --------------------------------------------------------  IN: 500 mL / OUT: 600 mL / NET: -100 mL        PHYSICAL EXAM:  GENERAL: NAD, well-developed  HEAD:  Atraumatic, Normocephalic  EYES: EOMI, PERRLA, conjunctiva and sclera deep red. Swelling of Right eyelid   ENMT: Moist mucous membranes, poor dentition   NECK: Supple  CHEST/LUNG: Clear to auscultation bilaterally; No rales, rhonchi, wheezing, or rubs  HEART: Regular rate and rhythm; No murmurs, rubs, or gallops  ABDOMEN: Soft, Nontender, Nondistended; Bowel sounds present  EXTREMITIES:  2+ Peripheral Pulses, No clubbing, cyanosis, or edema  NERVOUS SYSTEM:  Alert & Oriented X3, NAVARRETE spontaneously, poor concentration    PSYCH: Calm and cooperative, but confused. Laying in bed comfortably; not agitated.     LABS:                        11.2   6.52  )-----------( 403      ( 15 Ole 2022 14:20 )             35.5     Hgb Trend: 11.2<--, 9.6<--, 9.2<--, 11.0<--, 10.0<--  06-16    135  |  100  |  19  ----------------------------<  51<LL>  3.9   |  21<L>  |  1.22    Ca    9.4      16 Jun 2022 07:05  Phos  3.8     06-16  Mg     1.60     06-16      Creatinine Trend: 1.22<--, 1.13<--, 1.24<--, 1.10<--, 1.30<--, 1.04<--                    CAPILLARY BLOOD GLUCOSE      POCT Blood Glucose.: 101 mg/dL (16 Jun 2022 22:17)  POCT Blood Glucose.: 267 mg/dL (16 Jun 2022 17:51)  POCT Blood Glucose.: 296 mg/dL (16 Jun 2022 13:00)  POCT Blood Glucose.: 110 mg/dL (16 Jun 2022 10:08)  POCT Blood Glucose.: 73 mg/dL (16 Jun 2022 09:30)  POCT Blood Glucose.: 64 mg/dL (16 Jun 2022 09:06)  POCT Blood Glucose.: 53 mg/dL (16 Jun 2022 09:04)

## 2022-06-17 NOTE — DISCHARGE NOTE PROVIDER - HOSPITAL COURSE
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 Neurosurgery consulted for CT head findings of ventricular enlargement. No acute neurosurgical intervention was offered. 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 MRI brain could be obtained outpatient. They recommended neurology consult for NPH workup. Neurology did not believe patient's CT findings were signs of NPH, likely due to prior TBI. AMS workup of reversible causes was unremarkable (folate, B12, TSH, tox screen, infectious workup). Patient's Depakote (seizure ppx) dose lowered to 250mg BID. Patient was also monitored on CIWA protocol, and score 0 for 72 hours; CIWA therefore discontinued. Tox was negative for alcohol on admission. Patient treated with IV thiamin, multivitamin, and folate. Psychiatry evaluation recommended PRN Zyprexa for agitation, but did not elucidate any underlying psychiatric conditions. Patient was calm and cooperative, but confused and likely unable to take care of himself from a medical standpoint.      Endocrine also consulted for A1c of 15.5. Patient's FS 700s on admission, but without signs of DKA (normal pH, no BHB). Patient started on basal/bolus insulin regimen. Labwork showed low c-peptide. However, despite multiple attempts to teach patient insulin, patient lacked capacity to administer insulin. This was complicated by family refusing to take patient back home. Patient to be discharged on basal insulin? and oral agent.     PT recommended home with no skilled needs. 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 Neurosurgery consulted for CT head findings of ventricular enlargement. No acute neurosurgical intervention was offered. 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 MRI brain could be obtained outpatient. They recommended neurology consult for NPH workup. Neurology did not believe patient's CT findings were signs of NPH, likely due to prior TBI. AMS workup of reversible causes was unremarkable (folate, B12, TSH, tox screen, infectious workup). Patient's Depakote (seizure ppx) dose lowered to 250mg BID. Patient was also monitored on CIWA protocol, and score 0 for 72 hours; CIWA therefore discontinued. Tox was negative for alcohol on admission. Patient treated with IV thiamin, multivitamin, and folate. Psychiatry evaluation recommended PRN Zyprexa for agitation, but did not elucidate any underlying psychiatric conditions. Patient was calm and cooperative, but confused and likely unable to take care of himself from a medical standpoint. Patient had transient pre-renal LEXI that resolved with fluid administration.      Endocrine also consulted for A1c of 15.5. Patient's FS 700s on admission, but without signs of DKA (normal pH, no BHB). Patient started on basal/bolus insulin regimen. Labwork showed low c-peptide. However, despite multiple attempts to teach patient insulin, patient lacked capacity to administer insulin. This was complicated by family refusing to take patient back home. Patient with low c-peptide and positive EDMAR, suggesting likely Type 1.5 Diabetes Mellitus. WILL REQUIRE BASAL INSULIN.     Course complicated by COVID + status, however, patient asymptomatic.     Patient hemodynamically stable for discharge to rehab. Lantus 10U qhs with Admelog 10U pre-breakfast, Admelog 12U pre-lunch, and Admelog 16U pre-dinner. Stop Metformin.     **C-peptide 0.8 (glu 221) indicating need for at least basal insulin for dc. **

## 2022-06-17 NOTE — PROGRESS NOTE ADULT - PROBLEM SELECTOR PLAN 3
hx of HTN, unclear home regimen for HTN  -Goal less than 130/80  -BP acceptable so far, continue to monitor  -if persistently elevated, would consider initiating an ACE or ARB for renal protection.    Joann Rahman MD  Division of Endocrinology  Pager: [phone]    If after 6PM or before 9AM, or on weekends/holidays, please call endocrine answering service for assistance ([phone]).  For nonurgent matters email [email or web address] for assistance.

## 2022-06-18 DIAGNOSIS — U07.1 COVID-19: ICD-10-CM

## 2022-06-18 LAB
ANION GAP SERPL CALC-SCNC: 11 MMOL/L — SIGNIFICANT CHANGE UP (ref 7–14)
BUN SERPL-MCNC: 35 MG/DL — HIGH (ref 7–23)
CALCIUM SERPL-MCNC: 8.6 MG/DL — SIGNIFICANT CHANGE UP (ref 8.4–10.5)
CHLORIDE SERPL-SCNC: 99 MMOL/L — SIGNIFICANT CHANGE UP (ref 98–107)
CO2 SERPL-SCNC: 22 MMOL/L — SIGNIFICANT CHANGE UP (ref 22–31)
CREAT SERPL-MCNC: 1.55 MG/DL — HIGH (ref 0.5–1.3)
EGFR: 46 ML/MIN/1.73M2 — LOW
GLUCOSE BLDC GLUCOMTR-MCNC: 249 MG/DL — HIGH (ref 70–99)
GLUCOSE BLDC GLUCOMTR-MCNC: 259 MG/DL — HIGH (ref 70–99)
GLUCOSE BLDC GLUCOMTR-MCNC: 275 MG/DL — HIGH (ref 70–99)
GLUCOSE BLDC GLUCOMTR-MCNC: 290 MG/DL — HIGH (ref 70–99)
GLUCOSE SERPL-MCNC: 278 MG/DL — HIGH (ref 70–99)
HCT VFR BLD CALC: 31.6 % — LOW (ref 39–50)
HGB BLD-MCNC: 10.2 G/DL — LOW (ref 13–17)
MAGNESIUM SERPL-MCNC: 1.8 MG/DL — SIGNIFICANT CHANGE UP (ref 1.6–2.6)
MCHC RBC-ENTMCNC: 31.2 PG — SIGNIFICANT CHANGE UP (ref 27–34)
MCHC RBC-ENTMCNC: 32.3 GM/DL — SIGNIFICANT CHANGE UP (ref 32–36)
MCV RBC AUTO: 96.6 FL — SIGNIFICANT CHANGE UP (ref 80–100)
NRBC # BLD: 0 /100 WBCS — SIGNIFICANT CHANGE UP
NRBC # FLD: 0 K/UL — SIGNIFICANT CHANGE UP
PHOSPHATE SERPL-MCNC: 2.7 MG/DL — SIGNIFICANT CHANGE UP (ref 2.5–4.5)
PLATELET # BLD AUTO: 303 K/UL — SIGNIFICANT CHANGE UP (ref 150–400)
POTASSIUM SERPL-MCNC: 4.7 MMOL/L — SIGNIFICANT CHANGE UP (ref 3.5–5.3)
POTASSIUM SERPL-SCNC: 4.7 MMOL/L — SIGNIFICANT CHANGE UP (ref 3.5–5.3)
RBC # BLD: 3.27 M/UL — LOW (ref 4.2–5.8)
RBC # FLD: 12 % — SIGNIFICANT CHANGE UP (ref 10.3–14.5)
SARS-COV-2 RNA SPEC QL NAA+PROBE: DETECTED
SODIUM SERPL-SCNC: 132 MMOL/L — LOW (ref 135–145)
WBC # BLD: 3.82 K/UL — SIGNIFICANT CHANGE UP (ref 3.8–10.5)
WBC # FLD AUTO: 3.82 K/UL — SIGNIFICANT CHANGE UP (ref 3.8–10.5)

## 2022-06-18 PROCEDURE — 99232 SBSQ HOSP IP/OBS MODERATE 35: CPT

## 2022-06-18 RX ORDER — INSULIN LISPRO 100/ML
8 VIAL (ML) SUBCUTANEOUS
Refills: 0 | Status: DISCONTINUED | OUTPATIENT
Start: 2022-06-19 | End: 2022-06-21

## 2022-06-18 RX ORDER — INSULIN LISPRO 100/ML
8 VIAL (ML) SUBCUTANEOUS
Refills: 0 | Status: DISCONTINUED | OUTPATIENT
Start: 2022-06-18 | End: 2022-06-19

## 2022-06-18 RX ORDER — INSULIN LISPRO 100/ML
VIAL (ML) SUBCUTANEOUS
Refills: 0 | Status: DISCONTINUED | OUTPATIENT
Start: 2022-06-18 | End: 2022-06-23

## 2022-06-18 RX ADMIN — Medication 8 UNIT(S): at 18:35

## 2022-06-18 RX ADMIN — DIVALPROEX SODIUM 250 MILLIGRAM(S): 500 TABLET, DELAYED RELEASE ORAL at 05:53

## 2022-06-18 RX ADMIN — BACITRACIN ZINC AND POLYMYXIN B SULFATE 1 APPLICATION(S): 500; 10000 OINTMENT OPHTHALMIC at 05:54

## 2022-06-18 RX ADMIN — Medication 3: at 18:34

## 2022-06-18 RX ADMIN — Medication 6 UNIT(S): at 09:32

## 2022-06-18 RX ADMIN — INSULIN GLARGINE 14 UNIT(S): 100 INJECTION, SOLUTION SUBCUTANEOUS at 22:33

## 2022-06-18 RX ADMIN — Medication 1 MILLIGRAM(S): at 11:27

## 2022-06-18 RX ADMIN — Medication 5 UNIT(S): at 13:00

## 2022-06-18 RX ADMIN — BACITRACIN ZINC AND POLYMYXIN B SULFATE 1 APPLICATION(S): 500; 10000 OINTMENT OPHTHALMIC at 18:35

## 2022-06-18 RX ADMIN — ATORVASTATIN CALCIUM 40 MILLIGRAM(S): 80 TABLET, FILM COATED ORAL at 22:33

## 2022-06-18 RX ADMIN — DIVALPROEX SODIUM 250 MILLIGRAM(S): 500 TABLET, DELAYED RELEASE ORAL at 18:35

## 2022-06-18 RX ADMIN — Medication 1 TABLET(S): at 11:28

## 2022-06-18 RX ADMIN — Medication 105 MILLIGRAM(S): at 11:26

## 2022-06-18 RX ADMIN — Medication 2: at 09:32

## 2022-06-18 RX ADMIN — ENOXAPARIN SODIUM 40 MILLIGRAM(S): 100 INJECTION SUBCUTANEOUS at 18:35

## 2022-06-18 RX ADMIN — Medication 3: at 13:00

## 2022-06-18 RX ADMIN — Medication 1: at 22:33

## 2022-06-18 NOTE — PROGRESS NOTE ADULT - PROBLEM SELECTOR PLAN 2
asymptomatic, satting well on RA  - already on Lovneox 40 BID  - check ferritin, LDH, d-dimer with AM labs

## 2022-06-18 NOTE — PROGRESS NOTE ADULT - PROBLEM SELECTOR PLAN 1
- Reportedly on Metformin 850mg BID outpatient, unclear adherence   - FS 700s on admission, no signs of DKA (pH normal, bHB 0)   - A1c 15.5 on this admission   - Endocrine recs appreciated   - Continue Lantus 14U qhs   - incrase Ademelog to 8U TID premeal   - FS and low dose sliding scale TID premeal and qhs   - Patient unable to retain insulin teaching per documentation, daughter Ayse says she cannot help pt with insulin   - Per psych, patient does not to seem to fully appreciate his DM needs  - F/u zinc transporter, ICA, GADA

## 2022-06-18 NOTE — PROGRESS NOTE ADULT - SUBJECTIVE AND OBJECTIVE BOX
DIABETES FOLLOW UP : Seen earlier today    INTERVAL HX: pt asking for chocolate cake, pt denies eating overnight  , per d/w PCA on floor BG checekd prior to pt eating this am. BG previously better controlled on same lantus dose. prandial BG above goal past 24 hours , 390 at bedtime, pt denies eating any extra food at dinner , d/w RN pt asking for extra food today , unclear if pt received additional snacks over night       Review of Systems:  General: As above  Cardiovascular: No chest pain  Respiratory: No SOB  GI: No nausea, vomiting  Endocrine: no  S&Sx of hypoglycemia    Allergies    Allergy Status Unknown  No Known Allergies    Intolerances      MEDICATIONS  (STANDING):  atorvastatin 40 milliGRAM(s) Oral at bedtime  bacitracin/polymyxin B Ophthalmic Ointment 1 Application(s) Both EYES two times a day  dextrose 5%. 1000 milliLiter(s) (100 mL/Hr) IV Continuous <Continuous>  dextrose 5%. 1000 milliLiter(s) (50 mL/Hr) IV Continuous <Continuous>  dextrose 50% Injectable 25 Gram(s) IV Push once  dextrose 50% Injectable 12.5 Gram(s) IV Push once  dextrose 50% Injectable 25 Gram(s) IV Push once  diVALproex  milliGRAM(s) Oral two times a day  enoxaparin Injectable 40 milliGRAM(s) SubCutaneous every 24 hours  folic acid 1 milliGRAM(s) Oral daily  glucagon  Injectable 1 milliGRAM(s) IntraMuscular once  insulin glargine Injectable (LANTUS) 14 Unit(s) SubCutaneous at bedtime  insulin lispro (ADMELOG) corrective regimen sliding scale   SubCutaneous three times a day before meals  insulin lispro (ADMELOG) corrective regimen sliding scale   SubCutaneous at bedtime  insulin lispro Injectable (ADMELOG) 5 Unit(s) SubCutaneous before lunch  insulin lispro Injectable (ADMELOG) 6 Unit(s) SubCutaneous before dinner  insulin lispro Injectable (ADMELOG) 6 Unit(s) SubCutaneous before breakfast  multivitamin 1 Tablet(s) Oral daily  thiamine IVPB 500 milliGRAM(s) IV Intermittent daily      PHYSICAL EXAM:  VITALS: T(C): 36.8 (06-18-22 @ 05:50)  T(F): 98.3 (06-18-22 @ 05:50), Max: 98.8 (06-17-22 @ 22:39)  HR: 92 (06-18-22 @ 05:50) (90 - 95)  BP: 112/63 (06-18-22 @ 05:50) (112/63 - 139/65)  RR:  (18 - 18)  SpO2:  (99% - 100%)  Wt(kg): --  GENERAL: male laying in bed  in NAD  Respiratory: Respirations unlabored   Extremities: Warm  NEURO: Alert , appropriate     LABS:  POCT Blood Glucose.: 259 mg/dL (06-18-22 @ 12:04)  POCT Blood Glucose.: 249 mg/dL (06-18-22 @ 09:06)  POCT Blood Glucose.: 390 mg/dL (06-17-22 @ 22:07)  POCT Blood Glucose.: 284 mg/dL (06-17-22 @ 17:58)  POCT Blood Glucose.: 251 mg/dL (06-17-22 @ 12:52)  POCT Blood Glucose.: 121 mg/dL (06-17-22 @ 08:50)  POCT Blood Glucose.: 101 mg/dL (06-16-22 @ 22:17)  POCT Blood Glucose.: 267 mg/dL (06-16-22 @ 17:51)  POCT Blood Glucose.: 296 mg/dL (06-16-22 @ 13:00)  POCT Blood Glucose.: 110 mg/dL (06-16-22 @ 10:08)  POCT Blood Glucose.: 73 mg/dL (06-16-22 @ 09:30)  POCT Blood Glucose.: 64 mg/dL (06-16-22 @ 09:06)  POCT Blood Glucose.: 53 mg/dL (06-16-22 @ 09:04)  POCT Blood Glucose.: 123 mg/dL (06-15-22 @ 22:24)  POCT Blood Glucose.: 223 mg/dL (06-15-22 @ 18:01)                            10.2   3.82  )-----------( 303      ( 18 Jun 2022 05:47 )             31.6       06-18    132<L>  |  99  |  35<H>  ----------------------------<  278<H>  4.7   |  22  |  1.55<H>    Ca    8.6      18 Jun 2022 05:47  Phos  2.7     06-18  Mg     1.80     06-18        eGFR: 46 mL/min/1.73m2 (18 Jun 2022 05:47)      06-12 Chol 231<H> Direct LDL -- LDL calculated 150<H> HDL 54 Trig 133    Thyroid Function Tests:  06-11 @ 23:50 TSH 1.16 FreeT4 -- T3 -- Anti TPO -- Anti Thyroglobulin Ab -- TSI --          A1C with Estimated Average Glucose Result: 15.5 % (06-12-22 @ 11:00)  A1C with Estimated Average Glucose Result: 16.1 % (06-12-22 @ 11:00)      Estimated Average Glucose: 398 (06-12-22 @ 11:00)  Estimated Average Glucose: 415 (06-12-22 @ 11:00)                         DIABETES FOLLOW UP : Seen earlier today    INTERVAL HX: pt asking for chocolate cake counseled pt can call and order available diet dessert options with next meal, pt denies eating overnight  , per d/w PCA on floor BG checekd prior to pt eating this am. BG previously better controlled on same lantus dose. prandial BG above goal past 24 hours , 390 at bedtime, pt denies eating any extra food at dinner , d/w RN pt asking for extra food today , unclear if pt received additional snacks over night       Review of Systems:  General: As above  Cardiovascular: No chest pain  Respiratory: No SOB  GI: No nausea, vomiting  Endocrine: no  S&Sx of hypoglycemia    Allergies    Allergy Status Unknown  No Known Allergies    Intolerances      MEDICATIONS  (STANDING):  atorvastatin 40 milliGRAM(s) Oral at bedtime  bacitracin/polymyxin B Ophthalmic Ointment 1 Application(s) Both EYES two times a day  dextrose 5%. 1000 milliLiter(s) (100 mL/Hr) IV Continuous <Continuous>  dextrose 5%. 1000 milliLiter(s) (50 mL/Hr) IV Continuous <Continuous>  dextrose 50% Injectable 25 Gram(s) IV Push once  dextrose 50% Injectable 12.5 Gram(s) IV Push once  dextrose 50% Injectable 25 Gram(s) IV Push once  diVALproex  milliGRAM(s) Oral two times a day  enoxaparin Injectable 40 milliGRAM(s) SubCutaneous every 24 hours  folic acid 1 milliGRAM(s) Oral daily  glucagon  Injectable 1 milliGRAM(s) IntraMuscular once  insulin glargine Injectable (LANTUS) 14 Unit(s) SubCutaneous at bedtime  insulin lispro (ADMELOG) corrective regimen sliding scale   SubCutaneous three times a day before meals  insulin lispro (ADMELOG) corrective regimen sliding scale   SubCutaneous at bedtime  insulin lispro Injectable (ADMELOG) 5 Unit(s) SubCutaneous before lunch  insulin lispro Injectable (ADMELOG) 6 Unit(s) SubCutaneous before dinner  insulin lispro Injectable (ADMELOG) 6 Unit(s) SubCutaneous before breakfast  multivitamin 1 Tablet(s) Oral daily  thiamine IVPB 500 milliGRAM(s) IV Intermittent daily      PHYSICAL EXAM:  VITALS: T(C): 36.8 (06-18-22 @ 05:50)  T(F): 98.3 (06-18-22 @ 05:50), Max: 98.8 (06-17-22 @ 22:39)  HR: 92 (06-18-22 @ 05:50) (90 - 95)  BP: 112/63 (06-18-22 @ 05:50) (112/63 - 139/65)  RR:  (18 - 18)  SpO2:  (99% - 100%)  Wt(kg): --  GENERAL: male laying in bed  in NAD  Respiratory: Respirations unlabored   Extremities: Warm  NEURO: Alert , appropriate     LABS:  POCT Blood Glucose.: 259 mg/dL (06-18-22 @ 12:04)  POCT Blood Glucose.: 249 mg/dL (06-18-22 @ 09:06)  POCT Blood Glucose.: 390 mg/dL (06-17-22 @ 22:07)  POCT Blood Glucose.: 284 mg/dL (06-17-22 @ 17:58)  POCT Blood Glucose.: 251 mg/dL (06-17-22 @ 12:52)  POCT Blood Glucose.: 121 mg/dL (06-17-22 @ 08:50)  POCT Blood Glucose.: 101 mg/dL (06-16-22 @ 22:17)  POCT Blood Glucose.: 267 mg/dL (06-16-22 @ 17:51)  POCT Blood Glucose.: 296 mg/dL (06-16-22 @ 13:00)  POCT Blood Glucose.: 110 mg/dL (06-16-22 @ 10:08)  POCT Blood Glucose.: 73 mg/dL (06-16-22 @ 09:30)  POCT Blood Glucose.: 64 mg/dL (06-16-22 @ 09:06)  POCT Blood Glucose.: 53 mg/dL (06-16-22 @ 09:04)  POCT Blood Glucose.: 123 mg/dL (06-15-22 @ 22:24)  POCT Blood Glucose.: 223 mg/dL (06-15-22 @ 18:01)                            10.2   3.82  )-----------( 303      ( 18 Jun 2022 05:47 )             31.6       06-18    132<L>  |  99  |  35<H>  ----------------------------<  278<H>  4.7   |  22  |  1.55<H>    Ca    8.6      18 Jun 2022 05:47  Phos  2.7     06-18  Mg     1.80     06-18        eGFR: 46 mL/min/1.73m2 (18 Jun 2022 05:47)      06-12 Chol 231<H> Direct LDL -- LDL calculated 150<H> HDL 54 Trig 133    Thyroid Function Tests:  06-11 @ 23:50 TSH 1.16 FreeT4 -- T3 -- Anti TPO -- Anti Thyroglobulin Ab -- TSI --          A1C with Estimated Average Glucose Result: 15.5 % (06-12-22 @ 11:00)  A1C with Estimated Average Glucose Result: 16.1 % (06-12-22 @ 11:00)      Estimated Average Glucose: 398 (06-12-22 @ 11:00)  Estimated Average Glucose: 415 (06-12-22 @ 11:00)

## 2022-06-18 NOTE — PROGRESS NOTE ADULT - SUBJECTIVE AND OBJECTIVE BOX
Keanu John MD  PGY 2 Department of Internal Medicine  Available on [employer] Teams      Patient is a 77y old  Male who presents with a chief complaint of AMS/hyperglycemia (17 Jun 2022 19:28)      OVERNIGHT EVENTS: No acute overnight events.    SUBJECTIVE: Pt seen and examined. Denies fevers, chills, CP, SOB, Abdominal pain, N/V, Constipation, Diarrhea    MEDICATIONS  (STANDING):  atorvastatin 40 milliGRAM(s) Oral at bedtime  bacitracin/polymyxin B Ophthalmic Ointment 1 Application(s) Both EYES two times a day  dextrose 5%. 1000 milliLiter(s) (100 mL/Hr) IV Continuous <Continuous>  dextrose 5%. 1000 milliLiter(s) (50 mL/Hr) IV Continuous <Continuous>  dextrose 50% Injectable 25 Gram(s) IV Push once  dextrose 50% Injectable 12.5 Gram(s) IV Push once  dextrose 50% Injectable 25 Gram(s) IV Push once  diVALproex  milliGRAM(s) Oral two times a day  enoxaparin Injectable 40 milliGRAM(s) SubCutaneous every 24 hours  folic acid 1 milliGRAM(s) Oral daily  glucagon  Injectable 1 milliGRAM(s) IntraMuscular once  insulin glargine Injectable (LANTUS) 14 Unit(s) SubCutaneous at bedtime  insulin lispro (ADMELOG) corrective regimen sliding scale   SubCutaneous three times a day before meals  insulin lispro (ADMELOG) corrective regimen sliding scale   SubCutaneous at bedtime  insulin lispro Injectable (ADMELOG) 5 Unit(s) SubCutaneous before lunch  insulin lispro Injectable (ADMELOG) 6 Unit(s) SubCutaneous before dinner  insulin lispro Injectable (ADMELOG) 6 Unit(s) SubCutaneous before breakfast  multivitamin 1 Tablet(s) Oral daily  thiamine IVPB 500 milliGRAM(s) IV Intermittent daily    MEDICATIONS  (PRN):  acetaminophen     Tablet .. 650 milliGRAM(s) Oral every 6 hours PRN Temp greater or equal to 38C (100.4F), Mild Pain (1 - 3)  dextrose Oral Gel 15 Gram(s) Oral once PRN Blood Glucose LESS THAN 70 milliGRAM(s)/deciliter  melatonin 3 milliGRAM(s) Oral at bedtime PRN Insomnia      I&O's Summary    17 Jun 2022 07:01  -  18 Jun 2022 07:00  --------------------------------------------------------  IN: 1060 mL / OUT: 1150 mL / NET: -90 mL        Vital Signs Last 24 Hrs  T(C): 36.8 (18 Jun 2022 05:50), Max: 37.1 (17 Jun 2022 22:39)  T(F): 98.3 (18 Jun 2022 05:50), Max: 98.8 (17 Jun 2022 22:39)  HR: 92 (18 Jun 2022 05:50) (90 - 95)  BP: 112/63 (18 Jun 2022 05:50) (112/63 - 139/65)  BP(mean): --  RR: 18 (18 Jun 2022 05:50) (18 - 18)  SpO2: 100% (18 Jun 2022 05:50) (99% - 100%)    =================PHYSICAL EXAM=================    GENERAL: Laying comfortably, NAD  EYES: EOMI, PERRL, no scleral icterus  NECK: No JVD  LUNG: Clear to auscultation bilaterally; No wheeze, crackles or rhonci  HEART: Regular rate and rhythm; No murmurs, rubs, or gallops  ABDOMEN: Soft, Nontender, Nondistended  EXTREMITIES:  No LE edema, 2+ Peripheral Pulses, No clubbing, cyanosis, or edema  PSYCH: AAOx3  NEUROLOGY: non-focal, strength 5/5 in all extremities, sensation intact  SKIN: No rashes or lesions    =================================================    LABS:                        10.2   3.82  )-----------( 303      ( 18 Jun 2022 05:47 )             31.6     Auto Eosinophil # x     / Auto Eosinophil % x     / Auto Neutrophil # x     / Auto Neutrophil % x     / BANDS % x        06-18    132<L>  |  99  |  35<H>  ----------------------------<  278<H>  4.7   |  22  |  1.55<H>    Ca    8.6      18 Jun 2022 05:47  Mg     1.80     06-18  Phos  2.7     06-18                  RADIOLOGY & ADDITIONAL TESTS:    Imaging Personally Reviewed:    Consultant(s) Notes Reviewed:      Care Discussed with Consultants/Other Providers:   Keanu John MD  PGY 2 Department of Internal Medicine  Available on [employer] Teams      Patient is a 77y old  Male who presents with a chief complaint of AMS/hyperglycemia (17 Jun 2022 19:28)      OVERNIGHT EVENTS: No acute overnight events.    SUBJECTIVE: Pt seen and examined. Denies fevers, chills, CP, SOB, Abdominal pain, N/V, Constipation, Diarrhea    MEDICATIONS  (STANDING):  atorvastatin 40 milliGRAM(s) Oral at bedtime  bacitracin/polymyxin B Ophthalmic Ointment 1 Application(s) Both EYES two times a day  dextrose 5%. 1000 milliLiter(s) (100 mL/Hr) IV Continuous <Continuous>  dextrose 5%. 1000 milliLiter(s) (50 mL/Hr) IV Continuous <Continuous>  dextrose 50% Injectable 25 Gram(s) IV Push once  dextrose 50% Injectable 12.5 Gram(s) IV Push once  dextrose 50% Injectable 25 Gram(s) IV Push once  diVALproex  milliGRAM(s) Oral two times a day  enoxaparin Injectable 40 milliGRAM(s) SubCutaneous every 24 hours  folic acid 1 milliGRAM(s) Oral daily  glucagon  Injectable 1 milliGRAM(s) IntraMuscular once  insulin glargine Injectable (LANTUS) 14 Unit(s) SubCutaneous at bedtime  insulin lispro (ADMELOG) corrective regimen sliding scale   SubCutaneous three times a day before meals  insulin lispro (ADMELOG) corrective regimen sliding scale   SubCutaneous at bedtime  insulin lispro Injectable (ADMELOG) 5 Unit(s) SubCutaneous before lunch  insulin lispro Injectable (ADMELOG) 6 Unit(s) SubCutaneous before dinner  insulin lispro Injectable (ADMELOG) 6 Unit(s) SubCutaneous before breakfast  multivitamin 1 Tablet(s) Oral daily  thiamine IVPB 500 milliGRAM(s) IV Intermittent daily    MEDICATIONS  (PRN):  acetaminophen     Tablet .. 650 milliGRAM(s) Oral every 6 hours PRN Temp greater or equal to 38C (100.4F), Mild Pain (1 - 3)  dextrose Oral Gel 15 Gram(s) Oral once PRN Blood Glucose LESS THAN 70 milliGRAM(s)/deciliter  melatonin 3 milliGRAM(s) Oral at bedtime PRN Insomnia      I&O's Summary    17 Jun 2022 07:01  -  18 Jun 2022 07:00  --------------------------------------------------------  IN: 1060 mL / OUT: 1150 mL / NET: -90 mL        Vital Signs Last 24 Hrs  T(C): 36.8 (18 Jun 2022 05:50), Max: 37.1 (17 Jun 2022 22:39)  T(F): 98.3 (18 Jun 2022 05:50), Max: 98.8 (17 Jun 2022 22:39)  HR: 92 (18 Jun 2022 05:50) (90 - 95)  BP: 112/63 (18 Jun 2022 05:50) (112/63 - 139/65)  BP(mean): --  RR: 18 (18 Jun 2022 05:50) (18 - 18)  SpO2: 100% (18 Jun 2022 05:50) (99% - 100%)    =================PHYSICAL EXAM=================    GENERAL: NAD, well-developed  HEAD:  Atraumatic, Normocephalic  EYES: EOMI, PERRLA, conjunctiva and sclera deep red. Swelling of Right eyelid   ENMT: Moist mucous membranes, poor dentition   NECK: Supple  CHEST/LUNG: Clear to auscultation bilaterally; No rales, rhonchi, wheezing, or rubs  HEART: Regular rate and rhythm; No murmurs, rubs, or gallops  ABDOMEN: Soft, Nontender, slight distention; Bowel sounds present  EXTREMITIES:  2+ Peripheral Pulses, No clubbing, cyanosis, or edema  NERVOUS SYSTEM:  Alert & Oriented X3, NAVARRETE spontaneously, poor concentration    PSYCH: Calm and cooperative, but confused. Laying in bed comfortably; not agitated.     =================================================    LABS:                        10.2   3.82  )-----------( 303      ( 18 Jun 2022 05:47 )             31.6     Auto Eosinophil # x     / Auto Eosinophil % x     / Auto Neutrophil # x     / Auto Neutrophil % x     / BANDS % x        06-18    132<L>  |  99  |  35<H>  ----------------------------<  278<H>  4.7   |  22  |  1.55<H>    Ca    8.6      18 Jun 2022 05:47  Mg     1.80     06-18  Phos  2.7     06-18                  RADIOLOGY & ADDITIONAL TESTS:    Imaging Personally Reviewed:    Consultant(s) Notes Reviewed:      Care Discussed with Consultants/Other Providers:
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
## 2022-06-19 LAB
ANION GAP SERPL CALC-SCNC: 12 MMOL/L — SIGNIFICANT CHANGE UP (ref 7–14)
BUN SERPL-MCNC: 27 MG/DL — HIGH (ref 7–23)
CALCIUM SERPL-MCNC: 9 MG/DL — SIGNIFICANT CHANGE UP (ref 8.4–10.5)
CHLORIDE SERPL-SCNC: 102 MMOL/L — SIGNIFICANT CHANGE UP (ref 98–107)
CO2 SERPL-SCNC: 23 MMOL/L — SIGNIFICANT CHANGE UP (ref 22–31)
CREAT SERPL-MCNC: 1.31 MG/DL — HIGH (ref 0.5–1.3)
D DIMER BLD IA.RAPID-MCNC: 638 NG/ML DDU — HIGH
EGFR: 57 ML/MIN/1.73M2 — LOW
FERRITIN SERPL-MCNC: 274 NG/ML — SIGNIFICANT CHANGE UP (ref 30–400)
GLUCOSE BLDC GLUCOMTR-MCNC: 120 MG/DL — HIGH (ref 70–99)
GLUCOSE BLDC GLUCOMTR-MCNC: 131 MG/DL — HIGH (ref 70–99)
GLUCOSE BLDC GLUCOMTR-MCNC: 218 MG/DL — HIGH (ref 70–99)
GLUCOSE BLDC GLUCOMTR-MCNC: 240 MG/DL — HIGH (ref 70–99)
GLUCOSE BLDC GLUCOMTR-MCNC: 96 MG/DL — SIGNIFICANT CHANGE UP (ref 70–99)
GLUCOSE SERPL-MCNC: 97 MG/DL — SIGNIFICANT CHANGE UP (ref 70–99)
HCT VFR BLD CALC: 31.8 % — LOW (ref 39–50)
HGB BLD-MCNC: 10.3 G/DL — LOW (ref 13–17)
LDH SERPL L TO P-CCNC: 126 U/L — LOW (ref 135–225)
MAGNESIUM SERPL-MCNC: 1.9 MG/DL — SIGNIFICANT CHANGE UP (ref 1.6–2.6)
MCHC RBC-ENTMCNC: 31 PG — SIGNIFICANT CHANGE UP (ref 27–34)
MCHC RBC-ENTMCNC: 32.4 GM/DL — SIGNIFICANT CHANGE UP (ref 32–36)
MCV RBC AUTO: 95.8 FL — SIGNIFICANT CHANGE UP (ref 80–100)
NRBC # BLD: 0 /100 WBCS — SIGNIFICANT CHANGE UP
NRBC # FLD: 0 K/UL — SIGNIFICANT CHANGE UP
PHOSPHATE SERPL-MCNC: 3.2 MG/DL — SIGNIFICANT CHANGE UP (ref 2.5–4.5)
PLATELET # BLD AUTO: 307 K/UL — SIGNIFICANT CHANGE UP (ref 150–400)
POTASSIUM SERPL-MCNC: 4.3 MMOL/L — SIGNIFICANT CHANGE UP (ref 3.5–5.3)
POTASSIUM SERPL-SCNC: 4.3 MMOL/L — SIGNIFICANT CHANGE UP (ref 3.5–5.3)
RBC # BLD: 3.32 M/UL — LOW (ref 4.2–5.8)
RBC # FLD: 11.9 % — SIGNIFICANT CHANGE UP (ref 10.3–14.5)
SODIUM SERPL-SCNC: 137 MMOL/L — SIGNIFICANT CHANGE UP (ref 135–145)
WBC # BLD: 3.83 K/UL — SIGNIFICANT CHANGE UP (ref 3.8–10.5)
WBC # FLD AUTO: 3.83 K/UL — SIGNIFICANT CHANGE UP (ref 3.8–10.5)

## 2022-06-19 PROCEDURE — 99232 SBSQ HOSP IP/OBS MODERATE 35: CPT

## 2022-06-19 RX ORDER — ENOXAPARIN SODIUM 100 MG/ML
60 INJECTION SUBCUTANEOUS EVERY 12 HOURS
Refills: 0 | Status: DISCONTINUED | OUTPATIENT
Start: 2022-06-19 | End: 2022-06-20

## 2022-06-19 RX ORDER — INSULIN GLARGINE 100 [IU]/ML
12 INJECTION, SOLUTION SUBCUTANEOUS AT BEDTIME
Refills: 0 | Status: DISCONTINUED | OUTPATIENT
Start: 2022-06-19 | End: 2022-06-20

## 2022-06-19 RX ORDER — INSULIN LISPRO 100/ML
10 VIAL (ML) SUBCUTANEOUS
Refills: 0 | Status: DISCONTINUED | OUTPATIENT
Start: 2022-06-19 | End: 2022-06-21

## 2022-06-19 RX ORDER — ENOXAPARIN SODIUM 100 MG/ML
40 INJECTION SUBCUTANEOUS EVERY 12 HOURS
Refills: 0 | Status: DISCONTINUED | OUTPATIENT
Start: 2022-06-19 | End: 2022-06-19

## 2022-06-19 RX ADMIN — INSULIN GLARGINE 12 UNIT(S): 100 INJECTION, SOLUTION SUBCUTANEOUS at 22:04

## 2022-06-19 RX ADMIN — BACITRACIN ZINC AND POLYMYXIN B SULFATE 1 APPLICATION(S): 500; 10000 OINTMENT OPHTHALMIC at 18:03

## 2022-06-19 RX ADMIN — ATORVASTATIN CALCIUM 40 MILLIGRAM(S): 80 TABLET, FILM COATED ORAL at 22:05

## 2022-06-19 RX ADMIN — DIVALPROEX SODIUM 250 MILLIGRAM(S): 500 TABLET, DELAYED RELEASE ORAL at 18:01

## 2022-06-19 RX ADMIN — ENOXAPARIN SODIUM 60 MILLIGRAM(S): 100 INJECTION SUBCUTANEOUS at 20:14

## 2022-06-19 RX ADMIN — Medication 8 UNIT(S): at 12:50

## 2022-06-19 RX ADMIN — ENOXAPARIN SODIUM 60 MILLIGRAM(S): 100 INJECTION SUBCUTANEOUS at 09:29

## 2022-06-19 RX ADMIN — DIVALPROEX SODIUM 250 MILLIGRAM(S): 500 TABLET, DELAYED RELEASE ORAL at 06:04

## 2022-06-19 RX ADMIN — Medication 2: at 12:50

## 2022-06-19 RX ADMIN — Medication 105 MILLIGRAM(S): at 13:21

## 2022-06-19 RX ADMIN — Medication 2: at 18:01

## 2022-06-19 RX ADMIN — Medication 3 MILLIGRAM(S): at 22:05

## 2022-06-19 RX ADMIN — Medication 8 UNIT(S): at 09:28

## 2022-06-19 RX ADMIN — Medication 10 UNIT(S): at 18:00

## 2022-06-19 RX ADMIN — Medication 1 TABLET(S): at 12:49

## 2022-06-19 RX ADMIN — Medication 1 MILLIGRAM(S): at 12:48

## 2022-06-19 RX ADMIN — BACITRACIN ZINC AND POLYMYXIN B SULFATE 1 APPLICATION(S): 500; 10000 OINTMENT OPHTHALMIC at 06:05

## 2022-06-19 NOTE — PROGRESS NOTE ADULT - PROBLEM SELECTOR PLAN 7
Pt. is confused and agitated. DVT ppx: Lovenox  Diet: Regular, CC   Dispo: Homeless, needs  consult for placement  No psychiatric contraindication to discharge, can f/u psych outpatient

## 2022-06-19 NOTE — PROGRESS NOTE ADULT - SUBJECTIVE AND OBJECTIVE BOX
DIABETES FOLLOW UP : Seen earlier today    INTERVAL HX: pt asking for more food , states he is hungry and wants food now, noted lunch tray coming shortly pt agreeable to wait, note FBG tightly controlled , noted regular cookie package opened and consumed at bedside , per d/w RN pt had additional carbs at breakfast & Pt asking for additional food. pt states he has no problems feels well and just wants more food; denies eating over night .   began to educate pt on importance of CHO diet to prevent hyperglycemia, pt interrupted writer states high sugar wont kill him, attempted to educate pt further on complications of DM pt became visibly frustrated with writer and is asking again for more food pt not receptive to education. pt w/ poor insight into DM management/care     Review of Systems:  General: As above  Cardiovascular: No chest pain  Respiratory: No SOB  GI: No nausea, vomiting  Endocrine: no  S&Sx of hypoglycemia    Allergies    Allergy Status Unknown  No Known Allergies    Intolerances      MEDICATIONS  (STANDING):  atorvastatin 40 milliGRAM(s) Oral at bedtime  bacitracin/polymyxin B Ophthalmic Ointment 1 Application(s) Both EYES two times a day  dextrose 5%. 1000 milliLiter(s) (100 mL/Hr) IV Continuous <Continuous>  dextrose 5%. 1000 milliLiter(s) (50 mL/Hr) IV Continuous <Continuous>  dextrose 50% Injectable 25 Gram(s) IV Push once  dextrose 50% Injectable 12.5 Gram(s) IV Push once  dextrose 50% Injectable 25 Gram(s) IV Push once  diVALproex  milliGRAM(s) Oral two times a day  enoxaparin Injectable 60 milliGRAM(s) SubCutaneous every 12 hours  folic acid 1 milliGRAM(s) Oral daily  glucagon  Injectable 1 milliGRAM(s) IntraMuscular once  insulin glargine Injectable (LANTUS) 14 Unit(s) SubCutaneous at bedtime  insulin lispro (ADMELOG) corrective regimen sliding scale   SubCutaneous three times a day before meals  insulin lispro (ADMELOG) corrective regimen sliding scale   SubCutaneous <User Schedule>  insulin lispro Injectable (ADMELOG) 8 Unit(s) SubCutaneous before dinner  insulin lispro Injectable (ADMELOG) 8 Unit(s) SubCutaneous before lunch  insulin lispro Injectable (ADMELOG) 8 Unit(s) SubCutaneous before breakfast  multivitamin 1 Tablet(s) Oral daily      PHYSICAL EXAM:  VITALS: T(C): 36.6 (06-19-22 @ 06:05)  T(F): 97.8 (06-19-22 @ 06:05), Max: 98.1 (06-18-22 @ 21:15)  HR: 79 (06-19-22 @ 06:05) (79 - 98)  BP: 108/68 (06-19-22 @ 06:05) (108/68 - 114/73)  RR:  (17 - 18)  SpO2:  (98% - 98%)  Wt(kg): --  GENERAL: male laying in bed  in NAD  Respiratory: Respirations unlabored   Extremities: Warm  NEURO: Alert  , orientedx3     LABS:  POCT Blood Glucose.: 240 mg/dL (06-19-22 @ 12:45)  POCT Blood Glucose.: 96 mg/dL (06-19-22 @ 08:54)  POCT Blood Glucose.: 131 mg/dL (06-19-22 @ 02:23)  POCT Blood Glucose.: 275 mg/dL (06-18-22 @ 22:25)  POCT Blood Glucose.: 290 mg/dL (06-18-22 @ 17:54)  POCT Blood Glucose.: 259 mg/dL (06-18-22 @ 12:04)  POCT Blood Glucose.: 249 mg/dL (06-18-22 @ 09:06)  POCT Blood Glucose.: 390 mg/dL (06-17-22 @ 22:07)  POCT Blood Glucose.: 284 mg/dL (06-17-22 @ 17:58)  POCT Blood Glucose.: 251 mg/dL (06-17-22 @ 12:52)  POCT Blood Glucose.: 121 mg/dL (06-17-22 @ 08:50)  POCT Blood Glucose.: 101 mg/dL (06-16-22 @ 22:17)  POCT Blood Glucose.: 267 mg/dL (06-16-22 @ 17:51)                            10.3   3.83  )-----------( 307      ( 19 Jun 2022 06:31 )             31.8       06-19    137  |  102  |  27<H>  ----------------------------<  97  4.3   |  23  |  1.31<H>    Ca    9.0      19 Jun 2022 06:31  Phos  3.2     06-19  Mg     1.90     06-19        eGFR: 57 mL/min/1.73m2 (19 Jun 2022 06:31)      06-12 Chol 231<H> Direct LDL -- LDL calculated 150<H> HDL 54 Trig 133    Thyroid Function Tests:  06-11 @ 23:50 TSH 1.16 FreeT4 -- T3 -- Anti TPO -- Anti Thyroglobulin Ab -- TSI --          A1C with Estimated Average Glucose Result: 15.5 % (06-12-22 @ 11:00)  A1C with Estimated Average Glucose Result: 16.1 % (06-12-22 @ 11:00)      Estimated Average Glucose: 398 (06-12-22 @ 11:00)  Estimated Average Glucose: 415 (06-12-22 @ 11:00)

## 2022-06-19 NOTE — PROGRESS NOTE ADULT - SUBJECTIVE AND OBJECTIVE BOX
Patient is a 77y old  Male who presents with a chief complaint of AMS/hyperglycemia (19 Jun 2022 15:10)      INTERVAL HPI/OVERNIGHT EVENTS:   T(C): 36.6 (06-19-22 @ 22:10), Max: 36.6 (06-19-22 @ 06:05)  HR: 91 (06-19-22 @ 22:10) (79 - 91)  BP: 107/63 (06-19-22 @ 22:10) (107/63 - 108/68)  RR: 17 (06-19-22 @ 22:10) (17 - 18)  SpO2: 99% (06-19-22 @ 22:10) (98% - 99%)  Wt(kg): --  I&O's Summary      PAST MEDICAL & SURGICAL HISTORY:  DM (diabetes mellitus)      No significant past surgical history          SOCIAL HISTORY  Alcohol:  Tobacco:  Illicit substance use:    FAMILY HISTORY:    REVIEW OF SYSTEMS:  CONSTITUTIONAL: No fever, weight loss, or fatigue  EYES: No eye pain, visual disturbances, or discharge  ENMT:  No difficulty hearing, tinnitus, vertigo; No sinus or throat pain  NECK: No pain or stiffness  RESPIRATORY: No cough, wheezing, chills or hemoptysis; No shortness of breath  CARDIOVASCULAR: No chest pain, palpitations, dizziness, or leg swelling  GASTROINTESTINAL: No abdominal or epigastric pain. No nausea, vomiting, or hematemesis; No diarrhea or constipation. No melena or hematochezia.  GENITOURINARY: No dysuria, frequency, hematuria, or incontinence  NEUROLOGICAL: No headaches, memory loss, loss of strength, numbness, or tremors  SKIN: No itching, burning, rashes, or lesions   LYMPH NODES: No enlarged glands  ENDOCRINE: No heat or cold intolerance; No hair loss  MUSCULOSKELETAL: No joint pain or swelling; No muscle, back, or extremity pain  PSYCHIATRIC: No depression, anxiety, mood swings, or difficulty sleeping  HEME/LYMPH: No easy bruising, or bleeding gums  ALLERY AND IMMUNOLOGIC: No hives or eczema    RADIOLOGY & ADDITIONAL TESTS:    Imaging Personally Reviewed:  [ ] YES  [ ] NO    Consultant(s) Notes Reviewed:  [ ] YES  [ ] NO    PHYSICAL EXAM:  GENERAL: NAD, well-groomed, well-developed  HEAD:  Atraumatic, Normocephalic  EYES: EOMI, PERRLA, conjunctiva and sclera clear  ENMT: No tonsillar erythema, exudates, or enlargement; Moist mucous membranes, Good dentition, No lesions  NECK: Supple, No JVD, Normal thyroid  NERVOUS SYSTEM:  Alert & Oriented X3, Good concentration; Motor Strength 5/5 B/L upper and lower extremities; DTRs 2+ intact and symmetric  CHEST/LUNG: Clear to percussion bilaterally; No rales, rhonchi, wheezing, or rubs  HEART: Regular rate and rhythm; No murmurs, rubs, or gallops  ABDOMEN: Soft, Nontender, Nondistended; Bowel sounds present  EXTREMITIES:  2+ Peripheral Pulses, No clubbing, cyanosis, or edema  LYMPH: No lymphadenopathy noted  SKIN: No rashes or lesions    LABS:                        10.3   3.83  )-----------( 307      ( 19 Jun 2022 06:31 )             31.8     06-19    137  |  102  |  27<H>  ----------------------------<  97  4.3   |  23  |  1.31<H>    Ca    9.0      19 Jun 2022 06:31  Phos  3.2     06-19  Mg     1.90     06-19          CAPILLARY BLOOD GLUCOSE      POCT Blood Glucose.: 120 mg/dL (19 Jun 2022 22:02)  POCT Blood Glucose.: 218 mg/dL (19 Jun 2022 17:30)  POCT Blood Glucose.: 240 mg/dL (19 Jun 2022 12:45)  POCT Blood Glucose.: 96 mg/dL (19 Jun 2022 08:54)  POCT Blood Glucose.: 131 mg/dL (19 Jun 2022 02:23)            MEDICATIONS  (STANDING):  atorvastatin 40 milliGRAM(s) Oral at bedtime  bacitracin/polymyxin B Ophthalmic Ointment 1 Application(s) Both EYES two times a day  dextrose 5%. 1000 milliLiter(s) (100 mL/Hr) IV Continuous <Continuous>  dextrose 5%. 1000 milliLiter(s) (50 mL/Hr) IV Continuous <Continuous>  dextrose 50% Injectable 25 Gram(s) IV Push once  dextrose 50% Injectable 12.5 Gram(s) IV Push once  dextrose 50% Injectable 25 Gram(s) IV Push once  diVALproex  milliGRAM(s) Oral two times a day  enoxaparin Injectable 60 milliGRAM(s) SubCutaneous every 12 hours  folic acid 1 milliGRAM(s) Oral daily  glucagon  Injectable 1 milliGRAM(s) IntraMuscular once  insulin glargine Injectable (LANTUS) 12 Unit(s) SubCutaneous at bedtime  insulin lispro (ADMELOG) corrective regimen sliding scale   SubCutaneous <User Schedule>  insulin lispro (ADMELOG) corrective regimen sliding scale   SubCutaneous three times a day before meals  insulin lispro Injectable (ADMELOG) 8 Unit(s) SubCutaneous before lunch  insulin lispro Injectable (ADMELOG) 8 Unit(s) SubCutaneous before breakfast  insulin lispro Injectable (ADMELOG) 10 Unit(s) SubCutaneous before dinner  multivitamin 1 Tablet(s) Oral daily    MEDICATIONS  (PRN):  acetaminophen     Tablet .. 650 milliGRAM(s) Oral every 6 hours PRN Temp greater or equal to 38C (100.4F), Mild Pain (1 - 3)  dextrose Oral Gel 15 Gram(s) Oral once PRN Blood Glucose LESS THAN 70 milliGRAM(s)/deciliter  melatonin 3 milliGRAM(s) Oral at bedtime PRN Insomnia      Care Discussed with Consultants/Other Providers [ ] YES  [ ] NO Patient is a 77y old  Male who presents with a chief complaint of AMS/hyperglycemia (19 Jun 2022 15:10)      INTERVAL HPI/OVERNIGHT EVENTS: seen and examined   T(C): 36.6 (06-19-22 @ 22:10), Max: 36.6 (06-19-22 @ 06:05)  HR: 91 (06-19-22 @ 22:10) (79 - 91)  BP: 107/63 (06-19-22 @ 22:10) (107/63 - 108/68)  RR: 17 (06-19-22 @ 22:10) (17 - 18)  SpO2: 99% (06-19-22 @ 22:10) (98% - 99%)  Wt(kg): --  I&O's Summary      PAST MEDICAL & SURGICAL HISTORY:  DM (diabetes mellitus)      No significant past surgical history          SOCIAL HISTORY  Alcohol:  Tobacco:  Illicit substance use:    FAMILY HISTORY:    REVIEW OF SYSTEMS:  CONSTITUTIONAL: No fever, weight loss, or fatigue  EYES: No eye pain, visual disturbances, or discharge  ENMT:  No difficulty hearing, tinnitus, vertigo; No sinus or throat pain  NECK: No pain or stiffness  RESPIRATORY: No cough, wheezing, chills or hemoptysis; No shortness of breath  CARDIOVASCULAR: No chest pain, palpitations, dizziness, or leg swelling  GASTROINTESTINAL: No abdominal or epigastric pain. No nausea, vomiting, or hematemesis; No diarrhea or constipation. No melena or hematochezia.  GENITOURINARY: No dysuria, frequency, hematuria, or incontinence  NEUROLOGICAL: No headaches, memory loss, loss of strength, numbness, or tremors  SKIN: No itching, burning, rashes, or lesions   LYMPH NODES: No enlarged glands  ENDOCRINE: No heat or cold intolerance; No hair loss  MUSCULOSKELETAL: No joint pain or swelling; No muscle, back, or extremity pain  PSYCHIATRIC: No depression, anxiety, mood swings, or difficulty sleeping  HEME/LYMPH: No easy bruising, or bleeding gums  ALLERY AND IMMUNOLOGIC: No hives or eczema    RADIOLOGY & ADDITIONAL TESTS:    Imaging Personally Reviewed:  [ ] YES  [ ] NO    Consultant(s) Notes Reviewed:  [ ] YES  [ ] NO    PHYSICAL EXAM:  GENERAL: NAD, well-groomed, well-developed  HEAD:  Atraumatic, Normocephalic  EYES: EOMI, PERRLA, conjunctiva and sclera clear  ENMT: No tonsillar erythema, exudates, or enlargement; Moist mucous membranes, Good dentition, No lesions  NECK: Supple, No JVD, Normal thyroid  NERVOUS SYSTEM:  Alert & Oriented X3, Good concentration; Motor Strength 5/5 B/L upper and lower extremities; DTRs 2+ intact and symmetric  CHEST/LUNG: Clear to percussion bilaterally; No rales, rhonchi, wheezing, or rubs  HEART: Regular rate and rhythm; No murmurs, rubs, or gallops  ABDOMEN: Soft, Nontender, Nondistended; Bowel sounds present  EXTREMITIES:  2+ Peripheral Pulses, No clubbing, cyanosis, or edema  LYMPH: No lymphadenopathy noted  SKIN: No rashes or lesions    LABS:                        10.3   3.83  )-----------( 307      ( 19 Jun 2022 06:31 )             31.8     06-19    137  |  102  |  27<H>  ----------------------------<  97  4.3   |  23  |  1.31<H>    Ca    9.0      19 Jun 2022 06:31  Phos  3.2     06-19  Mg     1.90     06-19          CAPILLARY BLOOD GLUCOSE      POCT Blood Glucose.: 120 mg/dL (19 Jun 2022 22:02)  POCT Blood Glucose.: 218 mg/dL (19 Jun 2022 17:30)  POCT Blood Glucose.: 240 mg/dL (19 Jun 2022 12:45)  POCT Blood Glucose.: 96 mg/dL (19 Jun 2022 08:54)  POCT Blood Glucose.: 131 mg/dL (19 Jun 2022 02:23)            MEDICATIONS  (STANDING):  atorvastatin 40 milliGRAM(s) Oral at bedtime  bacitracin/polymyxin B Ophthalmic Ointment 1 Application(s) Both EYES two times a day  dextrose 5%. 1000 milliLiter(s) (100 mL/Hr) IV Continuous <Continuous>  dextrose 5%. 1000 milliLiter(s) (50 mL/Hr) IV Continuous <Continuous>  dextrose 50% Injectable 25 Gram(s) IV Push once  dextrose 50% Injectable 12.5 Gram(s) IV Push once  dextrose 50% Injectable 25 Gram(s) IV Push once  diVALproex  milliGRAM(s) Oral two times a day  enoxaparin Injectable 60 milliGRAM(s) SubCutaneous every 12 hours  folic acid 1 milliGRAM(s) Oral daily  glucagon  Injectable 1 milliGRAM(s) IntraMuscular once  insulin glargine Injectable (LANTUS) 12 Unit(s) SubCutaneous at bedtime  insulin lispro (ADMELOG) corrective regimen sliding scale   SubCutaneous <User Schedule>  insulin lispro (ADMELOG) corrective regimen sliding scale   SubCutaneous three times a day before meals  insulin lispro Injectable (ADMELOG) 8 Unit(s) SubCutaneous before lunch  insulin lispro Injectable (ADMELOG) 8 Unit(s) SubCutaneous before breakfast  insulin lispro Injectable (ADMELOG) 10 Unit(s) SubCutaneous before dinner  multivitamin 1 Tablet(s) Oral daily    MEDICATIONS  (PRN):  acetaminophen     Tablet .. 650 milliGRAM(s) Oral every 6 hours PRN Temp greater or equal to 38C (100.4F), Mild Pain (1 - 3)  dextrose Oral Gel 15 Gram(s) Oral once PRN Blood Glucose LESS THAN 70 milliGRAM(s)/deciliter  melatonin 3 milliGRAM(s) Oral at bedtime PRN Insomnia      Care Discussed with Consultants/Other Providers [ ] YES  [ ] NO

## 2022-06-19 NOTE — PROGRESS NOTE ADULT - SUBJECTIVE AND OBJECTIVE BOX
%%%%INCOMPLETE NOTE%%%%%     Dr. Chana Douglass, PGY-1    Patient is a 77y old  Male who presents with a chief complaint of AMS/hyperglycemia (18 Jun 2022 14:36)    24-HR EVENTS/SUBJECTIVE: No acute events overnight. Patient seen and examined at bedside this AM. BMx1 yesterday. Denies chest pain, abdominal pain, cough, n/v, sob. Breathing comfortably on room air.    MEDICATIONS  (STANDING):  atorvastatin 40 milliGRAM(s) Oral at bedtime  bacitracin/polymyxin B Ophthalmic Ointment 1 Application(s) Both EYES two times a day  dextrose 5%. 1000 milliLiter(s) (100 mL/Hr) IV Continuous <Continuous>  dextrose 5%. 1000 milliLiter(s) (50 mL/Hr) IV Continuous <Continuous>  dextrose 50% Injectable 25 Gram(s) IV Push once  dextrose 50% Injectable 12.5 Gram(s) IV Push once  dextrose 50% Injectable 25 Gram(s) IV Push once  diVALproex  milliGRAM(s) Oral two times a day  enoxaparin Injectable 40 milliGRAM(s) SubCutaneous every 12 hours  folic acid 1 milliGRAM(s) Oral daily  glucagon  Injectable 1 milliGRAM(s) IntraMuscular once  insulin glargine Injectable (LANTUS) 14 Unit(s) SubCutaneous at bedtime  insulin lispro (ADMELOG) corrective regimen sliding scale   SubCutaneous <User Schedule>  insulin lispro (ADMELOG) corrective regimen sliding scale   SubCutaneous three times a day before meals  insulin lispro Injectable (ADMELOG) 8 Unit(s) SubCutaneous before dinner  insulin lispro Injectable (ADMELOG) 8 Unit(s) SubCutaneous before lunch  insulin lispro Injectable (ADMELOG) 8 Unit(s) SubCutaneous before breakfast  multivitamin 1 Tablet(s) Oral daily  thiamine IVPB 500 milliGRAM(s) IV Intermittent daily    MEDICATIONS  (PRN):  acetaminophen     Tablet .. 650 milliGRAM(s) Oral every 6 hours PRN Temp greater or equal to 38C (100.4F), Mild Pain (1 - 3)  dextrose Oral Gel 15 Gram(s) Oral once PRN Blood Glucose LESS THAN 70 milliGRAM(s)/deciliter  melatonin 3 milliGRAM(s) Oral at bedtime PRN Insomnia        Objective:     Vitals: Vital Signs Last 24 Hrs  T(C): 36.6 (06-19-22 @ 06:05), Max: 36.8 (06-18-22 @ 15:07)  T(F): 97.8 (06-19-22 @ 06:05), Max: 98.2 (06-18-22 @ 15:07)  HR: 79 (06-19-22 @ 06:05) (79 - 98)  BP: 108/68 (06-19-22 @ 06:05) (108/68 - 114/73)  BP(mean): --  RR: 18 (06-19-22 @ 06:05) (17 - 18)  SpO2: 98% (06-19-22 @ 06:05) (98% - 98%)            I&O's Summary      PHYSICAL EXAM:  GENERAL: NAD, well-developed  HEAD:  Atraumatic, Normocephalic  EYES: EOMI, PERRLA, conjunctiva and sclera deep red. Swelling of Right eyelid   ENMT: Moist mucous membranes, poor dentition   NECK: Supple  CHEST/LUNG: Clear to auscultation bilaterally; No rales, rhonchi, wheezing, or rubs  HEART: Regular rate and rhythm; No murmurs, rubs, or gallops  ABDOMEN: Soft, Nontender, slight distention; Bowel sounds present  EXTREMITIES:  2+ Peripheral Pulses, No clubbing, cyanosis, or edema  NERVOUS SYSTEM:  Alert & Oriented X3, NAVARRETE spontaneously, poor concentration    PSYCH: Calm and cooperative, but confused. Laying in bed comfortably; not agitated.     LABS:                        10.3   3.83  )-----------( 307      ( 19 Jun 2022 06:31 )             31.8                         10.2   3.82  )-----------( 303      ( 18 Jun 2022 05:47 )             31.6     Hgb Trend: 10.3<--, 10.2<--, 11.2<--, 9.6<--, 9.2<--  06-19    137  |  102  |  27<H>  ----------------------------<  97  4.3   |  23  |  1.31<H>  06-18    132<L>  |  99  |  35<H>  ----------------------------<  278<H>  4.7   |  22  |  1.55<H>    Ca    9.0      19 Jun 2022 06:31  Ca    8.6      18 Jun 2022 05:47  Phos  3.2     06-19  Mg     1.90     06-19      Creatinine Trend: 1.31<--, 1.55<--, 1.22<--, 1.13<--, 1.24<--, 1.10<--                    CAPILLARY BLOOD GLUCOSE      POCT Blood Glucose.: 131 mg/dL (19 Jun 2022 02:23)  POCT Blood Glucose.: 275 mg/dL (18 Jun 2022 22:25)  POCT Blood Glucose.: 290 mg/dL (18 Jun 2022 17:54)  POCT Blood Glucose.: 259 mg/dL (18 Jun 2022 12:04)  POCT Blood Glucose.: 249 mg/dL (18 Jun 2022 09:06)     Dr. Chana Douglass, PGY-1    Patient is a 77y old  Male who presents with a chief complaint of AMS/hyperglycemia (18 Jun 2022 14:36)    24-HR EVENTS/SUBJECTIVE: No acute events overnight. Patient seen and examined at bedside this AM. BMx1 yesterday. Denies chest pain, abdominal pain, cough, n/v, sob. Breathing comfortably on room air. Requesting more food, despite actively eating breakfast.     MEDICATIONS  (STANDING):  atorvastatin 40 milliGRAM(s) Oral at bedtime  bacitracin/polymyxin B Ophthalmic Ointment 1 Application(s) Both EYES two times a day  dextrose 5%. 1000 milliLiter(s) (100 mL/Hr) IV Continuous <Continuous>  dextrose 5%. 1000 milliLiter(s) (50 mL/Hr) IV Continuous <Continuous>  dextrose 50% Injectable 25 Gram(s) IV Push once  dextrose 50% Injectable 12.5 Gram(s) IV Push once  dextrose 50% Injectable 25 Gram(s) IV Push once  diVALproex  milliGRAM(s) Oral two times a day  enoxaparin Injectable 40 milliGRAM(s) SubCutaneous every 12 hours  folic acid 1 milliGRAM(s) Oral daily  glucagon  Injectable 1 milliGRAM(s) IntraMuscular once  insulin glargine Injectable (LANTUS) 14 Unit(s) SubCutaneous at bedtime  insulin lispro (ADMELOG) corrective regimen sliding scale   SubCutaneous <User Schedule>  insulin lispro (ADMELOG) corrective regimen sliding scale   SubCutaneous three times a day before meals  insulin lispro Injectable (ADMELOG) 8 Unit(s) SubCutaneous before dinner  insulin lispro Injectable (ADMELOG) 8 Unit(s) SubCutaneous before lunch  insulin lispro Injectable (ADMELOG) 8 Unit(s) SubCutaneous before breakfast  multivitamin 1 Tablet(s) Oral daily  thiamine IVPB 500 milliGRAM(s) IV Intermittent daily    MEDICATIONS  (PRN):  acetaminophen     Tablet .. 650 milliGRAM(s) Oral every 6 hours PRN Temp greater or equal to 38C (100.4F), Mild Pain (1 - 3)  dextrose Oral Gel 15 Gram(s) Oral once PRN Blood Glucose LESS THAN 70 milliGRAM(s)/deciliter  melatonin 3 milliGRAM(s) Oral at bedtime PRN Insomnia        Objective:     Vitals: Vital Signs Last 24 Hrs  T(C): 36.6 (06-19-22 @ 06:05), Max: 36.8 (06-18-22 @ 15:07)  T(F): 97.8 (06-19-22 @ 06:05), Max: 98.2 (06-18-22 @ 15:07)  HR: 79 (06-19-22 @ 06:05) (79 - 98)  BP: 108/68 (06-19-22 @ 06:05) (108/68 - 114/73)  BP(mean): --  RR: 18 (06-19-22 @ 06:05) (17 - 18)  SpO2: 98% (06-19-22 @ 06:05) (98% - 98%)            I&O's Summary      PHYSICAL EXAM:  GENERAL: NAD, well-developed  HEAD:  Atraumatic, Normocephalic  EYES: EOMI, PERRLA, Improved eyelid swelling, no discharge noted   ENMT: Moist mucous membranes, poor dentition   NECK: Supple  CHEST/LUNG: Clear to auscultation bilaterally; No rales, rhonchi, wheezing, or rubs  HEART: Regular rate and rhythm; No murmurs, rubs, or gallops  ABDOMEN: Soft, Nontender, slight distention; Bowel sounds present  EXTREMITIES:  2+ Peripheral Pulses, No clubbing, cyanosis, or edema  NERVOUS SYSTEM:  Alert & Oriented X3, NAVARRETE spontaneously, poor concentration    PSYCH: Calm and cooperative, but confused. Laying in bed comfortably; not agitated.     LABS:                        10.3   3.83  )-----------( 307      ( 19 Jun 2022 06:31 )             31.8                         10.2   3.82  )-----------( 303      ( 18 Jun 2022 05:47 )             31.6     Hgb Trend: 10.3<--, 10.2<--, 11.2<--, 9.6<--, 9.2<--  06-19    137  |  102  |  27<H>  ----------------------------<  97  4.3   |  23  |  1.31<H>  06-18    132<L>  |  99  |  35<H>  ----------------------------<  278<H>  4.7   |  22  |  1.55<H>    Ca    9.0      19 Jun 2022 06:31  Ca    8.6      18 Jun 2022 05:47  Phos  3.2     06-19  Mg     1.90     06-19      Creatinine Trend: 1.31<--, 1.55<--, 1.22<--, 1.13<--, 1.24<--, 1.10<--                    CAPILLARY BLOOD GLUCOSE      POCT Blood Glucose.: 131 mg/dL (19 Jun 2022 02:23)  POCT Blood Glucose.: 275 mg/dL (18 Jun 2022 22:25)  POCT Blood Glucose.: 290 mg/dL (18 Jun 2022 17:54)  POCT Blood Glucose.: 259 mg/dL (18 Jun 2022 12:04)  POCT Blood Glucose.: 249 mg/dL (18 Jun 2022 09:06)

## 2022-06-20 LAB
ANION GAP SERPL CALC-SCNC: 11 MMOL/L — SIGNIFICANT CHANGE UP (ref 7–14)
APPEARANCE UR: ABNORMAL
BACTERIA # UR AUTO: ABNORMAL
BILIRUB UR-MCNC: NEGATIVE — SIGNIFICANT CHANGE UP
BUN SERPL-MCNC: 40 MG/DL — HIGH (ref 7–23)
CALCIUM SERPL-MCNC: 8.8 MG/DL — SIGNIFICANT CHANGE UP (ref 8.4–10.5)
CHLORIDE SERPL-SCNC: 100 MMOL/L — SIGNIFICANT CHANGE UP (ref 98–107)
CO2 SERPL-SCNC: 22 MMOL/L — SIGNIFICANT CHANGE UP (ref 22–31)
COLOR SPEC: YELLOW — SIGNIFICANT CHANGE UP
COMMENT - URINE: SIGNIFICANT CHANGE UP
CREAT ?TM UR-MCNC: 30 MG/DL — SIGNIFICANT CHANGE UP
CREAT SERPL-MCNC: 1.47 MG/DL — HIGH (ref 0.5–1.3)
DIFF PNL FLD: ABNORMAL
EGFR: 49 ML/MIN/1.73M2 — LOW
EPI CELLS # UR: 5 /HPF — SIGNIFICANT CHANGE UP (ref 0–5)
GAD65 AB SER-MCNC: 0.12 NMOL/L — HIGH
GLUCOSE BLDC GLUCOMTR-MCNC: 176 MG/DL — HIGH (ref 70–99)
GLUCOSE BLDC GLUCOMTR-MCNC: 241 MG/DL — HIGH (ref 70–99)
GLUCOSE BLDC GLUCOMTR-MCNC: 264 MG/DL — HIGH (ref 70–99)
GLUCOSE BLDC GLUCOMTR-MCNC: 266 MG/DL — HIGH (ref 70–99)
GLUCOSE BLDC GLUCOMTR-MCNC: 427 MG/DL — HIGH (ref 70–99)
GLUCOSE BLDC GLUCOMTR-MCNC: 440 MG/DL — HIGH (ref 70–99)
GLUCOSE SERPL-MCNC: 232 MG/DL — HIGH (ref 70–99)
GLUCOSE UR QL: ABNORMAL
HCT VFR BLD CALC: 30.1 % — LOW (ref 39–50)
HGB BLD-MCNC: 9.8 G/DL — LOW (ref 13–17)
KETONES UR-MCNC: NEGATIVE — SIGNIFICANT CHANGE UP
LEUKOCYTE ESTERASE UR-ACNC: ABNORMAL
MAGNESIUM SERPL-MCNC: 1.8 MG/DL — SIGNIFICANT CHANGE UP (ref 1.6–2.6)
MCHC RBC-ENTMCNC: 31.3 PG — SIGNIFICANT CHANGE UP (ref 27–34)
MCHC RBC-ENTMCNC: 32.6 GM/DL — SIGNIFICANT CHANGE UP (ref 32–36)
MCV RBC AUTO: 96.2 FL — SIGNIFICANT CHANGE UP (ref 80–100)
NITRITE UR-MCNC: NEGATIVE — SIGNIFICANT CHANGE UP
NRBC # BLD: 0 /100 WBCS — SIGNIFICANT CHANGE UP
NRBC # FLD: 0 K/UL — SIGNIFICANT CHANGE UP
PH UR: 6.5 — SIGNIFICANT CHANGE UP (ref 5–8)
PHOSPHATE SERPL-MCNC: 3 MG/DL — SIGNIFICANT CHANGE UP (ref 2.5–4.5)
PLATELET # BLD AUTO: 339 K/UL — SIGNIFICANT CHANGE UP (ref 150–400)
POTASSIUM SERPL-MCNC: 4.5 MMOL/L — SIGNIFICANT CHANGE UP (ref 3.5–5.3)
POTASSIUM SERPL-SCNC: 4.5 MMOL/L — SIGNIFICANT CHANGE UP (ref 3.5–5.3)
PROT UR-MCNC: ABNORMAL
RBC # BLD: 3.13 M/UL — LOW (ref 4.2–5.8)
RBC # FLD: 11.9 % — SIGNIFICANT CHANGE UP (ref 10.3–14.5)
RBC CASTS # UR COMP ASSIST: 4 /HPF — SIGNIFICANT CHANGE UP (ref 0–4)
SARS-COV-2 RNA SPEC QL NAA+PROBE: DETECTED
SODIUM SERPL-SCNC: 133 MMOL/L — LOW (ref 135–145)
SODIUM UR-SCNC: 48 MMOL/L — SIGNIFICANT CHANGE UP
SP GR SPEC: 1.03 — SIGNIFICANT CHANGE UP (ref 1–1.05)
UROBILINOGEN FLD QL: SIGNIFICANT CHANGE UP
WBC # BLD: 4.53 K/UL — SIGNIFICANT CHANGE UP (ref 3.8–10.5)
WBC # FLD AUTO: 4.53 K/UL — SIGNIFICANT CHANGE UP (ref 3.8–10.5)
WBC UR QL: 4 /HPF — SIGNIFICANT CHANGE UP (ref 0–5)
ZINC TRANSPORTER 8 AB, RESULT: <15 U/ML — SIGNIFICANT CHANGE UP

## 2022-06-20 PROCEDURE — 99233 SBSQ HOSP IP/OBS HIGH 50: CPT

## 2022-06-20 RX ORDER — INSULIN GLARGINE 100 [IU]/ML
14 INJECTION, SOLUTION SUBCUTANEOUS AT BEDTIME
Refills: 0 | Status: DISCONTINUED | OUTPATIENT
Start: 2022-06-20 | End: 2022-06-21

## 2022-06-20 RX ORDER — THIAMINE MONONITRATE (VIT B1) 100 MG
100 TABLET ORAL DAILY
Refills: 0 | Status: DISCONTINUED | OUTPATIENT
Start: 2022-06-20 | End: 2022-06-23

## 2022-06-20 RX ORDER — ENOXAPARIN SODIUM 100 MG/ML
40 INJECTION SUBCUTANEOUS EVERY 24 HOURS
Refills: 0 | Status: DISCONTINUED | OUTPATIENT
Start: 2022-06-20 | End: 2022-06-20

## 2022-06-20 RX ORDER — SODIUM CHLORIDE 9 MG/ML
500 INJECTION, SOLUTION INTRAVENOUS ONCE
Refills: 0 | Status: COMPLETED | OUTPATIENT
Start: 2022-06-20 | End: 2022-06-20

## 2022-06-20 RX ORDER — HEPARIN SODIUM 5000 [USP'U]/ML
5000 INJECTION INTRAVENOUS; SUBCUTANEOUS EVERY 8 HOURS
Refills: 0 | Status: DISCONTINUED | OUTPATIENT
Start: 2022-06-20 | End: 2022-06-23

## 2022-06-20 RX ADMIN — Medication 2: at 09:36

## 2022-06-20 RX ADMIN — Medication 8 UNIT(S): at 09:36

## 2022-06-20 RX ADMIN — BACITRACIN ZINC AND POLYMYXIN B SULFATE 1 APPLICATION(S): 500; 10000 OINTMENT OPHTHALMIC at 05:46

## 2022-06-20 RX ADMIN — Medication 100 MILLIGRAM(S): at 17:54

## 2022-06-20 RX ADMIN — Medication 1 TABLET(S): at 12:49

## 2022-06-20 RX ADMIN — Medication 1: at 12:47

## 2022-06-20 RX ADMIN — INSULIN GLARGINE 14 UNIT(S): 100 INJECTION, SOLUTION SUBCUTANEOUS at 21:45

## 2022-06-20 RX ADMIN — Medication 4: at 22:49

## 2022-06-20 RX ADMIN — HEPARIN SODIUM 5000 UNIT(S): 5000 INJECTION INTRAVENOUS; SUBCUTANEOUS at 21:44

## 2022-06-20 RX ADMIN — BACITRACIN ZINC AND POLYMYXIN B SULFATE 1 APPLICATION(S): 500; 10000 OINTMENT OPHTHALMIC at 17:54

## 2022-06-20 RX ADMIN — Medication 8 UNIT(S): at 12:48

## 2022-06-20 RX ADMIN — HEPARIN SODIUM 5000 UNIT(S): 5000 INJECTION INTRAVENOUS; SUBCUTANEOUS at 15:05

## 2022-06-20 RX ADMIN — Medication 10 UNIT(S): at 17:57

## 2022-06-20 RX ADMIN — DIVALPROEX SODIUM 250 MILLIGRAM(S): 500 TABLET, DELAYED RELEASE ORAL at 17:54

## 2022-06-20 RX ADMIN — Medication 3: at 17:56

## 2022-06-20 RX ADMIN — DIVALPROEX SODIUM 250 MILLIGRAM(S): 500 TABLET, DELAYED RELEASE ORAL at 05:46

## 2022-06-20 RX ADMIN — SODIUM CHLORIDE 500 MILLILITER(S): 9 INJECTION, SOLUTION INTRAVENOUS at 14:46

## 2022-06-20 RX ADMIN — Medication 1: at 02:14

## 2022-06-20 RX ADMIN — ATORVASTATIN CALCIUM 40 MILLIGRAM(S): 80 TABLET, FILM COATED ORAL at 21:44

## 2022-06-20 RX ADMIN — Medication 1 MILLIGRAM(S): at 12:48

## 2022-06-20 NOTE — PROGRESS NOTE ADULT - SUBJECTIVE AND OBJECTIVE BOX
[facility]    ** please note this is a delayed entry note**   Seen earlier today, and examined.  - Today, patient is without complaints.  " im hungry"          *****MEDICATIONS: Current medication reviewed and documented.    MEDICATIONS  (STANDING):  atorvastatin 40 milliGRAM(s) Oral at bedtime  bacitracin/polymyxin B Ophthalmic Ointment 1 Application(s) Both EYES two times a day  dextrose 5%. 1000 milliLiter(s) (100 mL/Hr) IV Continuous <Continuous>  dextrose 5%. 1000 milliLiter(s) (50 mL/Hr) IV Continuous <Continuous>  dextrose 50% Injectable 25 Gram(s) IV Push once  dextrose 50% Injectable 12.5 Gram(s) IV Push once  dextrose 50% Injectable 25 Gram(s) IV Push once  diVALproex  milliGRAM(s) Oral two times a day  folic acid 1 milliGRAM(s) Oral daily  glucagon  Injectable 1 milliGRAM(s) IntraMuscular once  heparin   Injectable 5000 Unit(s) SubCutaneous every 8 hours  insulin glargine Injectable (LANTUS) 14 Unit(s) SubCutaneous at bedtime  insulin lispro (ADMELOG) corrective regimen sliding scale   SubCutaneous three times a day before meals  insulin lispro (ADMELOG) corrective regimen sliding scale   SubCutaneous <User Schedule>  insulin lispro Injectable (ADMELOG) 8 Unit(s) SubCutaneous before lunch  insulin lispro Injectable (ADMELOG) 8 Unit(s) SubCutaneous before breakfast  insulin lispro Injectable (ADMELOG) 10 Unit(s) SubCutaneous before dinner  multivitamin 1 Tablet(s) Oral daily  thiamine 100 milliGRAM(s) Oral daily    MEDICATIONS  (PRN):  acetaminophen     Tablet .. 650 milliGRAM(s) Oral every 6 hours PRN Temp greater or equal to 38C (100.4F), Mild Pain (1 - 3)  dextrose Oral Gel 15 Gram(s) Oral once PRN Blood Glucose LESS THAN 70 milliGRAM(s)/deciliter  melatonin 3 milliGRAM(s) Oral at bedtime PRN Insomnia          ***** VITAL SIGNS:   VSS         *****PHYSICAL EXAM: more awake    alert oriented [id number] comprehension are  better   Able to name, repeat.   EOmi fundi not visualized   no nystagmus VFF to confrontation  Tongue is midline  Palate elevates symmetrically   Moving all 4 ext spontaneously no drift appreciated    Gait not assessed.            *****LAB AND IMAGIN.8    4.53  )-----------( 339      ( 2022 05:30 )             30.1               06-21    137  |  102  |  29<H>  ----------------------------<  349<H>  4.6   |  23  |  1.28    Ca    8.7      2022 06:18  Phos  3.0     -  Mg     1.70     -                         Urinalysis Basic - ( 2022 15:50 )    Color: Yellow / Appearance: Slightly Turbid / S.030 / pH: x  Gluc: x / Ketone: Negative  / Bili: Negative / Urobili: <2 mg/dL   Blood: x / Protein: 100 mg/dL / Nitrite: Negative   Leuk Esterase: Trace / RBC: 4 /HPF / WBC 4 /HPF   Sq Epi: x / Non Sq Epi: 5 /HPF / Bacteria: Moderate      [All pertinent recent Imaging/Reports reviewed]           *****A S S E S S M E N T   A N D   P L A N :         Excerpt from H&P,"   78 y/o M with PMH of DM2, bifrontal SAH, a R SDH, a L parietal SAH with pneumocephalus, a L possible parietal non-displaced skull fracture, and a C6 inferior endplate fracture into disc space with air into the canal in  who presents with AMS and hyperglycemia. Patient seen and examined. He is AAO to self and place. Does not know date (day, month or year). Says he does not have a place to live currently. Per ED documentation he was kicked out of his home last year after being agitated and aggressive with his family. He states that he takes metformin however unclear compliance. Patient today wants to eat. Denies any chest pain, abdominal pain, SOB, fevers or chills.      Problem/Recommendations 1:  ams of unclear etiology   likely related to hyperglycemia , hyponatremia on admission  compliance to medication is questioned   pt does not have any overt signs of nph, although ct suggestive, likely related to prior tbi, doubt any acute porcess   denies headache, visual changes, sunsetting not appreciated   pt eval   depakote restarted for sz prophylaxis, coincedentally may help with agitation.      will get depakote level in am   given low albumin lower depakote 250 bid   tolerating med   doing well asing for food       oob to chair in am          Thank you for allowing me to participate in the care of this patient. Will continue to follow patient periodically. Please do not hesitate to call me if you have any  questions or if there has been a change in patients neurological status     ________________  Radha Alexander MD  [facility] ([facility]  [phone]      33 minutes spent on total encounter; more than 50 % of the visit was  spent counseling about plan of care, compliance to diet/exercise and medication regimen and or  coordinating care by the attending physician.      It is advised that stroke patients follow up with GILL Chandra @ [phone] in 1- 2 weeks.   Others please follow up with Dr. Michael Nissenbaum [phone]     It is advised that stroke patients follow up with GILL Chandra @ [phone] in 1- 2 weeks.   Others please follow up with Dr. Michael Nissenbaum [phone]

## 2022-06-20 NOTE — PROGRESS NOTE ADULT - PROBLEM SELECTOR PLAN 7
DVT ppx: Heparin subq  Diet: Regular, CC   Dispo: Homeless, needs  consult for placement  No psychiatric contraindication to discharge, can f/u psych outpatient

## 2022-06-20 NOTE — PROGRESS NOTE ADULT - SUBJECTIVE AND OBJECTIVE BOX
%%%%INCOMPLETE NOTE%%%%%     Dr. Chana Douglass, PGY-1    Patient is a 77y old  Male who presents with a chief complaint of AMS/hyperglycemia (19 Jun 2022 19:44)    24-HR EVENTS/SUBJECTIVE: No acute events overnight. Patient seen and examined at bedside this AM. BMx1 yesterday. Denies chest pain, abdominal pain, cough, n/v, sob. Breathing comfortably on room air.    MEDICATIONS  (STANDING):  atorvastatin 40 milliGRAM(s) Oral at bedtime  bacitracin/polymyxin B Ophthalmic Ointment 1 Application(s) Both EYES two times a day  dextrose 5%. 1000 milliLiter(s) (100 mL/Hr) IV Continuous <Continuous>  dextrose 5%. 1000 milliLiter(s) (50 mL/Hr) IV Continuous <Continuous>  dextrose 50% Injectable 25 Gram(s) IV Push once  dextrose 50% Injectable 12.5 Gram(s) IV Push once  dextrose 50% Injectable 25 Gram(s) IV Push once  diVALproex  milliGRAM(s) Oral two times a day  enoxaparin Injectable 40 milliGRAM(s) SubCutaneous every 24 hours  folic acid 1 milliGRAM(s) Oral daily  glucagon  Injectable 1 milliGRAM(s) IntraMuscular once  insulin glargine Injectable (LANTUS) 12 Unit(s) SubCutaneous at bedtime  insulin lispro (ADMELOG) corrective regimen sliding scale   SubCutaneous <User Schedule>  insulin lispro (ADMELOG) corrective regimen sliding scale   SubCutaneous three times a day before meals  insulin lispro Injectable (ADMELOG) 8 Unit(s) SubCutaneous before lunch  insulin lispro Injectable (ADMELOG) 8 Unit(s) SubCutaneous before breakfast  insulin lispro Injectable (ADMELOG) 10 Unit(s) SubCutaneous before dinner  multivitamin 1 Tablet(s) Oral daily    MEDICATIONS  (PRN):  acetaminophen     Tablet .. 650 milliGRAM(s) Oral every 6 hours PRN Temp greater or equal to 38C (100.4F), Mild Pain (1 - 3)  dextrose Oral Gel 15 Gram(s) Oral once PRN Blood Glucose LESS THAN 70 milliGRAM(s)/deciliter  melatonin 3 milliGRAM(s) Oral at bedtime PRN Insomnia        Objective:     Vitals: Vital Signs Last 24 Hrs  T(C): 36.3 (06-20-22 @ 05:45), Max: 36.6 (06-19-22 @ 22:10)  T(F): 97.3 (06-20-22 @ 05:45), Max: 97.9 (06-19-22 @ 22:10)  HR: 81 (06-20-22 @ 05:45) (81 - 91)  BP: 109/61 (06-20-22 @ 05:45) (107/63 - 109/61)  BP(mean): --  RR: 18 (06-20-22 @ 05:45) (17 - 18)  SpO2: 100% (06-20-22 @ 05:45) (99% - 100%)            I&O's Summary      PHYSICAL EXAM:  GENERAL: NAD, well-developed  HEAD:  Atraumatic, Normocephalic  EYES: EOMI, PERRLA, Improved eyelid swelling, no discharge noted   ENMT: Moist mucous membranes, poor dentition   NECK: Supple  CHEST/LUNG: Clear to auscultation bilaterally; No rales, rhonchi, wheezing, or rubs  HEART: Regular rate and rhythm; No murmurs, rubs, or gallops  ABDOMEN: Soft, Nontender, slight distention; Bowel sounds present  EXTREMITIES:  2+ Peripheral Pulses, No clubbing, cyanosis, or edema  NERVOUS SYSTEM:  Alert & Oriented X3, NAVARRETE spontaneously, poor concentration    PSYCH: Calm and cooperative, but confused. Laying in bed comfortably; not agitated.     LABS:                        9.8    4.53  )-----------( 339      ( 20 Jun 2022 05:30 )             30.1                         10.3   3.83  )-----------( 307      ( 19 Jun 2022 06:31 )             31.8                         10.2   3.82  )-----------( 303      ( 18 Jun 2022 05:47 )             31.6     Hgb Trend: 9.8<--, 10.3<--, 10.2<--, 11.2<--, 9.6<--  06-20    133<L>  |  100  |  40<H>  ----------------------------<  232<H>  4.5   |  22  |  1.47<H>  06-19    137  |  102  |  27<H>  ----------------------------<  97  4.3   |  23  |  1.31<H>  06-18    132<L>  |  99  |  35<H>  ----------------------------<  278<H>  4.7   |  22  |  1.55<H>    Ca    8.8      20 Jun 2022 05:30  Ca    9.0      19 Jun 2022 06:31  Ca    8.6      18 Jun 2022 05:47  Phos  3.0     06-20  Mg     1.80     06-20      Creatinine Trend: 1.47<--, 1.31<--, 1.55<--, 1.22<--, 1.13<--, 1.24<--                    CAPILLARY BLOOD GLUCOSE      POCT Blood Glucose.: 264 mg/dL (20 Jun 2022 02:00)  POCT Blood Glucose.: 120 mg/dL (19 Jun 2022 22:02)  POCT Blood Glucose.: 218 mg/dL (19 Jun 2022 17:30)  POCT Blood Glucose.: 240 mg/dL (19 Jun 2022 12:45)  POCT Blood Glucose.: 96 mg/dL (19 Jun 2022 08:54)     Dr. Chana Douglass, PGY-1    Patient is a 77y old  Male who presents with a chief complaint of AMS/hyperglycemia (19 Jun 2022 19:44)    24-HR EVENTS/SUBJECTIVE: No acute events overnight. Patient seen and examined at bedside this AM. BMx1 yesterday. Denies chest pain, abdominal pain, cough, n/v, sob. Breathing comfortably on room air. Requesting breakfast.     MEDICATIONS  (STANDING):  atorvastatin 40 milliGRAM(s) Oral at bedtime  bacitracin/polymyxin B Ophthalmic Ointment 1 Application(s) Both EYES two times a day  dextrose 5%. 1000 milliLiter(s) (100 mL/Hr) IV Continuous <Continuous>  dextrose 5%. 1000 milliLiter(s) (50 mL/Hr) IV Continuous <Continuous>  dextrose 50% Injectable 25 Gram(s) IV Push once  dextrose 50% Injectable 12.5 Gram(s) IV Push once  dextrose 50% Injectable 25 Gram(s) IV Push once  diVALproex  milliGRAM(s) Oral two times a day  enoxaparin Injectable 40 milliGRAM(s) SubCutaneous every 24 hours  folic acid 1 milliGRAM(s) Oral daily  glucagon  Injectable 1 milliGRAM(s) IntraMuscular once  insulin glargine Injectable (LANTUS) 12 Unit(s) SubCutaneous at bedtime  insulin lispro (ADMELOG) corrective regimen sliding scale   SubCutaneous <User Schedule>  insulin lispro (ADMELOG) corrective regimen sliding scale   SubCutaneous three times a day before meals  insulin lispro Injectable (ADMELOG) 8 Unit(s) SubCutaneous before lunch  insulin lispro Injectable (ADMELOG) 8 Unit(s) SubCutaneous before breakfast  insulin lispro Injectable (ADMELOG) 10 Unit(s) SubCutaneous before dinner  multivitamin 1 Tablet(s) Oral daily    MEDICATIONS  (PRN):  acetaminophen     Tablet .. 650 milliGRAM(s) Oral every 6 hours PRN Temp greater or equal to 38C (100.4F), Mild Pain (1 - 3)  dextrose Oral Gel 15 Gram(s) Oral once PRN Blood Glucose LESS THAN 70 milliGRAM(s)/deciliter  melatonin 3 milliGRAM(s) Oral at bedtime PRN Insomnia        Objective:     Vitals: Vital Signs Last 24 Hrs  T(C): 36.3 (06-20-22 @ 05:45), Max: 36.6 (06-19-22 @ 22:10)  T(F): 97.3 (06-20-22 @ 05:45), Max: 97.9 (06-19-22 @ 22:10)  HR: 81 (06-20-22 @ 05:45) (81 - 91)  BP: 109/61 (06-20-22 @ 05:45) (107/63 - 109/61)  BP(mean): --  RR: 18 (06-20-22 @ 05:45) (17 - 18)  SpO2: 100% (06-20-22 @ 05:45) (99% - 100%)            I&O's Summary      PHYSICAL EXAM:  GENERAL: NAD, well-developed  HEAD:  Atraumatic, Normocephalic  EYES: EOMI, PERRLA, Improved eyelid swelling, no discharge noted   ENMT: Moist mucous membranes, poor dentition   NECK: Supple  CHEST/LUNG: Clear to auscultation bilaterally; No rales, rhonchi, wheezing, or rubs  HEART: Regular rate and rhythm; No murmurs, rubs, or gallops  ABDOMEN: Soft, Nontender, slight distention; Bowel sounds present  EXTREMITIES:  2+ Peripheral Pulses, No clubbing, cyanosis, or edema  NERVOUS SYSTEM:  Alert & Oriented X3, NAVARRETE spontaneously, poor concentration    PSYCH: Calm and cooperative, but confused. Laying in bed comfortably; not agitated.     LABS:                        9.8    4.53  )-----------( 339      ( 20 Jun 2022 05:30 )             30.1                         10.3   3.83  )-----------( 307      ( 19 Jun 2022 06:31 )             31.8                         10.2   3.82  )-----------( 303      ( 18 Jun 2022 05:47 )             31.6     Hgb Trend: 9.8<--, 10.3<--, 10.2<--, 11.2<--, 9.6<--  06-20    133<L>  |  100  |  40<H>  ----------------------------<  232<H>  4.5   |  22  |  1.47<H>  06-19    137  |  102  |  27<H>  ----------------------------<  97  4.3   |  23  |  1.31<H>  06-18    132<L>  |  99  |  35<H>  ----------------------------<  278<H>  4.7   |  22  |  1.55<H>    Ca    8.8      20 Jun 2022 05:30  Ca    9.0      19 Jun 2022 06:31  Ca    8.6      18 Jun 2022 05:47  Phos  3.0     06-20  Mg     1.80     06-20      Creatinine Trend: 1.47<--, 1.31<--, 1.55<--, 1.22<--, 1.13<--, 1.24<--                    CAPILLARY BLOOD GLUCOSE      POCT Blood Glucose.: 264 mg/dL (20 Jun 2022 02:00)  POCT Blood Glucose.: 120 mg/dL (19 Jun 2022 22:02)  POCT Blood Glucose.: 218 mg/dL (19 Jun 2022 17:30)  POCT Blood Glucose.: 240 mg/dL (19 Jun 2022 12:45)  POCT Blood Glucose.: 96 mg/dL (19 Jun 2022 08:54)     Dr. Chana Douglass, PGY-1    Patient is a 77y old  Male who presents with a chief complaint of AMS/hyperglycemia (19 Jun 2022 19:44)    24-HR EVENTS/SUBJECTIVE: No acute events overnight. Patient seen and examined at bedside this AM. BMx1 yesterday. Mild cough. Denies chest pain, abdominal pain, n/v, sob. Breathing comfortably on room air. Requesting breakfast.     MEDICATIONS  (STANDING):  atorvastatin 40 milliGRAM(s) Oral at bedtime  bacitracin/polymyxin B Ophthalmic Ointment 1 Application(s) Both EYES two times a day  dextrose 5%. 1000 milliLiter(s) (100 mL/Hr) IV Continuous <Continuous>  dextrose 5%. 1000 milliLiter(s) (50 mL/Hr) IV Continuous <Continuous>  dextrose 50% Injectable 25 Gram(s) IV Push once  dextrose 50% Injectable 12.5 Gram(s) IV Push once  dextrose 50% Injectable 25 Gram(s) IV Push once  diVALproex  milliGRAM(s) Oral two times a day  enoxaparin Injectable 40 milliGRAM(s) SubCutaneous every 24 hours  folic acid 1 milliGRAM(s) Oral daily  glucagon  Injectable 1 milliGRAM(s) IntraMuscular once  insulin glargine Injectable (LANTUS) 12 Unit(s) SubCutaneous at bedtime  insulin lispro (ADMELOG) corrective regimen sliding scale   SubCutaneous <User Schedule>  insulin lispro (ADMELOG) corrective regimen sliding scale   SubCutaneous three times a day before meals  insulin lispro Injectable (ADMELOG) 8 Unit(s) SubCutaneous before lunch  insulin lispro Injectable (ADMELOG) 8 Unit(s) SubCutaneous before breakfast  insulin lispro Injectable (ADMELOG) 10 Unit(s) SubCutaneous before dinner  multivitamin 1 Tablet(s) Oral daily    MEDICATIONS  (PRN):  acetaminophen     Tablet .. 650 milliGRAM(s) Oral every 6 hours PRN Temp greater or equal to 38C (100.4F), Mild Pain (1 - 3)  dextrose Oral Gel 15 Gram(s) Oral once PRN Blood Glucose LESS THAN 70 milliGRAM(s)/deciliter  melatonin 3 milliGRAM(s) Oral at bedtime PRN Insomnia        Objective:     Vitals: Vital Signs Last 24 Hrs  T(C): 36.3 (06-20-22 @ 05:45), Max: 36.6 (06-19-22 @ 22:10)  T(F): 97.3 (06-20-22 @ 05:45), Max: 97.9 (06-19-22 @ 22:10)  HR: 81 (06-20-22 @ 05:45) (81 - 91)  BP: 109/61 (06-20-22 @ 05:45) (107/63 - 109/61)  BP(mean): --  RR: 18 (06-20-22 @ 05:45) (17 - 18)  SpO2: 100% (06-20-22 @ 05:45) (99% - 100%)            I&O's Summary      PHYSICAL EXAM:  GENERAL: NAD, well-developed  HEAD:  Atraumatic, Normocephalic  EYES: EOMI, PERRLA, Improved eyelid swelling, no discharge noted   ENMT: Moist mucous membranes, poor dentition   NECK: Supple  CHEST/LUNG: Clear to auscultation bilaterally; No rales, rhonchi, wheezing, or rubs  HEART: Regular rate and rhythm; No murmurs, rubs, or gallops  ABDOMEN: Soft, Nontender, slight distention; Bowel sounds present  EXTREMITIES:  2+ Peripheral Pulses, No clubbing, cyanosis, or edema  NERVOUS SYSTEM:  Alert & Oriented X3, NAVARRETE spontaneously, poor concentration    PSYCH: Calm and cooperative, but confused. Laying in bed comfortably; not agitated.     LABS:                        9.8    4.53  )-----------( 339      ( 20 Jun 2022 05:30 )             30.1                         10.3   3.83  )-----------( 307      ( 19 Jun 2022 06:31 )             31.8                         10.2   3.82  )-----------( 303      ( 18 Jun 2022 05:47 )             31.6     Hgb Trend: 9.8<--, 10.3<--, 10.2<--, 11.2<--, 9.6<--  06-20    133<L>  |  100  |  40<H>  ----------------------------<  232<H>  4.5   |  22  |  1.47<H>  06-19    137  |  102  |  27<H>  ----------------------------<  97  4.3   |  23  |  1.31<H>  06-18    132<L>  |  99  |  35<H>  ----------------------------<  278<H>  4.7   |  22  |  1.55<H>    Ca    8.8      20 Jun 2022 05:30  Ca    9.0      19 Jun 2022 06:31  Ca    8.6      18 Jun 2022 05:47  Phos  3.0     06-20  Mg     1.80     06-20      Creatinine Trend: 1.47<--, 1.31<--, 1.55<--, 1.22<--, 1.13<--, 1.24<--                    CAPILLARY BLOOD GLUCOSE      POCT Blood Glucose.: 264 mg/dL (20 Jun 2022 02:00)  POCT Blood Glucose.: 120 mg/dL (19 Jun 2022 22:02)  POCT Blood Glucose.: 218 mg/dL (19 Jun 2022 17:30)  POCT Blood Glucose.: 240 mg/dL (19 Jun 2022 12:45)  POCT Blood Glucose.: 96 mg/dL (19 Jun 2022 08:54)

## 2022-06-20 NOTE — PROGRESS NOTE ADULT - PROBLEM SELECTOR PLAN 2
asymptomatic, satting well on RA  - Lovenox 40mg subq daily (does not need therapeutic dosing as patient not requiring oxygen)   - Ferritin wnl,   - D-dimer 638 asymptomatic, satting well on RA  - Lovenox 40mg subq daily (does not need therapeutic dosing as patient not requiring oxygen)   - Ferritin wnl,   - D-dimer 638  - bebtelovimab treatment offered iso new cough and underlying medial comorbidities, however patient's daughter declined treatment stating "he usually gets better on his own"

## 2022-06-20 NOTE — PHARMACOTHERAPY INTERVENTION NOTE - COMMENTS
Attempted basal insulin pen teaching - showed patient the proper steps to administer insulin but the patient was very distracted and could not be redirected (was hungry, wanted tea, wanted his IV removed). Even after patient received tea, he was not amenable to practicing because he was hungry. He took off the cap to the insulin pen and then asked if the pen can remove his IV; he could not figure out how to open the paper tab to the pen needle too. Patient at this time is not able to administer insulin or check BG on his own. Even when trying to give him step by step instructions, he could not follow appropriately.

## 2022-06-20 NOTE — PROGRESS NOTE ADULT - NSPROGADDITIONALINFOA_GEN_ALL_CORE
dispo: d/c planning home or rehab , insulin remains issue.  on case   medically stable to be d/c
dispo: awaiting placement /  on case

## 2022-06-20 NOTE — PROGRESS NOTE ADULT - PROBLEM SELECTOR PLAN 1
- Reportedly on Metformin 850mg BID outpatient, unclear adherence   - FS 700s on admission, no signs of DKA (pH normal, bHB 0)   - A1c 15.5 on this admission   - Endocrine recs appreciated   - Continue Lantus 14U qhs   - increase Ademelog to 8U TID premeal   - FS and low dose sliding scale TID premeal and qhs   - Patient unable to retain insulin teaching per documentation, daughter Ayse says she cannot help pt with insulin   - Per psych, patient does not to seem to fully appreciate his DM needs  - F/u zinc transporter, ICA, GADA

## 2022-06-20 NOTE — PROGRESS NOTE ADULT - PROBLEM SELECTOR PLAN 7
DVT ppx: Lovenox  Diet: Regular, CC   Dispo: Homeless, needs  consult for placement  No psychiatric contraindication to discharge, can f/u psych outpatient DVT ppx: Heparin subq  Diet: Regular, CC   Dispo: Homeless, needs  consult for placement  No psychiatric contraindication to discharge, can f/u psych outpatient

## 2022-06-20 NOTE — PROGRESS NOTE ADULT - PROBLEM SELECTOR PLAN 3
- AOx2 to person and place   - Ddx: metabolic most likely (hyperglycemic, hyponatremic on admission) vs. infectious vs. structural vs. toxic etiologies   - RVP/COVID PCR, UA negative   - Tox screen negative, folate, B12, TSH wnl   - CT head showing moderate ventricular dilatation greater than the degree of peripheral volume loss has increased since prior study and may be due to normal pressure hydrocephalus  - Per neurosurg, CT findings are non-specific and could be ex-vacuo dilation of ventricles from atrophy from previous TBI  - Neurology doubts symptoms related to NPH   - Defer MRI to outpatient, per neurosurgery  - Psych recommended Zyprexa 1.25mg q6h for agitation and thiamine 500mg IV TID x3 days - Ddx: - Encephalopathy likely chronic related to h/o alcohol use and structural changes from TBI vs. metabolic (hyperglycemic, hyponatremic on admission) vs. infectious vs. 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

## 2022-06-20 NOTE — PROGRESS NOTE ADULT - PROBLEM SELECTOR PLAN 2
asymptomatic, satting well on RA  - Lovenox 40mg subq daily (does not need therapeutic dosing as patient not requiring oxygen)   - Ferritin wnl,   - D-dimer 638  - bebtelovimab treatment offered iso new cough and underlying medial comorbidities, however patient's daughter declined treatment stating "he usually gets better on his own"

## 2022-06-20 NOTE — PROGRESS NOTE ADULT - SUBJECTIVE AND OBJECTIVE BOX
Chief Complaint: DM2    History: patient ate lunch tray but asking for more food and states he is hungry.  Of note he reports receiving rice and potatoes but no protein such as chicken or fish which is what he would like.  No hypoglycemia events noted.  Patient met with pharmacist for DM education today and was still unable to learn how to use insulin pen and demonstrated poor insight into diabetes management.    MEDICATIONS  (STANDING):  atorvastatin 40 milliGRAM(s) Oral at bedtime  bacitracin/polymyxin B Ophthalmic Ointment 1 Application(s) Both EYES two times a day  dextrose 5%. 1000 milliLiter(s) (100 mL/Hr) IV Continuous <Continuous>  dextrose 5%. 1000 milliLiter(s) (50 mL/Hr) IV Continuous <Continuous>  dextrose 50% Injectable 25 Gram(s) IV Push once  dextrose 50% Injectable 12.5 Gram(s) IV Push once  dextrose 50% Injectable 25 Gram(s) IV Push once  diVALproex  milliGRAM(s) Oral two times a day  folic acid 1 milliGRAM(s) Oral daily  glucagon  Injectable 1 milliGRAM(s) IntraMuscular once  heparin   Injectable 5000 Unit(s) SubCutaneous every 8 hours  insulin glargine Injectable (LANTUS) 14 Unit(s) SubCutaneous at bedtime  insulin lispro (ADMELOG) corrective regimen sliding scale   SubCutaneous three times a day before meals  insulin lispro (ADMELOG) corrective regimen sliding scale   SubCutaneous <User Schedule>  insulin lispro Injectable (ADMELOG) 8 Unit(s) SubCutaneous before lunch  insulin lispro Injectable (ADMELOG) 8 Unit(s) SubCutaneous before breakfast  insulin lispro Injectable (ADMELOG) 10 Unit(s) SubCutaneous before dinner  multivitamin 1 Tablet(s) Oral daily  thiamine 100 milliGRAM(s) Oral daily    MEDICATIONS  (PRN):  acetaminophen     Tablet .. 650 milliGRAM(s) Oral every 6 hours PRN Temp greater or equal to 38C (100.4F), Mild Pain (1 - 3)  dextrose Oral Gel 15 Gram(s) Oral once PRN Blood Glucose LESS THAN 70 milliGRAM(s)/deciliter  melatonin 3 milliGRAM(s) Oral at bedtime PRN Insomnia      Allergies    Allergy Status Unknown  No Known Allergies    Intolerances      Review of Systems:    ALL OTHER SYSTEMS REVIEWED AND NEGATIVE      PHYSICAL EXAM:  VITALS: T(C): 36.7 (06-20-22 @ 14:38)  T(F): 98 (06-20-22 @ 14:38), Max: 98 (06-20-22 @ 14:38)  HR: 83 (06-20-22 @ 14:38) (81 - 91)  BP: 112/62 (06-20-22 @ 14:38) (107/63 - 112/62)  RR:  (17 - 18)  SpO2:  (98% - 100%)  Wt(kg): --  GENERAL: NAD, well-groomed, well-developed  EYES: No proptosis, no lid lag, anicteric  HEENT:  Atraumatic, Normocephalic, moist mucous membranes  RESPIRATORY: nonlabored respirations, no wheezing  PSYCH: Alert and oriented x 3, normal affect, normal mood    CAPILLARY BLOOD GLUCOSE      POCT Blood Glucose.: 176 mg/dL (20 Jun 2022 12:46)  POCT Blood Glucose.: 241 mg/dL (20 Jun 2022 09:34)  POCT Blood Glucose.: 264 mg/dL (20 Jun 2022 02:00)  POCT Blood Glucose.: 120 mg/dL (19 Jun 2022 22:02)  POCT Blood Glucose.: 218 mg/dL (19 Jun 2022 17:30)      06-20    133<L>  |  100  |  40<H>  ----------------------------<  232<H>  4.5   |  22  |  1.47<H>    eGFR: 49<L>    Ca    8.8      06-20  Mg     1.80     06-20  Phos  3.0     06-20        A1C with Estimated Average Glucose Result: 15.5 % (06-12-22 @ 11:00)  A1C with Estimated Average Glucose Result: 16.1 % (06-12-22 @ 11:00)  A1C with Estimated Average Glucose Result: 7.3 % (07-10-21 @ 10:16)      Thyroid Function Tests:  06-11 @ 23:50 TSH 1.16 FreeT4 -- T3 -- Anti TPO -- Anti Thyroglobulin Ab -- TSI --

## 2022-06-20 NOTE — PROGRESS NOTE ADULT - SUBJECTIVE AND OBJECTIVE BOX
Patient is a 77y old  Male who presents with a chief complaint of AMS/hyperglycemia (2022 14:45)      INTERVAL HPI/OVERNIGHT EVENTS:  T(C): 36.8 (22 @ 22:12), Max: 36.8 (22 @ 22:12)  HR: 78 (22 @ 22:12) (78 - 83)  BP: 113/69 (22 @ 22:12) (109/61 - 113/69)  RR: 17 (22 @ 22:12) (17 - 18)  SpO2: 100% (22 @ 22:12) (98% - 100%)  Wt(kg): --  I&O's Summary      PAST MEDICAL & SURGICAL HISTORY:  DM (diabetes mellitus)      No significant past surgical history          SOCIAL HISTORY  Alcohol:  Tobacco:  Illicit substance use:    FAMILY HISTORY:    REVIEW OF SYSTEMS:  CONSTITUTIONAL: No fever, weight loss, or fatigue  EYES: No eye pain, visual disturbances, or discharge  ENMT:  No difficulty hearing, tinnitus, vertigo; No sinus or throat pain  NECK: No pain or stiffness  RESPIRATORY: No cough, wheezing, chills or hemoptysis; No shortness of breath  CARDIOVASCULAR: No chest pain, palpitations, dizziness, or leg swelling  GASTROINTESTINAL: No abdominal or epigastric pain. No nausea, vomiting, or hematemesis; No diarrhea or constipation. No melena or hematochezia.  GENITOURINARY: No dysuria, frequency, hematuria, or incontinence  NEUROLOGICAL: No headaches, memory loss, loss of strength, numbness, or tremors  SKIN: No itching, burning, rashes, or lesions   LYMPH NODES: No enlarged glands  ENDOCRINE: No heat or cold intolerance; No hair loss  MUSCULOSKELETAL: No joint pain or swelling; No muscle, back, or extremity pain  PSYCHIATRIC: No depression, anxiety, mood swings, or difficulty sleeping  HEME/LYMPH: No easy bruising, or bleeding gums  ALLERY AND IMMUNOLOGIC: No hives or eczema    RADIOLOGY & ADDITIONAL TESTS:    Imaging Personally Reviewed:  [ ] YES  [ ] NO    Consultant(s) Notes Reviewed:  [ ] YES  [ ] NO    PHYSICAL EXAM:  GENERAL: NAD, well-groomed, well-developed  HEAD:  Atraumatic, Normocephalic  EYES: EOMI, PERRLA, conjunctiva and sclera clear  ENMT: No tonsillar erythema, exudates, or enlargement; Moist mucous membranes, Good dentition, No lesions  NECK: Supple, No JVD, Normal thyroid  NERVOUS SYSTEM:  Alert & Oriented X3, Good concentration; Motor Strength 5/5 B/L upper and lower extremities; DTRs 2+ intact and symmetric  CHEST/LUNG: Clear to percussion bilaterally; No rales, rhonchi, wheezing, or rubs  HEART: Regular rate and rhythm; No murmurs, rubs, or gallops  ABDOMEN: Soft, Nontender, Nondistended; Bowel sounds present  EXTREMITIES:  2+ Peripheral Pulses, No clubbing, cyanosis, or edema  LYMPH: No lymphadenopathy noted  SKIN: No rashes or lesions    LABS:                        9.8    4.53  )-----------( 339      ( 2022 05:30 )             30.1     06-20    133<L>  |  100  |  40<H>  ----------------------------<  232<H>  4.5   |  22  |  1.47<H>    Ca    8.8      2022 05:30  Phos  3.0     06-20  Mg     1.80     06-20        Urinalysis Basic - ( 2022 15:50 )    Color: Yellow / Appearance: Slightly Turbid / S.030 / pH: x  Gluc: x / Ketone: Negative  / Bili: Negative / Urobili: <2 mg/dL   Blood: x / Protein: 100 mg/dL / Nitrite: Negative   Leuk Esterase: Trace / RBC: 4 /HPF / WBC 4 /HPF   Sq Epi: x / Non Sq Epi: 5 /HPF / Bacteria: Moderate      CAPILLARY BLOOD GLUCOSE      POCT Blood Glucose.: 427 mg/dL (2022 22:38)  POCT Blood Glucose.: 440 mg/dL (2022 22:36)  POCT Blood Glucose.: 266 mg/dL (2022 17:51)  POCT Blood Glucose.: 176 mg/dL (2022 12:46)  POCT Blood Glucose.: 241 mg/dL (2022 09:34)  POCT Blood Glucose.: 264 mg/dL (2022 02:00)        Urinalysis Basic - ( 2022 15:50 )    Color: Yellow / Appearance: Slightly Turbid / S.030 / pH: x  Gluc: x / Ketone: Negative  / Bili: Negative / Urobili: <2 mg/dL   Blood: x / Protein: 100 mg/dL / Nitrite: Negative   Leuk Esterase: Trace / RBC: 4 /HPF / WBC 4 /HPF   Sq Epi: x / Non Sq Epi: 5 /HPF / Bacteria: Moderate        MEDICATIONS  (STANDING):  atorvastatin 40 milliGRAM(s) Oral at bedtime  bacitracin/polymyxin B Ophthalmic Ointment 1 Application(s) Both EYES two times a day  dextrose 5%. 1000 milliLiter(s) (50 mL/Hr) IV Continuous <Continuous>  dextrose 5%. 1000 milliLiter(s) (100 mL/Hr) IV Continuous <Continuous>  dextrose 50% Injectable 25 Gram(s) IV Push once  dextrose 50% Injectable 12.5 Gram(s) IV Push once  dextrose 50% Injectable 25 Gram(s) IV Push once  diVALproex  milliGRAM(s) Oral two times a day  folic acid 1 milliGRAM(s) Oral daily  glucagon  Injectable 1 milliGRAM(s) IntraMuscular once  heparin   Injectable 5000 Unit(s) SubCutaneous every 8 hours  insulin glargine Injectable (LANTUS) 14 Unit(s) SubCutaneous at bedtime  insulin lispro (ADMELOG) corrective regimen sliding scale   SubCutaneous three times a day before meals  insulin lispro (ADMELOG) corrective regimen sliding scale   SubCutaneous <User Schedule>  insulin lispro Injectable (ADMELOG) 8 Unit(s) SubCutaneous before lunch  insulin lispro Injectable (ADMELOG) 8 Unit(s) SubCutaneous before breakfast  insulin lispro Injectable (ADMELOG) 10 Unit(s) SubCutaneous before dinner  multivitamin 1 Tablet(s) Oral daily  thiamine 100 milliGRAM(s) Oral daily    MEDICATIONS  (PRN):  acetaminophen     Tablet .. 650 milliGRAM(s) Oral every 6 hours PRN Temp greater or equal to 38C (100.4F), Mild Pain (1 - 3)  dextrose Oral Gel 15 Gram(s) Oral once PRN Blood Glucose LESS THAN 70 milliGRAM(s)/deciliter  melatonin 3 milliGRAM(s) Oral at bedtime PRN Insomnia      Care Discussed with Consultants/Other Providers [ ] YES  [ ] NO

## 2022-06-20 NOTE — PROGRESS NOTE ADULT - PROBLEM SELECTOR PLAN 3
- Ddx: - Encephalopathy likely chronic related to h/o alcohol use and structural changes from TBI vs. metabolic (hyperglycemic, hyponatremic on admission) vs. infectious vs. 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

## 2022-06-21 DIAGNOSIS — N17.9 ACUTE KIDNEY FAILURE, UNSPECIFIED: ICD-10-CM

## 2022-06-21 LAB
ANION GAP SERPL CALC-SCNC: 12 MMOL/L — SIGNIFICANT CHANGE UP (ref 7–14)
BUN SERPL-MCNC: 29 MG/DL — HIGH (ref 7–23)
CALCIUM SERPL-MCNC: 8.7 MG/DL — SIGNIFICANT CHANGE UP (ref 8.4–10.5)
CHLORIDE SERPL-SCNC: 102 MMOL/L — SIGNIFICANT CHANGE UP (ref 98–107)
CO2 SERPL-SCNC: 23 MMOL/L — SIGNIFICANT CHANGE UP (ref 22–31)
CREAT SERPL-MCNC: 1.28 MG/DL — SIGNIFICANT CHANGE UP (ref 0.5–1.3)
EGFR: 58 ML/MIN/1.73M2 — LOW
GLUCOSE BLDC GLUCOMTR-MCNC: 137 MG/DL — HIGH (ref 70–99)
GLUCOSE BLDC GLUCOMTR-MCNC: 142 MG/DL — HIGH (ref 70–99)
GLUCOSE BLDC GLUCOMTR-MCNC: 293 MG/DL — HIGH (ref 70–99)
GLUCOSE BLDC GLUCOMTR-MCNC: 297 MG/DL — HIGH (ref 70–99)
GLUCOSE BLDC GLUCOMTR-MCNC: 298 MG/DL — HIGH (ref 70–99)
GLUCOSE SERPL-MCNC: 349 MG/DL — HIGH (ref 70–99)
MAGNESIUM SERPL-MCNC: 1.7 MG/DL — SIGNIFICANT CHANGE UP (ref 1.6–2.6)
PHOSPHATE SERPL-MCNC: 3 MG/DL — SIGNIFICANT CHANGE UP (ref 2.5–4.5)
POTASSIUM SERPL-MCNC: 4.6 MMOL/L — SIGNIFICANT CHANGE UP (ref 3.5–5.3)
POTASSIUM SERPL-SCNC: 4.6 MMOL/L — SIGNIFICANT CHANGE UP (ref 3.5–5.3)
SODIUM SERPL-SCNC: 137 MMOL/L — SIGNIFICANT CHANGE UP (ref 135–145)

## 2022-06-21 PROCEDURE — 99232 SBSQ HOSP IP/OBS MODERATE 35: CPT

## 2022-06-21 RX ORDER — INSULIN LISPRO 100/ML
9 VIAL (ML) SUBCUTANEOUS
Refills: 0 | Status: DISCONTINUED | OUTPATIENT
Start: 2022-06-22 | End: 2022-06-23

## 2022-06-21 RX ORDER — INSULIN LISPRO 100/ML
12 VIAL (ML) SUBCUTANEOUS
Refills: 0 | Status: DISCONTINUED | OUTPATIENT
Start: 2022-06-21 | End: 2022-06-22

## 2022-06-21 RX ORDER — INSULIN LISPRO 100/ML
10 VIAL (ML) SUBCUTANEOUS
Refills: 0 | Status: DISCONTINUED | OUTPATIENT
Start: 2022-06-22 | End: 2022-06-23

## 2022-06-21 RX ORDER — INSULIN GLARGINE 100 [IU]/ML
15 INJECTION, SOLUTION SUBCUTANEOUS AT BEDTIME
Refills: 0 | Status: DISCONTINUED | OUTPATIENT
Start: 2022-06-21 | End: 2022-06-23

## 2022-06-21 RX ADMIN — Medication 12 UNIT(S): at 18:10

## 2022-06-21 RX ADMIN — Medication 8 UNIT(S): at 13:11

## 2022-06-21 RX ADMIN — DIVALPROEX SODIUM 250 MILLIGRAM(S): 500 TABLET, DELAYED RELEASE ORAL at 18:11

## 2022-06-21 RX ADMIN — DIVALPROEX SODIUM 250 MILLIGRAM(S): 500 TABLET, DELAYED RELEASE ORAL at 06:06

## 2022-06-21 RX ADMIN — Medication 100 MILLIGRAM(S): at 13:12

## 2022-06-21 RX ADMIN — BACITRACIN ZINC AND POLYMYXIN B SULFATE 1 APPLICATION(S): 500; 10000 OINTMENT OPHTHALMIC at 05:37

## 2022-06-21 RX ADMIN — HEPARIN SODIUM 5000 UNIT(S): 5000 INJECTION INTRAVENOUS; SUBCUTANEOUS at 22:10

## 2022-06-21 RX ADMIN — Medication 8 UNIT(S): at 09:25

## 2022-06-21 RX ADMIN — Medication 3: at 09:24

## 2022-06-21 RX ADMIN — BACITRACIN ZINC AND POLYMYXIN B SULFATE 1 APPLICATION(S): 500; 10000 OINTMENT OPHTHALMIC at 18:12

## 2022-06-21 RX ADMIN — Medication 1 TABLET(S): at 13:12

## 2022-06-21 RX ADMIN — Medication 3 MILLIGRAM(S): at 22:10

## 2022-06-21 RX ADMIN — INSULIN GLARGINE 15 UNIT(S): 100 INJECTION, SOLUTION SUBCUTANEOUS at 22:10

## 2022-06-21 RX ADMIN — HEPARIN SODIUM 5000 UNIT(S): 5000 INJECTION INTRAVENOUS; SUBCUTANEOUS at 13:31

## 2022-06-21 RX ADMIN — HEPARIN SODIUM 5000 UNIT(S): 5000 INJECTION INTRAVENOUS; SUBCUTANEOUS at 06:00

## 2022-06-21 RX ADMIN — ATORVASTATIN CALCIUM 40 MILLIGRAM(S): 80 TABLET, FILM COATED ORAL at 22:10

## 2022-06-21 RX ADMIN — Medication 1 MILLIGRAM(S): at 13:11

## 2022-06-21 RX ADMIN — Medication 1: at 22:11

## 2022-06-21 NOTE — PROGRESS NOTE ADULT - SUBJECTIVE AND OBJECTIVE BOX
Chief Complaint: DM2    History: patient finished whole meal tray, states too little food, wants me to bring him more food right now. States his tea is not sweet enough.  no hypoglycemia events    MEDICATIONS  (STANDING):  atorvastatin 40 milliGRAM(s) Oral at bedtime  bacitracin/polymyxin B Ophthalmic Ointment 1 Application(s) Both EYES two times a day  dextrose 5%. 1000 milliLiter(s) (100 mL/Hr) IV Continuous <Continuous>  dextrose 5%. 1000 milliLiter(s) (50 mL/Hr) IV Continuous <Continuous>  dextrose 50% Injectable 25 Gram(s) IV Push once  dextrose 50% Injectable 12.5 Gram(s) IV Push once  dextrose 50% Injectable 25 Gram(s) IV Push once  diVALproex  milliGRAM(s) Oral two times a day  folic acid 1 milliGRAM(s) Oral daily  glucagon  Injectable 1 milliGRAM(s) IntraMuscular once  heparin   Injectable 5000 Unit(s) SubCutaneous every 8 hours  insulin glargine Injectable (LANTUS) 14 Unit(s) SubCutaneous at bedtime  insulin lispro (ADMELOG) corrective regimen sliding scale   SubCutaneous three times a day before meals  insulin lispro (ADMELOG) corrective regimen sliding scale   SubCutaneous <User Schedule>  insulin lispro Injectable (ADMELOG) 8 Unit(s) SubCutaneous before lunch  insulin lispro Injectable (ADMELOG) 8 Unit(s) SubCutaneous before breakfast  insulin lispro Injectable (ADMELOG) 10 Unit(s) SubCutaneous before dinner  multivitamin 1 Tablet(s) Oral daily  thiamine 100 milliGRAM(s) Oral daily    MEDICATIONS  (PRN):  acetaminophen     Tablet .. 650 milliGRAM(s) Oral every 6 hours PRN Temp greater or equal to 38C (100.4F), Mild Pain (1 - 3)  dextrose Oral Gel 15 Gram(s) Oral once PRN Blood Glucose LESS THAN 70 milliGRAM(s)/deciliter  melatonin 3 milliGRAM(s) Oral at bedtime PRN Insomnia      Allergies    Allergy Status Unknown  No Known Allergies    Intolerances      Review of Systems:    ALL OTHER SYSTEMS REVIEWED AND NEGATIVE      PHYSICAL EXAM:  VITALS: T(C): 36.8 (06-21-22 @ 14:38)  T(F): 98.2 (06-21-22 @ 14:38), Max: 98.8 (06-21-22 @ 02:00)  HR: 86 (06-21-22 @ 14:38) (78 - 87)  BP: 94/61 (06-21-22 @ 14:38) (94/61 - 113/69)  RR:  (17 - 18)  SpO2:  (99% - 100%)  Wt(kg): --  GENERAL: NAD, well-groomed, well-developed  EYES: No proptosis, no lid lag, anicteric  HEENT:  Atraumatic, Normocephalic, moist mucous membranes  RESPIRATORY: nonlabored respirations, no wheezing  PSYCH: Alert and oriented, poor insight    CAPILLARY BLOOD GLUCOSE      POCT Blood Glucose.: 142 mg/dL (21 Jun 2022 12:50)  POCT Blood Glucose.: 297 mg/dL (21 Jun 2022 09:08)  POCT Blood Glucose.: 293 mg/dL (21 Jun 2022 02:03)  POCT Blood Glucose.: 427 mg/dL (20 Jun 2022 22:38)  POCT Blood Glucose.: 440 mg/dL (20 Jun 2022 22:36)  POCT Blood Glucose.: 266 mg/dL (20 Jun 2022 17:51)      06-21    137  |  102  |  29<H>  ----------------------------<  349<H>  4.6   |  23  |  1.28    eGFR: 58<L>    Ca    8.7      06-21  Mg     1.70     06-21  Phos  3.0     06-21        A1C with Estimated Average Glucose Result: 15.5 % (06-12-22 @ 11:00)  A1C with Estimated Average Glucose Result: 16.1 % (06-12-22 @ 11:00)  A1C with Estimated Average Glucose Result: 7.3 % (07-10-21 @ 10:16)      Thyroid Function Tests:  06-11 @ 23:50 TSH 1.16 FreeT4 -- T3 -- Anti TPO -- Anti Thyroglobulin Ab -- TSI --

## 2022-06-21 NOTE — PROGRESS NOTE ADULT - NS ATTEND OPT1 GEN_ALL_CORE
I attest my time as attending is greater than 50% of the total combined time spent on qualifying patient care activities by the PA/NP and attending.
I independently performed the documented:
I independently performed the documented:

## 2022-06-21 NOTE — PROGRESS NOTE ADULT - PROBLEM SELECTOR PLAN 3
- SCr trended upwards to 1.55 (baseline 1.1 on admission)   - FeNa suggests intrinsic renal disease   - Bladder scan with only 50cc  - Will give fluids and trend SCr - SCr trended upwards to 1.55 (baseline 1.1 on admission)   - FeNa suggests intrinsic renal disease   - Bladder scan with only 50cc  - Will give fluids and trend SCr  IMPROVED

## 2022-06-21 NOTE — PROGRESS NOTE ADULT - SUBJECTIVE AND OBJECTIVE BOX
%%%%INCOMPLETE NOTE%%%%%     Dr. Chana Douglass, PGY-1    Patient is a 77y old  Male who presents with a chief complaint of AMS/hyperglycemia (2022 18:37)    24-HR EVENTS/SUBJECTIVE: No acute events overnight. Patient seen and examined at bedside this AM. BMx1 yesterday. Denies chest pain, abdominal pain, cough, n/v, sob. Breathing comfortably on room air.    MEDICATIONS  (STANDING):  atorvastatin 40 milliGRAM(s) Oral at bedtime  bacitracin/polymyxin B Ophthalmic Ointment 1 Application(s) Both EYES two times a day  dextrose 5%. 1000 milliLiter(s) (100 mL/Hr) IV Continuous <Continuous>  dextrose 5%. 1000 milliLiter(s) (50 mL/Hr) IV Continuous <Continuous>  dextrose 50% Injectable 25 Gram(s) IV Push once  dextrose 50% Injectable 12.5 Gram(s) IV Push once  dextrose 50% Injectable 25 Gram(s) IV Push once  diVALproex  milliGRAM(s) Oral two times a day  folic acid 1 milliGRAM(s) Oral daily  glucagon  Injectable 1 milliGRAM(s) IntraMuscular once  heparin   Injectable 5000 Unit(s) SubCutaneous every 8 hours  insulin glargine Injectable (LANTUS) 14 Unit(s) SubCutaneous at bedtime  insulin lispro (ADMELOG) corrective regimen sliding scale   SubCutaneous three times a day before meals  insulin lispro (ADMELOG) corrective regimen sliding scale   SubCutaneous <User Schedule>  insulin lispro Injectable (ADMELOG) 8 Unit(s) SubCutaneous before lunch  insulin lispro Injectable (ADMELOG) 8 Unit(s) SubCutaneous before breakfast  insulin lispro Injectable (ADMELOG) 10 Unit(s) SubCutaneous before dinner  multivitamin 1 Tablet(s) Oral daily  thiamine 100 milliGRAM(s) Oral daily    MEDICATIONS  (PRN):  acetaminophen     Tablet .. 650 milliGRAM(s) Oral every 6 hours PRN Temp greater or equal to 38C (100.4F), Mild Pain (1 - 3)  dextrose Oral Gel 15 Gram(s) Oral once PRN Blood Glucose LESS THAN 70 milliGRAM(s)/deciliter  melatonin 3 milliGRAM(s) Oral at bedtime PRN Insomnia        Objective:     Vitals: Vital Signs Last 24 Hrs  T(C): 37.1 (22 @ 06:02), Max: 37.1 (22 @ 02:00)  T(F): 98.7 (22 @ 06:02), Max: 98.8 (22 @ 02:00)  HR: 84 (22 @ 06:02) (78 - 87)  BP: 110/67 (22 @ 06:02) (108/63 - 113/69)  BP(mean): --  RR: 18 (22 @ 06:02) (17 - 18)  SpO2: 99% (22 @ 06:02) (98% - 100%)            I&O's Summary      PHYSICAL EXAM:  GENERAL: NAD, well-developed  HEAD:  Atraumatic, Normocephalic  EYES: EOMI, PERRLA, Improved eyelid swelling, no discharge noted   ENMT: Moist mucous membranes, poor dentition   NECK: Supple  CHEST/LUNG: Clear to auscultation bilaterally; No rales, rhonchi, wheezing, or rubs  HEART: Regular rate and rhythm; No murmurs, rubs, or gallops  ABDOMEN: Soft, Nontender, slight distention; Bowel sounds present  EXTREMITIES:  2+ Peripheral Pulses, No clubbing, cyanosis, or edema  NERVOUS SYSTEM:  Alert & Oriented X3, NAVARRETE spontaneously, poor concentration    PSYCH: Calm and cooperative, but confused. Laying in bed comfortably; not agitated.     LABS:                        9.8    4.53  )-----------( 339      ( 2022 05:30 )             30.1                         10.3   3.83  )-----------( 307      ( 2022 06:31 )             31.8     Hgb Trend: 9.8<--, 10.3<--, 10.2<--, 11.2<--  06-20    133<L>  |  100  |  40<H>  ----------------------------<  232<H>  4.5   |  22  |  1.47<H>  06-    137  |  102  |  27<H>  ----------------------------<  97  4.3   |  23  |  1.31<H>    Ca    8.8      2022 05:30  Ca    9.0      2022 06:31  Phos  3.0     06-20  Mg     1.80     06-20      Creatinine Trend: 1.47<--, 1.31<--, 1.55<--, 1.22<--, 1.13<--, 1.24<--                Urinalysis Basic - ( 2022 15:50 )    Color: Yellow / Appearance: Slightly Turbid / S.030 / pH: x  Gluc: x / Ketone: Negative  / Bili: Negative / Urobili: <2 mg/dL   Blood: x / Protein: 100 mg/dL / Nitrite: Negative   Leuk Esterase: Trace / RBC: 4 /HPF / WBC 4 /HPF   Sq Epi: x / Non Sq Epi: 5 /HPF / Bacteria: Moderate        CAPILLARY BLOOD GLUCOSE      POCT Blood Glucose.: 293 mg/dL (2022 02:03)  POCT Blood Glucose.: 427 mg/dL (2022 22:38)  POCT Blood Glucose.: 440 mg/dL (2022 22:36)  POCT Blood Glucose.: 266 mg/dL (2022 17:51)  POCT Blood Glucose.: 176 mg/dL (2022 12:46)  POCT Blood Glucose.: 241 mg/dL (2022 09:34)     Dr. Chana Douglass, PGY-1    Patient is a 77y old  Male who presents with a chief complaint of AMS/hyperglycemia (2022 18:37)    24-HR EVENTS/SUBJECTIVE: No acute events overnight. Patient seen and examined at bedside this AM. Denies chest pain, abdominal pain, cough, n/v, sob. Breathing comfortably on room air. BMP improved, resolving LEXI.     MEDICATIONS  (STANDING):  atorvastatin 40 milliGRAM(s) Oral at bedtime  bacitracin/polymyxin B Ophthalmic Ointment 1 Application(s) Both EYES two times a day  dextrose 5%. 1000 milliLiter(s) (100 mL/Hr) IV Continuous <Continuous>  dextrose 5%. 1000 milliLiter(s) (50 mL/Hr) IV Continuous <Continuous>  dextrose 50% Injectable 25 Gram(s) IV Push once  dextrose 50% Injectable 12.5 Gram(s) IV Push once  dextrose 50% Injectable 25 Gram(s) IV Push once  diVALproex  milliGRAM(s) Oral two times a day  folic acid 1 milliGRAM(s) Oral daily  glucagon  Injectable 1 milliGRAM(s) IntraMuscular once  heparin   Injectable 5000 Unit(s) SubCutaneous every 8 hours  insulin glargine Injectable (LANTUS) 14 Unit(s) SubCutaneous at bedtime  insulin lispro (ADMELOG) corrective regimen sliding scale   SubCutaneous three times a day before meals  insulin lispro (ADMELOG) corrective regimen sliding scale   SubCutaneous <User Schedule>  insulin lispro Injectable (ADMELOG) 8 Unit(s) SubCutaneous before lunch  insulin lispro Injectable (ADMELOG) 8 Unit(s) SubCutaneous before breakfast  insulin lispro Injectable (ADMELOG) 10 Unit(s) SubCutaneous before dinner  multivitamin 1 Tablet(s) Oral daily  thiamine 100 milliGRAM(s) Oral daily    MEDICATIONS  (PRN):  acetaminophen     Tablet .. 650 milliGRAM(s) Oral every 6 hours PRN Temp greater or equal to 38C (100.4F), Mild Pain (1 - 3)  dextrose Oral Gel 15 Gram(s) Oral once PRN Blood Glucose LESS THAN 70 milliGRAM(s)/deciliter  melatonin 3 milliGRAM(s) Oral at bedtime PRN Insomnia        Objective:     Vitals: Vital Signs Last 24 Hrs  T(C): 37.1 (22 @ 06:02), Max: 37.1 (22 @ 02:00)  T(F): 98.7 (22 @ 06:02), Max: 98.8 (22 @ 02:00)  HR: 84 (22 @ 06:02) (78 - 87)  BP: 110/67 (22 @ 06:02) (108/63 - 113/69)  BP(mean): --  RR: 18 (22 @ 06:02) (17 - 18)  SpO2: 99% (22 @ 06:02) (98% - 100%)            I&O's Summary      PHYSICAL EXAM:  GENERAL: NAD, well-developed  HEAD:  Atraumatic, Normocephalic  EYES: EOMI, PERRLA, Improved eyelid swelling, no discharge noted   ENMT: Moist mucous membranes, poor dentition   NECK: Supple  CHEST/LUNG: Clear to auscultation bilaterally; No rales, rhonchi, wheezing, or rubs  HEART: Regular rate and rhythm; No murmurs, rubs, or gallops  ABDOMEN: Soft, Nontender, slight distention; Bowel sounds present  EXTREMITIES:  2+ Peripheral Pulses, No clubbing, cyanosis, or edema  NERVOUS SYSTEM:  Alert & Oriented X3, NAVARRETE spontaneously, poor concentration    PSYCH: Calm and cooperative, but confused. Laying in bed comfortably; not agitated.     LABS:                        9.8    4.53  )-----------( 339      ( 2022 05:30 )             30.1                         10.3   3.83  )-----------( 307      ( 2022 06:31 )             31.8     Hgb Trend: 9.8<--, 10.3<--, 10.2<--, 11.2<--  06-20    133<L>  |  100  |  40<H>  ----------------------------<  232<H>  4.5   |  22  |  1.47<H>  06-19    137  |  102  |  27<H>  ----------------------------<  97  4.3   |  23  |  1.31<H>    Ca    8.8      2022 05:30  Ca    9.0      2022 06:31  Phos  3.0     06-20  Mg     1.80     06-20      Creatinine Trend: 1.47<--, 1.31<--, 1.55<--, 1.22<--, 1.13<--, 1.24<--                Urinalysis Basic - ( 2022 15:50 )    Color: Yellow / Appearance: Slightly Turbid / S.030 / pH: x  Gluc: x / Ketone: Negative  / Bili: Negative / Urobili: <2 mg/dL   Blood: x / Protein: 100 mg/dL / Nitrite: Negative   Leuk Esterase: Trace / RBC: 4 /HPF / WBC 4 /HPF   Sq Epi: x / Non Sq Epi: 5 /HPF / Bacteria: Moderate        CAPILLARY BLOOD GLUCOSE      POCT Blood Glucose.: 293 mg/dL (2022 02:03)  POCT Blood Glucose.: 427 mg/dL (2022 22:38)  POCT Blood Glucose.: 440 mg/dL (2022 22:36)  POCT Blood Glucose.: 266 mg/dL (2022 17:51)  POCT Blood Glucose.: 176 mg/dL (2022 12:46)  POCT Blood Glucose.: 241 mg/dL (2022 09:34)

## 2022-06-21 NOTE — PROGRESS NOTE ADULT - NS ATTEND AMEND GEN_ALL_CORE FT
pt. seen and examined, agree with above resident note.    consult for d/c planning .   d/c [facility] protocol   PT jaleesa
pt. seen and examined, he is unable to lean how to give insulin and no family member available to give him insulin , seen by maria a and BS are too high on oral hypoglycemic. may need placement .  following   d/c planning once social issues are taken care
pt. is medically stable to be d/c pending social issues , plan for d/c home or rehab for safe d/c .  following   d/c planning once bed is available

## 2022-06-22 LAB
GLUCOSE BLDC GLUCOMTR-MCNC: 122 MG/DL — HIGH (ref 70–99)
GLUCOSE BLDC GLUCOMTR-MCNC: 170 MG/DL — HIGH (ref 70–99)
GLUCOSE BLDC GLUCOMTR-MCNC: 221 MG/DL — HIGH (ref 70–99)
GLUCOSE BLDC GLUCOMTR-MCNC: 247 MG/DL — HIGH (ref 70–99)
GLUCOSE BLDC GLUCOMTR-MCNC: 371 MG/DL — HIGH (ref 70–99)

## 2022-06-22 RX ORDER — INSULIN LISPRO 100/ML
14 VIAL (ML) SUBCUTANEOUS
Refills: 0 | Status: DISCONTINUED | OUTPATIENT
Start: 2022-06-22 | End: 2022-06-23

## 2022-06-22 RX ADMIN — BACITRACIN ZINC AND POLYMYXIN B SULFATE 1 APPLICATION(S): 500; 10000 OINTMENT OPHTHALMIC at 18:18

## 2022-06-22 RX ADMIN — Medication 2: at 13:17

## 2022-06-22 RX ADMIN — Medication 1 TABLET(S): at 13:19

## 2022-06-22 RX ADMIN — Medication 9 UNIT(S): at 09:09

## 2022-06-22 RX ADMIN — Medication 10 UNIT(S): at 13:18

## 2022-06-22 RX ADMIN — Medication 1 MILLIGRAM(S): at 13:19

## 2022-06-22 RX ADMIN — INSULIN GLARGINE 15 UNIT(S): 100 INJECTION, SOLUTION SUBCUTANEOUS at 23:20

## 2022-06-22 RX ADMIN — Medication 14 UNIT(S): at 18:18

## 2022-06-22 RX ADMIN — DIVALPROEX SODIUM 250 MILLIGRAM(S): 500 TABLET, DELAYED RELEASE ORAL at 05:47

## 2022-06-22 RX ADMIN — Medication 100 MILLIGRAM(S): at 13:19

## 2022-06-22 RX ADMIN — HEPARIN SODIUM 5000 UNIT(S): 5000 INJECTION INTRAVENOUS; SUBCUTANEOUS at 23:22

## 2022-06-22 RX ADMIN — Medication 3 MILLIGRAM(S): at 23:21

## 2022-06-22 RX ADMIN — HEPARIN SODIUM 5000 UNIT(S): 5000 INJECTION INTRAVENOUS; SUBCUTANEOUS at 13:19

## 2022-06-22 RX ADMIN — ATORVASTATIN CALCIUM 40 MILLIGRAM(S): 80 TABLET, FILM COATED ORAL at 23:21

## 2022-06-22 RX ADMIN — DIVALPROEX SODIUM 250 MILLIGRAM(S): 500 TABLET, DELAYED RELEASE ORAL at 18:18

## 2022-06-22 RX ADMIN — Medication 2: at 18:17

## 2022-06-22 RX ADMIN — Medication 3: at 23:20

## 2022-06-22 RX ADMIN — HEPARIN SODIUM 5000 UNIT(S): 5000 INJECTION INTRAVENOUS; SUBCUTANEOUS at 05:46

## 2022-06-22 RX ADMIN — BACITRACIN ZINC AND POLYMYXIN B SULFATE 1 APPLICATION(S): 500; 10000 OINTMENT OPHTHALMIC at 05:46

## 2022-06-22 NOTE — PROGRESS NOTE ADULT - ATTENDING COMMENTS
Uncontrolled DM suspected type 2 (but sending workup to rule out other types), HbA1c 15.5%, glucose 718 on admission, might be homeless/shelter. Previously on metformin. Former ETOH. Will need insulin based plan for dc - basal insulin plus orals seems most workable given patient not currently able to learn skills for insulin self administration. Need to clarify safe dc plan. Clarify living situation. Need refrigeration for insulin and someone would need to help him administer insulin.    Joann Rahman MD  Division of Endocrinology  Pager: [phone]    If after 6PM or before 9AM, or on weekends/holidays, please call endocrine answering service for assistance ([phone]).  For nonurgent matters email [email or web address] for assistance.
Uncontrolled DM suspected type 2 (but sending workup to rule out other types), HbA1c 15.5%, glucose 718 on admission, might be homeless/shelter. Previously on metformin. Former ETOH. Will need insulin based plan for dc. Clarify living situation. medicaid pending - would likely be on Novolin 70/30 vial and syringe for dc, need to ensure refrigeration for insulin on dc. Transitions of care pharmacist performed education today.    Joann Rahman MD  Division of Endocrinology  Pager: [phone]    If after 6PM or before 9AM, or on weekends/holidays, please call endocrine answering service for assistance ([phone]).  For nonurgent matters email [email or web address] for assistance.
Uncontrolled DM suspected type 2 (but sending workup to rule out other types), HbA1c 15.5%, glucose 718 on admission, curerntly in shelter    Previously on metformin. Former ETOH. Will need insulin based plan for dc.   Full plan TBD- would need to determine where patient is going, who will be assisting with care and able to give insulin injections.  Please call endocrine once this information has been obtained so that a safe dc plan can be decided and coordianted  [phone]. .  Concern that current clinical status will prohibit him from being able to do this.
D/C planning

## 2022-06-22 NOTE — PROGRESS NOTE ADULT - PROBLEM SELECTOR PLAN 3
- SCr trended upwards to 1.55 (baseline 1.1 on admission)   - FeNa suggests intrinsic renal disease   - Bladder scan with only 50cc  - Will give fluids and trend SCr  IMPROVED

## 2022-06-22 NOTE — CHART NOTE - NSCHARTNOTESELECT_GEN_ALL_CORE
Consult/Nutrition Services
Endocrine Fellow/Event Note
Event Note
Neurosurgery/Event Note
Endocrine/Event Note

## 2022-06-22 NOTE — PROGRESS NOTE ADULT - PROBLEM SELECTOR PLAN 1
[de-identified] : Acute right shoulder pain for 2-3 weeks in the setting of increased activity. Given prescription for Mobic. Side effects were discussed for his knee is attained for greater than 3 years reports mechanical symptoms. He tried rest home exercises/NSAIDs for minimum 6 weeks and he we will get an MRI to rule out meniscus tear follow up after MRI. All questions answered - Reportedly on Metformin 850mg BID outpatient, unclear adherence   - FS 700s on admission, no signs of DKA (pH normal, bHB 0)   - A1c 15.5 on this admission   - Endocrine recs appreciated   - Continue Lantus 14U qhs   - increase Ademelog to 8U TID premeal   - FS and low dose sliding scale TID premeal and qhs   - Patient unable to retain insulin teaching per documentation, daughter Ayse says she cannot help pt with insulin   - Per psych, patient does not to seem to fully appreciate his DM needs either   - F/u zinc transporter, ICA, GADA

## 2022-06-22 NOTE — PROGRESS NOTE ADULT - SUBJECTIVE AND OBJECTIVE BOX
Dr. Chana Douglass, PGY-1    Patient is a 77y old  Male who presents with a chief complaint of AMS/hyperglycemia (2022 15:19)    24-HR EVENTS/SUBJECTIVE: No acute events overnight. Patient seen and examined at bedside this AM. AOx3. Denies abdominal pain, cough, n/v, sob. Breathing comfortably on room air.    MEDICATIONS  (STANDING):  atorvastatin 40 milliGRAM(s) Oral at bedtime  bacitracin/polymyxin B Ophthalmic Ointment 1 Application(s) Both EYES two times a day  dextrose 5%. 1000 milliLiter(s) (100 mL/Hr) IV Continuous <Continuous>  dextrose 5%. 1000 milliLiter(s) (50 mL/Hr) IV Continuous <Continuous>  dextrose 50% Injectable 25 Gram(s) IV Push once  dextrose 50% Injectable 12.5 Gram(s) IV Push once  dextrose 50% Injectable 25 Gram(s) IV Push once  diVALproex  milliGRAM(s) Oral two times a day  folic acid 1 milliGRAM(s) Oral daily  glucagon  Injectable 1 milliGRAM(s) IntraMuscular once  heparin   Injectable 5000 Unit(s) SubCutaneous every 8 hours  insulin glargine Injectable (LANTUS) 15 Unit(s) SubCutaneous at bedtime  insulin lispro (ADMELOG) corrective regimen sliding scale   SubCutaneous three times a day before meals  insulin lispro (ADMELOG) corrective regimen sliding scale   SubCutaneous <User Schedule>  insulin lispro Injectable (ADMELOG) 10 Unit(s) SubCutaneous before lunch  insulin lispro Injectable (ADMELOG) 9 Unit(s) SubCutaneous before breakfast  insulin lispro Injectable (ADMELOG) 12 Unit(s) SubCutaneous before dinner  multivitamin 1 Tablet(s) Oral daily  thiamine 100 milliGRAM(s) Oral daily    MEDICATIONS  (PRN):  acetaminophen     Tablet .. 650 milliGRAM(s) Oral every 6 hours PRN Temp greater or equal to 38C (100.4F), Mild Pain (1 - 3)  dextrose Oral Gel 15 Gram(s) Oral once PRN Blood Glucose LESS THAN 70 milliGRAM(s)/deciliter  melatonin 3 milliGRAM(s) Oral at bedtime PRN Insomnia        Objective:     Vitals: Vital Signs Last 24 Hrs  T(C): 37.2 (22 @ 07:20), Max: 37.2 (22 @ 07:20)  T(F): 98.9 (22 @ 07:20), Max: 98.9 (22 @ 07:20)  HR: 92 (22 @ 07:20) (86 - 96)  BP: 104/62 (22 @ 07:20) (94/61 - 108/64)  BP(mean): --  RR: 17 (22 @ 07:20) (17 - 18)  SpO2: 98% (22 @ 07:20) (98% - 100%)            I&O's Summary      PHYSICAL EXAM:  GENERAL: NAD, well-developed  HEAD:  Atraumatic, Normocephalic  EYES: EOMI, PERRLA, Improved eyelid swelling, no discharge noted   ENMT: Moist mucous membranes, poor dentition   NECK: Supple  CHEST/LUNG: Clear to auscultation bilaterally; No rales, rhonchi, wheezing, or rubs  HEART: Regular rate and rhythm; No murmurs, rubs, or gallops  ABDOMEN: Soft, Nontender, slight distention; Bowel sounds present  EXTREMITIES:  2+ Peripheral Pulses, No clubbing, cyanosis, or edema  NERVOUS SYSTEM:  Alert & Oriented X3, NAVARRETE spontaneously, poor concentration    PSYCH: Calm and cooperative, but confused. Laying in bed comfortably; not agitated.     LABS:                        9.8    4.53  )-----------( 339      ( 2022 05:30 )             30.1     Hgb Trend: 9.8<--, 10.3<--, 10.2<--, 11.2<--      137  |  102  |  29<H>  ----------------------------<  349<H>  4.6   |  23  |  1.28      133<L>  |  100  |  40<H>  ----------------------------<  232<H>  4.5   |  22  |  1.47<H>    Ca    8.7      2022 06:18  Ca    8.8      2022 05:30  Phos  3.0     06-21  Mg     1.70     06-21      Creatinine Trend: 1.28<--, 1.47<--, 1.31<--, 1.55<--, 1.22<--, 1.13<--                Urinalysis Basic - ( 2022 15:50 )    Color: Yellow / Appearance: Slightly Turbid / S.030 / pH: x  Gluc: x / Ketone: Negative  / Bili: Negative / Urobili: <2 mg/dL   Blood: x / Protein: 100 mg/dL / Nitrite: Negative   Leuk Esterase: Trace / RBC: 4 /HPF / WBC 4 /HPF   Sq Epi: x / Non Sq Epi: 5 /HPF / Bacteria: Moderate        CAPILLARY BLOOD GLUCOSE      POCT Blood Glucose.: 170 mg/dL (2022 02:26)  POCT Blood Glucose.: 298 mg/dL (2022 21:53)  POCT Blood Glucose.: 137 mg/dL (2022 18:08)  POCT Blood Glucose.: 142 mg/dL (2022 12:50)  POCT Blood Glucose.: 297 mg/dL (2022 09:08)

## 2022-06-22 NOTE — CHART NOTE - NSCHARTNOTEFT_GEN_A_CORE
Endocrine note  76 y/o M with PMH of DM, found to have +GADab and low c-peptide, HTN, bifrontal SAH, a R SDH, a L parietal SAH with pneumocephalus, a L possible parietal non-displaced skull fracture, and a C6 inferior endplate fracture into disc space with air into the canal in 2021 who presents with AMS and hyperglycemia. Endocrinology consulted for uncontrolled DM2. Also COVID+ (not on dexamethasone).    Interval hx  team planning for rehab  not leaving today  glucose trend improved      Plan:  #diabetes mellitus with hyperglycemia --> +EDMAR, low c-peptide c/w MISHA, treat like type 1 diabetes  ·  unclear compliance given patient is a poor historian. A1C 15.5%  -Islet cell and Zinc transporter Antibodies are negative; EDMAR+  -Pt requesting more food, reports being hungry and not receiving a protein with his meal - discussed with primary team to try to add protein to his meals and if he is hungry can provide lower carb/sugar free snacks. Seen by RD, recommended Glucerna TID and double protein with meals.  -continue Lantus dose 15 units sq qhs (previously had hypoglycemia with 16 units) - do NOT hold, as pt is at risk for DKA  -Increase Admelog to 9-10-14 units sq TID AC  -continue low dose insulin correctional scale premeal and low bedtime scale & 2AM scale    -patient is new to insulin and will be discharged on insulin so he will need to have access to a fridge after discharge. Will need CM/SW help with safe discharge planning since patient doesn't seem to have the capacity to safely administer insulin by himself. Pt needs basal insulin  **C-peptide 0.8 (glu 221) indicating need for at least basal insulin for dc. **  Would plan for basal/oral regimen at dc to help minimize frequency for help with administration of insulin.  **Of note multiple assessments made and patient is unable to self administer insulin pen, also poor insight into diabetes management concepts. Family not available/willing to supervise his care.  -GFR 49 - will trend to decide about resuming use of metformin as oral agent to be added to basal insulin for dc.    discussed insulin plan with team    Rubi Woodruff MD  Attending Physician   Department of Endocrinology, Diabetes and Metabolism   Pager: [phone]    If before 9AM or after 5PM, or on weekends/holidays, please call the Endocrine answering service for assistance ([phone]).  For nonurgent matters, please email [email or web address] for assistance.
76 y/o M with PMH of DM2, bifrontal SAH, a R SDH, a L parietal SAH with pneumocephalus, a L possible parietal non-displaced skull fracture, and a C6 inferior endplate fracture into disc space with air into the canal in 2021 who presents with AMS and hyperglycemia.    A1c 15.1%.   Presented with glucose in the 500s, no DKA. Now FS have improved with numerous doses of Admelog.     Rec:   -Start weight based insulin dosing - 0.4 x kg weight comes out to 12 units of Lantus at bedtime and 4 units of Admelog before each meal  -Start low dose correctional scale qAC and qHS  -FS qAC and qHS  -place on consistent carbohydrate diet    Ann Sotelo MD  Endocrine Fellow  Can be reached via teams. For follow up questions, discharge recommendations, or new consults, please call answering service at [phone] (weekdays); [phone] (nights/weekends)
Case d/w Dr. Clark. Mental status improved.   At this time, Patient can follow up outpatient with him and can arrange MRI outpatient.   Page PRN questions.
Patient informed he was exposed to COVID-19 during admission. Patient with no new symptoms. Isolation instructions discussed. Patient understands his risk and will continue to self-monitor for symptoms. Will continue to monitor the patient.     Adolph Francisco PA-C  Department of Medicine  Pager #[phone]
Pt seen for nutrition consult- Registered Dietitian.    Medical Course:  - Per chart, pt is 77 year old male PMH type 2 DM, HLD, prior TBI (2021) with SAH, SDH, pneumocephalus, L parietal non-displaced skull fracture, C6 inferior endplate fracture presenting with AMS, hyperglycemia. Course complicated by COVID (+).     Nutrition Course:  - Pt on RA at time of visit, pleasant throughout interaction.  - Pt reports good appetite/PO intake, requesting additional food as he is still hungry. Breakfast tray visible with 100% PO intake.   - Noted history of type 2 DM. Medications PTA inclusive of Metformin 850 mg BID, unclear adherence. Pt residing in shelter PTA, unclear access to food. Endocrinology is following; plan for discharge on insulin however pt is unable to administer independently and family is unable to help. Possible plan for discharge to nursing home. Pt was seen by diabetes specialist RN and pharmacist. Nutrition education deferred as pt with poor insight into DM management and limited comprehension. Fingersticks remain elevated.   - Pt continues on folic acid, thiamine and multivitamin; noted history of EtOH misuse.   - No noted GI distress, last BM 6/20 per chart; no bowel regimen ordered at this time.     Diet Prescription:  - Consistent Carbohydrate {Evening Snack}     Pertinent Medications:   - atorvastatin, folic acid, LANTUS 14U qHS, ADMELOG corrective regimen sliding scale, ADMELOG 8U before lunch/breakfast, ADMELOG 10U before dinner, multivitamin, thiamine    Pertinent Labs:  - (6/21) Na 137 mmol/L Glu 349 mg/dL<H> K+ 4.6 mmol/L Cr 1.28 mg/dL BUN 29 mg/dL<H> Phos 3.0 mg/dL   - (6/12) HbA1c 15.5%<H>  - POCT: (6/21) 293-297, (6/20) 176-440    Weight: (6/12 dosing) 130 lbs / 58.9 kg  Height: 66 in / 167.64 cm  IBW: 142 lbs / 64.5 kg +/-10%  BMI: 21.0 kg/m^2    Physical Assessment, per flowsheets:  Edema/Pressure Injury: none noted    Estimated Needs:   [X] No change since previous assessment, based on dosing weight 130 lbs / 58.9 kg  0176-1061 kcal daily @25-30 kcal/kg, 58.9-70.68 gm protein daily @1.0-1.2 gm/kg     Previous Nutrition Diagnosis: [X] Altered nutrition related lab values, ongoing  New Nutrition Diagnosis: [X] not applicable     Interventions:   1) Recommend continue consistent carbohydrate diet to assist with glycemic management.   2) Recommend addition of Glucerna 1 PO 3x daily (provides 220 kcal, 10 gm protein per 8oz serving).   3) Will note within  system to provide double protein at meals.  4) Obtain weekly weights.  5) RDN remains available to provide nutrition education as appropriate.   6) May continue micronutrient supplementation.    Monitor & Evaluate:  PO intake, nutrition related lab values, weight trends, BMs/GI distress, hydration status, skin integrity.  Jeannine Gamez RDN, CDN #[id number]

## 2022-06-23 ENCOUNTER — TRANSCRIPTION ENCOUNTER (OUTPATIENT)
Age: 77
End: 2022-06-23

## 2022-06-23 VITALS
SYSTOLIC BLOOD PRESSURE: 127 MMHG | OXYGEN SATURATION: 100 % | DIASTOLIC BLOOD PRESSURE: 59 MMHG | RESPIRATION RATE: 18 BRPM | TEMPERATURE: 98 F | HEART RATE: 96 BPM

## 2022-06-23 LAB
GLUCOSE BLDC GLUCOMTR-MCNC: 255 MG/DL — HIGH (ref 70–99)
GLUCOSE BLDC GLUCOMTR-MCNC: 289 MG/DL — HIGH (ref 70–99)
GLUCOSE BLDC GLUCOMTR-MCNC: 311 MG/DL — HIGH (ref 70–99)

## 2022-06-23 PROCEDURE — 99232 SBSQ HOSP IP/OBS MODERATE 35: CPT

## 2022-06-23 RX ORDER — INSULIN LISPRO 100/ML
12 VIAL (ML) SUBCUTANEOUS
Refills: 0 | Status: DISCONTINUED | OUTPATIENT
Start: 2022-06-23 | End: 2022-06-23

## 2022-06-23 RX ORDER — FOLIC ACID 0.8 MG
1 TABLET ORAL
Qty: 0 | Refills: 0 | DISCHARGE
Start: 2022-06-23

## 2022-06-23 RX ORDER — INSULIN LISPRO 100/ML
10 VIAL (ML) SUBCUTANEOUS
Refills: 0 | Status: DISCONTINUED | OUTPATIENT
Start: 2022-06-23 | End: 2022-06-23

## 2022-06-23 RX ORDER — INSULIN GLARGINE 100 [IU]/ML
15 INJECTION, SOLUTION SUBCUTANEOUS
Qty: 0 | Refills: 0 | DISCHARGE
Start: 2022-06-23

## 2022-06-23 RX ORDER — THIAMINE MONONITRATE (VIT B1) 100 MG
1 TABLET ORAL
Qty: 0 | Refills: 0 | DISCHARGE
Start: 2022-06-23

## 2022-06-23 RX ORDER — INSULIN LISPRO 100/ML
10 VIAL (ML) SUBCUTANEOUS
Qty: 300 | Refills: 0
Start: 2022-06-23 | End: 2022-07-22

## 2022-06-23 RX ORDER — INSULIN LISPRO 100/ML
12 VIAL (ML) SUBCUTANEOUS
Qty: 2500 | Refills: 0
Start: 2022-06-23 | End: 2022-07-22

## 2022-06-23 RX ORDER — INSULIN LISPRO 100/ML
16 VIAL (ML) SUBCUTANEOUS
Qty: 1900 | Refills: 0
Start: 2022-06-23 | End: 2022-07-22

## 2022-06-23 RX ORDER — DIVALPROEX SODIUM 500 MG/1
1 TABLET, DELAYED RELEASE ORAL
Qty: 0 | Refills: 0 | DISCHARGE
Start: 2022-06-23

## 2022-06-23 RX ORDER — INSULIN LISPRO 100/ML
16 VIAL (ML) SUBCUTANEOUS
Refills: 0 | Status: DISCONTINUED | OUTPATIENT
Start: 2022-06-23 | End: 2022-06-23

## 2022-06-23 RX ADMIN — Medication 4: at 12:40

## 2022-06-23 RX ADMIN — Medication 10 UNIT(S): at 12:41

## 2022-06-23 RX ADMIN — DIVALPROEX SODIUM 250 MILLIGRAM(S): 500 TABLET, DELAYED RELEASE ORAL at 05:20

## 2022-06-23 RX ADMIN — HEPARIN SODIUM 5000 UNIT(S): 5000 INJECTION INTRAVENOUS; SUBCUTANEOUS at 05:20

## 2022-06-23 RX ADMIN — Medication 3: at 08:59

## 2022-06-23 RX ADMIN — BACITRACIN ZINC AND POLYMYXIN B SULFATE 1 APPLICATION(S): 500; 10000 OINTMENT OPHTHALMIC at 05:20

## 2022-06-23 RX ADMIN — Medication 9 UNIT(S): at 08:59

## 2022-06-23 RX ADMIN — Medication 1: at 02:20

## 2022-06-23 NOTE — PROGRESS NOTE ADULT - PROBLEM SELECTOR PROBLEM 1
Type 2 diabetes mellitus with hyperglycemia
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
## 2022-06-23 NOTE — PROGRESS NOTE ADULT - PROBLEM SELECTOR PROBLEM 2
Hyperlipidemia
2019 novel coronavirus disease (COVID-19)
Hyperlipidemia
Hyperlipidemia
2019 novel coronavirus disease (COVID-19)
Acute encephalopathy
2019 novel coronavirus disease (COVID-19)
Acute encephalopathy
Hyperlipidemia
Acute encephalopathy
2019 novel coronavirus disease (COVID-19)
Acute encephalopathy
2019 novel coronavirus disease (COVID-19)

## 2022-06-23 NOTE — BH CONSULTATION LIAISON PROGRESS NOTE - NSBHFUPINTERVALHXFT_PSY_A_CORE
Chart reviewed, pt. not requiring psychiatric PRNs, pt. seen/evaluated, calm/polite/cooperative, somewhat confused at times, asked the writer for more breakfast. Patient AAOX2, he denies any mood issues, reports " I am feeling fine", pt. firmly denies SIIP, denies HIIP,  No AH/VH elicited. Overall patient is grossly goal directed/concrete. He does not appear internally preoccupied and denies feeling unsafe. No paranoia or delusions elicited.     As per patient's nurse: Patient is in good behavioral control.
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
## 2022-06-23 NOTE — PROGRESS NOTE ADULT - PROBLEM SELECTOR PLAN 1
- Reportedly on Metformin 850mg BID outpatient, unclear adherence   - FS 700s on admission, no signs of DKA (pH normal, bHB 0)   - A1c 15.5 on this admission   - Endocrine recs appreciated   - Continue Lantus 14U qhs and Ademelog per endo recs   - FS and low dose sliding scale TID premeal and qhs   - Patient unable to retain insulin teaching per documentation, daughter Ayse says she cannot help pt with insulin   - Per psych, patient does not to seem to fully appreciate his DM needs either   - F/u zinc transporter, ICA, GADA - Reportedly on Metformin 850mg BID outpatient, unclear adherence   - FS 700s on admission, no signs of DKA (pH normal, bHB 0)   - A1c 15.5 on this admission   - Endocrine recs appreciated   - Continue Lantus 14U qhs and Ademelog per endo recs   - FS and low dose sliding scale TID premeal and qhs   - Patient unable to retain insulin teaching per documentation, daughter Ayse says she cannot help pt with insulin   - Per psych, patient does not to seem to fully appreciate his DM needs either   - Per endocrine for discharge recs, low c-peptide means patient must be discharged on basal insuline. Positive EDMAR likely consistent with Diabetes Type 1.5 picture. Can hold metformin on dc. Lantus 10U qhs with Admelog 10U pre-breakfast, Admelog 12U pre-lunch, and Admelog 16U pre-dinner.

## 2022-06-23 NOTE — PROGRESS NOTE ADULT - SUBJECTIVE AND OBJECTIVE BOX
Chief Complaint: DM Type 1.5    History: Patient continues to ask for food  no hypoglycemia events noted  No nausea, vomiting  planned for transfer to rehab today    MEDICATIONS  (STANDING):  atorvastatin 40 milliGRAM(s) Oral at bedtime  bacitracin/polymyxin B Ophthalmic Ointment 1 Application(s) Both EYES two times a day  dextrose 5%. 1000 milliLiter(s) (100 mL/Hr) IV Continuous <Continuous>  dextrose 5%. 1000 milliLiter(s) (50 mL/Hr) IV Continuous <Continuous>  dextrose 50% Injectable 25 Gram(s) IV Push once  dextrose 50% Injectable 12.5 Gram(s) IV Push once  dextrose 50% Injectable 25 Gram(s) IV Push once  diVALproex  milliGRAM(s) Oral two times a day  folic acid 1 milliGRAM(s) Oral daily  glucagon  Injectable 1 milliGRAM(s) IntraMuscular once  heparin   Injectable 5000 Unit(s) SubCutaneous every 8 hours  insulin glargine Injectable (LANTUS) 15 Unit(s) SubCutaneous at bedtime  insulin lispro (ADMELOG) corrective regimen sliding scale   SubCutaneous <User Schedule>  insulin lispro (ADMELOG) corrective regimen sliding scale   SubCutaneous three times a day before meals  insulin lispro Injectable (ADMELOG) 12 Unit(s) SubCutaneous before lunch  insulin lispro Injectable (ADMELOG) 10 Unit(s) SubCutaneous before breakfast  insulin lispro Injectable (ADMELOG) 16 Unit(s) SubCutaneous before dinner  multivitamin 1 Tablet(s) Oral daily  thiamine 100 milliGRAM(s) Oral daily    MEDICATIONS  (PRN):  acetaminophen     Tablet .. 650 milliGRAM(s) Oral every 6 hours PRN Temp greater or equal to 38C (100.4F), Mild Pain (1 - 3)  dextrose Oral Gel 15 Gram(s) Oral once PRN Blood Glucose LESS THAN 70 milliGRAM(s)/deciliter  melatonin 3 milliGRAM(s) Oral at bedtime PRN Insomnia      Allergies    Allergy Status Unknown  No Known Allergies    Intolerances      Review of Systems:    ALL OTHER SYSTEMS REVIEWED AND NEGATIVE      PHYSICAL EXAM:  VITALS: T(C): 36.8 (06-23-22 @ 14:21)  T(F): 98.3 (06-23-22 @ 14:21), Max: 98.3 (06-23-22 @ 14:21)  HR: 96 (06-23-22 @ 14:21) (92 - 96)  BP: 127/59 (06-23-22 @ 14:21) (122/77 - 127/59)  RR:  (18 - 18)  SpO2:  (100% - 100%)  Wt(kg): --  GENERAL: NAD, well-groomed, well-developed  EYES: No proptosis, no lid lag, anicteric  HEENT:  Atraumatic, Normocephalic, moist mucous membranes  THYROID: Normal size, no palpable nodules  RESPIRATORY: Clear to auscultation bilaterally; No rales, rhonchi, wheezing  CARDIOVASCULAR: Regular rate and rhythm; No murmurs; no peripheral edema  GI: Soft, nontender, non distended  SKIN: Dry, intact, No rashes or lesions  MUSCULOSKELETAL: Full range of motion, normal strength  NEURO: extraocular movements intact, no tremor  PSYCH: Alert and oriented x 3, normal affect, normal mood    CAPILLARY BLOOD GLUCOSE      POCT Blood Glucose.: 311 mg/dL (23 Jun 2022 12:21)  POCT Blood Glucose.: 255 mg/dL (23 Jun 2022 08:54)  POCT Blood Glucose.: 289 mg/dL (23 Jun 2022 02:18)  POCT Blood Glucose.: 371 mg/dL (22 Jun 2022 22:54)  POCT Blood Glucose.: 247 mg/dL (22 Jun 2022 17:44)      06-21    137  |  102  |  29<H>  ----------------------------<  349<H>  4.6   |  23  |  1.28    eGFR: 58<L>    Ca    8.7      06-21  Mg     1.70     06-21  Phos  3.0     06-21        A1C with Estimated Average Glucose Result: 15.5 % (06-12-22 @ 11:00)  A1C with Estimated Average Glucose Result: 16.1 % (06-12-22 @ 11:00)  A1C with Estimated Average Glucose Result: 7.3 % (07-10-21 @ 10:16)      Thyroid Function Tests:  06-11 @ 23:50 TSH 1.16 FreeT4 -- T3 -- Anti TPO -- Anti Thyroglobulin Ab -- TSI --

## 2022-06-23 NOTE — PROGRESS NOTE ADULT - REASON FOR ADMISSION
AMS/hyperglycemia

## 2022-06-23 NOTE — PROGRESS NOTE ADULT - PROBLEM SELECTOR PROBLEM 4
Acute encephalopathy
TBI (traumatic brain injury)
TBI (traumatic brain injury)
Hyperlipidemia
Hyperlipidemia
TBI (traumatic brain injury)
Hyperlipidemia
Acute encephalopathy
Hyperlipidemia
Acute encephalopathy
TBI (traumatic brain injury)
TBI (traumatic brain injury)

## 2022-06-23 NOTE — PROGRESS NOTE ADULT - PROBLEM SELECTOR PLAN 8
DVT ppx: Heparin subq  Diet: Regular, CC   Dispo: Homeless, needs  consult for placement  No psychiatric contraindication to discharge, can f/u psych outpatient
DVT ppx: Heparin subq  Diet: Regular, CC   Dispo: Homeless, needs  consult for placement. Patient is medically clear for discharge, however COVID+ status limits placement choices at this time   No psychiatric contraindication to discharge, can f/u psych outpatient
DVT ppx: Heparin subq  Diet: Regular, CC   Dispo: Homeless, needs  consult for placement. Patient is medically clear for discharge, however COVID+ status limits placement choices at this time   No psychiatric contraindication to discharge, can f/u psych outpatient

## 2022-06-23 NOTE — PROGRESS NOTE ADULT - SUBJECTIVE AND OBJECTIVE BOX
Dr. Chana Douglass, PGY-1    Patient is a 77y old  Male who presents with a chief complaint of AMS/hyperglycemia (22 Jun 2022 07:37)    24-HR EVENTS/SUBJECTIVE: No acute events overnight. Patient seen and examined at bedside this AM. Denies chest pain, abdominal pain, SOB. Breathing comfortably on room air.     MEDICATIONS  (STANDING):  atorvastatin 40 milliGRAM(s) Oral at bedtime  bacitracin/polymyxin B Ophthalmic Ointment 1 Application(s) Both EYES two times a day  dextrose 5%. 1000 milliLiter(s) (100 mL/Hr) IV Continuous <Continuous>  dextrose 5%. 1000 milliLiter(s) (50 mL/Hr) IV Continuous <Continuous>  dextrose 50% Injectable 25 Gram(s) IV Push once  dextrose 50% Injectable 12.5 Gram(s) IV Push once  dextrose 50% Injectable 25 Gram(s) IV Push once  diVALproex  milliGRAM(s) Oral two times a day  folic acid 1 milliGRAM(s) Oral daily  glucagon  Injectable 1 milliGRAM(s) IntraMuscular once  heparin   Injectable 5000 Unit(s) SubCutaneous every 8 hours  insulin glargine Injectable (LANTUS) 15 Unit(s) SubCutaneous at bedtime  insulin lispro (ADMELOG) corrective regimen sliding scale   SubCutaneous three times a day before meals  insulin lispro (ADMELOG) corrective regimen sliding scale   SubCutaneous <User Schedule>  insulin lispro Injectable (ADMELOG) 14 Unit(s) SubCutaneous before dinner  insulin lispro Injectable (ADMELOG) 10 Unit(s) SubCutaneous before lunch  insulin lispro Injectable (ADMELOG) 9 Unit(s) SubCutaneous before breakfast  multivitamin 1 Tablet(s) Oral daily  thiamine 100 milliGRAM(s) Oral daily    MEDICATIONS  (PRN):  acetaminophen     Tablet .. 650 milliGRAM(s) Oral every 6 hours PRN Temp greater or equal to 38C (100.4F), Mild Pain (1 - 3)  dextrose Oral Gel 15 Gram(s) Oral once PRN Blood Glucose LESS THAN 70 milliGRAM(s)/deciliter  melatonin 3 milliGRAM(s) Oral at bedtime PRN Insomnia        Objective:     Vitals: Vital Signs Last 24 Hrs  T(C): 36.7 (06-23-22 @ 05:20), Max: 36.8 (06-22-22 @ 22:07)  T(F): 98.1 (06-23-22 @ 05:20), Max: 98.2 (06-22-22 @ 22:07)  HR: 92 (06-23-22 @ 05:20) (91 - 95)  BP: 122/77 (06-23-22 @ 05:20) (109/50 - 124/63)  BP(mean): --  RR: 18 (06-23-22 @ 05:20) (18 - 18)  SpO2: 100% (06-23-22 @ 05:20) (99% - 100%)            I&O's Summary    22 Jun 2022 07:01  -  23 Jun 2022 07:00  --------------------------------------------------------  IN: 0 mL / OUT: 300 mL / NET: -300 mL        PHYSICAL EXAM:  GENERAL: NAD, well-developed  HEAD:  Atraumatic, Normocephalic  EYES: EOMI, PERRLA, Improved eyelid swelling, no discharge noted   ENMT: Moist mucous membranes, poor dentition   NECK: Supple  CHEST/LUNG: Clear to auscultation bilaterally; No rales, rhonchi, wheezing, or rubs  HEART: Regular rate and rhythm; No murmurs, rubs, or gallops  ABDOMEN: Soft, Nontender, slight distention; Bowel sounds present  EXTREMITIES:  2+ Peripheral Pulses, No clubbing, cyanosis, or edema  NERVOUS SYSTEM:  Alert & Oriented X3, NAVARRETE spontaneously, poor concentration    PSYCH: Calm and cooperative, but confused. Laying in bed comfortably; not agitated.     LABS:    Hgb Trend: 9.8<--, 10.3<--, 10.2<--  06-21    137  |  102  |  29<H>  ----------------------------<  349<H>  4.6   |  23  |  1.28    Ca    8.7      21 Jun 2022 06:18      Creatinine Trend: 1.28<--, 1.47<--, 1.31<--, 1.55<--, 1.22<--, 1.13<--                    CAPILLARY BLOOD GLUCOSE      POCT Blood Glucose.: 289 mg/dL (23 Jun 2022 02:18)  POCT Blood Glucose.: 371 mg/dL (22 Jun 2022 22:54)  POCT Blood Glucose.: 247 mg/dL (22 Jun 2022 17:44)  POCT Blood Glucose.: 221 mg/dL (22 Jun 2022 12:28)  POCT Blood Glucose.: 122 mg/dL (22 Jun 2022 08:49)

## 2022-06-23 NOTE — PROGRESS NOTE ADULT - PROVIDER SPECIALTY LIST ADULT
Internal Medicine
Internal Medicine
Neurology
Endocrinology
Internal Medicine
Internal Medicine
Neurology
Neurology
Internal Medicine
Neurology
Neurology
Endocrinology
Internal Medicine
Endocrinology
Internal Medicine
Internal Medicine
Endocrinology
Endocrinology
Internal Medicine
Endocrinology
Internal Medicine

## 2022-06-23 NOTE — PROGRESS NOTE ADULT - PROBLEM SELECTOR PROBLEM 3
Acute encephalopathy
Acute encephalopathy
Hypertension
Hypertension
TBI (traumatic brain injury)
Hypertension
TBI (traumatic brain injury)
Hypertension
Hypertension
Acute encephalopathy
Hypertension
TBI (traumatic brain injury)
TBI (traumatic brain injury)
Hypertension
Acute encephalopathy
LEXI (acute kidney injury)
Acute encephalopathy
LEXI (acute kidney injury)
LEXI (acute kidney injury)

## 2022-06-23 NOTE — PROGRESS NOTE ADULT - PROBLEM SELECTOR PROBLEM 5
TBI (traumatic brain injury)
Alcohol abuse
Hyperlipidemia
Alcohol abuse
Alcohol abuse
TBI (traumatic brain injury)
Hyperlipidemia
Hyperlipidemia
Alcohol abuse
TBI (traumatic brain injury)
Hyperlipidemia
Hyperlipidemia

## 2022-06-23 NOTE — PROGRESS NOTE ADULT - PROBLEM SELECTOR PROBLEM 7
Alcohol abuse
Alcohol abuse
DVT prophylaxis
Alcohol abuse
DVT prophylaxis

## 2022-06-23 NOTE — PROGRESS NOTE ADULT - PROBLEM SELECTOR PROBLEM 6
Alcohol abuse
Hyperlipidemia
Hyperlipidemia
Alcohol abuse
DVT prophylaxis
Hyperlipidemia
DVT prophylaxis
Alcohol abuse

## 2022-06-23 NOTE — DISCHARGE NOTE NURSING/CASE MANAGEMENT/SOCIAL WORK - PATIENT PORTAL LINK FT
You can access the FollowMyHealth Patient Portal offered by [facility] by registering at the following website: [email or web address]. By joining [facility]’s FollowMyHealth portal, you will also be able to view your health information using other applications (apps) compatible with our system.

## 2022-06-23 NOTE — DISCHARGE NOTE NURSING/CASE MANAGEMENT/SOCIAL WORK - NSDCPEFALRISK_GEN_ALL_CORE
For information on Fall & Injury Prevention, visit: [email or web address] OR  [email or web address] OR  https://www.cdc.gov/steadi/patient.html

## 2022-06-23 NOTE — BH CONSULTATION LIAISON PROGRESS NOTE - NSBHCHARTREVIEWVS_PSY_A_CORE FT
Vital Signs Last 24 Hrs  T(C): 36.7 (23 Jun 2022 05:20), Max: 36.8 (22 Jun 2022 22:07)  T(F): 98.1 (23 Jun 2022 05:20), Max: 98.2 (22 Jun 2022 22:07)  HR: 92 (23 Jun 2022 05:20) (91 - 95)  BP: 122/77 (23 Jun 2022 05:20) (109/50 - 124/63)  BP(mean): --  RR: 18 (23 Jun 2022 05:20) (18 - 18)  SpO2: 100% (23 Jun 2022 05:20) (99% - 100%)

## 2022-06-23 NOTE — BH CONSULTATION LIAISON PROGRESS NOTE - CURRENT MEDICATION
MEDICATIONS  (STANDING):  atorvastatin 40 milliGRAM(s) Oral at bedtime  bacitracin/polymyxin B Ophthalmic Ointment 1 Application(s) Both EYES two times a day  dextrose 5%. 1000 milliLiter(s) (100 mL/Hr) IV Continuous <Continuous>  dextrose 5%. 1000 milliLiter(s) (50 mL/Hr) IV Continuous <Continuous>  dextrose 50% Injectable 25 Gram(s) IV Push once  dextrose 50% Injectable 12.5 Gram(s) IV Push once  dextrose 50% Injectable 25 Gram(s) IV Push once  diVALproex  milliGRAM(s) Oral two times a day  folic acid 1 milliGRAM(s) Oral daily  glucagon  Injectable 1 milliGRAM(s) IntraMuscular once  heparin   Injectable 5000 Unit(s) SubCutaneous every 8 hours  insulin glargine Injectable (LANTUS) 15 Unit(s) SubCutaneous at bedtime  insulin lispro (ADMELOG) corrective regimen sliding scale   SubCutaneous three times a day before meals  insulin lispro (ADMELOG) corrective regimen sliding scale   SubCutaneous <User Schedule>  insulin lispro Injectable (ADMELOG) 14 Unit(s) SubCutaneous before dinner  insulin lispro Injectable (ADMELOG) 10 Unit(s) SubCutaneous before lunch  insulin lispro Injectable (ADMELOG) 9 Unit(s) SubCutaneous before breakfast  multivitamin 1 Tablet(s) Oral daily  thiamine 100 milliGRAM(s) Oral daily    MEDICATIONS  (PRN):  acetaminophen     Tablet .. 650 milliGRAM(s) Oral every 6 hours PRN Temp greater or equal to 38C (100.4F), Mild Pain (1 - 3)  dextrose Oral Gel 15 Gram(s) Oral once PRN Blood Glucose LESS THAN 70 milliGRAM(s)/deciliter  melatonin 3 milliGRAM(s) Oral at bedtime PRN Insomnia

## 2022-06-23 NOTE — BH CONSULTATION LIAISON PROGRESS NOTE - NSBHCONSULTFOLLOWAFTERCARE_PSY_A_CORE FT
[facility]  [id number] [facility] [address] [facility] [address] [phone]    [facility]: [phone]

## 2022-11-18 NOTE — PROGRESS NOTE ADULT - PROBLEM/PLAN-1
DISPLAY PLAN FREE TEXT
pain behind left knee near varicose veins    h/o covid 02/21

## 2023-04-17 NOTE — DISCHARGE NOTE NURSING/CASE MANAGEMENT/SOCIAL WORK - NSDCVIVACCINE_GEN_ALL_CORE_FT
Patient was called to check on status. She is feeling well offers no complaints. She is hydrating well and ambulating well. She will get labs for transplant clinic on 4/20 when she gets for gen surgery.   
No Vaccines Administered.

## 2023-06-23 NOTE — PROGRESS NOTE ADULT - ASSESSMENT
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 
76 y/o M with PMH of DM2, HTN, bifrontal SAH, a R SDH, a L parietal SAH with pneumocephalus, a L possible parietal non-displaced skull fracture, and a C6 inferior endplate fracture into disc space with air into the canal in 2021 who presents with AMS and hyperglycemia. Endocrinology consulted for uncontrolled DM2. Also COVID+ (not on dexamethasone).    Type 2 diabetes mellitus with hyperglycemia.   ·  Plan: Uncontrolled T2DM on home oral agent (metformin), unclear compliance given patient is a poor historian. A1C 15.5%  -Pt requesting more food, reports being hungry and not receiving a protein with his meal - discussed with primary team to try to add protein to his meals and if he is hungry can provide lower carb/sugar free snacks. Seen by RD, recommended Glucerna TID and double protein with meals.  -Fasting BG above goal, likely related to eating more food  -Increase Lantus dose from 14 to 15 units sq qhs (previously had hypoglycemia with 16 units)  -Increase Admelog from  8-8-10 to 9-10-12 units sq TID AC  -continue low dose insulin correctional scale premeal and low bedtime scale & 2AM scale  -patient is new to insulin and will be discharged on insulin so he will need to have access to a fridge after discharge. Will need CM/SW help with safe discharge planning since patient doesn't seem to have the capacity to safely administer insulin by himself  -Islet cell antibody negative, EDMAR and zinc transporter Ab testing in lab  -C-peptide 0.8 (glu 221) indicating need for at least basal insulin for dc.   Would plan for basal/oral regimen at dc to help minimize frequency for help with administration of insulin.  **Of note multiple assessments made including today 6/20/22 and patient is unable to self administer insulin pen, also poor insight into diabetes management concepts. Family not available/willing to supervise his care.  -Islet cell and Zinc transporter Antibodies are negative, EDMAR testing in lab  -GFR 49 - will trend to decide about resuming use of metformin as oral agent to be added to basal insulin for dc.    Hyperlipidemia.   ·  Plan:  this admission. Per pharmacy on file, patient last filled atorvastatin 40mg qd in aug 2021  -c/w atorvastatin 40mg     Hypertension.   ·  Plan: hx of HTN, unclear home regimen for HTN  -Goal less than 130/80, currently controlled      Joann Rahman MD  Division of Endocrinology  Pager: [phone]    If after 6PM or before 9AM, or on weekends/holidays, please call endocrine answering service for assistance ([phone]).  For nonurgent matters email [email or web address] for assistance.
78 y/o M with PMH of DM2, bifrontal SAH, a R SDH, a L parietal SAH with pneumocephalus, a L possible parietal non-displaced skull fracture, and a C6 inferior endplate fracture into disc space with air into the canal in 2021 who presents with AMS and hyperglycemia.     Problem/Plan - 1:  ·  Problem: Type 2 diabetes mellitus with hyperglycemia.   ·  Plan: -seen by endo   started on lantus : dose adjusted   c/w RICss      Problem/Plan - 2:  ·  Problem: Acute encephalopathy.   ·  Plan: -AAox 2  seen by neurology :   doubt symptom related to NPH   likelt CT finding 2/2 prior head injury   AMS likely 2/2 hyperglycemia / metabolic      Problem/Plan - 3:  ·  Problem: NPH (normal pressure hydrocephalus).   ·  Plan:as above      Problem/Plan - 4:  ·  Problem: Alcohol abuse.  ·  Plan: -h/o alcohol abuse  -pt denies recent alcohol use  -tox negative for alcohol  -unreliable historian- start ciwa protocol with symptom trigger ciwa.     Problem/Plan - 5:  ·  Problem: SAH (subarachnoid hemorrhage).  ·  Plan: -h/o trauma in 2021 with bifrontal SAH, a R SDH, a L parietal SAH with pneumocephalus, a L possible parietal non-displaced skull fracture, and a C6 inferior endplate fracture into disc space with air into the canal   -CTH now with NPH - secondary NPH due to prior trauma???  -was discharged on depakote in 2021 from rehab  -restart depakote for seizure ppx  -neuro surgery eval done : no ac intervention      Problem/Plan - 6:  ·  Problem: Homelessness.  ·  Plan: -needs evaluation for placement.     Problem/Plan - 7:  ·  Problem: DVT prophylaxis.   ·  Plan: -scds.    dispo: PT eval , may need placement ,  consult     
76 y/o M with PMH of DM2, HTN, bifrontal SAH, a R SDH, a L parietal SAH with pneumocephalus, a L possible parietal non-displaced skull fracture, and a C6 inferior endplate fracture into disc space with air into the canal in 2021 who presents with AMS and hyperglycemia. Endocrinology consulted for uncontrolled DM. 
78 y/o M with PMH of DM2, HTN, bifrontal SAH, a R SDH, a L parietal SAH with pneumocephalus, a L possible parietal non-displaced skull fracture, and a C6 inferior endplate fracture into disc space with air into the canal in 2021 who presents with AMS and hyperglycemia. Endocrinology consulted for uncontrolled DM2. Also COVID+ (not on dexamethasone).    Diabetes mellitus with hyperglycemia - Labs and clinical picture suggesting Type 1.5 DM  Previously on metformin, poor historian A1C 15.5%  -Pt requesting more food, reports being hungry and not receiving a protein with his meal - discussed with primary team to try to add protein to his meals and if he is hungry can provide lower carb/sugar free snacks. Seen by RD, recommended Glucerna TID and double protein with meals.  -Glucose above goal, likely related to eating more food  -Continue Lantus 15 units sq qhs (previously had hypoglycemia with 16 units)  -Increase Admelog  to 10-12-16 units sq TID AC  -continue low dose insulin correctional scale premeal and low bedtime scale & 2AM scale  -patient is new to insulin and will be discharged on insulin so he will need to have access to a fridge after discharge. Will need CM/SW help with safe discharge planning since patient doesn't seem to have the capacity to safely administer insulin by himself  -Islet cell antibody negative,  zinc transporter antibody negative but EDMAR antibody resulted positive  -C-peptide low at 0.8 (glu 221) indicating need for at least basal insulin for dc.   With new knowledge of positive EDMAR antibody and fact that patient is going to rehab would recommend continue same as above basal and bolus insulin doses for discharge. Metformin less likely to have significant clinical effect given low body weight and reduced c-peptide.  Turner will need to ensure safe plan for insulin administration post rehab.  **Of note multiple assessments made and patient is unable to self administer insulin pen, also poor insight into diabetes management concepts. Family not available/willing to supervise his care.    Hyperlipidemia.   ·  Plan:  this admission. Per pharmacy on file, patient last filled atorvastatin 40mg qd in aug 2021  -c/w atorvastatin 40mg     Hypertension.   ·  Plan: hx of HTN, unclear home regimen for HTN  -Goal less than 130/80, currently controlled    Plan discussed with primary team  Patient remains with poor insight preoccupied with asking for food.    Joann Rahman MD  Division of Endocrinology  Pager: [phone]    If after 6PM or before 9AM, or on weekends/holidays, please call endocrine answering service for assistance ([phone]).  For nonurgent matters email [email or web address] for assistance.
78 y/o M with PMH of DM2, bifrontal SAH, a R SDH, a L parietal SAH with pneumocephalus, a L possible parietal non-displaced skull fracture, and a C6 inferior endplate fracture into disc space with air into the canal in 2021 who presents with AMS and hyperglycemia.     Problem/Plan - 1:  ·  Problem: Type 2 diabetes mellitus with hyperglycemia.   ·  Plan: -seen by endo   started on lantus : dose adjusted   c/w RICss      Problem/Plan - 2:  ·  Problem: Acute encephalopathy.   ·  Plan: -AAox 2  seen by neurology :   doubt symptom related to NPH   likelt CT finding 2/2 prior head injury   AMS likely 2/2 hyperglycemia / metabolic      Problem/Plan - 3:  ·  Problem: NPH (normal pressure hydrocephalus).   ·  Plan:as above      Problem/Plan - 4:  ·  Problem: Alcohol abuse.  ·  Plan: -h/o alcohol abuse  -pt denies recent alcohol use  -tox negative for alcohol  -unreliable historian- start ciwa protocol with symptom trigger ciwa.     Problem/Plan - 5:  ·  Problem: SAH (subarachnoid hemorrhage).  ·  Plan: -h/o trauma in 2021 with bifrontal SAH, a R SDH, a L parietal SAH with pneumocephalus, a L possible parietal non-displaced skull fracture, and a C6 inferior endplate fracture into disc space with air into the canal   -CTH now with NPH - secondary NPH due to prior trauma???  -was discharged on depakote in 2021 from rehab  -restart depakote for seizure ppx  -neuro surgery eval done : no ac intervention      Problem/Plan - 6:  ·  Problem: Homelessness.  ·  Plan: -needs evaluation for placement.     Problem/Plan - 7:  ·  Problem: DVT prophylaxis.   ·  Plan: -scds.    dispo: PT eval , may need placement ,  consult     
.
76 y/o M with PMH of DM2, HTN, bifrontal SAH, a R SDH, a L parietal SAH with pneumocephalus, a L possible parietal non-displaced skull fracture, and a C6 inferior endplate fracture into disc space with air into the canal in 2021 who presents with AMS and hyperglycemia. Endocrinology consulted for uncontrolled DM. 
76 y/o M with PMH of DM2, HTN, bifrontal SAH, a R SDH, a L parietal SAH with pneumocephalus, a L possible parietal non-displaced skull fracture, and a C6 inferior endplate fracture into disc space with air into the canal in 2021 who presents with AMS and hyperglycemia. Endocrinology consulted for uncontrolled DM. 
76 y/o M with PMH of DM2, HTN, bifrontal SAH, a R SDH, a L parietal SAH with pneumocephalus, a L possible parietal non-displaced skull fracture, and a C6 inferior endplate fracture into disc space with air into the canal in 2021 who presents with AMS and hyperglycemia. Endocrinology consulted for uncontrolled DM.     Type 2 diabetes mellitus with hyperglycemia.   ·  Plan: Uncontrolled T2DM on home oral agent, unclear compliance given patient is a poor historian. A1C 15.5%  -given hypoglycemia in the morning after Lantus 16 units previously, decreased lantus back to 14 units at bedtime.  -Pt requesting more food, despite attempted education, pt needs further DM/Diet education, appears to have poor insight into dm management/care    -FBG has been variable on current lantus dose, pt denies eating over night/early am  today on/off at goal on current dose, given FBG tightly controlled and 2 AM BG at goal> lower lantus dose to 12 units sq qhs .  -Noted bedtime hyperglycemia, adjust admelog regimen titrate insulin cautiously as Cr 1.3 today   -Adjust Admelog to 8-8-10 units sq TID AC  -continue low dose insulin correctional scale premeal and low bedtime scale & 2AM scale  -patient is new to insulin and will be discharged on insulin so he will need to have access to a fridge after discharge. Will need CM/SW help with safe discharge planning since patient doesn't seem to have the capacity to safely administer insulin by himself  -Islet cell antibody negative, EDAMR and zinc transporter Ab testing in lab  -C-peptide 0.8 (glu 221) indicating need for at least basal insulin for dc. Would plan for basal/oral regimen at dc to help minimize frequency for help with administration of insulin.  -f/u ICA, GADA, zinc transporter.  -RD consult as pt asking for many different snacks, RD can assist in helping team/RNs choose healthier snacks, please avoid snacking inbetween meals to prevent post prandial hyperglycemia , avoid regular sugar cookies, if providing snacks please ensure they are diet/CHO compliant    Hyperlipidemia.   ·  Plan:  this admission. Per pharmacy on file, patient last filled atorvastatin 40mg qd in aug 2021  -c/w atorvastatin.     Hypertension.   ·  Plan: hx of HTN, unclear home regimen for HTN  -Goal less than 130/80  -BP acceptable so far, continue to monitor  -if persistently elevated, would consider initiating an ACE or ARB for renal protection.    Discussed with patient and RN & Team 7 Resident Evonne  Contact via [employer] Teams during business hours  Please note that this patient may be followed by different provider tomorrow.  If before 9AM or after 6PM, or on weekends/holidays, please call endocrine answering service for assistance ([phone]).  For nonurgent matters email [email or web address] for assistance.     Time spent on encounter: 26 minutes spent on total encounter; The necessity of the time spent during the encounter on this date of service was due to development of plan of care/coordination of care/glycemic control through review of labs, blood glucose values and vital signs.   
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 Course c/b COVID+ status.  
78 y/o M with PMH of DM2, HTN, bifrontal SAH, a R SDH, a L parietal SAH with pneumocephalus, a L possible parietal non-displaced skull fracture, and a C6 inferior endplate fracture into disc space with air into the canal in 2021 who presents with AMS and hyperglycemia. Endocrinology consulted for uncontrolled DM.     Type 2 diabetes mellitus with hyperglycemia.   ·  Plan: Uncontrolled T2DM on home oral agent, unclear compliance given patient is a poor historian. A1C 15.5%  -given hypoglycemia in the morning after Lantus 16 units previously, decreased lantus back to 14 units at bedtime. Continue with Lantus 14 units qhs for now, FBG above goal today but previously at goal on same dose, suspect pt  eating over night last night but has denied this, check 2 AM FS ; titrate insulin cautiously as Cr rising to 1.5 today   -Prandial BG above goal, increase Admelog to 8 units sq TID AC  -continue low dose insulin correctional scale premeal and low bedtime scale & 2AM scale  -patient is new to insulin and will be discharged on insulin so he will need to have access to a fridge after discharge. Will need CM/SW help with safe discharge planning since patient doesn't seem to have the capacity to safely administer insulin by himself  -Islet cell antibody negative, EDMAR and zinc transporter Ab testing in lab  -C-peptide 0.8 (glu 221) indicating need for at least basal insulin for dc. Would plan for basal/oral regimen at dc to help minimize frequency for help with administration of insulin.  -f/u ICA, GADA, zinc transporter.    Hyperlipidemia.   ·  Plan:  this admission. Per pharmacy on file, patient last filled atorvastatin 40mg qd in aug 2021  -c/w atorvastatin.     Hypertension.   ·  Plan: hx of HTN, unclear home regimen for HTN  -Goal less than 130/80  -BP acceptable so far, continue to monitor  -if persistently elevated, would consider initiating an ACE or ARB for renal protection.    Discussed with patient and RN   Contact via [employer] Teams during business hours  Please note that this patient may be followed by different provider tomorrow.  If before 9AM or after 6PM, or on weekends/holidays, please call endocrine answering service for assistance ([phone]).  For nonurgent matters email [email or web address] for assistance.     Time spent on encounter: 26 minutes spent on total encounter; The necessity of the time spent during the encounter on this date of service was due to development of plan of care/coordination of care/glycemic control through review of labs, blood glucose values and vital signs.   
76 y/o M with PMH of DM2, HTN, bifrontal SAH, a R SDH, a L parietal SAH with pneumocephalus, a L possible parietal non-displaced skull fracture, and a C6 inferior endplate fracture into disc space with air into the canal in 2021 who presents with AMS and hyperglycemia. Endocrinology consulted for uncontrolled DM. 
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 Course c/b COVID+ status.  
76 y/o M with PMH of DM2, HTN, bifrontal SAH, a R SDH, a L parietal SAH with pneumocephalus, a L possible parietal non-displaced skull fracture, and a C6 inferior endplate fracture into disc space with air into the canal in 2021 who presents with AMS and hyperglycemia. Endocrinology consulted for uncontrolled DM2. Also COVID+ (not on dexamethasone).    Type 2 diabetes mellitus with hyperglycemia.   ·  Plan: Uncontrolled T2DM on home oral agent (metformin), unclear compliance given patient is a poor historian. A1C 15.5%  -Pt requesting more food, reports being hungry and not receiving a protein with his meal - discussed with primary team to try to add protein to his meals and if he is hungry can provide lower carb/sugar free snacks.  -Fasting BG above goal  -Increase Lantus dose to 14 units sq qhs .  -Continue Admelog  8-8-10 units sq TID AC  -continue low dose insulin correctional scale premeal and low bedtime scale & 2AM scale  -patient is new to insulin and will be discharged on insulin so he will need to have access to a fridge after discharge. Will need CM/SW help with safe discharge planning since patient doesn't seem to have the capacity to safely administer insulin by himself  -Islet cell antibody negative, EDMAR and zinc transporter Ab testing in lab  -C-peptide 0.8 (glu 221) indicating need for at least basal insulin for dc.   Would plan for basal/oral regimen at dc to help minimize frequency for help with administration of insulin.  **Of note multiple assessments made including today 6/20/22 and patient is unable to self administer insulin pen, also poor insight into diabetes management concepts. Family not available/willing to supervise his care.  -Islet cell and Zinc transporter Antibodies are negative, EDMAR testing in lab  -GFR 49 - will trend to decide about resuming use of metformin as oral agent to be added to basal insulin for dc.    Hyperlipidemia.   ·  Plan:  this admission. Per pharmacy on file, patient last filled atorvastatin 40mg qd in aug 2021  -c/w atorvastatin 40mg     Hypertension.   ·  Plan: hx of HTN, unclear home regimen for HTN  -Goal less than 130/80, currently controlled    Plan discussed with primary team.    Joann Rahman MD  Division of Endocrinology  Pager: [phone]    If after 6PM or before 9AM, or on weekends/holidays, please call endocrine answering service for assistance ([phone]).  For nonurgent matters email [email or web address] for assistance.
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 Course c/b COVID+ status.  
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 Course c/b COVID+ status.  
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 Course c/b COVID+ status.  
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 Course c/b COVID+ status.  
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 Course c/b COVID+ status.  
30-Apr-2019

## 2023-07-12 NOTE — DISCHARGE NOTE PROVIDER - NSDCQMANTITHROM_GEN_A_CORE
Problem: Potential for Falls  Goal: Patient will remain free of falls  Description: INTERVENTIONS:  - Educate patient/family on patient safety including physical limitations  - Instruct patient to call for assistance with activity   - Consult OT/PT to assist with strengthening/mobility   - Keep Call bell within reach  - Keep bed low and locked with side rails adjusted as appropriate  - Keep care items and personal belongings within reach  - Initiate and maintain comfort rounds  - Make Fall Risk Sign visible to staff  - Offer Toileting every 2 Hours, in advance of need  - Initiate/Maintain BED alarm  - Obtain necessary fall risk management equipment:   - Apply yellow socks and bracelet for high fall risk patients  - Consider moving patient to room near nurses station  Outcome: Progressing     Problem: PAIN - ADULT  Goal: Verbalizes/displays adequate comfort level or baseline comfort level  Description: Interventions:  - Encourage patient to monitor pain and request assistance  - Assess pain using appropriate pain scale  - Administer analgesics based on type and severity of pain and evaluate response  - Implement non-pharmacological measures as appropriate and evaluate response  - Consider cultural and social influences on pain and pain management  - Notify physician/advanced practitioner if interventions unsuccessful or patient reports new pain  Outcome: Progressing     Problem: INFECTION - ADULT  Goal: Absence or prevention of progression during hospitalization  Description: INTERVENTIONS:  - Assess and monitor for signs and symptoms of infection  - Monitor lab/diagnostic results  - Monitor all insertion sites, i.e. indwelling lines, tubes, and drains  - Monitor endotracheal if appropriate and nasal secretions for changes in amount and color  - [place] appropriate cooling/warming therapies per order  - Administer medications as ordered  - Instruct and encourage patient and family to use good hand hygiene technique  - Identify and instruct in appropriate isolation precautions for identified infection/condition  Outcome: Progressing  Goal: Absence of fever/infection during neutropenic period  Description: INTERVENTIONS:  - Monitor WBC    Outcome: Progressing     Problem: SAFETY ADULT  Goal: Patient will remain free of falls  Description: INTERVENTIONS:  - Educate patient/family on patient safety including physical limitations  - Instruct patient to call for assistance with activity   - Consult OT/PT to assist with strengthening/mobility   - Keep Call bell within reach  - Keep bed low and locked with side rails adjusted as appropriate  - Keep care items and personal belongings within reach  - Initiate and maintain comfort rounds  - Make Fall Risk Sign visible to staff  - Offer Toileting every 2 Hours, in advance of need  - Initiate/Maintain BEDalarm  - Obtain necessary fall risk management equipment:   - Apply yellow socks and bracelet for high fall risk patients  - Consider moving patient to room near nurses station  Outcome: Progressing  Goal: Maintain or return to baseline ADL function  Description: INTERVENTIONS:  -  Assess patient's ability to carry out ADLs; assess patient's baseline for ADL function and identify physical deficits which impact ability to perform ADLs (bathing, care of mouth/teeth, toileting, grooming, dressing, etc.)  - Assess/evaluate cause of self-care deficits   - Assess range of motion  - Assess patient's mobility; develop plan if impaired  - Assess patient's need for assistive devices and provide as appropriate  - Encourage maximum independence but intervene and supervise when necessary  - Involve family in performance of ADLs  - Assess for home care needs following discharge   - Consider OT consult to assist with ADL evaluation and planning for discharge  - Provide patient education as appropriate  Outcome: Progressing  Goal: Maintains/Returns to pre admission functional level  Description: INTERVENTIONS:  - Perform BMAT or MOVE assessment daily.   - Set and communicate daily mobility goal to care team and patient/family/caregiver. - Collaborate with rehabilitation services on mobility goals if consulted  - Perform Range of Motion 3 times a day. - Reposition patient every 3 hours. - Dangle patient 3 times a day  - Stand patient 3 times a day  - Ambulate patient 3 times a day  - Out of bed to chair 3 times a day   - Out of bed for meals 3 times a day  - Out of bed for toileting  - Record patient progress and toleration of activity level   Outcome: Progressing     Problem: DISCHARGE PLANNING  Goal: Discharge to home or other facility with appropriate resources  Description: INTERVENTIONS:  - Identify barriers to discharge w/patient and caregiver  - Arrange for needed discharge resources and transportation as appropriate  - Identify discharge learning needs (meds, wound care, etc.)  - Arrange for interpretive services to assist at discharge as needed  - Refer to Case Management Department for coordinating discharge planning if the patient needs post-hospital services based on physician/advanced practitioner order or complex needs related to functional status, cognitive ability, or social support system  Outcome: Progressing     Problem: Knowledge Deficit  Goal: Patient/family/caregiver demonstrates understanding of disease process, treatment plan, medications, and discharge instructions  Description: Complete learning assessment and assess knowledge base.   Interventions:  - Provide teaching at level of understanding  - Provide teaching via preferred learning methods  Outcome: Progressing     Problem: NEUROSENSORY - ADULT  Goal: Achieves stable or improved neurological status  Description: INTERVENTIONS  - Monitor and report changes in neurological status  - Monitor vital signs such as temperature, blood pressure, glucose, and any other labs ordered   - Initiate measures to prevent increased intracranial pressure  - Monitor for seizure activity and implement precautions if appropriate      Outcome: Progressing  Goal: Remains free of injury related to seizures activity  Description: INTERVENTIONS  - Maintain airway, patient safety  and administer oxygen as ordered  - Monitor patient for seizure activity, document and report duration and description of seizure to physician/advanced practitioner  - If seizure occurs,  ensure patient safety during seizure  - Reorient patient post seizure  - Seizure pads on all 4 side rails  - Instruct patient/family to notify RN of any seizure activity including if an aura is experienced  - Instruct patient/family to call for assistance with activity based on nursing assessment  - Administer anti-seizure medications if ordered    Outcome: Progressing  Goal: Achieves maximal functionality and self care  Description: INTERVENTIONS  - Monitor swallowing and airway patency with patient fatigue and changes in neurological status  - Encourage and assist patient to increase activity and self care.    - Encourage visually impaired, hearing impaired and aphasic patients to use assistive/communication devices  Outcome: Progressing     Problem: MOBILITY - ADULT  Goal: Maintain or return to baseline ADL function  Description: INTERVENTIONS:  -  Assess patient's ability to carry out ADLs; assess patient's baseline for ADL function and identify physical deficits which impact ability to perform ADLs (bathing, care of mouth/teeth, toileting, grooming, dressing, etc.)  - Assess/evaluate cause of self-care deficits   - Assess range of motion  - Assess patient's mobility; develop plan if impaired  - Assess patient's need for assistive devices and provide as appropriate  - Encourage maximum independence but intervene and supervise when necessary  - Involve family in performance of ADLs  - Assess for home care needs following discharge   - Consider OT consult to assist with ADL evaluation and planning for discharge  - Provide patient education as appropriate  Outcome: Progressing  Goal: Maintains/Returns to pre admission functional level  Description: INTERVENTIONS:  - Perform BMAT or MOVE assessment daily.   - Set and communicate daily mobility goal to care team and patient/family/caregiver. - Collaborate with rehabilitation services on mobility goals if consulted  - Perform Range of Motion 3 times a day. - Reposition patient every 3 hours. - Dangle patient 3 times a day  - Stand patient 3 times a day  - Ambulate patient 3 times a day  - Out of bed to chair 3 times a day   - Out of bed for meals 3 times a day  - Out of bed for toileting  - Record patient progress and toleration of activity level   Outcome: Progressing     Problem: Neurological Deficit  Goal: Neurological status is stable or improving  Description: Interventions:  - Monitor and assess patient's level of consciousness, motor function, sensory function, and level of assistance needed for ADLs. - Monitor and report changes from baseline. Collaborate with interdisciplinary team to initiate plan and implement interventions as ordered. - Provide and maintain a safe environment. - Consider seizure precautions. - Consider fall precautions. - Consider aspiration precautions. - Consider bleeding precautions. Outcome: Progressing     Problem: Activity Intolerance/Impaired Mobility  Goal: Mobility/activity is maintained at optimum level for patient  Description: Interventions:  - Assess and monitor patient  barriers to mobility and need for assistive/adaptive devices. - Assess patient's emotional response to limitations. - Collaborate with interdisciplinary team and initiate plans and interventions as ordered. - Encourage independent activity per ability.  - Maintain proper body alignment. - Perform active/passive rom as tolerated/ordered.   - Plan activities to conserve energy.  - Turn patient as appropriate  Outcome: Progressing Problem: Communication Impairment  Goal: Ability to express needs and understand communication  Description: Assess patient's communication skills and ability to understand information. Patient will demonstrate use of effective communication techniques, alternative methods of communication and understanding even if not able to speak. - Encourage communication and provide alternate methods of communication as needed. - Collaborate with case management/ for discharge needs. - Include patient/family/caregiver in decisions related to communication. Outcome: Progressing     Problem: Potential for Aspiration  Goal: Non-ventilated patient's risk of aspiration is minimized  Description: Assess and monitor vital signs, respiratory status, and labs (WBC). Monitor for signs of aspiration (tachypnea, cough, rales, wheezing, cyanosis, fever). - Assess and monitor patient's ability to swallow. - Place patient up in chair to eat if possible. - HOB up at 90 degrees to eat if unable to get patient up into chair.  - Supervise patient during oral intake. - Instruct patient/ family to take small bites. - Instruct patient/ family to take small single sips when taking liquids. - Follow patient-specific strategies generated by speech pathologist.  Outcome: Progressing  Goal: Ventilated patient's risk of aspiration is minimized  Description: Assess and monitor vital signs, respiratory status, airway cuff pressure, and labs (WBC). Monitor for signs of aspiration (tachypnea, cough, rales, wheezing, cyanosis, fever). - Elevate head of bed 30 degrees if patient has tube feeding.  - Monitor tube feeding. Outcome: Progressing     Problem: Nutrition  Goal: Nutrition/Hydration status is improving  Description: Monitor and assess patient's nutrition/hydration status for malnutrition (ex- brittle hair, bruises, dry skin, pale skin and conjunctiva, muscle wasting, smooth red tongue, and disorientation). Collaborate with interdisciplinary team and initiate plan and interventions as ordered. Monitor patient's weight and dietary intake as ordered or per policy. Utilize nutrition screening tool and intervene per policy. Determine patient's food preferences and provide high-protein, high-caloric foods as appropriate. - Assist patient with eating.  - Allow adequate time for meals.  - Encourage patient to take dietary supplement as ordered. - Collaborate with clinical nutritionist.  - Include patient/family/caregiver in decisions related to nutrition.   Outcome: Progressing No, not prescribed...

## 2025-07-09 ENCOUNTER — INPATIENT (INPATIENT)
Facility: HOSPITAL | Age: 80
LOS: 4 days | Discharge: HOME | DRG: 639 | End: 2025-07-14
Attending: INTERNAL MEDICINE | Admitting: INTERNAL MEDICINE
Payer: MEDICARE

## 2025-07-09 VITALS
RESPIRATION RATE: 18 BRPM | WEIGHT: 149.91 LBS | TEMPERATURE: 98 F | HEIGHT: 70 IN | DIASTOLIC BLOOD PRESSURE: 80 MMHG | OXYGEN SATURATION: 96 % | SYSTOLIC BLOOD PRESSURE: 153 MMHG | HEART RATE: 104 BPM

## 2025-07-09 DIAGNOSIS — E11.65 TYPE 2 DIABETES MELLITUS WITH HYPERGLYCEMIA: ICD-10-CM

## 2025-07-09 LAB
ACETONE SERPL-MCNC: NEGATIVE — SIGNIFICANT CHANGE UP
ALBUMIN SERPL ELPH-MCNC: 3.5 G/DL — SIGNIFICANT CHANGE UP (ref 3.5–5)
ALP SERPL-CCNC: 140 U/L — HIGH (ref 40–120)
ALT FLD-CCNC: 43 U/L DA — SIGNIFICANT CHANGE UP (ref 10–60)
ANION GAP SERPL CALC-SCNC: 10 MMOL/L — SIGNIFICANT CHANGE UP (ref 5–17)
APPEARANCE UR: CLEAR — SIGNIFICANT CHANGE UP
APTT BLD: 29 SEC — SIGNIFICANT CHANGE UP (ref 26.1–36.8)
AST SERPL-CCNC: 49 U/L — HIGH (ref 10–40)
BASE EXCESS BLDV CALC-SCNC: -3.6 MMOL/L — SIGNIFICANT CHANGE UP
BASOPHILS # BLD AUTO: 0.02 K/UL — SIGNIFICANT CHANGE UP (ref 0–0.2)
BASOPHILS NFR BLD AUTO: 0.3 % — SIGNIFICANT CHANGE UP (ref 0–2)
BILIRUB SERPL-MCNC: 0.5 MG/DL — SIGNIFICANT CHANGE UP (ref 0.2–1.2)
BILIRUB UR-MCNC: NEGATIVE — SIGNIFICANT CHANGE UP
BUN SERPL-MCNC: 31 MG/DL — HIGH (ref 7–18)
CALCIUM SERPL-MCNC: 9.1 MG/DL — SIGNIFICANT CHANGE UP (ref 8.4–10.5)
CHLORIDE SERPL-SCNC: 87 MMOL/L — LOW (ref 96–108)
CO2 SERPL-SCNC: 24 MMOL/L — SIGNIFICANT CHANGE UP (ref 22–31)
COLOR SPEC: YELLOW — SIGNIFICANT CHANGE UP
CREAT SERPL-MCNC: 2.05 MG/DL — HIGH (ref 0.5–1.3)
DIFF PNL FLD: NEGATIVE — SIGNIFICANT CHANGE UP
EGFR: 33 ML/MIN/1.73M2 — LOW
EGFR: 33 ML/MIN/1.73M2 — LOW
EOSINOPHIL # BLD AUTO: 0.03 K/UL — SIGNIFICANT CHANGE UP (ref 0–0.5)
EOSINOPHIL NFR BLD AUTO: 0.5 % — SIGNIFICANT CHANGE UP (ref 0–6)
GLUCOSE SERPL-MCNC: 983 MG/DL — CRITICAL HIGH (ref 70–99)
GLUCOSE UR QL: >=1000 MG/DL
HCO3 BLDV-SCNC: 24 MMOL/L — SIGNIFICANT CHANGE UP (ref 22–29)
HCT VFR BLD CALC: 39.7 % — SIGNIFICANT CHANGE UP (ref 39–50)
HGB BLD-MCNC: 12.5 G/DL — LOW (ref 13–17)
IMM GRANULOCYTES NFR BLD AUTO: 0.3 % — SIGNIFICANT CHANGE UP (ref 0–0.9)
INR BLD: 1.03 RATIO — SIGNIFICANT CHANGE UP (ref 0.85–1.16)
KETONES UR QL: NEGATIVE MG/DL — SIGNIFICANT CHANGE UP
LEUKOCYTE ESTERASE UR-ACNC: ABNORMAL
LYMPHOCYTES # BLD AUTO: 1.28 K/UL — SIGNIFICANT CHANGE UP (ref 1–3.3)
LYMPHOCYTES # BLD AUTO: 19.9 % — SIGNIFICANT CHANGE UP (ref 13–44)
MAGNESIUM SERPL-MCNC: 2 MG/DL — SIGNIFICANT CHANGE UP (ref 1.6–2.6)
MCHC RBC-ENTMCNC: 31.5 G/DL — LOW (ref 32–36)
MCHC RBC-ENTMCNC: 31.6 PG — SIGNIFICANT CHANGE UP (ref 27–34)
MCV RBC AUTO: 100.5 FL — HIGH (ref 80–100)
MONOCYTES # BLD AUTO: 0.45 K/UL — SIGNIFICANT CHANGE UP (ref 0–0.9)
MONOCYTES NFR BLD AUTO: 7 % — SIGNIFICANT CHANGE UP (ref 2–14)
NEUTROPHILS # BLD AUTO: 4.64 K/UL — SIGNIFICANT CHANGE UP (ref 1.8–7.4)
NEUTROPHILS NFR BLD AUTO: 72 % — SIGNIFICANT CHANGE UP (ref 43–77)
NITRITE UR-MCNC: NEGATIVE — SIGNIFICANT CHANGE UP
NRBC BLD AUTO-RTO: 0 /100 WBCS — SIGNIFICANT CHANGE UP (ref 0–0)
NT-PROBNP SERPL-SCNC: 134 PG/ML — SIGNIFICANT CHANGE UP (ref 0–450)
PCO2 BLDV: 54 MMHG — SIGNIFICANT CHANGE UP (ref 42–55)
PH BLDV: 7.26 — LOW (ref 7.32–7.43)
PH UR: 7 — SIGNIFICANT CHANGE UP (ref 5–8)
PLATELET # BLD AUTO: 353 K/UL — SIGNIFICANT CHANGE UP (ref 150–400)
PO2 BLDV: 19 MMHG — SIGNIFICANT CHANGE UP
POTASSIUM SERPL-MCNC: 5.3 MMOL/L — SIGNIFICANT CHANGE UP (ref 3.5–5.3)
POTASSIUM SERPL-SCNC: 5.3 MMOL/L — SIGNIFICANT CHANGE UP (ref 3.5–5.3)
PROT SERPL-MCNC: 8.1 G/DL — SIGNIFICANT CHANGE UP (ref 6–8.3)
PROT UR-MCNC: NEGATIVE MG/DL — SIGNIFICANT CHANGE UP
PROTHROM AB SERPL-ACNC: 12 SEC — SIGNIFICANT CHANGE UP (ref 9.9–13.4)
RBC # BLD: 3.95 M/UL — LOW (ref 4.2–5.8)
RBC # FLD: 12.6 % — SIGNIFICANT CHANGE UP (ref 10.3–14.5)
SAO2 % BLDV: 28.2 % — SIGNIFICANT CHANGE UP
SODIUM SERPL-SCNC: 121 MMOL/L — LOW (ref 135–145)
SP GR SPEC: 1.02 — SIGNIFICANT CHANGE UP (ref 1–1.03)
TROPONIN I, HIGH SENSITIVITY RESULT: 5.7 NG/L — SIGNIFICANT CHANGE UP
UROBILINOGEN FLD QL: 0.2 MG/DL — SIGNIFICANT CHANGE UP (ref 0.2–1)
WBC # BLD: 6.44 K/UL — SIGNIFICANT CHANGE UP (ref 3.8–10.5)
WBC # FLD AUTO: 6.44 K/UL — SIGNIFICANT CHANGE UP (ref 3.8–10.5)

## 2025-07-09 PROCEDURE — 99285 EMERGENCY DEPT VISIT HI MDM: CPT

## 2025-07-09 RX ORDER — HALOPERIDOL 10 MG/1
5 TABLET ORAL ONCE
Refills: 0 | Status: COMPLETED | OUTPATIENT
Start: 2025-07-09 | End: 2025-07-09

## 2025-07-09 RX ORDER — LORAZEPAM 4 MG/ML
1 VIAL (ML) INJECTION ONCE
Refills: 0 | Status: DISCONTINUED | OUTPATIENT
Start: 2025-07-09 | End: 2025-07-09

## 2025-07-09 RX ADMIN — Medication 1 MILLIGRAM(S): at 18:16

## 2025-07-09 RX ADMIN — HALOPERIDOL 5 MILLIGRAM(S): 10 TABLET ORAL at 18:06

## 2025-07-09 RX ADMIN — HALOPERIDOL 5 MILLIGRAM(S): 10 TABLET ORAL at 21:48

## 2025-07-09 RX ADMIN — Medication 1000 MILLILITER(S): at 20:53

## 2025-07-09 RX ADMIN — Medication 2000 MILLILITER(S): at 15:13

## 2025-07-09 RX ADMIN — Medication 10 UNIT(S): at 20:53

## 2025-07-09 RX ADMIN — Medication 1000 MILLILITER(S): at 21:48

## 2025-07-09 RX ADMIN — Medication 10 UNIT(S): at 15:44

## 2025-07-09 NOTE — ED PROVIDER NOTE - OBJECTIVE STATEMENT
80-year-old male with a past medical history of insulin-dependent diabetes (has been refusing all insulin) depression, CAD, dementia, glaucoma, CKD 3 presents for hyperglycemia that is uncontrolled.  Patient was sent from [facility] for admission for control of blood sugars.  At this time patient states that he would like something to eat and drink.  Patient denies other complaints at this time. Male

## 2025-07-09 NOTE — ED ADULT TRIAGE NOTE - CHIEF COMPLAINT QUOTE
sent from [facility] for Hyperglycemia   Pt non compliant with Insulin for months

## 2025-07-09 NOTE — ED PROVIDER NOTE - PROGRESS NOTE DETAILS
Glucose improving.  Labs are not consistent with DKA.  Patient given sedation because trying to get out of bed very agitated and asking to leave.  Will admit for uncontrolled diabetes.

## 2025-07-09 NOTE — ED ADULT TRIAGE NOTE - NS ED NURSE BANDS TYPE
Name band; Tazorac Counseling:  Patient advised that medication is irritating and drying.  Patient may need to apply sparingly and wash off after an hour before eventually leaving it on overnight.  The patient verbalized understanding of the proper use and possible adverse effects of tazorac.  All of the patient's questions and concerns were addressed.

## 2025-07-10 DIAGNOSIS — E11.65 TYPE 2 DIABETES MELLITUS WITH HYPERGLYCEMIA: ICD-10-CM

## 2025-07-10 DIAGNOSIS — E78.5 HYPERLIPIDEMIA, UNSPECIFIED: ICD-10-CM

## 2025-07-10 DIAGNOSIS — I25.10 ATHEROSCLEROTIC HEART DISEASE OF NATIVE CORONARY ARTERY WITHOUT ANGINA PECTORIS: ICD-10-CM

## 2025-07-10 DIAGNOSIS — H40.9 UNSPECIFIED GLAUCOMA: ICD-10-CM

## 2025-07-10 DIAGNOSIS — E11.9 TYPE 2 DIABETES MELLITUS WITHOUT COMPLICATIONS: ICD-10-CM

## 2025-07-10 DIAGNOSIS — D64.9 ANEMIA, UNSPECIFIED: ICD-10-CM

## 2025-07-10 DIAGNOSIS — Z29.9 ENCOUNTER FOR PROPHYLACTIC MEASURES, UNSPECIFIED: ICD-10-CM

## 2025-07-10 LAB
A1C WITH ESTIMATED AVERAGE GLUCOSE RESULT: >15.5 % — HIGH (ref 4–5.6)
ALBUMIN SERPL ELPH-MCNC: 2.9 G/DL — LOW (ref 3.5–5)
ALP SERPL-CCNC: 114 U/L — SIGNIFICANT CHANGE UP (ref 40–120)
ALT FLD-CCNC: 32 U/L DA — SIGNIFICANT CHANGE UP (ref 10–60)
ANION GAP SERPL CALC-SCNC: 2 MMOL/L — LOW (ref 5–17)
ANION GAP SERPL CALC-SCNC: 4 MMOL/L — LOW (ref 5–17)
AST SERPL-CCNC: 24 U/L — SIGNIFICANT CHANGE UP (ref 10–40)
BASE EXCESS BLDV CALC-SCNC: -5.5 MMOL/L — SIGNIFICANT CHANGE UP
BILIRUB SERPL-MCNC: 0.2 MG/DL — SIGNIFICANT CHANGE UP (ref 0.2–1.2)
BUN SERPL-MCNC: 19 MG/DL — HIGH (ref 7–18)
BUN SERPL-MCNC: 23 MG/DL — HIGH (ref 7–18)
CA-I SERPL-SCNC: SIGNIFICANT CHANGE UP MMOL/L (ref 1.15–1.33)
CALCIUM SERPL-MCNC: 8.3 MG/DL — LOW (ref 8.4–10.5)
CALCIUM SERPL-MCNC: 8.9 MG/DL — SIGNIFICANT CHANGE UP (ref 8.4–10.5)
CHLORIDE SERPL-SCNC: 107 MMOL/L — SIGNIFICANT CHANGE UP (ref 96–108)
CHLORIDE SERPL-SCNC: 108 MMOL/L — SIGNIFICANT CHANGE UP (ref 96–108)
CHOLEST SERPL-MCNC: 141 MG/DL — SIGNIFICANT CHANGE UP
CO2 SERPL-SCNC: 24 MMOL/L — SIGNIFICANT CHANGE UP (ref 22–31)
CO2 SERPL-SCNC: 26 MMOL/L — SIGNIFICANT CHANGE UP (ref 22–31)
CREAT SERPL-MCNC: 1.25 MG/DL — SIGNIFICANT CHANGE UP (ref 0.5–1.3)
CREAT SERPL-MCNC: 1.29 MG/DL — SIGNIFICANT CHANGE UP (ref 0.5–1.3)
EGFR: 57 ML/MIN/1.73M2 — LOW
EGFR: 57 ML/MIN/1.73M2 — LOW
EGFR: 59 ML/MIN/1.73M2 — LOW
EGFR: 59 ML/MIN/1.73M2 — LOW
ESTIMATED AVERAGE GLUCOSE: >398 MG/DL — HIGH (ref 68–114)
GAS PNL BLDV: 132 MMOL/L — LOW (ref 136–145)
GAS PNL BLDV: SIGNIFICANT CHANGE UP
GLUCOSE BLDC GLUCOMTR-MCNC: 270 MG/DL — HIGH (ref 70–99)
GLUCOSE BLDC GLUCOMTR-MCNC: 274 MG/DL — HIGH (ref 70–99)
GLUCOSE BLDC GLUCOMTR-MCNC: 349 MG/DL — HIGH (ref 70–99)
GLUCOSE BLDC GLUCOMTR-MCNC: 353 MG/DL — HIGH (ref 70–99)
GLUCOSE BLDC GLUCOMTR-MCNC: 383 MG/DL — HIGH (ref 70–99)
GLUCOSE BLDC GLUCOMTR-MCNC: 425 MG/DL — HIGH (ref 70–99)
GLUCOSE BLDC GLUCOMTR-MCNC: 467 MG/DL — CRITICAL HIGH (ref 70–99)
GLUCOSE BLDC GLUCOMTR-MCNC: >600 MG/DL — CRITICAL HIGH (ref 70–99)
GLUCOSE BLDV-MCNC: 374 MG/DL — HIGH (ref 70–99)
GLUCOSE SERPL-MCNC: 312 MG/DL — HIGH (ref 70–99)
GLUCOSE SERPL-MCNC: 358 MG/DL — HIGH (ref 70–99)
HCO3 BLDV-SCNC: 21 MMOL/L — LOW (ref 22–29)
HCT VFR BLD CALC: 31.4 % — LOW (ref 39–50)
HDLC SERPL-MCNC: 47 MG/DL — SIGNIFICANT CHANGE UP
HGB BLD-MCNC: 10.4 G/DL — LOW (ref 13–17)
LACTATE BLDV-MCNC: 1.3 MMOL/L — SIGNIFICANT CHANGE UP (ref 0.5–2)
LACTATE SERPL-SCNC: 1.5 MMOL/L — SIGNIFICANT CHANGE UP (ref 0.7–2)
LDLC SERPL-MCNC: 72 MG/DL — SIGNIFICANT CHANGE UP
LIPID PNL WITH DIRECT LDL SERPL: 72 MG/DL — SIGNIFICANT CHANGE UP
MAGNESIUM SERPL-MCNC: 1.7 MG/DL — SIGNIFICANT CHANGE UP (ref 1.6–2.6)
MCHC RBC-ENTMCNC: 30.9 PG — SIGNIFICANT CHANGE UP (ref 27–34)
MCHC RBC-ENTMCNC: 33.1 G/DL — SIGNIFICANT CHANGE UP (ref 32–36)
MCV RBC AUTO: 93.2 FL — SIGNIFICANT CHANGE UP (ref 80–100)
NONHDLC SERPL-MCNC: 94 MG/DL — SIGNIFICANT CHANGE UP
NRBC BLD AUTO-RTO: 0 /100 WBCS — SIGNIFICANT CHANGE UP (ref 0–0)
PCO2 BLDV: 45 MMHG — SIGNIFICANT CHANGE UP (ref 42–55)
PH BLDV: 7.28 — LOW (ref 7.32–7.43)
PHOSPHATE SERPL-MCNC: 2.9 MG/DL — SIGNIFICANT CHANGE UP (ref 2.5–4.5)
PLATELET # BLD AUTO: 347 K/UL — SIGNIFICANT CHANGE UP (ref 150–400)
PO2 BLDV: 42 MMHG — SIGNIFICANT CHANGE UP
POTASSIUM BLDV-SCNC: 4.3 MMOL/L — SIGNIFICANT CHANGE UP (ref 3.5–5.1)
POTASSIUM SERPL-MCNC: 3.9 MMOL/L — SIGNIFICANT CHANGE UP (ref 3.5–5.3)
POTASSIUM SERPL-MCNC: 4.2 MMOL/L — SIGNIFICANT CHANGE UP (ref 3.5–5.3)
POTASSIUM SERPL-SCNC: 3.9 MMOL/L — SIGNIFICANT CHANGE UP (ref 3.5–5.3)
POTASSIUM SERPL-SCNC: 4.2 MMOL/L — SIGNIFICANT CHANGE UP (ref 3.5–5.3)
PROT SERPL-MCNC: 6.6 G/DL — SIGNIFICANT CHANGE UP (ref 6–8.3)
RBC # BLD: 3.37 M/UL — LOW (ref 4.2–5.8)
RBC # FLD: 12.1 % — SIGNIFICANT CHANGE UP (ref 10.3–14.5)
SAO2 % BLDV: 68 % — SIGNIFICANT CHANGE UP
SODIUM SERPL-SCNC: 135 MMOL/L — SIGNIFICANT CHANGE UP (ref 135–145)
SODIUM SERPL-SCNC: 136 MMOL/L — SIGNIFICANT CHANGE UP (ref 135–145)
TRIGL SERPL-MCNC: 125 MG/DL — SIGNIFICANT CHANGE UP
WBC # BLD: 5.78 K/UL — SIGNIFICANT CHANGE UP (ref 3.8–10.5)
WBC # FLD AUTO: 5.78 K/UL — SIGNIFICANT CHANGE UP (ref 3.8–10.5)

## 2025-07-10 RX ORDER — HEPARIN SODIUM 1000 [USP'U]/ML
5000 INJECTION INTRAVENOUS; SUBCUTANEOUS EVERY 8 HOURS
Refills: 0 | Status: DISCONTINUED | OUTPATIENT
Start: 2025-07-10 | End: 2025-07-14

## 2025-07-10 RX ORDER — INSULIN LISPRO 100 U/ML
2 INJECTION, SOLUTION INTRAVENOUS; SUBCUTANEOUS ONCE
Refills: 0 | Status: COMPLETED | OUTPATIENT
Start: 2025-07-10 | End: 2025-07-10

## 2025-07-10 RX ORDER — GLUCAGON 3 MG/1
1 POWDER NASAL ONCE
Refills: 0 | Status: DISCONTINUED | OUTPATIENT
Start: 2025-07-10 | End: 2025-07-14

## 2025-07-10 RX ORDER — INSULIN LISPRO 100 U/ML
INJECTION, SOLUTION INTRAVENOUS; SUBCUTANEOUS
Refills: 0 | Status: DISCONTINUED | OUTPATIENT
Start: 2025-07-10 | End: 2025-07-14

## 2025-07-10 RX ORDER — DEXTROSE 50 % IN WATER 50 %
25 SYRINGE (ML) INTRAVENOUS ONCE
Refills: 0 | Status: DISCONTINUED | OUTPATIENT
Start: 2025-07-10 | End: 2025-07-14

## 2025-07-10 RX ORDER — ATORVASTATIN CALCIUM 80 MG/1
20 TABLET, FILM COATED ORAL AT BEDTIME
Refills: 0 | Status: DISCONTINUED | OUTPATIENT
Start: 2025-07-10 | End: 2025-07-14

## 2025-07-10 RX ORDER — INSULIN LISPRO 100 U/ML
INJECTION, SOLUTION INTRAVENOUS; SUBCUTANEOUS AT BEDTIME
Refills: 0 | Status: DISCONTINUED | OUTPATIENT
Start: 2025-07-10 | End: 2025-07-14

## 2025-07-10 RX ORDER — INSULIN LISPRO 100 U/ML
INJECTION, SOLUTION INTRAVENOUS; SUBCUTANEOUS AT BEDTIME
Refills: 0 | Status: DISCONTINUED | OUTPATIENT
Start: 2025-07-10 | End: 2025-07-10

## 2025-07-10 RX ORDER — SODIUM CHLORIDE 9 G/1000ML
1000 INJECTION, SOLUTION INTRAVENOUS
Refills: 0 | Status: DISCONTINUED | OUTPATIENT
Start: 2025-07-10 | End: 2025-07-14

## 2025-07-10 RX ORDER — ACETAMINOPHEN 500 MG/5ML
650 LIQUID (ML) ORAL EVERY 6 HOURS
Refills: 0 | Status: DISCONTINUED | OUTPATIENT
Start: 2025-07-10 | End: 2025-07-14

## 2025-07-10 RX ORDER — ASPIRIN 325 MG
81 TABLET ORAL DAILY
Refills: 0 | Status: DISCONTINUED | OUTPATIENT
Start: 2025-07-10 | End: 2025-07-14

## 2025-07-10 RX ORDER — EZETIMIBE 10 MG/1
10 TABLET ORAL DAILY
Refills: 0 | Status: DISCONTINUED | OUTPATIENT
Start: 2025-07-10 | End: 2025-07-14

## 2025-07-10 RX ORDER — INSULIN LISPRO 100 U/ML
2 INJECTION, SOLUTION INTRAVENOUS; SUBCUTANEOUS
Refills: 0 | Status: DISCONTINUED | OUTPATIENT
Start: 2025-07-10 | End: 2025-07-11

## 2025-07-10 RX ORDER — MELATONIN 5 MG
3 TABLET ORAL AT BEDTIME
Refills: 0 | Status: DISCONTINUED | OUTPATIENT
Start: 2025-07-10 | End: 2025-07-12

## 2025-07-10 RX ORDER — INSULIN GLARGINE-YFGN 100 [IU]/ML
6 INJECTION, SOLUTION SUBCUTANEOUS AT BEDTIME
Refills: 0 | Status: DISCONTINUED | OUTPATIENT
Start: 2025-07-10 | End: 2025-07-11

## 2025-07-10 RX ORDER — DEXTROSE 50 % IN WATER 50 %
12.5 SYRINGE (ML) INTRAVENOUS ONCE
Refills: 0 | Status: DISCONTINUED | OUTPATIENT
Start: 2025-07-10 | End: 2025-07-14

## 2025-07-10 RX ORDER — INSULIN GLARGINE-YFGN 100 [IU]/ML
6 INJECTION, SOLUTION SUBCUTANEOUS ONCE
Refills: 0 | Status: COMPLETED | OUTPATIENT
Start: 2025-07-10 | End: 2025-07-10

## 2025-07-10 RX ORDER — DEXTROSE 50 % IN WATER 50 %
15 SYRINGE (ML) INTRAVENOUS ONCE
Refills: 0 | Status: DISCONTINUED | OUTPATIENT
Start: 2025-07-10 | End: 2025-07-14

## 2025-07-10 RX ORDER — MAGNESIUM, ALUMINUM HYDROXIDE 200-200 MG
30 TABLET,CHEWABLE ORAL EVERY 4 HOURS
Refills: 0 | Status: DISCONTINUED | OUTPATIENT
Start: 2025-07-10 | End: 2025-07-14

## 2025-07-10 RX ORDER — LATANOPROST PF 0.05 MG/ML
1 SOLUTION/ DROPS OPHTHALMIC AT BEDTIME
Refills: 0 | Status: DISCONTINUED | OUTPATIENT
Start: 2025-07-10 | End: 2025-07-14

## 2025-07-10 RX ORDER — INSULIN LISPRO 100 U/ML
INJECTION, SOLUTION INTRAVENOUS; SUBCUTANEOUS
Refills: 0 | Status: DISCONTINUED | OUTPATIENT
Start: 2025-07-10 | End: 2025-07-10

## 2025-07-10 RX ADMIN — INSULIN LISPRO 2 UNIT(S): 100 INJECTION, SOLUTION INTRAVENOUS; SUBCUTANEOUS at 16:00

## 2025-07-10 RX ADMIN — INSULIN LISPRO 2: 100 INJECTION, SOLUTION INTRAVENOUS; SUBCUTANEOUS at 21:13

## 2025-07-10 RX ADMIN — Medication 250 MILLIGRAM(S): at 06:00

## 2025-07-10 RX ADMIN — INSULIN LISPRO 10: 100 INJECTION, SOLUTION INTRAVENOUS; SUBCUTANEOUS at 15:59

## 2025-07-10 RX ADMIN — HEPARIN SODIUM 5000 UNIT(S): 1000 INJECTION INTRAVENOUS; SUBCUTANEOUS at 15:59

## 2025-07-10 RX ADMIN — INSULIN GLARGINE-YFGN 6 UNIT(S): 100 INJECTION, SOLUTION SUBCUTANEOUS at 13:17

## 2025-07-10 RX ADMIN — LATANOPROST PF 1 DROP(S): 0.05 SOLUTION/ DROPS OPHTHALMIC at 21:52

## 2025-07-10 RX ADMIN — INSULIN LISPRO 2 UNIT(S): 100 INJECTION, SOLUTION INTRAVENOUS; SUBCUTANEOUS at 13:47

## 2025-07-10 RX ADMIN — Medication 3 MILLIGRAM(S): at 21:12

## 2025-07-10 RX ADMIN — INSULIN LISPRO 2 UNIT(S): 100 INJECTION, SOLUTION INTRAVENOUS; SUBCUTANEOUS at 02:13

## 2025-07-10 RX ADMIN — HEPARIN SODIUM 5000 UNIT(S): 1000 INJECTION INTRAVENOUS; SUBCUTANEOUS at 21:12

## 2025-07-10 RX ADMIN — ATORVASTATIN CALCIUM 20 MILLIGRAM(S): 80 TABLET, FILM COATED ORAL at 21:12

## 2025-07-10 RX ADMIN — HEPARIN SODIUM 5000 UNIT(S): 1000 INJECTION INTRAVENOUS; SUBCUTANEOUS at 06:00

## 2025-07-10 RX ADMIN — Medication 250 MILLIGRAM(S): at 17:37

## 2025-07-10 RX ADMIN — INSULIN LISPRO 6: 100 INJECTION, SOLUTION INTRAVENOUS; SUBCUTANEOUS at 09:06

## 2025-07-10 RX ADMIN — INSULIN GLARGINE-YFGN 6 UNIT(S): 100 INJECTION, SOLUTION SUBCUTANEOUS at 21:52

## 2025-07-10 NOTE — CONSULT NOTE ADULT - SUBJECTIVE AND OBJECTIVE BOX
INTERNAL MEDICINE CONSULT NOTE      TITO LINDA  MRN-[id number]      HISTORY OF PRESENT ILLNESS:  Patient is a 79 YO Male, from [facility], ambulates independently at baseline, with PMHx significant for DM2, CKD, HLD, CAD and glaucoma,who presents to the ED with elevated blood glucose. Patient reportedly refused insulin, found to have blood glucose >600 in home facility. Patient uncooperative with interview and exam, repeatedly states that he does not want to stay and would prefer to go home. Denies all symptoms and request to be left alone.    MEDICATIONS  (STANDING):  aspirin  chewable 81 milliGRAM(s) Oral daily  atorvastatin 20 milliGRAM(s) Oral at bedtime  dextrose 5%. 1000 milliLiter(s) (50 mL/Hr) IV Continuous <Continuous>  dextrose 5%. 1000 milliLiter(s) (100 mL/Hr) IV Continuous <Continuous>  dextrose 50% Injectable 25 Gram(s) IV Push once  dextrose 50% Injectable 12.5 Gram(s) IV Push once  dextrose 50% Injectable 25 Gram(s) IV Push once  divalproex  milliGRAM(s) Oral two times a day  ezetimibe 10 milliGRAM(s) Oral daily  glucagon  Injectable 1 milliGRAM(s) IntraMuscular once  heparin   Injectable 5000 Unit(s) SubCutaneous every 8 hours  insulin lispro (ADMELOG) corrective regimen sliding scale   SubCutaneous three times a day before meals  insulin lispro (ADMELOG) corrective regimen sliding scale   SubCutaneous at bedtime  latanoprost 0.005% Ophthalmic Solution 1 Drop(s) Both EYES at bedtime      MEDICATIONS  (PRN):  acetaminophen     Tablet .. 650 milliGRAM(s) Oral every 6 hours PRN Temp greater or equal to 38C (100.4F), Mild Pain (1 - 3)  aluminum hydroxide/magnesium hydroxide/simethicone Suspension 30 milliLiter(s) Oral every 4 hours PRN Dyspepsia  dextrose Oral Gel 15 Gram(s) Oral once PRN Blood Glucose LESS THAN 70 milliGRAM(s)/deciliter  melatonin 3 milliGRAM(s) Oral at bedtime PRN Insomnia      Allergies    No Known Allergies    Intolerances        PAST MEDICAL & SURGICAL HISTORY:  DM (diabetes mellitus)      HLD (hyperlipidemia)      CAD (coronary artery disease)      Dementia          FAMILY HISTORY:      SOCIAL HISTORY  Smoking History:     REVIEW OF SYSTEMS:    CONSTITUTIONAL:  No fevers, chills, sweats    HEENT:  Eyes:  No diplopia or blurred vision. ENT:  No earache, sore throat or runny nose.    CARDIOVASCULAR:  No pressure, squeezing, tightness, or heaviness about the chest; no palpitations.    RESPIRATORY:  Per HPI    GASTROINTESTINAL:  No abdominal pain, nausea, vomiting or diarrhea.    GENITOURINARY:  No dysuria, frequency or urgency.    NEUROLOGIC:  No paresthesias, fasciculations, seizures or weakness.    PSYCHIATRIC:  No disorder of thought or mood.    Vital Signs Last 24 Hrs  T(C): 36.4 (10 Jul 2025 04:38), Max: 36.7 (09 Jul 2025 13:39)  T(F): 97.5 (10 Jul 2025 04:38), Max: 98.1 (09 Jul 2025 16:55)  HR: 82 (10 Jul 2025 04:38) (82 - 104)  BP: 159/89 (10 Jul 2025 04:38) (132/70 - 173/89)  BP(mean): --  RR: 18 (10 Jul 2025 04:38) (17 - 18)  SpO2: 99% (10 Jul 2025 04:38) (96% - 99%)    Parameters below as of 10 Jul 2025 04:38  Patient On (Oxygen Delivery Method): room air      I&O's Detail      PHYSICAL EXAMINATION:    GENERAL: The patient is a well-developed, well-nourished _____in no apparent distress.     HEENT: Head is normocephalic and atraumatic. Extraocular muscles are intact. Mucous membranes are moist.     NECK: Supple.     LUNGS: Clear to auscultation without wheezing, rales, or rhonchi. Respirations unlabored    HEART: Regular rate and rhythm without murmur.    ABDOMEN: Soft, nontender, and nondistended.  No hepatosplenomegaly is noted.    EXTREMITIES: Without any cyanosis, clubbing, rash, lesions or edema.    NEUROLOGIC: Grossly intact.      LABS:                        10.4   5.78  )-----------( 347      ( 10 Jul 2025 05:29 )             31.4     07-10    136  |  108  |  19[H]  ----------------------------<  312[H]  3.9   |  26  |  1.25    Ca    8.9      10 Jul 2025 05:29  Phos  2.9     07-10  Mg     1.7     07-10    TPro  6.6  /  Alb  2.9[L]  /  TBili  0.2  /  DBili  x   /  AST  24  /  ALT  32  /  AlkPhos  114  07-10    PT/INR - ( 09 Jul 2025 14:24 )   PT: 12.0 sec;   INR: 1.03 ratio         PTT - ( 09 Jul 2025 14:24 )  PTT:29.0 sec  Urinalysis Basic - ( 10 Jul 2025 05:29 )    Color: x / Appearance: x / SG: x / pH: x  Gluc: 312 mg/dL / Ketone: x  / Bili: x / Urobili: x   Blood: x / Protein: x / Nitrite: x   Leuk Esterase: x / RBC: x / WBC x   Sq Epi: x / Non Sq Epi: x / Bacteria: x          Lactate, Blood: 1.5 mmol/L (07-10-25 @ 00:16)      POCT Blood Glucose (07.10.25 @ 08:57)   POCT Blood Glucose.: 274: NotifiedMDClndMtr mg/dL    Lipid Profile (07.10.25 @ 05:29)   Cholesterol: 141 mg/dL  Triglycerides, Serum: 125 mg/dL  HDL Cholesterol: 47 mg/dL  Non HDL Cholesterol: 94LDL Cholesterol Calculated: 72 mg/dL  MICROBIOLOGY:    RADIOLOGY & ADDITIONAL STUDIES:    CXR:      Ct scan chest;    ekg;    echo:

## 2025-07-10 NOTE — H&P ADULT - NSHPREVIEWOFSYSTEMS_GEN_ALL_CORE
REVIEW OF SYSTEMS: Limited given lack of cooperation     CONSTITUTIONAL: No weakness, fevers or chills  EYES/ENT: No visual changes;  No vertigo or throat pain   NECK: No pain or stiffness  RESPIRATORY: No cough, wheezing, hemoptysis; No shortness of breath  CARDIOVASCULAR: No chest pain or palpitations  GASTROINTESTINAL: No abdominal or epigastric pain. No nausea, vomiting, or hematemesis; No diarrhea or constipation. No melena or hematochezia.  GENITOURINARY: No dysuria, frequency or hematuria  NEUROLOGICAL: No numbness or weakness  SKIN: No itching, burning, rashes, or lesions   All other review of systems is negative unless indicated above.

## 2025-07-10 NOTE — ED ADULT NURSE NOTE - CHIEF COMPLAINT QUOTE
sent from [facility] for Hyperglycemia   Pt non compliant with Insulin for months

## 2025-07-10 NOTE — CONSULT NOTE ADULT - PROBLEM SELECTOR RECOMMENDATION 9
Accucheck with regular insulin coverage  HA1C  Endo eval Accucheck with regular insulin coverage  HA1C  Endo eval  IVF

## 2025-07-10 NOTE — CONSULT NOTE ADULT - SUBJECTIVE AND OBJECTIVE BOX
Patient is a 80y old  Male who presents with a chief complaint of elevated blood glucose (10 Jul 2025 09:33)      HPI:  Patient is a 81 YO Male, from [facility], ambulates independently at baseline, with PMHx significant for DM2, CKD, HLD, CAD and glaucoma,who presents to the ED with elevated blood glucose. Patient reportedly refused insulin, found to have blood glucose >600 in home facility. Patient uncooperative with interview and exam, repeatedly states that he does not want to stay and would prefer to go home. Denies all symptoms and request to be left alone.    ED Course  Vitals: /80mmHg,  BPM, RR 18, SpO2 96% on RA, T 36.7   Labs: Hb12.5, glucose 983, , UA with glucosuria  EKG: Sinus rhythm with first degree AV block  S/p 10U insulin x2 in ED (10 Jul 2025 01:41)      PAST MEDICAL & SURGICAL HISTORY:  DM (diabetes mellitus)      HLD (hyperlipidemia)      CAD (coronary artery disease)      Dementia             MEDICATIONS  (STANDING):  aspirin  chewable 81 milliGRAM(s) Oral daily  atorvastatin 20 milliGRAM(s) Oral at bedtime  dextrose 5%. 1000 milliLiter(s) (50 mL/Hr) IV Continuous <Continuous>  dextrose 5%. 1000 milliLiter(s) (100 mL/Hr) IV Continuous <Continuous>  dextrose 50% Injectable 25 Gram(s) IV Push once  dextrose 50% Injectable 12.5 Gram(s) IV Push once  dextrose 50% Injectable 25 Gram(s) IV Push once  divalproex  milliGRAM(s) Oral two times a day  ezetimibe 10 milliGRAM(s) Oral daily  glucagon  Injectable 1 milliGRAM(s) IntraMuscular once  heparin   Injectable 5000 Unit(s) SubCutaneous every 8 hours  insulin lispro (ADMELOG) corrective regimen sliding scale   SubCutaneous three times a day before meals  insulin lispro (ADMELOG) corrective regimen sliding scale   SubCutaneous at bedtime  latanoprost 0.005% Ophthalmic Solution 1 Drop(s) Both EYES at bedtime    MEDICATIONS  (PRN):  acetaminophen     Tablet .. 650 milliGRAM(s) Oral every 6 hours PRN Temp greater or equal to 38C (100.4F), Mild Pain (1 - 3)  aluminum hydroxide/magnesium hydroxide/simethicone Suspension 30 milliLiter(s) Oral every 4 hours PRN Dyspepsia  dextrose Oral Gel 15 Gram(s) Oral once PRN Blood Glucose LESS THAN 70 milliGRAM(s)/deciliter  melatonin 3 milliGRAM(s) Oral at bedtime PRN Insomnia      FAMILY HISTORY:      SOCIAL HISTORY:      REVIEW OF SYSTEMS:  CONSTITUTIONAL: No fever, weight loss, or fatigue  EYES: No eye pain, visual disturbances, or discharge  ENT:  No difficulty hearing, tinnitus, vertigo; No sinus or throat pain  NECK: No pain or stiffness  RESPIRATORY: No cough, wheezing, chills or hemoptysis; No Shortness of Breath  CARDIOVASCULAR: No chest pain, palpitations, passing out, dizziness, or leg swelling  GASTROINTESTINAL: No abdominal or epigastric pain. No nausea, vomiting, or hematemesis; No diarrhea or constipation. No melena or hematochezia.  GENITOURINARY: No dysuria, frequency, hematuria, or incontinence  NEUROLOGICAL: No headaches, memory loss, loss of strength, numbness, or tremors  SKIN: No itching, burning, rashes, or lesions   LYMPH Nodes: No enlarged glands  ENDOCRINE: No heat or cold intolerance; No hair loss  MUSCULOSKELETAL: No joint pain or swelling; No muscle, back, or extremity pain  PSYCHIATRIC: No depression, anxiety, mood swings, or difficulty sleeping  HEME/LYMPH: No easy bruising, or bleeding gums  ALLERGY AND IMMUNOLOGIC: No hives or eczema	        Vital Signs Last 24 Hrs  T(C): 36.4 (10 Jul 2025 04:38), Max: 36.7 (09 Jul 2025 13:39)  T(F): 97.5 (10 Jul 2025 04:38), Max: 98.1 (09 Jul 2025 16:55)  HR: 82 (10 Jul 2025 04:38) (82 - 104)  BP: 159/89 (10 Jul 2025 04:38) (132/70 - 173/89)  BP(mean): --  RR: 18 (10 Jul 2025 04:38) (17 - 18)  SpO2: 99% (10 Jul 2025 04:38) (96% - 99%)    Parameters below as of 10 Jul 2025 04:38  Patient On (Oxygen Delivery Method): room air          Constitutional:    NC/AT:    HEENT:    Neck:  No JVD, bruits or thyromegaly    Respiratory:  Clear without rales or rhonchi    Cardiovascular:  RR without murmur, rub or gallop.    Gastrointestinal: Soft without hepatosplenomegaly.    Extremities: without cyanosis, clubbing or edema.    Neurological:  Oriented   x      . No gross sensory or motor defects.        LABS:                        10.4   5.78  )-----------( 347      ( 10 Jul 2025 05:29 )             31.4     07-10    136  |  108  |  19[H]  ----------------------------<  312[H]  3.9   |  26  |  1.25    Ca    8.9      10 Jul 2025 05:29  Phos  2.9     07-10  Mg     1.7     07-10    TPro  6.6  /  Alb  2.9[L]  /  TBili  0.2  /  DBili  x   /  AST  24  /  ALT  32  /  AlkPhos  114  07-10        PT/INR - ( 09 Jul 2025 14:24 )   PT: 12.0 sec;   INR: 1.03 ratio         PTT - ( 09 Jul 2025 14:24 )  PTT:29.0 sec  Urinalysis Basic - ( 10 Jul 2025 05:29 )    Color: x / Appearance: x / SG: x / pH: x  Gluc: 312 mg/dL / Ketone: x  / Bili: x / Urobili: x   Blood: x / Protein: x / Nitrite: x   Leuk Esterase: x / RBC: x / WBC x   Sq Epi: x / Non Sq Epi: x / Bacteria: x      CAPILLARY BLOOD GLUCOSE      POCT Blood Glucose.: 274 mg/dL (10 Jul 2025 08:57)  POCT Blood Glucose.: >600 mg/dL (10 Jul 2025 02:03)  POCT Blood Glucose.: 508 mg/dL (09 Jul 2025 21:46)  POCT Blood Glucose.: >600 mg/dL (09 Jul 2025 19:33)  POCT Blood Glucose.: >600 mg/dL (09 Jul 2025 17:05)  POCT Blood Glucose.: >600 mg/dL (09 Jul 2025 15:30)  POCT Blood Glucose.: >600 mg/dL (09 Jul 2025 13:38)  POCT Blood Glucose.: >600 mg/dL (09 Jul 2025 13:36)      RADIOLOGY & ADDITIONAL STUDIES: Patient is a 80y old  Male who presents with a chief complaint of elevated blood glucose (10 Jul 2025 09:33)      HPI:  Patient is a 81 YO Male, from [facility], ambulates independently at baseline, with PMHx significant for DM2, CKD, HLD, CAD and glaucoma,who presents to the ED with elevated blood glucose. Patient reportedly refused insulin, found to have blood glucose >600 in home facility. Patient uncooperative with interview and exam, repeatedly states that he does not want to stay and would prefer to go home. Denies all symptoms and request to be left alone. Pt known to me as out pt- demented refuses insulin at the facility.     ED Course  Vitals: /80mmHg,  BPM, RR 18, SpO2 96% on RA, T 36.7   Labs: Hb12.5, glucose 983, , UA with glucosuria  EKG: Sinus rhythm with first degree AV block  S/p 10U insulin x2 in ED (10 Jul 2025 01:41)      PAST MEDICAL & SURGICAL HISTORY:  DM (diabetes mellitus)      HLD (hyperlipidemia)      CAD (coronary artery disease)      Dementia             MEDICATIONS  (STANDING):  aspirin  chewable 81 milliGRAM(s) Oral daily  atorvastatin 20 milliGRAM(s) Oral at bedtime  dextrose 5%. 1000 milliLiter(s) (50 mL/Hr) IV Continuous <Continuous>  dextrose 5%. 1000 milliLiter(s) (100 mL/Hr) IV Continuous <Continuous>  dextrose 50% Injectable 25 Gram(s) IV Push once  dextrose 50% Injectable 12.5 Gram(s) IV Push once  dextrose 50% Injectable 25 Gram(s) IV Push once  divalproex  milliGRAM(s) Oral two times a day  ezetimibe 10 milliGRAM(s) Oral daily  glucagon  Injectable 1 milliGRAM(s) IntraMuscular once  heparin   Injectable 5000 Unit(s) SubCutaneous every 8 hours  insulin lispro (ADMELOG) corrective regimen sliding scale   SubCutaneous three times a day before meals  insulin lispro (ADMELOG) corrective regimen sliding scale   SubCutaneous at bedtime  latanoprost 0.005% Ophthalmic Solution 1 Drop(s) Both EYES at bedtime    MEDICATIONS  (PRN):  acetaminophen     Tablet .. 650 milliGRAM(s) Oral every 6 hours PRN Temp greater or equal to 38C (100.4F), Mild Pain (1 - 3)  aluminum hydroxide/magnesium hydroxide/simethicone Suspension 30 milliLiter(s) Oral every 4 hours PRN Dyspepsia  dextrose Oral Gel 15 Gram(s) Oral once PRN Blood Glucose LESS THAN 70 milliGRAM(s)/deciliter  melatonin 3 milliGRAM(s) Oral at bedtime PRN Insomnia      FAMILY HISTORY:      SOCIAL HISTORY:      REVIEW OF SYSTEMS: NA    Vital Signs Last 24 Hrs  T(C): 36.4 (10 Jul 2025 04:38), Max: 36.7 (09 Jul 2025 13:39)  T(F): 97.5 (10 Jul 2025 04:38), Max: 98.1 (09 Jul 2025 16:55)  HR: 82 (10 Jul 2025 04:38) (82 - 104)  BP: 159/89 (10 Jul 2025 04:38) (132/70 - 173/89)  BP(mean): --  RR: 18 (10 Jul 2025 04:38) (17 - 18)  SpO2: 99% (10 Jul 2025 04:38) (96% - 99%)    Parameters below as of 10 Jul 2025 04:38  Patient On (Oxygen Delivery Method): room air          Constitutional:    HEENT: nad    Neck:  No JVD, bruits or thyromegaly    Respiratory:  Clear without rales or rhonchi    Cardiovascular:  RR without murmur, rub or gallop.    Gastrointestinal: Soft without hepatosplenomegaly.    Extremities: without cyanosis, clubbing or edema.    Neurological:  Oriented   x  1    . No gross sensory or motor defects.        LABS:                        10.4   5.78  )-----------( 347      ( 10 Jul 2025 05:29 )             31.4     07-10    136  |  108  |  19[H]  ----------------------------<  312[H]  3.9   |  26  |  1.25    Ca    8.9      10 Jul 2025 05:29  Phos  2.9     07-10  Mg     1.7     07-10    TPro  6.6  /  Alb  2.9[L]  /  TBili  0.2  /  DBili  x   /  AST  24  /  ALT  32  /  AlkPhos  114  07-10        PT/INR - ( 09 Jul 2025 14:24 )   PT: 12.0 sec;   INR: 1.03 ratio         PTT - ( 09 Jul 2025 14:24 )  PTT:29.0 sec  Urinalysis Basic - ( 10 Jul 2025 05:29 )    Color: x / Appearance: x / SG: x / pH: x  Gluc: 312 mg/dL / Ketone: x  / Bili: x / Urobili: x   Blood: x / Protein: x / Nitrite: x   Leuk Esterase: x / RBC: x / WBC x   Sq Epi: x / Non Sq Epi: x / Bacteria: x      CAPILLARY BLOOD GLUCOSE      POCT Blood Glucose.: 274 mg/dL (10 Jul 2025 08:57)  POCT Blood Glucose.: >600 mg/dL (10 Jul 2025 02:03)  POCT Blood Glucose.: 508 mg/dL (09 Jul 2025 21:46)  POCT Blood Glucose.: >600 mg/dL (09 Jul 2025 19:33)  POCT Blood Glucose.: >600 mg/dL (09 Jul 2025 17:05)  POCT Blood Glucose.: >600 mg/dL (09 Jul 2025 15:30)  POCT Blood Glucose.: >600 mg/dL (09 Jul 2025 13:38)  POCT Blood Glucose.: >600 mg/dL (09 Jul 2025 13:36)      RADIOLOGY & ADDITIONAL STUDIES:

## 2025-07-10 NOTE — H&P ADULT - HISTORY OF PRESENT ILLNESS
Patient is a YO, from home, ambulates independently at baseline, with PMHx significant for, PSHx significant for who presents to the ED with    Patient denies headaches, fevers, chills, shortness of breath, chest pain, palpitations, nausea, vomiting, changes in urination or bowel movements.    ED Course  Vitals: BP mmHg, HR BPM, RR , SpO2 % on RA, T   Labs:  CXR:  EKG:   S/p Patient is a 81 YO Male, from [facility], ambulates independently at baseline, with PMHx significant for DM2, CKD, HLD, CAD and glaucoma,who presents to the ED with elevated blood glucose. Patient reportedly refused insulin, found to have blood glucose >600 in home facility. Patient uncooperative with interview and exam, repeatedly states that he does not want to stay and would prefer to go home. Denies all symptoms and request to be left alone.    ED Course  Vitals: /80mmHg,  BPM, RR 18, SpO2 96% on RA, T 36.7   Labs: Hb12.5, glucose 983, , UA with glucosuria  EKG: Sinus rhythm with first degree AV block  S/p 10U insulin x2 in ED

## 2025-07-10 NOTE — H&P ADULT - PROBLEM SELECTOR PLAN 1
Hx of DM on ISS, Alogliptin, Actos, Jardiance at facility  P/w blood glucose of 983 on admission, no AG, no acetone  Reportedly refused Insulin at facility  Repeat glucose s/p 10 U regular Insulin x2 > 358    - 2U Lispro STAT   - ISS ACHS, adjust as needed  - C/w CC diet, POCT glucose ACHS  - F/u HbA1c, ordered  - Diabetes education  - Consider endocrinology consult

## 2025-07-10 NOTE — H&P ADULT - NSHPPHYSICALEXAM_GEN_ALL_CORE
VITALS: Vital Signs Last 24 Hrs  T(C): 36.3 (09 Jul 2025 23:00), Max: 36.7 (09 Jul 2025 13:39)  T(F): 97.4 (09 Jul 2025 23:00), Max: 98.1 (09 Jul 2025 16:55)  HR: 92 (09 Jul 2025 21:58) (92 - 104)  BP: 132/70 (09 Jul 2025 23:00) (132/70 - 173/89)  BP(mean): --  RR: 17 (09 Jul 2025 23:00) (17 - 18)  SpO2: 98% (09 Jul 2025 23:00) (96% - 99%)    Parameters below as of 09 Jul 2025 23:00  Patient On (Oxygen Delivery Method): room air        GENERAL: NAD, sleeping in bed comfortably  HEAD:  Atraumatic, Normocephalic  EYES: EOMI, PERRLA, conjunctiva and sclera clear  ENT: Moist mucous membranes  NECK: Supple, No JVD  CHEST/LUNG: Clear to auscultation bilaterally; No rales, rhonchi, wheezing, or rubs. Unlabored respirations  HEART: Regular rate and rhythm; No murmurs, rubs, or gallops  ABDOMEN: Bowel sounds present; Soft, Nontender, Nondistended  EXTREMITIES:  2+ Peripheral Pulses, brisk capillary refill.  NERVOUS SYSTEM:  Alert & Oriented X3, speech clear. No deficits   MSK: FROM all 4 extremities, full and equal strength  SKIN: No rashes or lesions

## 2025-07-10 NOTE — PATIENT PROFILE ADULT - HAVE YOU RECENTLY LOST WEIGHT WITHOUT TRYING?
----- Message from Mariposa Askew sent at 10/17/2023 10:05 AM CDT -----  [facility]  Type:  Pharmacy Calling to Clarify an RX    Name of Caller:robert   Pharmacy Name:[facility]   Prescription Name:testosterone (NATESTO) 5.5 mg/0.122 gram/actuation GlPm  What do they need to clarify?:it on back order  Best Call Back Number:[phone]  Additional Information: pharmacy is requesting a different prescription for a different medication      Thanks SUGEY         No (0)

## 2025-07-10 NOTE — H&P ADULT - ASSESSMENT
Patient is a YO, from [facility], ambulates independently at baseline, with PMHx significant for DM2, CKD, HLD, CAD and glaucoma,who presents to the ED with elevated blood glucose. Patient reportedly refused insulin, found to have blood glucose >600 in home facility. Patient is being admitted for hyperglycemia.       Patient is a 81 YO Male, from [facility], ambulates independently at baseline, with PMHx significant for DM2, CKD, HLD, CAD and glaucoma,who presents to the ED with elevated blood glucose. Patient reportedly refused insulin, found to have blood glucose >600 in home facility. Confirmed to have blood glucose of 983. Patient is being admitted for hyperglycemia.

## 2025-07-10 NOTE — ED ADULT NURSE NOTE - NSFALLRISKINTERV_ED_ALL_ED
Assistance OOB with selected safe patient handling equipment if applicable/Assistance with ambulation/Communicate fall risk and risk factors to all staff, patient, and family/Monitor gait and stability/Provide visual cue: yellow wristband, yellow gown, etc/Reinforce activity limits and safety measures with patient and family/Call bell, personal items and telephone in reach/Instruct patient to call for assistance before getting out of bed/chair/stretcher/Non-slip footwear applied when patient is off stretcher/[place] to call system/Physically safe environment - no spills, clutter or unnecessary equipment/Purposeful Proactive Rounding/Room/bathroom lighting operational, light cord in reach

## 2025-07-10 NOTE — CONSULT NOTE ADULT - PROBLEM SELECTOR RECOMMENDATION 4
Not active bleeding  Stool for occult blood.  Hem/onco eval Not active bleeding  Stool for occult blood.  Anemia Panel

## 2025-07-10 NOTE — CONSULT NOTE ADULT - ASSESSMENT
Patient is a 79 YO Male, from [facility], ambulates independently at baseline, with PMHx significant for DM2, CKD, HLD, CAD and glaucoma,who presents to the ED with elevated blood glucose.  Pt known to me as out pt- demented refuses insulin at the facility. Pt is also on alogliptin/ jardiance and actos as out pt

## 2025-07-10 NOTE — H&P ADULT - NSICDXPASTMEDICALHX_GEN_ALL_CORE_FT
PAST MEDICAL HISTORY:  CAD (coronary artery disease)     Dementia     DM (diabetes mellitus)     HLD (hyperlipidemia)

## 2025-07-10 NOTE — CHART NOTE - NSCHARTNOTEFT_GEN_A_CORE
HPI:  Patient is a 81 YO Male, from [facility], ambulates independently at baseline, with PMHx significant for DM2, CKD, HLD, CAD and glaucoma,who presents to the ED with elevated blood glucose. Patient reportedly refused insulin, found to have blood glucose >600 in home facility. Patient uncooperative with interview and exam, repeatedly states that he does not want to stay and would prefer to go home. Denies all symptoms and request to be left alone.    ED Course  Vitals: /80mmHg,  BPM, RR 18, SpO2 96% on RA, T 36.7   Labs: Hb12.5, glucose 983, , UA with glucosuria  EKG: Sinus rhythm with first degree AV block  S/p 10U insulin x2 in ED (10 Jul 2025 01:41)        OBJECTIVE:  Vital Signs Last 24 Hrs  T(C): 36.7 (10 Jul 2025 17:05), Max: 36.7 (10 Jul 2025 15:36)  T(F): 98 (10 Jul 2025 17:05), Max: 98 (10 Jul 2025 15:36)  HR: 89 (10 Jul 2025 17:05) (82 - 95)  BP: 169/97 (10 Jul 2025 17:05) (132/70 - 173/89)  BP(mean): --  RR: 18 (10 Jul 2025 17:05) (17 - 18)  SpO2: 100% (10 Jul 2025 17:05) (97% - 100%)    Parameters below as of 10 Jul 2025 17:05  Patient On (Oxygen Delivery Method): room air        LABS:                        10.4   5.78  )-----------( 347      ( 10 Jul 2025 05:29 )             31.4     07-10    136  |  108  |  19[H]  ----------------------------<  312[H]  3.9   |  26  |  1.25    Ca    8.9      10 Jul 2025 05:29  Phos  2.9     07-10  Mg     1.7     07-10    TPro  6.6  /  Alb  2.9[L]  /  TBili  0.2  /  DBili  x   /  AST  24  /  ALT  32  /  AlkPhos  114  07-10      ASSESSMENT:  Glucose responding to insulin.  Regimen adjusted and SW consulted per Endo recs.    PLAN:   Glucose monitoring  Lantus, sliding scale and premeal insulin    FOLLOW UP/RESULTS:    Monitor finger sticks  AM labs HPI:  Patient is a 79 YO Male, from [facility], ambulates independently at baseline, with PMHx significant for DM2, CKD, HLD, CAD and glaucoma,who presents to the ED with elevated blood glucose. Patient reportedly refused insulin, found to have blood glucose >600 in home facility. Patient uncooperative with interview and exam, repeatedly states that he does not want to stay and would prefer to go home. Denies all symptoms and request to be left alone.    ED Course  Vitals: /80mmHg,  BPM, RR 18, SpO2 96% on RA, T 36.7   Labs: Hb12.5, glucose 983, , UA with glucosuria  EKG: Sinus rhythm with first degree AV block  S/p 10U insulin x2 in ED (10 Jul 2025 01:41)        OBJECTIVE:  Vital Signs Last 24 Hrs  T(C): 36.7 (10 Jul 2025 17:05), Max: 36.7 (10 Jul 2025 15:36)  T(F): 98 (10 Jul 2025 17:05), Max: 98 (10 Jul 2025 15:36)  HR: 89 (10 Jul 2025 17:05) (82 - 95)  BP: 169/97 (10 Jul 2025 17:05) (132/70 - 173/89)  BP(mean): --  RR: 18 (10 Jul 2025 17:05) (17 - 18)  SpO2: 100% (10 Jul 2025 17:05) (97% - 100%)    Parameters below as of 10 Jul 2025 17:05  Patient On (Oxygen Delivery Method): room air        LABS:                        10.4   5.78  )-----------( 347      ( 10 Jul 2025 05:29 )             31.4     07-10    136  |  108  |  19[H]  ----------------------------<  312[H]  3.9   |  26  |  1.25    Ca    8.9      10 Jul 2025 05:29  Phos  2.9     07-10  Mg     1.7     07-10    TPro  6.6  /  Alb  2.9[L]  /  TBili  0.2  /  DBili  x   /  AST  24  /  ALT  32  /  AlkPhos  114  07-10      ASSESSMENT:  Glucose responding to insulin.  Regimen adjusted and SW consulted per Endo recs.    PLAN:   Glucose monitoring  Lantus, sliding scale and pre meal insulin    FOLLOW UP/RESULTS:    Monitor finger sticks  AM labs

## 2025-07-10 NOTE — CONSULT NOTE ADULT - PROBLEM SELECTOR RECOMMENDATION 9
due to noncompliance with insulin  rec lantus 8 units  add admelog 2 ac tid  check a1c  check fsg ac and hs  will need out pt insulin tx  d/w pts son at length  d/w prim team

## 2025-07-10 NOTE — CONSULT NOTE ADULT - PROBLEM SELECTOR RECOMMENDATION 6
Pt arrived per w/c with guards for his weekly prolastin infusion. No new problems. Still has GARCIA and uses O2 at night and prn during day. O2 on at 2L while here. Prolastin infused without problem. IV dc'd intact. 2x2 dressing to site. Referral completed and sent with guards at [place].   Cont with meds

## 2025-07-11 DIAGNOSIS — N17.9 ACUTE KIDNEY FAILURE, UNSPECIFIED: ICD-10-CM

## 2025-07-11 LAB
ANION GAP SERPL CALC-SCNC: 5 MMOL/L — SIGNIFICANT CHANGE UP (ref 5–17)
BUN SERPL-MCNC: 16 MG/DL — SIGNIFICANT CHANGE UP (ref 7–18)
CALCIUM SERPL-MCNC: 8.5 MG/DL — SIGNIFICANT CHANGE UP (ref 8.4–10.5)
CHLORIDE SERPL-SCNC: 102 MMOL/L — SIGNIFICANT CHANGE UP (ref 96–108)
CO2 SERPL-SCNC: 23 MMOL/L — SIGNIFICANT CHANGE UP (ref 22–31)
CREAT SERPL-MCNC: 1.45 MG/DL — HIGH (ref 0.5–1.3)
EGFR: 49 ML/MIN/1.73M2 — LOW
EGFR: 49 ML/MIN/1.73M2 — LOW
GLUCOSE BLDC GLUCOMTR-MCNC: 155 MG/DL — HIGH (ref 70–99)
GLUCOSE BLDC GLUCOMTR-MCNC: 181 MG/DL — HIGH (ref 70–99)
GLUCOSE BLDC GLUCOMTR-MCNC: 344 MG/DL — HIGH (ref 70–99)
GLUCOSE BLDC GLUCOMTR-MCNC: 490 MG/DL — CRITICAL HIGH (ref 70–99)
GLUCOSE SERPL-MCNC: 354 MG/DL — HIGH (ref 70–99)
HCT VFR BLD CALC: 35.9 % — LOW (ref 39–50)
HGB BLD-MCNC: 11.6 G/DL — LOW (ref 13–17)
MAGNESIUM SERPL-MCNC: 1.8 MG/DL — SIGNIFICANT CHANGE UP (ref 1.6–2.6)
MCHC RBC-ENTMCNC: 30.5 PG — SIGNIFICANT CHANGE UP (ref 27–34)
MCHC RBC-ENTMCNC: 32.3 G/DL — SIGNIFICANT CHANGE UP (ref 32–36)
MCV RBC AUTO: 94.5 FL — SIGNIFICANT CHANGE UP (ref 80–100)
NRBC BLD AUTO-RTO: 0 /100 WBCS — SIGNIFICANT CHANGE UP (ref 0–0)
PHOSPHATE SERPL-MCNC: 3.1 MG/DL — SIGNIFICANT CHANGE UP (ref 2.5–4.5)
PLATELET # BLD AUTO: 267 K/UL — SIGNIFICANT CHANGE UP (ref 150–400)
POTASSIUM SERPL-MCNC: 4.1 MMOL/L — SIGNIFICANT CHANGE UP (ref 3.5–5.3)
POTASSIUM SERPL-SCNC: 4.1 MMOL/L — SIGNIFICANT CHANGE UP (ref 3.5–5.3)
RBC # BLD: 3.8 M/UL — LOW (ref 4.2–5.8)
RBC # FLD: 12.5 % — SIGNIFICANT CHANGE UP (ref 10.3–14.5)
SODIUM SERPL-SCNC: 130 MMOL/L — LOW (ref 135–145)
WBC # BLD: 6.39 K/UL — SIGNIFICANT CHANGE UP (ref 3.8–10.5)
WBC # FLD AUTO: 6.39 K/UL — SIGNIFICANT CHANGE UP (ref 3.8–10.5)

## 2025-07-11 RX ORDER — INSULIN GLARGINE-YFGN 100 [IU]/ML
10 INJECTION, SOLUTION SUBCUTANEOUS AT BEDTIME
Refills: 0 | Status: DISCONTINUED | OUTPATIENT
Start: 2025-07-11 | End: 2025-07-13

## 2025-07-11 RX ORDER — INSULIN LISPRO 100 U/ML
5 INJECTION, SOLUTION INTRAVENOUS; SUBCUTANEOUS
Refills: 0 | Status: DISCONTINUED | OUTPATIENT
Start: 2025-07-11 | End: 2025-07-13

## 2025-07-11 RX ADMIN — Medication 250 MILLIGRAM(S): at 05:48

## 2025-07-11 RX ADMIN — INSULIN LISPRO 12: 100 INJECTION, SOLUTION INTRAVENOUS; SUBCUTANEOUS at 12:31

## 2025-07-11 RX ADMIN — Medication 50 MILLILITER(S): at 22:18

## 2025-07-11 RX ADMIN — INSULIN LISPRO 5 UNIT(S): 100 INJECTION, SOLUTION INTRAVENOUS; SUBCUTANEOUS at 17:27

## 2025-07-11 RX ADMIN — Medication 3 MILLIGRAM(S): at 21:45

## 2025-07-11 RX ADMIN — Medication 81 MILLIGRAM(S): at 12:36

## 2025-07-11 RX ADMIN — INSULIN LISPRO 2 UNIT(S): 100 INJECTION, SOLUTION INTRAVENOUS; SUBCUTANEOUS at 08:15

## 2025-07-11 RX ADMIN — LATANOPROST PF 1 DROP(S): 0.05 SOLUTION/ DROPS OPHTHALMIC at 21:45

## 2025-07-11 RX ADMIN — ATORVASTATIN CALCIUM 20 MILLIGRAM(S): 80 TABLET, FILM COATED ORAL at 21:44

## 2025-07-11 RX ADMIN — HEPARIN SODIUM 5000 UNIT(S): 1000 INJECTION INTRAVENOUS; SUBCUTANEOUS at 05:48

## 2025-07-11 RX ADMIN — INSULIN LISPRO 2: 100 INJECTION, SOLUTION INTRAVENOUS; SUBCUTANEOUS at 08:14

## 2025-07-11 RX ADMIN — Medication 250 MILLIGRAM(S): at 17:28

## 2025-07-11 RX ADMIN — INSULIN LISPRO 2 UNIT(S): 100 INJECTION, SOLUTION INTRAVENOUS; SUBCUTANEOUS at 12:34

## 2025-07-11 RX ADMIN — HEPARIN SODIUM 5000 UNIT(S): 1000 INJECTION INTRAVENOUS; SUBCUTANEOUS at 15:07

## 2025-07-11 RX ADMIN — HEPARIN SODIUM 5000 UNIT(S): 1000 INJECTION INTRAVENOUS; SUBCUTANEOUS at 21:45

## 2025-07-11 RX ADMIN — EZETIMIBE 10 MILLIGRAM(S): 10 TABLET ORAL at 12:36

## 2025-07-11 RX ADMIN — INSULIN GLARGINE-YFGN 10 UNIT(S): 100 INJECTION, SOLUTION SUBCUTANEOUS at 22:18

## 2025-07-11 RX ADMIN — INSULIN LISPRO 8: 100 INJECTION, SOLUTION INTRAVENOUS; SUBCUTANEOUS at 17:26

## 2025-07-11 NOTE — PROGRESS NOTE ADULT - PROBLEM SELECTOR PLAN 2
Hx of HLD, on Atorvastatin 20mg at home  - C/w home medication noted with sCR 1.45  start IVF  f/u BMP in Am

## 2025-07-11 NOTE — DIETITIAN INITIAL EVALUATION ADULT - PERTINENT MEDS FT
MEDICATIONS  (STANDING):  aspirin  chewable 81 milliGRAM(s) Oral daily  atorvastatin 20 milliGRAM(s) Oral at bedtime  dextrose 5%. 1000 milliLiter(s) (50 mL/Hr) IV Continuous <Continuous>  dextrose 5%. 1000 milliLiter(s) (100 mL/Hr) IV Continuous <Continuous>  dextrose 50% Injectable 25 Gram(s) IV Push once  dextrose 50% Injectable 12.5 Gram(s) IV Push once  dextrose 50% Injectable 25 Gram(s) IV Push once  divalproex  milliGRAM(s) Oral two times a day  ezetimibe 10 milliGRAM(s) Oral daily  glucagon  Injectable 1 milliGRAM(s) IntraMuscular once  heparin   Injectable 5000 Unit(s) SubCutaneous every 8 hours  insulin glargine Injectable (LANTUS) 6 Unit(s) SubCutaneous at bedtime  insulin lispro (ADMELOG) corrective regimen sliding scale   SubCutaneous three times a day before meals  insulin lispro (ADMELOG) corrective regimen sliding scale   SubCutaneous at bedtime  insulin lispro Injectable (ADMELOG) 2 Unit(s) SubCutaneous three times a day before meals  latanoprost 0.005% Ophthalmic Solution 1 Drop(s) Both EYES at bedtime    MEDICATIONS  (PRN):  acetaminophen     Tablet .. 650 milliGRAM(s) Oral every 6 hours PRN Temp greater or equal to 38C (100.4F), Mild Pain (1 - 3)  aluminum hydroxide/magnesium hydroxide/simethicone Suspension 30 milliLiter(s) Oral every 4 hours PRN Dyspepsia  dextrose Oral Gel 15 Gram(s) Oral once PRN Blood Glucose LESS THAN 70 milliGRAM(s)/deciliter  melatonin 3 milliGRAM(s) Oral at bedtime PRN Insomnia

## 2025-07-11 NOTE — PROGRESS NOTE ADULT - PROBLEM SELECTOR PLAN 1
Accucheck with regular insulin coverage  HA1C  Endo follow up  IVF. Accuchecks with regular insulin coverage  HA1C  Endo follow up  IVF.

## 2025-07-11 NOTE — PROGRESS NOTE ADULT - SUBJECTIVE AND OBJECTIVE BOX
NP Note discussed with  Primary Attending    Patient is a 80y old  Male who presents with a chief complaint of Hyperglycemia (11 Jul 2025 12:43)      INTERVAL HPI/OVERNIGHT EVENTS: no acute events overnight    MEDICATIONS  (STANDING):  aspirin  chewable 81 milliGRAM(s) Oral daily  atorvastatin 20 milliGRAM(s) Oral at bedtime  dextrose 5%. 1000 milliLiter(s) (50 mL/Hr) IV Continuous <Continuous>  dextrose 5%. 1000 milliLiter(s) (100 mL/Hr) IV Continuous <Continuous>  dextrose 50% Injectable 25 Gram(s) IV Push once  dextrose 50% Injectable 12.5 Gram(s) IV Push once  dextrose 50% Injectable 25 Gram(s) IV Push once  divalproex  milliGRAM(s) Oral two times a day  ezetimibe 10 milliGRAM(s) Oral daily  glucagon  Injectable 1 milliGRAM(s) IntraMuscular once  heparin   Injectable 5000 Unit(s) SubCutaneous every 8 hours  insulin glargine Injectable (LANTUS) 10 Unit(s) SubCutaneous at bedtime  insulin lispro (ADMELOG) corrective regimen sliding scale   SubCutaneous three times a day before meals  insulin lispro (ADMELOG) corrective regimen sliding scale   SubCutaneous at bedtime  insulin lispro Injectable (ADMELOG) 5 Unit(s) SubCutaneous three times a day before meals  latanoprost 0.005% Ophthalmic Solution 1 Drop(s) Both EYES at bedtime    MEDICATIONS  (PRN):  acetaminophen     Tablet .. 650 milliGRAM(s) Oral every 6 hours PRN Temp greater or equal to 38C (100.4F), Mild Pain (1 - 3)  aluminum hydroxide/magnesium hydroxide/simethicone Suspension 30 milliLiter(s) Oral every 4 hours PRN Dyspepsia  dextrose Oral Gel 15 Gram(s) Oral once PRN Blood Glucose LESS THAN 70 milliGRAM(s)/deciliter  melatonin 3 milliGRAM(s) Oral at bedtime PRN Insomnia      __________________________________________________  REVIEW OF SYSTEMS:    limited due to mental status  denies pain      Vital Signs Last 24 Hrs  T(C): 36.3 (11 Jul 2025 14:44), Max: 36.8 (11 Jul 2025 05:08)  T(F): 97.3 (11 Jul 2025 14:44), Max: 98.2 (11 Jul 2025 05:08)  HR: 99 (11 Jul 2025 14:44) (89 - 99)  BP: 153/73 (11 Jul 2025 14:44) (143/75 - 169/97)  BP(mean): --  RR: 20 (11 Jul 2025 14:44) (18 - 20)  SpO2: 96% (11 Jul 2025 14:44) (96% - 100%)    Parameters below as of 11 Jul 2025 14:44  Patient On (Oxygen Delivery Method): room air        ________________________________________________  PHYSICAL EXAM:  GENERAL: NAD  HEENT: Normocephalic  NECK : supple  CHEST/LUNG: Clear to auscultation bilaterally  HEART: S1 S2  regular;  ABDOMEN: Soft, Nontender, Nondistended; Bowel sounds present  EXTREMITIES: no edema  SKIN: warm and dry  NERVOUS SYSTEM:  Awake and alert; Oriented  to person    _________________________________________________  LABS:                        11.6   6.39  )-----------( 267      ( 11 Jul 2025 09:30 )             35.9     07-11    130[L]  |  102  |  16  ----------------------------<  354[H]  4.1   |  23  |  1.45[H]    Ca    8.5      11 Jul 2025 09:30  Phos  3.1     07-11  Mg     1.8     07-11    TPro  6.6  /  Alb  2.9[L]  /  TBili  0.2  /  DBili  x   /  AST  24  /  ALT  32  /  AlkPhos  114  07-10      Urinalysis Basic - ( 11 Jul 2025 09:30 )    Color: x / Appearance: x / SG: x / pH: x  Gluc: 354 mg/dL / Ketone: x  / Bili: x / Urobili: x   Blood: x / Protein: x / Nitrite: x   Leuk Esterase: x / RBC: x / WBC x   Sq Epi: x / Non Sq Epi: x / Bacteria: x      CAPILLARY BLOOD GLUCOSE      POCT Blood Glucose.: 490 mg/dL (11 Jul 2025 11:28)  POCT Blood Glucose.: 181 mg/dL (11 Jul 2025 08:02)  POCT Blood Glucose.: 270 mg/dL (10 Jul 2025 20:59)  POCT Blood Glucose.: 349 mg/dL (10 Jul 2025 17:00)            Imaging Personally Reviewed:  YES    Consultant(s) Notes Reviewed:   YES        Plan of care was discussed with patient and /or primary care giver; all questions and concerns were addressed and care was aligned with patient's wishes.

## 2025-07-11 NOTE — PROGRESS NOTE ADULT - SUBJECTIVE AND OBJECTIVE BOX
Interval Events:      Allergies    No Known Allergies    Intolerances      Endocrine/Metabolic Medications:  atorvastatin 20 milliGRAM(s) Oral at bedtime  dextrose 50% Injectable 25 Gram(s) IV Push once  dextrose 50% Injectable 12.5 Gram(s) IV Push once  dextrose 50% Injectable 25 Gram(s) IV Push once  dextrose Oral Gel 15 Gram(s) Oral once PRN  ezetimibe 10 milliGRAM(s) Oral daily  glucagon  Injectable 1 milliGRAM(s) IntraMuscular once  insulin glargine Injectable (LANTUS) 6 Unit(s) SubCutaneous at bedtime  insulin lispro (ADMELOG) corrective regimen sliding scale   SubCutaneous three times a day before meals  insulin lispro (ADMELOG) corrective regimen sliding scale   SubCutaneous at bedtime  insulin lispro Injectable (ADMELOG) 2 Unit(s) SubCutaneous three times a day before meals      Vital Signs Last 24 Hrs  T(C): 36.8 (11 Jul 2025 05:08), Max: 36.8 (11 Jul 2025 05:08)  T(F): 98.2 (11 Jul 2025 05:08), Max: 98.2 (11 Jul 2025 05:08)  HR: 95 (11 Jul 2025 05:08) (89 - 95)  BP: 143/75 (11 Jul 2025 05:08) (143/75 - 169/97)  BP(mean): --  RR: 18 (11 Jul 2025 05:08) (17 - 18)  SpO2: 100% (11 Jul 2025 05:08) (98% - 100%)    Parameters below as of 11 Jul 2025 05:08  Patient On (Oxygen Delivery Method): room air          PHYSICAL EXAM  All physical exam findings normal, except those marked:  General:	Alert, active, cooperative, NAD, well hydrated  .		[] Abnormal:  Neck		Normal: supple, no cervical adenopathy, no palpable thyroid  .		[] Abnormal:  Cardiovascular	Normal: regular rate, normal S1, S2, no murmurs  .		[] Abnormal:  Respiratory	Normal: no chest wall deformity, normal respiratory pattern, CTA B/L  .		[] Abnormal:  Abdominal	Normal: soft, ND, NT, bowel sounds present, no masses, no organomegaly  .		[] Abnormal:  		Normal normal genitalia, testes descended, circumcised/uncircumcised  .		Lacey stage:			Breast lacey:  .		Menstrual history:  .		[] Abnormal:  Extremities	Normal: FROM x4  .		[] Abnormal:  Skin		Normal: intact and not indurated, no rash, no acanthosis nigricans  .		[] Abnormal:  Neurologic	Normal: grossly intact  .		[] Abnormal:    LABS        CAPILLARY BLOOD GLUCOSE      POCT Blood Glucose.: 181 mg/dL (11 Jul 2025 08:02)  POCT Blood Glucose.: 270 mg/dL (10 Jul 2025 20:59)  POCT Blood Glucose.: 349 mg/dL (10 Jul 2025 17:00)  POCT Blood Glucose.: 353 mg/dL (10 Jul 2025 15:52)  POCT Blood Glucose.: 383 mg/dL (10 Jul 2025 15:00)  POCT Blood Glucose.: 467 mg/dL (10 Jul 2025 13:30)  POCT Blood Glucose.: 425 mg/dL (10 Jul 2025 13:27)  POCT Blood Glucose.: 274 mg/dL (10 Jul 2025 08:57)        Assesment/plan       Interval Events:  pt in nad    Allergies    No Known Allergies    Intolerances      Endocrine/Metabolic Medications:  atorvastatin 20 milliGRAM(s) Oral at bedtime  dextrose 50% Injectable 25 Gram(s) IV Push once  dextrose 50% Injectable 12.5 Gram(s) IV Push once  dextrose 50% Injectable 25 Gram(s) IV Push once  dextrose Oral Gel 15 Gram(s) Oral once PRN  ezetimibe 10 milliGRAM(s) Oral daily  glucagon  Injectable 1 milliGRAM(s) IntraMuscular once  insulin glargine Injectable (LANTUS) 6 Unit(s) SubCutaneous at bedtime  insulin lispro (ADMELOG) corrective regimen sliding scale   SubCutaneous three times a day before meals  insulin lispro (ADMELOG) corrective regimen sliding scale   SubCutaneous at bedtime  insulin lispro Injectable (ADMELOG) 2 Unit(s) SubCutaneous three times a day before meals      Vital Signs Last 24 Hrs  T(C): 36.8 (11 Jul 2025 05:08), Max: 36.8 (11 Jul 2025 05:08)  T(F): 98.2 (11 Jul 2025 05:08), Max: 98.2 (11 Jul 2025 05:08)  HR: 95 (11 Jul 2025 05:08) (89 - 95)  BP: 143/75 (11 Jul 2025 05:08) (143/75 - 169/97)  BP(mean): --  RR: 18 (11 Jul 2025 05:08) (17 - 18)  SpO2: 100% (11 Jul 2025 05:08) (98% - 100%)    Parameters below as of 11 Jul 2025 05:08  Patient On (Oxygen Delivery Method): room air          PHYSICAL EXAM  All physical exam findings normal, except those marked:  General:	Alert, active, cooperative, NAD, well hydrated  .		[] Abnormal:  Neck		Normal: supple, no cervical adenopathy, no palpable thyroid  .		[] Abnormal:  Cardiovascular	Normal: regular rate, normal S1, S2, no murmurs  .		[] Abnormal:  Respiratory	Normal: no chest wall deformity, normal respiratory pattern, CTA B/L  .		[] Abnormal:  Abdominal	Normal: soft, ND, NT, bowel sounds present, no masses, no organomegaly  .		[] Abnormal:  		Normal normal genitalia, testes descended, circumcised/uncircumcised  .		Lacey stage:			Breast lacey:  .		Menstrual history:  .		[] Abnormal:  Extremities	Normal: FROM x4  .		[] Abnormal:  Skin		Normal: intact and not indurated, no rash, no acanthosis nigricans  .		[] Abnormal:  Neurologic	Normal: grossly intact  .		[] Abnormal:    LABS        CAPILLARY BLOOD GLUCOSE      POCT Blood Glucose.: 181 mg/dL (11 Jul 2025 08:02)  POCT Blood Glucose.: 270 mg/dL (10 Jul 2025 20:59)  POCT Blood Glucose.: 349 mg/dL (10 Jul 2025 17:00)  POCT Blood Glucose.: 353 mg/dL (10 Jul 2025 15:52)  POCT Blood Glucose.: 383 mg/dL (10 Jul 2025 15:00)  POCT Blood Glucose.: 467 mg/dL (10 Jul 2025 13:30)  POCT Blood Glucose.: 425 mg/dL (10 Jul 2025 13:27)  POCT Blood Glucose.: 274 mg/dL (10 Jul 2025 08:57)        Assesment/plan    · Assessment	  Patient is a 79 YO Male, from [facility], ambulates independently at baseline, with PMHx significant for DM2, CKD, HLD, CAD and glaucoma,who presents to the ED with elevated blood glucose.  Pt known to me as out pt- demented refuses insulin at the facility. Pt is also on alogliptin/ jardiance and actos as out pt         Problem/Recommendation - 1:  ·  Problem: Severe hyperglycemia due to diabetes mellitus.   ·  Recommendation: due to noncompliance with insulin  rec lantus 8 units  inc admelog to 5 ac tid  check a1c  check fsg ac and hs  will need out pt insulin tx  d/w pts son at length  d/w prim team.  case management ? to d/w nursing home for insulin adm

## 2025-07-11 NOTE — DIETITIAN INITIAL EVALUATION ADULT - OTHER INFO
Pt Visited. Pt OOb to chair. Pt eating Lunch Pt eating good. Pt is  Alert but confused.  Pt w H/O Dementia.     Per Pt Ht 5 feet something and USUAL BODY WT   140 lb something. Pt is From NH  Diet rx unable to find. However Current diet tolerated. D/W NSG. A1c >15.5. .  Endo on case. Pt is not a candidate for diet education.  Pt is refusing all  Insulin at NH. Per  HIE- 5 FEET 10 INCHES, WEIGHT 149 LB. PO INTAKE ~75- 100 % PER flow sheet.

## 2025-07-11 NOTE — PROGRESS NOTE ADULT - SUBJECTIVE AND OBJECTIVE BOX
pt seen in icu [  ], reg med floor [ x  ], bed [ x ], chair at bedside [   ]    REVIEW OF SYSTEMS:    CONSTITUTIONAL: No weakness, fevers or chills  EYES/ENT: No visual changes;  No vertigo or throat pain   NECK: No pain or stiffness  RESPIRATORY: No cough, wheezing, hemoptysis; No shortness of breath  CARDIOVASCULAR: No chest pain or palpitations  GASTROINTESTINAL: No abdominal or epigastric pain. No nausea, vomiting, or hematemesis; No diarrhea or constipation. No melena or hematochezia.  GENITOURINARY: No dysuria, frequency or hematuria  NEUROLOGICAL: No numbness or weakness  SKIN: No itching, burning, rashes, or lesions   All other review of systems is negative unless indicated above.    Physical Exam    General: WN/WD NAD  Neurology: A&Ox3, nonfocal, NAVARRETE x 4  Respiratory: CTA B/L  CV: RRR, S1S2, no murmurs, rubs or gallops  Abdominal: Soft, NT, ND +BS, Last BM  Extremities: No edema, + peripheral pulses      Allergies  No Known Allergies      Health Issues  Type 2 diabetes mellitus with hyperglycemia    DM (diabetes mellitus)    HLD (hyperlipidemia)    CAD (coronary artery disease)    Dementia        Vitals  T(F): 98.2 (07-11-25 @ 05:08), Max: 98.2 (07-11-25 @ 05:08)  HR: 95 (07-11-25 @ 05:08) (89 - 95)  BP: 143/75 (07-11-25 @ 05:08) (143/75 - 169/97)  RR: 18 (07-11-25 @ 05:08) (17 - 18)  SpO2: 100% (07-11-25 @ 05:08) (98% - 100%)  Wt(kg): --  CAPILLARY BLOOD GLUCOSE      POCT Blood Glucose.: 270 mg/dL (10 Jul 2025 20:59)      Labs                          10.4   5.78  )-----------( 347      ( 10 Jul 2025 05:29 )             31.4       07-10    136  |  108  |  19[H]  ----------------------------<  312[H]  3.9   |  26  |  1.25    Ca    8.9      10 Jul 2025 05:29  Phos  2.9     07-10  Mg     1.7     07-10    TPro  6.6  /  Alb  2.9[L]  /  TBili  0.2  /  DBili  x   /  AST  24  /  ALT  32  /  AlkPhos  114  07-10            Radiology Results      Meds    MEDICATIONS  (STANDING):  aspirin  chewable 81 milliGRAM(s) Oral daily  atorvastatin 20 milliGRAM(s) Oral at bedtime  dextrose 5%. 1000 milliLiter(s) (50 mL/Hr) IV Continuous <Continuous>  dextrose 5%. 1000 milliLiter(s) (100 mL/Hr) IV Continuous <Continuous>  dextrose 50% Injectable 25 Gram(s) IV Push once  dextrose 50% Injectable 12.5 Gram(s) IV Push once  dextrose 50% Injectable 25 Gram(s) IV Push once  divalproex  milliGRAM(s) Oral two times a day  ezetimibe 10 milliGRAM(s) Oral daily  glucagon  Injectable 1 milliGRAM(s) IntraMuscular once  heparin   Injectable 5000 Unit(s) SubCutaneous every 8 hours  insulin glargine Injectable (LANTUS) 6 Unit(s) SubCutaneous at bedtime  insulin lispro (ADMELOG) corrective regimen sliding scale   SubCutaneous three times a day before meals  insulin lispro (ADMELOG) corrective regimen sliding scale   SubCutaneous at bedtime  insulin lispro Injectable (ADMELOG) 2 Unit(s) SubCutaneous three times a day before meals  latanoprost 0.005% Ophthalmic Solution 1 Drop(s) Both EYES at bedtime      MEDICATIONS  (PRN):  acetaminophen     Tablet .. 650 milliGRAM(s) Oral every 6 hours PRN Temp greater or equal to 38C (100.4F), Mild Pain (1 - 3)  aluminum hydroxide/magnesium hydroxide/simethicone Suspension 30 milliLiter(s) Oral every 4 hours PRN Dyspepsia  dextrose Oral Gel 15 Gram(s) Oral once PRN Blood Glucose LESS THAN 70 milliGRAM(s)/deciliter  melatonin 3 milliGRAM(s) Oral at bedtime PRN Insomnia         pt seen in icu [  ], reg med floor [ x  ], bed [  ], chair at bedside [ x  ]  Awake, alert, comfortable out of  bed in NAD  REVIEW OF SYSTEMS:    CONSTITUTIONAL: No weakness, fevers or chills  EYES/ENT: No visual changes;  No vertigo or throat pain   NECK: No pain or stiffness  RESPIRATORY: No cough, wheezing, hemoptysis; No shortness of breath  CARDIOVASCULAR: No chest pain or palpitations  GASTROINTESTINAL: No abdominal or epigastric pain. No nausea, vomiting, or hematemesis; No diarrhea or constipation. No melena or hematochezia.  GENITOURINARY: No dysuria, frequency or hematuria  NEUROLOGICAL: No numbness or weakness  SKIN: No itching, burning, rashes, or lesions   All other review of systems is negative unless indicated above.    Physical Exam    General: WN/WD NAD  Neurology: A&Ox3, nonfocal, NAVARRETE x 4  Respiratory: CTA B/L  CV: RRR, S1S2, no murmurs, rubs or gallops  Abdominal: Soft, NT, ND +BS, Last BM  Extremities: No edema, + peripheral pulses      Allergies  No Known Allergies      Health Issues  Type 2 diabetes mellitus with hyperglycemia    DM (diabetes mellitus)    HLD (hyperlipidemia)    CAD (coronary artery disease)    Dementia        Vitals  T(F): 98.2 (07-11-25 @ 05:08), Max: 98.2 (07-11-25 @ 05:08)  HR: 95 (07-11-25 @ 05:08) (89 - 95)  BP: 143/75 (07-11-25 @ 05:08) (143/75 - 169/97)  RR: 18 (07-11-25 @ 05:08) (17 - 18)  SpO2: 100% (07-11-25 @ 05:08) (98% - 100%)  Wt(kg): --  CAPILLARY BLOOD GLUCOSE      POCT Blood Glucose.: 270 mg/dL (10 Jul 2025 20:59)      Labs                          10.4   5.78  )-----------( 347      ( 10 Jul 2025 05:29 )             31.4       07-10    136  |  108  |  19[H]  ----------------------------<  312[H]  3.9   |  26  |  1.25    Ca    8.9      10 Jul 2025 05:29  Phos  2.9     07-10  Mg     1.7     07-10    TPro  6.6  /  Alb  2.9[L]  /  TBili  0.2  /  DBili  x   /  AST  24  /  ALT  32  /  AlkPhos  114  07-10            Radiology Results      Meds    MEDICATIONS  (STANDING):  aspirin  chewable 81 milliGRAM(s) Oral daily  atorvastatin 20 milliGRAM(s) Oral at bedtime  dextrose 5%. 1000 milliLiter(s) (50 mL/Hr) IV Continuous <Continuous>  dextrose 5%. 1000 milliLiter(s) (100 mL/Hr) IV Continuous <Continuous>  dextrose 50% Injectable 25 Gram(s) IV Push once  dextrose 50% Injectable 12.5 Gram(s) IV Push once  dextrose 50% Injectable 25 Gram(s) IV Push once  divalproex  milliGRAM(s) Oral two times a day  ezetimibe 10 milliGRAM(s) Oral daily  glucagon  Injectable 1 milliGRAM(s) IntraMuscular once  heparin   Injectable 5000 Unit(s) SubCutaneous every 8 hours  insulin glargine Injectable (LANTUS) 6 Unit(s) SubCutaneous at bedtime  insulin lispro (ADMELOG) corrective regimen sliding scale   SubCutaneous three times a day before meals  insulin lispro (ADMELOG) corrective regimen sliding scale   SubCutaneous at bedtime  insulin lispro Injectable (ADMELOG) 2 Unit(s) SubCutaneous three times a day before meals  latanoprost 0.005% Ophthalmic Solution 1 Drop(s) Both EYES at bedtime      MEDICATIONS  (PRN):  acetaminophen     Tablet .. 650 milliGRAM(s) Oral every 6 hours PRN Temp greater or equal to 38C (100.4F), Mild Pain (1 - 3)  aluminum hydroxide/magnesium hydroxide/simethicone Suspension 30 milliLiter(s) Oral every 4 hours PRN Dyspepsia  dextrose Oral Gel 15 Gram(s) Oral once PRN Blood Glucose LESS THAN 70 milliGRAM(s)/deciliter  melatonin 3 milliGRAM(s) Oral at bedtime PRN Insomnia

## 2025-07-11 NOTE — PROGRESS NOTE ADULT - PROBLEM SELECTOR PLAN 6
Heparin 5000 q8h sq for DVT prophylaxis    #D/C planning  -glucose elevated  -d/c pending improvement in glucose Hx of glaucoma on Latanoprost at home  - C/w home Latanoprost

## 2025-07-11 NOTE — PROGRESS NOTE ADULT - PROBLEM SELECTOR PLAN 3
Hx of CAD, on ASA 81mg at home  Trop negative, EKG non ischemic  - C/w ASA Hx of HLD, on Atorvastatin 20mg at home  - C/w home medication no fever and no chills.

## 2025-07-11 NOTE — PROGRESS NOTE ADULT - ASSESSMENT
Patient is a 81 YO Male, from [facility], ambulates independently at baseline, with PMHx significant for DM2, CKD, HLD, CAD and glaucoma,who presents to the ED with elevated blood glucose. Patient reportedly refused insulin, found to have blood glucose >600 in home facility. Confirmed to have blood glucose of 983. Patient is being admitted for hyperglycemia.

## 2025-07-11 NOTE — PROGRESS NOTE ADULT - PROBLEM SELECTOR PLAN 1
Hx of DM on ISS, Alogliptin, Actos, Jardiance at facility  P/w blood glucose of 983 on admission, no AG, no acetone  Reportedly refused Insulin at facility  -patient not refusing while inpatient  -c/w 5u admelog and lantus 10U per Endo reccs  - ISS ACHS, adjust as needed  - C/w CC diet, POCT glucose ACHS  - A1c >15  - Diabetes education  -Endocrine consulted

## 2025-07-11 NOTE — DIETITIAN INITIAL EVALUATION ADULT - PERTINENT LABORATORY DATA
07-11    130[L]  |  102  |  16  ----------------------------<  354[H]  4.1   |  23  |  1.45[H]    Ca    8.5      11 Jul 2025 09:30  Phos  3.1     07-11  Mg     1.8     07-11    TPro  6.6  /  Alb  2.9[L]  /  TBili  0.2  /  DBili  x   /  AST  24  /  ALT  32  /  AlkPhos  114  07-10  POCT Blood Glucose.: 490 mg/dL (07-11-25 @ 11:28)  A1C with Estimated Average Glucose Result: >15.5 % (07-10-25 @ 05:29)

## 2025-07-12 LAB
ANION GAP SERPL CALC-SCNC: 5 MMOL/L — SIGNIFICANT CHANGE UP (ref 5–17)
BUN SERPL-MCNC: 15 MG/DL — SIGNIFICANT CHANGE UP (ref 7–18)
CALCIUM SERPL-MCNC: 9.1 MG/DL — SIGNIFICANT CHANGE UP (ref 8.4–10.5)
CHLORIDE SERPL-SCNC: 108 MMOL/L — SIGNIFICANT CHANGE UP (ref 96–108)
CO2 SERPL-SCNC: 23 MMOL/L — SIGNIFICANT CHANGE UP (ref 22–31)
CREAT SERPL-MCNC: 1.22 MG/DL — SIGNIFICANT CHANGE UP (ref 0.5–1.3)
EGFR: 61 ML/MIN/1.73M2 — SIGNIFICANT CHANGE UP
EGFR: 61 ML/MIN/1.73M2 — SIGNIFICANT CHANGE UP
GLUCOSE BLDC GLUCOMTR-MCNC: 236 MG/DL — HIGH (ref 70–99)
GLUCOSE BLDC GLUCOMTR-MCNC: 250 MG/DL — HIGH (ref 70–99)
GLUCOSE BLDC GLUCOMTR-MCNC: 305 MG/DL — HIGH (ref 70–99)
GLUCOSE BLDC GLUCOMTR-MCNC: 394 MG/DL — HIGH (ref 70–99)
GLUCOSE BLDC GLUCOMTR-MCNC: 471 MG/DL — CRITICAL HIGH (ref 70–99)
GLUCOSE SERPL-MCNC: 233 MG/DL — HIGH (ref 70–99)
POTASSIUM SERPL-MCNC: 4 MMOL/L — SIGNIFICANT CHANGE UP (ref 3.5–5.3)
POTASSIUM SERPL-SCNC: 4 MMOL/L — SIGNIFICANT CHANGE UP (ref 3.5–5.3)
SODIUM SERPL-SCNC: 136 MMOL/L — SIGNIFICANT CHANGE UP (ref 135–145)

## 2025-07-12 RX ORDER — MELATONIN 5 MG
3 TABLET ORAL AT BEDTIME
Refills: 0 | Status: DISCONTINUED | OUTPATIENT
Start: 2025-07-12 | End: 2025-07-13

## 2025-07-12 RX ADMIN — INSULIN LISPRO 4: 100 INJECTION, SOLUTION INTRAVENOUS; SUBCUTANEOUS at 17:40

## 2025-07-12 RX ADMIN — INSULIN LISPRO 10: 100 INJECTION, SOLUTION INTRAVENOUS; SUBCUTANEOUS at 12:42

## 2025-07-12 RX ADMIN — HEPARIN SODIUM 5000 UNIT(S): 1000 INJECTION INTRAVENOUS; SUBCUTANEOUS at 21:32

## 2025-07-12 RX ADMIN — INSULIN GLARGINE-YFGN 10 UNIT(S): 100 INJECTION, SOLUTION SUBCUTANEOUS at 21:32

## 2025-07-12 RX ADMIN — INSULIN LISPRO 8: 100 INJECTION, SOLUTION INTRAVENOUS; SUBCUTANEOUS at 07:59

## 2025-07-12 RX ADMIN — Medication 81 MILLIGRAM(S): at 12:43

## 2025-07-12 RX ADMIN — HEPARIN SODIUM 5000 UNIT(S): 1000 INJECTION INTRAVENOUS; SUBCUTANEOUS at 15:23

## 2025-07-12 RX ADMIN — INSULIN LISPRO 5 UNIT(S): 100 INJECTION, SOLUTION INTRAVENOUS; SUBCUTANEOUS at 08:00

## 2025-07-12 RX ADMIN — Medication 250 MILLIGRAM(S): at 05:44

## 2025-07-12 RX ADMIN — EZETIMIBE 10 MILLIGRAM(S): 10 TABLET ORAL at 12:43

## 2025-07-12 RX ADMIN — LATANOPROST PF 1 DROP(S): 0.05 SOLUTION/ DROPS OPHTHALMIC at 21:31

## 2025-07-12 RX ADMIN — HEPARIN SODIUM 5000 UNIT(S): 1000 INJECTION INTRAVENOUS; SUBCUTANEOUS at 05:45

## 2025-07-12 RX ADMIN — Medication 250 MILLIGRAM(S): at 17:42

## 2025-07-12 RX ADMIN — ATORVASTATIN CALCIUM 20 MILLIGRAM(S): 80 TABLET, FILM COATED ORAL at 21:32

## 2025-07-12 RX ADMIN — INSULIN LISPRO 5 UNIT(S): 100 INJECTION, SOLUTION INTRAVENOUS; SUBCUTANEOUS at 17:41

## 2025-07-12 RX ADMIN — INSULIN LISPRO 5 UNIT(S): 100 INJECTION, SOLUTION INTRAVENOUS; SUBCUTANEOUS at 12:40

## 2025-07-12 NOTE — PROGRESS NOTE ADULT - SUBJECTIVE AND OBJECTIVE BOX
pt seen in icu [  ], reg med floor [ x  ], bed [  ], chair at bedside [   ]  Awake, alert, comfortable out of  bed in NAD.    REVIEW OF SYSTEMS:    CONSTITUTIONAL: No weakness, fevers or chills  EYES/ENT: No visual changes;  No vertigo or throat pain   NECK: No pain or stiffness  RESPIRATORY: No cough, wheezing, hemoptysis; No shortness of breath  CARDIOVASCULAR: No chest pain or palpitations  GASTROINTESTINAL: No abdominal or epigastric pain. No nausea, vomiting, or hematemesis; No diarrhea or constipation. No melena or hematochezia.  GENITOURINARY: No dysuria, frequency or hematuria  NEUROLOGICAL: No numbness or weakness  SKIN: No itching, burning, rashes, or lesions   All other review of systems is negative unless indicated above.    Physical Exam    General: WN/WD NAD  Neurology: A&Ox3, nonfocal, NAVARRETE x 4  Respiratory: CTA B/L  CV: RRR, S1S2, no murmurs, rubs or gallops  Abdominal: Soft, NT, ND +BS, Last BM  Extremities: No edema, + peripheral pulses      Allergies  No Known Allergies      Health Issues  Type 2 diabetes mellitus with hyperglycemia    DM (diabetes mellitus)    HLD (hyperlipidemia)    CAD (coronary artery disease)    Dementia        Vitals  T(F): 97.7 (07-12-25 @ 05:37), Max: 97.7 (07-12-25 @ 05:37)  HR: 88 (07-12-25 @ 05:37) (88 - 100)  BP: 122/74 (07-12-25 @ 05:37) (122/74 - 153/73)  RR: 15 (07-12-25 @ 05:37) (15 - 20)  SpO2: 99% (07-12-25 @ 05:37) (96% - 99%)  Wt(kg): --  CAPILLARY BLOOD GLUCOSE      POCT Blood Glucose.: 155 mg/dL (11 Jul 2025 21:41)      Labs                          11.6   6.39  )-----------( 267      ( 11 Jul 2025 09:30 )             35.9       07-12    136  |  108  |  15  ----------------------------<  233[H]  x    |  23  |  1.22    Ca    9.1      12 Jul 2025 06:25  Phos  3.1     07-11  Mg     1.8     07-11          Radiology Results      Meds    MEDICATIONS  (STANDING):  aspirin  chewable 81 milliGRAM(s) Oral daily  atorvastatin 20 milliGRAM(s) Oral at bedtime  dextrose 5%. 1000 milliLiter(s) (100 mL/Hr) IV Continuous <Continuous>  dextrose 5%. 1000 milliLiter(s) (50 mL/Hr) IV Continuous <Continuous>  dextrose 50% Injectable 25 Gram(s) IV Push once  dextrose 50% Injectable 12.5 Gram(s) IV Push once  dextrose 50% Injectable 25 Gram(s) IV Push once  divalproex  milliGRAM(s) Oral two times a day  ezetimibe 10 milliGRAM(s) Oral daily  glucagon  Injectable 1 milliGRAM(s) IntraMuscular once  heparin   Injectable 5000 Unit(s) SubCutaneous every 8 hours  insulin glargine Injectable (LANTUS) 10 Unit(s) SubCutaneous at bedtime  insulin lispro (ADMELOG) corrective regimen sliding scale   SubCutaneous three times a day before meals  insulin lispro (ADMELOG) corrective regimen sliding scale   SubCutaneous at bedtime  insulin lispro Injectable (ADMELOG) 5 Unit(s) SubCutaneous three times a day before meals  latanoprost 0.005% Ophthalmic Solution 1 Drop(s) Both EYES at bedtime  sodium chloride 0.9%. 1000 milliLiter(s) (50 mL/Hr) IV Continuous <Continuous>      MEDICATIONS  (PRN):  acetaminophen     Tablet .. 650 milliGRAM(s) Oral every 6 hours PRN Temp greater or equal to 38C (100.4F), Mild Pain (1 - 3)  aluminum hydroxide/magnesium hydroxide/simethicone Suspension 30 milliLiter(s) Oral every 4 hours PRN Dyspepsia  dextrose Oral Gel 15 Gram(s) Oral once PRN Blood Glucose LESS THAN 70 milliGRAM(s)/deciliter  melatonin 3 milliGRAM(s) Oral at bedtime PRN Insomnia         pt seen in icu [  ], reg med floor [ x  ], bed [ x ], chair at bedside [   ]  Awake, alert, comfortable in  bed in NAD. Clinically stable    REVIEW OF SYSTEMS:    CONSTITUTIONAL: No weakness, fevers or chills  EYES/ENT: No visual changes;  No vertigo or throat pain   NECK: No pain or stiffness  RESPIRATORY: No cough, wheezing, hemoptysis; No shortness of breath  CARDIOVASCULAR: No chest pain or palpitations  GASTROINTESTINAL: No abdominal or epigastric pain. No nausea, vomiting, or hematemesis; No diarrhea or constipation. No melena or hematochezia.  GENITOURINARY: No dysuria, frequency or hematuria  NEUROLOGICAL: No numbness or weakness  SKIN: No itching, burning, rashes, or lesions   All other review of systems is negative unless indicated above.    Physical Exam    General: WN/WD NAD  Neurology: A&Ox3, nonfocal, NAVARRETE x 4  Respiratory: CTA B/L  CV: RRR, S1S2, no murmurs, rubs or gallops  Abdominal: Soft, NT, ND +BS, Last BM  Extremities: No edema, + peripheral pulses      Allergies  No Known Allergies      Health Issues  Type 2 diabetes mellitus with hyperglycemia    DM (diabetes mellitus)    HLD (hyperlipidemia)    CAD (coronary artery disease)    Dementia        Vitals  T(F): 97.7 (07-12-25 @ 05:37), Max: 97.7 (07-12-25 @ 05:37)  HR: 88 (07-12-25 @ 05:37) (88 - 100)  BP: 122/74 (07-12-25 @ 05:37) (122/74 - 153/73)  RR: 15 (07-12-25 @ 05:37) (15 - 20)  SpO2: 99% (07-12-25 @ 05:37) (96% - 99%)  Wt(kg): --  CAPILLARY BLOOD GLUCOSE      POCT Blood Glucose.: 155 mg/dL (11 Jul 2025 21:41)      Labs                          11.6   6.39  )-----------( 267      ( 11 Jul 2025 09:30 )             35.9       07-12    136  |  108  |  15  ----------------------------<  233[H]  x    |  23  |  1.22    Ca    9.1      12 Jul 2025 06:25  Phos  3.1     07-11  Mg     1.8     07-11          Radiology Results      Meds    MEDICATIONS  (STANDING):  aspirin  chewable 81 milliGRAM(s) Oral daily  atorvastatin 20 milliGRAM(s) Oral at bedtime  dextrose 5%. 1000 milliLiter(s) (100 mL/Hr) IV Continuous <Continuous>  dextrose 5%. 1000 milliLiter(s) (50 mL/Hr) IV Continuous <Continuous>  dextrose 50% Injectable 25 Gram(s) IV Push once  dextrose 50% Injectable 12.5 Gram(s) IV Push once  dextrose 50% Injectable 25 Gram(s) IV Push once  divalproex  milliGRAM(s) Oral two times a day  ezetimibe 10 milliGRAM(s) Oral daily  glucagon  Injectable 1 milliGRAM(s) IntraMuscular once  heparin   Injectable 5000 Unit(s) SubCutaneous every 8 hours  insulin glargine Injectable (LANTUS) 10 Unit(s) SubCutaneous at bedtime  insulin lispro (ADMELOG) corrective regimen sliding scale   SubCutaneous three times a day before meals  insulin lispro (ADMELOG) corrective regimen sliding scale   SubCutaneous at bedtime  insulin lispro Injectable (ADMELOG) 5 Unit(s) SubCutaneous three times a day before meals  latanoprost 0.005% Ophthalmic Solution 1 Drop(s) Both EYES at bedtime  sodium chloride 0.9%. 1000 milliLiter(s) (50 mL/Hr) IV Continuous <Continuous>      MEDICATIONS  (PRN):  acetaminophen     Tablet .. 650 milliGRAM(s) Oral every 6 hours PRN Temp greater or equal to 38C (100.4F), Mild Pain (1 - 3)  aluminum hydroxide/magnesium hydroxide/simethicone Suspension 30 milliLiter(s) Oral every 4 hours PRN Dyspepsia  dextrose Oral Gel 15 Gram(s) Oral once PRN Blood Glucose LESS THAN 70 milliGRAM(s)/deciliter  melatonin 3 milliGRAM(s) Oral at bedtime PRN Insomnia

## 2025-07-13 LAB
ANION GAP SERPL CALC-SCNC: 5 MMOL/L — SIGNIFICANT CHANGE UP (ref 5–17)
BUN SERPL-MCNC: 18 MG/DL — SIGNIFICANT CHANGE UP (ref 7–18)
CALCIUM SERPL-MCNC: 8.8 MG/DL — SIGNIFICANT CHANGE UP (ref 8.4–10.5)
CHLORIDE SERPL-SCNC: 107 MMOL/L — SIGNIFICANT CHANGE UP (ref 96–108)
CO2 SERPL-SCNC: 24 MMOL/L — SIGNIFICANT CHANGE UP (ref 22–31)
CREAT SERPL-MCNC: 1.3 MG/DL — SIGNIFICANT CHANGE UP (ref 0.5–1.3)
EGFR: 56 ML/MIN/1.73M2 — LOW
EGFR: 56 ML/MIN/1.73M2 — LOW
GLUCOSE BLDC GLUCOMTR-MCNC: 132 MG/DL — HIGH (ref 70–99)
GLUCOSE BLDC GLUCOMTR-MCNC: 241 MG/DL — HIGH (ref 70–99)
GLUCOSE BLDC GLUCOMTR-MCNC: 263 MG/DL — HIGH (ref 70–99)
GLUCOSE BLDC GLUCOMTR-MCNC: 329 MG/DL — HIGH (ref 70–99)
GLUCOSE SERPL-MCNC: 257 MG/DL — HIGH (ref 70–99)
HCT VFR BLD CALC: 30.4 % — LOW (ref 39–50)
HGB BLD-MCNC: 10 G/DL — LOW (ref 13–17)
MCHC RBC-ENTMCNC: 30.8 PG — SIGNIFICANT CHANGE UP (ref 27–34)
MCHC RBC-ENTMCNC: 32.9 G/DL — SIGNIFICANT CHANGE UP (ref 32–36)
MCV RBC AUTO: 93.5 FL — SIGNIFICANT CHANGE UP (ref 80–100)
NRBC BLD AUTO-RTO: 0 /100 WBCS — SIGNIFICANT CHANGE UP (ref 0–0)
PLATELET # BLD AUTO: 300 K/UL — SIGNIFICANT CHANGE UP (ref 150–400)
POTASSIUM SERPL-MCNC: 3.7 MMOL/L — SIGNIFICANT CHANGE UP (ref 3.5–5.3)
POTASSIUM SERPL-SCNC: 3.7 MMOL/L — SIGNIFICANT CHANGE UP (ref 3.5–5.3)
RBC # BLD: 3.25 M/UL — LOW (ref 4.2–5.8)
RBC # FLD: 12.4 % — SIGNIFICANT CHANGE UP (ref 10.3–14.5)
SODIUM SERPL-SCNC: 136 MMOL/L — SIGNIFICANT CHANGE UP (ref 135–145)
WBC # BLD: 4.26 K/UL — SIGNIFICANT CHANGE UP (ref 3.8–10.5)
WBC # FLD AUTO: 4.26 K/UL — SIGNIFICANT CHANGE UP (ref 3.8–10.5)

## 2025-07-13 RX ORDER — INSULIN LISPRO 100 U/ML
8 INJECTION, SOLUTION INTRAVENOUS; SUBCUTANEOUS
Refills: 0 | Status: DISCONTINUED | OUTPATIENT
Start: 2025-07-13 | End: 2025-07-14

## 2025-07-13 RX ORDER — INSULIN GLARGINE-YFGN 100 [IU]/ML
14 INJECTION, SOLUTION SUBCUTANEOUS AT BEDTIME
Refills: 0 | Status: DISCONTINUED | OUTPATIENT
Start: 2025-07-13 | End: 2025-07-14

## 2025-07-13 RX ADMIN — INSULIN LISPRO 4: 100 INJECTION, SOLUTION INTRAVENOUS; SUBCUTANEOUS at 16:59

## 2025-07-13 RX ADMIN — Medication 250 MILLIGRAM(S): at 05:53

## 2025-07-13 RX ADMIN — ATORVASTATIN CALCIUM 20 MILLIGRAM(S): 80 TABLET, FILM COATED ORAL at 21:10

## 2025-07-13 RX ADMIN — INSULIN LISPRO 6: 100 INJECTION, SOLUTION INTRAVENOUS; SUBCUTANEOUS at 08:23

## 2025-07-13 RX ADMIN — INSULIN LISPRO 8: 100 INJECTION, SOLUTION INTRAVENOUS; SUBCUTANEOUS at 11:52

## 2025-07-13 RX ADMIN — INSULIN LISPRO 8 UNIT(S): 100 INJECTION, SOLUTION INTRAVENOUS; SUBCUTANEOUS at 16:59

## 2025-07-13 RX ADMIN — Medication 81 MILLIGRAM(S): at 11:51

## 2025-07-13 RX ADMIN — INSULIN GLARGINE-YFGN 14 UNIT(S): 100 INJECTION, SOLUTION SUBCUTANEOUS at 21:35

## 2025-07-13 RX ADMIN — INSULIN LISPRO 5 UNIT(S): 100 INJECTION, SOLUTION INTRAVENOUS; SUBCUTANEOUS at 08:24

## 2025-07-13 RX ADMIN — EZETIMIBE 10 MILLIGRAM(S): 10 TABLET ORAL at 11:50

## 2025-07-13 RX ADMIN — HEPARIN SODIUM 5000 UNIT(S): 1000 INJECTION INTRAVENOUS; SUBCUTANEOUS at 13:32

## 2025-07-13 RX ADMIN — HEPARIN SODIUM 5000 UNIT(S): 1000 INJECTION INTRAVENOUS; SUBCUTANEOUS at 05:51

## 2025-07-13 RX ADMIN — INSULIN LISPRO 5 UNIT(S): 100 INJECTION, SOLUTION INTRAVENOUS; SUBCUTANEOUS at 11:53

## 2025-07-13 RX ADMIN — LATANOPROST PF 1 DROP(S): 0.05 SOLUTION/ DROPS OPHTHALMIC at 21:43

## 2025-07-13 RX ADMIN — Medication 250 MILLIGRAM(S): at 17:07

## 2025-07-13 RX ADMIN — HEPARIN SODIUM 5000 UNIT(S): 1000 INJECTION INTRAVENOUS; SUBCUTANEOUS at 21:10

## 2025-07-13 NOTE — PROGRESS NOTE ADULT - SUBJECTIVE AND OBJECTIVE BOX
pt seen in icu [  ], reg med floor [  x ], bed [  ], chair at bedside [   ]  Awake, alert, comfortable in  bed in NAD. Clinically stable    REVIEW OF SYSTEMS:    CONSTITUTIONAL: No weakness, fevers or chills  EYES/ENT: No visual changes;  No vertigo or throat pain   NECK: No pain or stiffness  RESPIRATORY: No cough, wheezing, hemoptysis; No shortness of breath  CARDIOVASCULAR: No chest pain or palpitations  GASTROINTESTINAL: No abdominal or epigastric pain. No nausea, vomiting, or hematemesis; No diarrhea or constipation. No melena or hematochezia.  GENITOURINARY: No dysuria, frequency or hematuria  NEUROLOGICAL: No numbness or weakness  SKIN: No itching, burning, rashes, or lesions   All other review of systems is negative unless indicated above.    Physical Exam    General: WN/WD NAD  Neurology: A&Ox3, nonfocal, NAVARRETE x 4  Respiratory: CTA B/L  CV: RRR, S1S2, no murmurs, rubs or gallops  Abdominal: Soft, NT, ND +BS, Last BM  Extremities: No edema, + peripheral pulses      Allergies  No Known Allergies      Health Issues  Type 2 diabetes mellitus with hyperglycemia    DM (diabetes mellitus)    HLD (hyperlipidemia)    CAD (coronary artery disease)    Dementia        Vitals  T(F): 98.4 (07-13-25 @ 06:14), Max: 98.4 (07-13-25 @ 06:14)  HR: 86 (07-13-25 @ 06:14) (86 - 94)  BP: 110/66 (07-13-25 @ 06:14) (107/67 - 146/77)  RR: 16 (07-13-25 @ 06:14) (16 - 18)  SpO2: 100% (07-13-25 @ 06:14) (98% - 100%)  Wt(kg): --  CAPILLARY BLOOD GLUCOSE      POCT Blood Glucose.: 250 mg/dL (12 Jul 2025 21:19)      Labs                          10.0   4.26  )-----------( 300      ( 13 Jul 2025 06:05 )             30.4       07-13    136  |  107  |  18  ----------------------------<  257[H]  3.7   |  24  |  1.30    Ca    8.8      13 Jul 2025 06:05  Phos  3.1     07-11  Mg     1.8     07-11              Radiology Results      Meds    MEDICATIONS  (STANDING):  aspirin  chewable 81 milliGRAM(s) Oral daily  atorvastatin 20 milliGRAM(s) Oral at bedtime  dextrose 5%. 1000 milliLiter(s) (50 mL/Hr) IV Continuous <Continuous>  dextrose 5%. 1000 milliLiter(s) (100 mL/Hr) IV Continuous <Continuous>  dextrose 50% Injectable 25 Gram(s) IV Push once  dextrose 50% Injectable 12.5 Gram(s) IV Push once  dextrose 50% Injectable 25 Gram(s) IV Push once  divalproex  milliGRAM(s) Oral two times a day  ezetimibe 10 milliGRAM(s) Oral daily  glucagon  Injectable 1 milliGRAM(s) IntraMuscular once  heparin   Injectable 5000 Unit(s) SubCutaneous every 8 hours  insulin glargine Injectable (LANTUS) 10 Unit(s) SubCutaneous at bedtime  insulin lispro (ADMELOG) corrective regimen sliding scale   SubCutaneous three times a day before meals  insulin lispro (ADMELOG) corrective regimen sliding scale   SubCutaneous at bedtime  insulin lispro Injectable (ADMELOG) 5 Unit(s) SubCutaneous three times a day before meals  latanoprost 0.005% Ophthalmic Solution 1 Drop(s) Both EYES at bedtime  sodium chloride 0.9%. 1000 milliLiter(s) (50 mL/Hr) IV Continuous <Continuous>      MEDICATIONS  (PRN):  acetaminophen     Tablet .. 650 milliGRAM(s) Oral every 6 hours PRN Temp greater or equal to 38C (100.4F), Mild Pain (1 - 3)  aluminum hydroxide/magnesium hydroxide/simethicone Suspension 30 milliLiter(s) Oral every 4 hours PRN Dyspepsia  dextrose Oral Gel 15 Gram(s) Oral once PRN Blood Glucose LESS THAN 70 milliGRAM(s)/deciliter         pt seen in icu [  ], reg med floor [  x ], bed [  ], chair at bedside [   ]  Awake, alert, comfortable in  bed in NAD.     REVIEW OF SYSTEMS:    CONSTITUTIONAL: No weakness, fevers or chills  EYES/ENT: No visual changes;  No vertigo or throat pain   NECK: No pain or stiffness  RESPIRATORY: No cough, wheezing, hemoptysis; No shortness of breath  CARDIOVASCULAR: No chest pain or palpitations  GASTROINTESTINAL: No abdominal or epigastric pain. No nausea, vomiting, or hematemesis; No diarrhea or constipation. No melena or hematochezia.  GENITOURINARY: No dysuria, frequency or hematuria  NEUROLOGICAL: No numbness or weakness  SKIN: No itching, burning, rashes, or lesions   All other review of systems is negative unless indicated above.    Physical Exam    General: WN/WD NAD  Neurology: A&Ox3, nonfocal, NAVARRETE x 4  Respiratory: CTA B/L  CV: RRR, S1S2, no murmurs, rubs or gallops  Abdominal: Soft, NT, ND +BS, Last BM  Extremities: No edema, + peripheral pulses      Allergies  No Known Allergies      Health Issues  Type 2 diabetes mellitus with hyperglycemia    DM (diabetes mellitus)    HLD (hyperlipidemia)    CAD (coronary artery disease)    Dementia        Vitals  T(F): 98.4 (07-13-25 @ 06:14), Max: 98.4 (07-13-25 @ 06:14)  HR: 86 (07-13-25 @ 06:14) (86 - 94)  BP: 110/66 (07-13-25 @ 06:14) (107/67 - 146/77)  RR: 16 (07-13-25 @ 06:14) (16 - 18)  SpO2: 100% (07-13-25 @ 06:14) (98% - 100%)  Wt(kg): --  CAPILLARY BLOOD GLUCOSE      POCT Blood Glucose.: 250 mg/dL (12 Jul 2025 21:19)      Labs                          10.0   4.26  )-----------( 300      ( 13 Jul 2025 06:05 )             30.4       07-13    136  |  107  |  18  ----------------------------<  257[H]  3.7   |  24  |  1.30    Ca    8.8      13 Jul 2025 06:05  Phos  3.1     07-11  Mg     1.8     07-11              Radiology Results      Meds    MEDICATIONS  (STANDING):  aspirin  chewable 81 milliGRAM(s) Oral daily  atorvastatin 20 milliGRAM(s) Oral at bedtime  dextrose 5%. 1000 milliLiter(s) (50 mL/Hr) IV Continuous <Continuous>  dextrose 5%. 1000 milliLiter(s) (100 mL/Hr) IV Continuous <Continuous>  dextrose 50% Injectable 25 Gram(s) IV Push once  dextrose 50% Injectable 12.5 Gram(s) IV Push once  dextrose 50% Injectable 25 Gram(s) IV Push once  divalproex  milliGRAM(s) Oral two times a day  ezetimibe 10 milliGRAM(s) Oral daily  glucagon  Injectable 1 milliGRAM(s) IntraMuscular once  heparin   Injectable 5000 Unit(s) SubCutaneous every 8 hours  insulin glargine Injectable (LANTUS) 10 Unit(s) SubCutaneous at bedtime  insulin lispro (ADMELOG) corrective regimen sliding scale   SubCutaneous three times a day before meals  insulin lispro (ADMELOG) corrective regimen sliding scale   SubCutaneous at bedtime  insulin lispro Injectable (ADMELOG) 5 Unit(s) SubCutaneous three times a day before meals  latanoprost 0.005% Ophthalmic Solution 1 Drop(s) Both EYES at bedtime  sodium chloride 0.9%. 1000 milliLiter(s) (50 mL/Hr) IV Continuous <Continuous>      MEDICATIONS  (PRN):  acetaminophen     Tablet .. 650 milliGRAM(s) Oral every 6 hours PRN Temp greater or equal to 38C (100.4F), Mild Pain (1 - 3)  aluminum hydroxide/magnesium hydroxide/simethicone Suspension 30 milliLiter(s) Oral every 4 hours PRN Dyspepsia  dextrose Oral Gel 15 Gram(s) Oral once PRN Blood Glucose LESS THAN 70 milliGRAM(s)/deciliter         pt seen in icu [  ], reg med floor [  x ], bed [  ], chair at bedside [   ]  Asleep, lying comfortable in  bed in NAD.     REVIEW OF SYSTEMS:    CONSTITUTIONAL: No weakness, fevers or chills  EYES/ENT: No visual changes;  No vertigo or throat pain   NECK: No pain or stiffness  RESPIRATORY: No cough, wheezing, hemoptysis; No shortness of breath  CARDIOVASCULAR: No chest pain or palpitations  GASTROINTESTINAL: No abdominal or epigastric pain. No nausea, vomiting, or hematemesis; No diarrhea or constipation. No melena or hematochezia.  GENITOURINARY: No dysuria, frequency or hematuria  NEUROLOGICAL: No numbness or weakness  SKIN: No itching, burning, rashes, or lesions   All other review of systems is negative unless indicated above.    Physical Exam    General: WN/WD NAD  Neurology: A&Ox3, nonfocal, NAVARRETE x 4  Respiratory: CTA B/L  CV: RRR, S1S2, no murmurs, rubs or gallops  Abdominal: Soft, NT, ND +BS, Last BM  Extremities: No edema, + peripheral pulses      Allergies  No Known Allergies      Health Issues  Type 2 diabetes mellitus with hyperglycemia    DM (diabetes mellitus)    HLD (hyperlipidemia)    CAD (coronary artery disease)    Dementia        Vitals  T(F): 98.4 (07-13-25 @ 06:14), Max: 98.4 (07-13-25 @ 06:14)  HR: 86 (07-13-25 @ 06:14) (86 - 94)  BP: 110/66 (07-13-25 @ 06:14) (107/67 - 146/77)  RR: 16 (07-13-25 @ 06:14) (16 - 18)  SpO2: 100% (07-13-25 @ 06:14) (98% - 100%)  Wt(kg): --  CAPILLARY BLOOD GLUCOSE      POCT Blood Glucose.: 250 mg/dL (12 Jul 2025 21:19)      Labs                          10.0   4.26  )-----------( 300      ( 13 Jul 2025 06:05 )             30.4       07-13    136  |  107  |  18  ----------------------------<  257[H]  3.7   |  24  |  1.30    Ca    8.8      13 Jul 2025 06:05  Phos  3.1     07-11  Mg     1.8     07-11              Radiology Results      Meds    MEDICATIONS  (STANDING):  aspirin  chewable 81 milliGRAM(s) Oral daily  atorvastatin 20 milliGRAM(s) Oral at bedtime  dextrose 5%. 1000 milliLiter(s) (50 mL/Hr) IV Continuous <Continuous>  dextrose 5%. 1000 milliLiter(s) (100 mL/Hr) IV Continuous <Continuous>  dextrose 50% Injectable 25 Gram(s) IV Push once  dextrose 50% Injectable 12.5 Gram(s) IV Push once  dextrose 50% Injectable 25 Gram(s) IV Push once  divalproex  milliGRAM(s) Oral two times a day  ezetimibe 10 milliGRAM(s) Oral daily  glucagon  Injectable 1 milliGRAM(s) IntraMuscular once  heparin   Injectable 5000 Unit(s) SubCutaneous every 8 hours  insulin glargine Injectable (LANTUS) 10 Unit(s) SubCutaneous at bedtime  insulin lispro (ADMELOG) corrective regimen sliding scale   SubCutaneous three times a day before meals  insulin lispro (ADMELOG) corrective regimen sliding scale   SubCutaneous at bedtime  insulin lispro Injectable (ADMELOG) 5 Unit(s) SubCutaneous three times a day before meals  latanoprost 0.005% Ophthalmic Solution 1 Drop(s) Both EYES at bedtime  sodium chloride 0.9%. 1000 milliLiter(s) (50 mL/Hr) IV Continuous <Continuous>      MEDICATIONS  (PRN):  acetaminophen     Tablet .. 650 milliGRAM(s) Oral every 6 hours PRN Temp greater or equal to 38C (100.4F), Mild Pain (1 - 3)  aluminum hydroxide/magnesium hydroxide/simethicone Suspension 30 milliLiter(s) Oral every 4 hours PRN Dyspepsia  dextrose Oral Gel 15 Gram(s) Oral once PRN Blood Glucose LESS THAN 70 milliGRAM(s)/deciliter

## 2025-07-13 NOTE — DISCHARGE NOTE PROVIDER - HOSPITAL COURSE
===INCOMPLETE===  79 YO Male, from [facility], ambulates independently at baseline, with PMHx significant for DM2, CKD, HLD, CAD and glaucoma,who presents to the ED with elevated blood glucose. Patient reportedly refused insulin, found to have blood glucose >600 in home facility. Patient uncooperative with interview and exam, repeatedly states that he does not want to stay and would prefer to go home. Denies all symptoms and request to be left alone.    In the ED:  Temp 98, , /80, RR 18, Pulseox 96% on RA. Blood sugar >600, on BMP was 983. pt agreeable for insulin, admitted for hyperglycemia    #DM with hyperglycemia  Not in DKA  A1C >15.5%.  Seen by Endocrinology  Lantus and mealtime insulin uptitrated    #dementia  Pt cooperative during hospitalization  Accepted all insulins     81 YO Male, from [facility], ambulates independently at baseline, with PMHx significant for DM2, CKD, HLD, CAD and glaucoma,who presents to the ED with elevated blood glucose. Patient reportedly refused insulin, found to have blood glucose >600 in home facility. Patient uncooperative with interview and exam, repeatedly states that he does not want to stay and would prefer to go home. Denies all symptoms and request to be left alone.    In the ED:  Temp 98, , /80, RR 18, Pulseox 96% on RA. Blood sugar >600, on BMP was 983. pt agreeable for insulin, admitted for hyperglycemia    #DM with hyperglycemia  Not in DKA  A1C >15.5%.  Seen by Endocrinology  Lantus and mealtime insulin up-titrated: 12Units qhs and 6units with meal TID. Blood glucose controlled better.     #LEXI on CKD. Pt with Cr 2.05 on admission. s/p IVF and now improved 1.34.     #Dementia  Pt cooperative during hospitalization  Accepted all insulins        Patient is medically optimized for discharge back to NH.   Discharge plan discussed with attending.

## 2025-07-13 NOTE — DISCHARGE NOTE PROVIDER - PROVIDER TOKENS
FREE:[LAST:[Your Nursing Home.],PHONE:[(   )    -],FAX:[(   )    -],ADDRESS:[Your Nursing Home.],FOLLOWUP:[1-3 days],ESTABLISHEDPATIENT:[T]],PROVIDER:[TOKEN:[[id number]],FOLLOWUP:[2 weeks]]

## 2025-07-13 NOTE — PROGRESS NOTE ADULT - PROBLEM SELECTOR PLAN 1
Accuchecks with regular insulin coverage  HA1C  Endo follow up  IVF. Accuchecks with regular insulin coverage  HA1C noted  Endo follow up  IVF.

## 2025-07-13 NOTE — DISCHARGE NOTE PROVIDER - NSDCCPCAREPLAN_GEN_ALL_CORE_FT
PRINCIPAL DISCHARGE DIAGNOSIS  Diagnosis: Uncontrolled diabetes mellitus with hyperglycemia  Assessment and Plan of Treatment: You were sent in from the Nursing Home for uncontrolled Diabetes and not accepting recommended insulins.  Your blood sugar when you arrived to the emergency room was very high.  You were given insulin and admitted to the hospital for further management.  You were seen by Endocrinology and your insulin doses were slowly increased with good control of your blood sugars.  Eat a diet low on carbohydrates and eat regular meals.  Check your blood sugars regularly.  Take all insulins as prescribed.  Follow-up with an Endocrinologist.     PRINCIPAL DISCHARGE DIAGNOSIS  Diagnosis: Uncontrolled diabetes mellitus with hyperglycemia  Assessment and Plan of Treatment: You were sent in from the Nursing Home for uncontrolled Diabetes and not accepting recommended insulins.  Your blood sugar when you arrived to the emergency room was very high.  You were given insulin and admitted to the hospital for further management.  You were seen by Endocrinology and your insulin doses were slowly increased with good control of your blood sugars.  Eat a diet low on carbohydrates and eat regular meals.  Check your blood sugars regularly at nursing facility.  Take all insulins as prescribed. LANTUS 12units bedtime and LISPRO 6 untis before each meals.   DO NOT TAKE ORAL MEDICATION FOR DM.   Follow-up with an Endocrinologist. dr. East in 2 weeks.      SECONDARY DISCHARGE DIAGNOSES  Diagnosis: Acute kidney injury superimposed on CKD  Assessment and Plan of Treatment: Kidney functions are improved. Last Serum Cr 1.34.   Avoid taking (NSAIDs) - (ex: Ibuprofen, Advil, Celebrex, Naprosyn)  Avoid taking any nephrotoxic agents (can harm kidneys) - Intravenous contrast for diagnostic testing, combination cold medications.  Have all medications adjusted for your renal function by your Health Care Provider.  Blood pressure control is important.  Take all medication as prescribed.      Diagnosis: CAD (coronary artery disease)  Assessment and Plan of Treatment: Take your cardiac medication as prescribed to lower cholesterol, to lower blood pressure, aspirin to prevent blood clots, and diabetes control  Make sure to - Keep K >4 and Mg >2.  continue follow up care at nursing facility.   Coronary artery disease is a condition where the arteries the supply the heart muscle get clogges with fatty deposits & puts you at risk for a heart attack  Call your doctor if you have any new pain, pressure, or discomfort in the center of your chest, pain, tingling or discomfort in arms, back, neck, jaw, or stomach, shortness of breath, nausea, vomiting, burping or heartburn, sweating, cold and clammy skin, racing or abnormal heartbeat for more than 10 minutes  You can help yourself with lefestyle changes (quitting smoking if you smoke), eat lots of fruits & vegetables & low fat dairy products, not a lot of meat & fatty foods, walk or some form of physical activity most days of the week, lose weight if you are overweight      Diagnosis: HLD (hyperlipidemia)  Assessment and Plan of Treatment:   Follow up with PCP for treatment goals, continue medication, have liver function testing every 3 months as anti lipid medications can cause liver irritation, eat low fat, low cholesterol meals      Diagnosis: Anemia  Assessment and Plan of Treatment: Symptoms to report, bleeding, palpitations, fatigue, pale skin, cold skin,  contieu follow up with your facility Md.    Diagnosis: Dementia  Assessment and Plan of Treatment: continue medication as prescribed. Depakote  supportive care. Balanced diet. Fall/Aspiration precaution.   continue follow up care with facility MD.

## 2025-07-13 NOTE — DISCHARGE NOTE PROVIDER - NSDCMRMEDTOKEN_GEN_ALL_CORE_FT
Actos 45 mg oral tablet: 1 tab(s) orally once a day  alogliptin 12.5 mg oral tablet: 1 tab(s) orally 2 times a day  aspirin 81 mg oral capsule: 1 cap(s) orally once a day  atorvastatin 20 mg oral tablet: 1 tab(s) orally once a day  Depakote 250 mg oral delayed release tablet: 1 tab(s) orally 2 times a day  ezetimibe 10 mg oral tablet: 1 tab(s) orally once a day  HumaLOG 100 units/mL injectable solution: injectable Unknown sliding scale in facility  Jardiance 25 mg oral tablet: 1 tab(s) orally once a day  latanoprost 0.005% ophthalmic emulsion: 1 drop(s) in each eye once a day   aspirin 81 mg oral capsule: 1 cap(s) orally once a day  atorvastatin 20 mg oral tablet: 1 tab(s) orally once a day  Depakote 250 mg oral delayed release tablet: 1 tab(s) orally 2 times a day  ezetimibe 10 mg oral tablet: 1 tab(s) orally once a day  HumaLOG 100 units/mL injectable solution: injectable Unknown sliding scale in facility  insulin glargine 100 units/mL subcutaneous solution: 12 unit(s) subcutaneous once a day (at bedtime)  insulin lispro 100 units/mL injectable solution: 6 international unit(s) injectable 3 times a day (before meals)  latanoprost 0.005% ophthalmic emulsion: 1 drop(s) in each eye once a day

## 2025-07-13 NOTE — DISCHARGE NOTE PROVIDER - CARE PROVIDER_API CALL
[facility]  Phone: (   )    -  Fax: (   )    -  Established Patient  Follow Up Time: 1-3 days    Kim East  Endocrinology Diabetes and Metabolism  [address] [place] [id number]  Phone: [phone]  Fax: [phone]  Follow Up Time: 2 weeks

## 2025-07-13 NOTE — PROGRESS NOTE ADULT - SUBJECTIVE AND OBJECTIVE BOX
Interval Events:  pt in nad    Allergies    No Known Allergies    Intolerances      Endocrine/Metabolic Medications:  atorvastatin 20 milliGRAM(s) Oral at bedtime  dextrose 50% Injectable 25 Gram(s) IV Push once  dextrose 50% Injectable 12.5 Gram(s) IV Push once  dextrose 50% Injectable 25 Gram(s) IV Push once  dextrose Oral Gel 15 Gram(s) Oral once PRN  ezetimibe 10 milliGRAM(s) Oral daily  glucagon  Injectable 1 milliGRAM(s) IntraMuscular once  insulin glargine Injectable (LANTUS) 10 Unit(s) SubCutaneous at bedtime  insulin lispro (ADMELOG) corrective regimen sliding scale   SubCutaneous three times a day before meals  insulin lispro (ADMELOG) corrective regimen sliding scale   SubCutaneous at bedtime  insulin lispro Injectable (ADMELOG) 5 Unit(s) SubCutaneous three times a day before meals      Vital Signs Last 24 Hrs  T(C): 36.6 (13 Jul 2025 11:53), Max: 36.9 (13 Jul 2025 06:14)  T(F): 97.9 (13 Jul 2025 11:53), Max: 98.4 (13 Jul 2025 06:14)  HR: 89 (13 Jul 2025 11:53) (86 - 94)  BP: 106/65 (13 Jul 2025 11:53) (106/65 - 146/77)  BP(mean): --  RR: 17 (13 Jul 2025 11:53) (16 - 18)  SpO2: 99% (13 Jul 2025 11:53) (98% - 100%)    Parameters below as of 13 Jul 2025 11:53  Patient On (Oxygen Delivery Method): room air          PHYSICAL EXAM  All physical exam findings normal, except those marked:  General:	Alert, active, cooperative, NAD, well hydrated  .		[] Abnormal:  Neck		Normal: supple, no cervical adenopathy, no palpable thyroid  .		[] Abnormal:  Cardiovascular	Normal: regular rate, normal S1, S2, no murmurs  .		[] Abnormal:  Respiratory	Normal: no chest wall deformity, normal respiratory pattern, CTA B/L  .		[] Abnormal:  Abdominal	Normal: soft, ND, NT, bowel sounds present, no masses, no organomegaly  .		[] Abnormal:  		Normal normal genitalia, testes descended, circumcised/uncircumcised  .		Lacey stage:			Breast lacey:  .		Menstrual history:  .		[] Abnormal:  Extremities	Normal: FROM x4  .		[] Abnormal:  Skin		Normal: intact and not indurated, no rash, no acanthosis nigricans  .		[] Abnormal:  Neurologic	Normal: grossly intact  .		[] Abnormal:    LABS                        10.0   4.26  )-----------( 300      ( 13 Jul 2025 06:05 )             30.4                               136    |  107    |  18                  Calcium: 8.8   / iCa: x      (07-13 @ 06:05)    ----------------------------<  257       Magnesium: x                                3.7     |  24     |  1.30             Phosphorous: x          CAPILLARY BLOOD GLUCOSE      POCT Blood Glucose.: 329 mg/dL (13 Jul 2025 11:22)  POCT Blood Glucose.: 263 mg/dL (13 Jul 2025 08:12)  POCT Blood Glucose.: 250 mg/dL (12 Jul 2025 21:19)  POCT Blood Glucose.: 236 mg/dL (12 Jul 2025 16:51)        Assesment/plan    Patient is a 79 YO Male, from [facility], ambulates independently at baseline, with PMHx significant for DM2, CKD, HLD, CAD and glaucoma,who presents to the ED with elevated blood glucose.  Pt known to me as out pt- demented refuses insulin at the facility. Pt is also on alogliptin/ jardiance and actos as out pt         Problem/Recommendation - 1:  ·  Problem: Severe hyperglycemia due to diabetes mellitus.   ·  Recommendation: due to noncompliance with insulin  inc lantus to14 units  inc admelog to 8 ac tid  PT IS TAKING ALL HIS INSULIN - NOT REFUSING!  check a1c- >15  check fsg ac and hs  will need out pt insulin tx  d/w pts son at length  d/w prim team.  case management ? to d/w nursing home for insulin adm

## 2025-07-14 ENCOUNTER — TRANSCRIPTION ENCOUNTER (OUTPATIENT)
Age: 80
End: 2025-07-14

## 2025-07-14 VITALS
RESPIRATION RATE: 20 BRPM | OXYGEN SATURATION: 96 % | HEART RATE: 86 BPM | SYSTOLIC BLOOD PRESSURE: 129 MMHG | DIASTOLIC BLOOD PRESSURE: 76 MMHG | TEMPERATURE: 99 F

## 2025-07-14 LAB
ANION GAP SERPL CALC-SCNC: 4 MMOL/L — LOW (ref 5–17)
BUN SERPL-MCNC: 28 MG/DL — HIGH (ref 7–18)
CALCIUM SERPL-MCNC: 9.1 MG/DL — SIGNIFICANT CHANGE UP (ref 8.4–10.5)
CHLORIDE SERPL-SCNC: 109 MMOL/L — HIGH (ref 96–108)
CO2 SERPL-SCNC: 27 MMOL/L — SIGNIFICANT CHANGE UP (ref 22–31)
CREAT SERPL-MCNC: 1.34 MG/DL — HIGH (ref 0.5–1.3)
EGFR: 54 ML/MIN/1.73M2 — LOW
EGFR: 54 ML/MIN/1.73M2 — LOW
GLUCOSE BLDC GLUCOMTR-MCNC: 368 MG/DL — HIGH (ref 70–99)
GLUCOSE BLDC GLUCOMTR-MCNC: 86 MG/DL — SIGNIFICANT CHANGE UP (ref 70–99)
GLUCOSE SERPL-MCNC: 72 MG/DL — SIGNIFICANT CHANGE UP (ref 70–99)
HCT VFR BLD CALC: 28.7 % — LOW (ref 39–50)
HGB BLD-MCNC: 9.5 G/DL — LOW (ref 13–17)
MCHC RBC-ENTMCNC: 30.8 PG — SIGNIFICANT CHANGE UP (ref 27–34)
MCHC RBC-ENTMCNC: 33.1 G/DL — SIGNIFICANT CHANGE UP (ref 32–36)
MCV RBC AUTO: 93.2 FL — SIGNIFICANT CHANGE UP (ref 80–100)
NRBC BLD AUTO-RTO: 0 /100 WBCS — SIGNIFICANT CHANGE UP (ref 0–0)
PLATELET # BLD AUTO: 314 K/UL — SIGNIFICANT CHANGE UP (ref 150–400)
POTASSIUM SERPL-MCNC: 3.6 MMOL/L — SIGNIFICANT CHANGE UP (ref 3.5–5.3)
POTASSIUM SERPL-SCNC: 3.6 MMOL/L — SIGNIFICANT CHANGE UP (ref 3.5–5.3)
RBC # BLD: 3.08 M/UL — LOW (ref 4.2–5.8)
RBC # FLD: 12.6 % — SIGNIFICANT CHANGE UP (ref 10.3–14.5)
SODIUM SERPL-SCNC: 140 MMOL/L — SIGNIFICANT CHANGE UP (ref 135–145)
WBC # BLD: 5.34 K/UL — SIGNIFICANT CHANGE UP (ref 3.8–10.5)
WBC # FLD AUTO: 5.34 K/UL — SIGNIFICANT CHANGE UP (ref 3.8–10.5)

## 2025-07-14 PROCEDURE — 81001 URINALYSIS AUTO W/SCOPE: CPT

## 2025-07-14 PROCEDURE — 82330 ASSAY OF CALCIUM: CPT

## 2025-07-14 PROCEDURE — 96374 THER/PROPH/DIAG INJ IV PUSH: CPT

## 2025-07-14 PROCEDURE — 83605 ASSAY OF LACTIC ACID: CPT

## 2025-07-14 PROCEDURE — 96372 THER/PROPH/DIAG INJ SC/IM: CPT | Mod: XU

## 2025-07-14 PROCEDURE — 83036 HEMOGLOBIN GLYCOSYLATED A1C: CPT

## 2025-07-14 PROCEDURE — 80061 LIPID PANEL: CPT

## 2025-07-14 PROCEDURE — 93005 ELECTROCARDIOGRAM TRACING: CPT

## 2025-07-14 PROCEDURE — 99285 EMERGENCY DEPT VISIT HI MDM: CPT

## 2025-07-14 PROCEDURE — 82009 KETONE BODYS QUAL: CPT

## 2025-07-14 PROCEDURE — 85730 THROMBOPLASTIN TIME PARTIAL: CPT

## 2025-07-14 PROCEDURE — 85027 COMPLETE CBC AUTOMATED: CPT

## 2025-07-14 PROCEDURE — 82803 BLOOD GASES ANY COMBINATION: CPT

## 2025-07-14 PROCEDURE — 96375 TX/PRO/DX INJ NEW DRUG ADDON: CPT

## 2025-07-14 PROCEDURE — 82947 ASSAY GLUCOSE BLOOD QUANT: CPT

## 2025-07-14 PROCEDURE — 85025 COMPLETE CBC W/AUTO DIFF WBC: CPT

## 2025-07-14 PROCEDURE — 84132 ASSAY OF SERUM POTASSIUM: CPT

## 2025-07-14 PROCEDURE — 80048 BASIC METABOLIC PNL TOTAL CA: CPT

## 2025-07-14 PROCEDURE — 84295 ASSAY OF SERUM SODIUM: CPT

## 2025-07-14 PROCEDURE — 82962 GLUCOSE BLOOD TEST: CPT

## 2025-07-14 PROCEDURE — 96376 TX/PRO/DX INJ SAME DRUG ADON: CPT

## 2025-07-14 PROCEDURE — 85610 PROTHROMBIN TIME: CPT

## 2025-07-14 PROCEDURE — 84484 ASSAY OF TROPONIN QUANT: CPT

## 2025-07-14 PROCEDURE — 83735 ASSAY OF MAGNESIUM: CPT

## 2025-07-14 PROCEDURE — 83880 ASSAY OF NATRIURETIC PEPTIDE: CPT

## 2025-07-14 PROCEDURE — 80053 COMPREHEN METABOLIC PANEL: CPT

## 2025-07-14 PROCEDURE — 36415 COLL VENOUS BLD VENIPUNCTURE: CPT

## 2025-07-14 PROCEDURE — 87086 URINE CULTURE/COLONY COUNT: CPT

## 2025-07-14 PROCEDURE — 84100 ASSAY OF PHOSPHORUS: CPT

## 2025-07-14 RX ORDER — INSULIN LISPRO 100 U/ML
6 INJECTION, SOLUTION INTRAVENOUS; SUBCUTANEOUS
Qty: 0 | Refills: 0 | DISCHARGE
Start: 2025-07-14

## 2025-07-14 RX ORDER — INSULIN GLARGINE-YFGN 100 [IU]/ML
12 INJECTION, SOLUTION SUBCUTANEOUS AT BEDTIME
Refills: 0 | Status: DISCONTINUED | OUTPATIENT
Start: 2025-07-14 | End: 2025-07-14

## 2025-07-14 RX ORDER — INSULIN GLARGINE-YFGN 100 [IU]/ML
12 INJECTION, SOLUTION SUBCUTANEOUS
Qty: 0 | Refills: 0 | DISCHARGE
Start: 2025-07-14

## 2025-07-14 RX ORDER — INSULIN LISPRO 100 U/ML
6 INJECTION, SOLUTION INTRAVENOUS; SUBCUTANEOUS
Refills: 0 | Status: DISCONTINUED | OUTPATIENT
Start: 2025-07-14 | End: 2025-07-14

## 2025-07-14 RX ADMIN — INSULIN LISPRO 6 UNIT(S): 100 INJECTION, SOLUTION INTRAVENOUS; SUBCUTANEOUS at 11:38

## 2025-07-14 RX ADMIN — Medication 250 MILLIGRAM(S): at 06:08

## 2025-07-14 RX ADMIN — EZETIMIBE 10 MILLIGRAM(S): 10 TABLET ORAL at 11:41

## 2025-07-14 RX ADMIN — HEPARIN SODIUM 5000 UNIT(S): 1000 INJECTION INTRAVENOUS; SUBCUTANEOUS at 06:08

## 2025-07-14 RX ADMIN — Medication 81 MILLIGRAM(S): at 11:41

## 2025-07-14 RX ADMIN — INSULIN LISPRO 10: 100 INJECTION, SOLUTION INTRAVENOUS; SUBCUTANEOUS at 11:37

## 2025-07-14 NOTE — PROGRESS NOTE ADULT - PROBLEM/PLAN-1
Physical Therapy  Visit Type: treatment  Precautions:  Medical precautions: ; standard precautions. Admitted due to alcohol intoxication, hx of anxiety, depression, HTN, MI, a-fib, alcohol abuse, cardiomyopathy, CKD, CHF  Lines:     Basic: telemetry and capped IV      Lines in chart and on patient reviewed, precautions maintained throughout session.  Safety Measures: bed alarm and chair alarm    SUBJECTIVE  Patient agreed to participate in therapy this date.  \"I'm feeling better then I was\"  Patient / Family Goal: return to previous functional status, maximize function and return home     OBJECTIVE     Oriented to appropriate for developmental age     Arousal alertness: delayed responses to stimuli    Affect/Behavior: pleasant, cooperative, flat and shaking  Patient activity tolerance: 1 to 2 activity to rest  Functional Communication/Cognition    Overall status:  Within functional limits    Commands: follows multistep commands with increased time.    Safety judgement: decreased awareness of need for safety and decreased awareness of need for assistance.  Strength (out of 5 unless otherwise indicated)   Comments / Details:  Generalized weakness noted, tremulous    Bed Mobility:      Training completed:      Not addressed patient up in chair  Transfers:    Assistive devices: 2-wheeled walker, 1 person and gait belt    Sit to stand: moderate assist and minimal assist    Stand to sit: minimal assist    Toilet: minimal assist  Training completed:    Tasks: stand to sit, sit to stand and toilet    Education details: body mechanics and patient safety    Patient aware he needs to have a Bm and makes it to toilet. Able to daniel/doff briefs, however requires assistance with ra-cares and proper hygiene.   Gait/Ambulation:     Assistance: minimal assist and moderate assist   Assistive device: gait belt, 2-wheeled walker and 1 person    Distance (ft): 18; 30; 15    Pattern: step to    Type: unsteady, decreased christine and 
excessive flexed posture  Training Completed:    Tasks: gait training on level surfaces    Education details: body mechanics and patient safety    Cues to avoid extend knees with mobility for improved stability as well as closer walker management.       Interventions     Seated    Lower Extremity: Bilateral: seated hip flexion, knee extensions, toe raises, heel raises and hip abduction, 10 reps, 1 sets       ASSESSMENT    Impairments: strength, balance deficits, safety awareness, activity tolerance and endurance  Functional Limitations: sit to/from stand transfers, stand pivot transfers and ambulation  Patient had improved mobility this date able to walk in room a couple times as well as go to bathroom and perform clothing management. Patient however, continues to be tremulous and fatigue easily with minimal activity. Continue recommend [facility] to address strength and balance deficits.       Recommended Consults: PT: None    Discharge Recommendations  Recommendation for Discharge: PT WI: Sub-acute nursing home (BE[facility])  PT/OT Mobility Equipment for Discharge: owns 2 ww  PT/OT ADL Equipment for Discharge: owns reacher and sock aid  PT Identified Barriers to Discharge: lives alone, weakness, tremors, poor activity tolerance, inability to care for self    Skilled therapy is required to address these limitations in attempt to maximize the patient's independence.  Progress: progressing toward goals    End of Session:   Location: in bed  Safety measures: alarm system in place/re-engaged and call light within reach  Handoff to: nurse  Education Provided:   Learning assessment:  - Primary learner: patient  - Are they ready to learn: yes  - Preferred learning style: demonstration and verbal  - Barriers to learning: no barriers apparent at this time    PLAN    Suggestions for next session as indicated: PT Frequency: 5 days/week  Frequency Comments: sonia baker, 2/5 by 3/4 (2/28)      Interventions: balance, gait training, strengthening, 
DISPLAY PLAN FREE TEXT
safety education, patient/family training, endurance training, functional transfer training, bed mobility and energy conservation  Agreement to plan and goals: patient agrees with goals and treatment plan        GOALS  Long Term Goals: (to be met by time of discharge from hospital)  Sit to supine: Patient will complete sit to supine modified independent.  Supine to sit: Patient will complete supine to sit modified independent.  Sit to stand: Patient will complete sit to stand transfer with 2-wheeled walker, modified independent.   Stand to sit: Patient will complete stand to sit transfer with 2-wheeled walker, modified independent.   Ambulation (even): Patient will ambulate on even surface for 70 feet with 2-wheeled walker, modified independent.     Documented in the chart in the following areas: Pain. Assessment.      Therapy procedure time and total treatment time can be found documented on the Time Entry flowsheet  
DISPLAY PLAN FREE TEXT

## 2025-07-14 NOTE — DISCHARGE NOTE NURSING/CASE MANAGEMENT/SOCIAL WORK - FINANCIAL ASSISTANCE
[facility] provides services at a reduced cost to those who are determined to be eligible through [facility]’s financial assistance program. Information regarding [facility]’s financial assistance program can be found by going to [email or web address] or by calling [phone].

## 2025-07-14 NOTE — PROGRESS NOTE ADULT - PROVIDER SPECIALTY LIST ADULT
Endocrinology
Internal Medicine
Endocrinology
Endocrinology
Internal Medicine

## 2025-07-14 NOTE — PROGRESS NOTE ADULT - SUBJECTIVE AND OBJECTIVE BOX
Interval Events:      Allergies    No Known Allergies    Intolerances      Endocrine/Metabolic Medications:  atorvastatin 20 milliGRAM(s) Oral at bedtime  dextrose 50% Injectable 25 Gram(s) IV Push once  dextrose 50% Injectable 12.5 Gram(s) IV Push once  dextrose 50% Injectable 25 Gram(s) IV Push once  dextrose Oral Gel 15 Gram(s) Oral once PRN  ezetimibe 10 milliGRAM(s) Oral daily  glucagon  Injectable 1 milliGRAM(s) IntraMuscular once  insulin glargine Injectable (LANTUS) 14 Unit(s) SubCutaneous at bedtime  insulin lispro (ADMELOG) corrective regimen sliding scale   SubCutaneous three times a day before meals  insulin lispro (ADMELOG) corrective regimen sliding scale   SubCutaneous at bedtime  insulin lispro Injectable (ADMELOG) 8 Unit(s) SubCutaneous three times a day before meals      Vital Signs Last 24 Hrs  T(C): 37.1 (14 Jul 2025 05:05), Max: 37.1 (14 Jul 2025 05:05)  T(F): 98.7 (14 Jul 2025 05:05), Max: 98.7 (14 Jul 2025 05:05)  HR: 86 (14 Jul 2025 05:05) (86 - 91)  BP: 129/76 (14 Jul 2025 05:05) (106/65 - 137/86)  BP(mean): --  RR: 20 (14 Jul 2025 05:05) (17 - 20)  SpO2: 96% (14 Jul 2025 05:05) (96% - 99%)    Parameters below as of 14 Jul 2025 05:05  Patient On (Oxygen Delivery Method): room air          PHYSICAL EXAM  All physical exam findings normal, except those marked:  General:	Alert, active, cooperative, NAD, well hydrated  .		[] Abnormal:  Neck		Normal: supple, no cervical adenopathy, no palpable thyroid  .		[] Abnormal:  Cardiovascular	Normal: regular rate, normal S1, S2, no murmurs  .		[] Abnormal:  Respiratory	Normal: no chest wall deformity, normal respiratory pattern, CTA B/L  .		[] Abnormal:  Abdominal	Normal: soft, ND, NT, bowel sounds present, no masses, no organomegaly  .		[] Abnormal:  		Normal normal genitalia, testes descended, circumcised/uncircumcised  .		Lacey stage:			Breast lacey:  .		Menstrual history:  .		[] Abnormal:  Extremities	Normal: FROM x4  .		[] Abnormal:  Skin		Normal: intact and not indurated, no rash, no acanthosis nigricans  .		[] Abnormal:  Neurologic	Normal: grossly intact  .		[] Abnormal:    LABS                        9.5    5.34  )-----------( 314      ( 14 Jul 2025 06:05 )             28.7                               140    |  109    |  28                  Calcium: 9.1   / iCa: x      (07-14 @ 06:05)    ----------------------------<  72        Magnesium: x                                3.6     |  27     |  1.34             Phosphorous: x          CAPILLARY BLOOD GLUCOSE      POCT Blood Glucose.: 86 mg/dL (14 Jul 2025 07:48)  POCT Blood Glucose.: 132 mg/dL (13 Jul 2025 21:11)  POCT Blood Glucose.: 241 mg/dL (13 Jul 2025 16:53)  POCT Blood Glucose.: 329 mg/dL (13 Jul 2025 11:22)        Assesment/plan       Interval Events:  pt in nad    Allergies    No Known Allergies    Intolerances      Endocrine/Metabolic Medications:  atorvastatin 20 milliGRAM(s) Oral at bedtime  dextrose 50% Injectable 25 Gram(s) IV Push once  dextrose 50% Injectable 12.5 Gram(s) IV Push once  dextrose 50% Injectable 25 Gram(s) IV Push once  dextrose Oral Gel 15 Gram(s) Oral once PRN  ezetimibe 10 milliGRAM(s) Oral daily  glucagon  Injectable 1 milliGRAM(s) IntraMuscular once  insulin glargine Injectable (LANTUS) 14 Unit(s) SubCutaneous at bedtime  insulin lispro (ADMELOG) corrective regimen sliding scale   SubCutaneous three times a day before meals  insulin lispro (ADMELOG) corrective regimen sliding scale   SubCutaneous at bedtime  insulin lispro Injectable (ADMELOG) 8 Unit(s) SubCutaneous three times a day before meals      Vital Signs Last 24 Hrs  T(C): 37.1 (14 Jul 2025 05:05), Max: 37.1 (14 Jul 2025 05:05)  T(F): 98.7 (14 Jul 2025 05:05), Max: 98.7 (14 Jul 2025 05:05)  HR: 86 (14 Jul 2025 05:05) (86 - 91)  BP: 129/76 (14 Jul 2025 05:05) (106/65 - 137/86)  BP(mean): --  RR: 20 (14 Jul 2025 05:05) (17 - 20)  SpO2: 96% (14 Jul 2025 05:05) (96% - 99%)    Parameters below as of 14 Jul 2025 05:05  Patient On (Oxygen Delivery Method): room air          PHYSICAL EXAM  All physical exam findings normal, except those marked:  General:	Alert, active, cooperative, NAD, well hydrated  .		[] Abnormal:  Neck		Normal: supple, no cervical adenopathy, no palpable thyroid  .		[] Abnormal:  Cardiovascular	Normal: regular rate, normal S1, S2, no murmurs  .		[] Abnormal:  Respiratory	Normal: no chest wall deformity, normal respiratory pattern, CTA B/L  .		[] Abnormal:  Abdominal	Normal: soft, ND, NT, bowel sounds present, no masses, no organomegaly  .		[] Abnormal:  		Normal normal genitalia, testes descended, circumcised/uncircumcised  .		Lacey stage:			Breast lacey:  .		Menstrual history:  .		[] Abnormal:  Extremities	Normal: FROM x4  .		[] Abnormal:  Skin		Normal: intact and not indurated, no rash, no acanthosis nigricans  .		[] Abnormal:  Neurologic	Normal: grossly intact  .		[] Abnormal:    LABS                        9.5    5.34  )-----------( 314      ( 14 Jul 2025 06:05 )             28.7                               140    |  109    |  28                  Calcium: 9.1   / iCa: x      (07-14 @ 06:05)    ----------------------------<  72        Magnesium: x                                3.6     |  27     |  1.34             Phosphorous: x          CAPILLARY BLOOD GLUCOSE      POCT Blood Glucose.: 86 mg/dL (14 Jul 2025 07:48)  POCT Blood Glucose.: 132 mg/dL (13 Jul 2025 21:11)  POCT Blood Glucose.: 241 mg/dL (13 Jul 2025 16:53)  POCT Blood Glucose.: 329 mg/dL (13 Jul 2025 11:22)        Assesment/plan      Patient is a 79 YO Male, from [facility], ambulates independently at baseline, with PMHx significant for DM2, CKD, HLD, CAD and glaucoma,who presents to the ED with elevated blood glucose.  Pt known to me as out pt- demented refuses insulin at the facility. Pt is also on alogliptin/ jardiance and actos as out pt         Problem/Recommendation - 1:  ·  Problem: Severe hyperglycemia due to diabetes mellitus.   ·  Recommendation: due to noncompliance with insulin  dec lantus to12 units  and admelog to 6 ac tid  PT IS TAKING ALL HIS INSULIN - NOT REFUSING!  check a1c- >15  check fsg ac and hs  will need out pt insulin tx  d/w pts son at length  d/w prim team.  case management ? to d/w nursing home for insulin adm

## 2025-07-14 NOTE — DISCHARGE NOTE NURSING/CASE MANAGEMENT/SOCIAL WORK - PATIENT PORTAL LINK FT
You can access the FollowMyHealth Patient Portal offered by [facility] by registering at the following website: [email or web address]. By joining [facility]’s FollowMyHealth portal, you will also be able to view your health information using other applications (apps) compatible with our system.

## 2025-07-14 NOTE — PROGRESS NOTE ADULT - PROBLEM SELECTOR PROBLEM 1
Severe hyperglycemia due to diabetes mellitus

## 2025-07-14 NOTE — PROGRESS NOTE ADULT - PROBLEM/PLAN-4
DISPLAY PLAN FREE TEXT
DISPLAY PLAN FREE TEXT
Reviewed US results with Dr. Tigits Munson. Notified patient of US results. Discussed watching and doing an EMB to sample the tissue. Also discussed possible D & C due to possible polyp. Patient prefers to talk with an MD to discuss this option.  Patient transferr
DISPLAY PLAN FREE TEXT

## 2025-07-14 NOTE — PROGRESS NOTE ADULT - PROBLEM SELECTOR PLAN 7
DVT PPX.
Heparin 5000 q8h sq for DVT prophylaxis    #D/C planning  -glucose elevated  -d/c pending improvement in glucose

## 2025-07-14 NOTE — PROGRESS NOTE ADULT - SUBJECTIVE AND OBJECTIVE BOX
pt seen in icu [  ], reg med floor [ x  ], bed [  ], chair at bedside [   ]    REVIEW OF SYSTEMS:    CONSTITUTIONAL: No weakness, fevers or chills  EYES/ENT: No visual changes;  No vertigo or throat pain   NECK: No pain or stiffness  RESPIRATORY: No cough, wheezing, hemoptysis; No shortness of breath  CARDIOVASCULAR: No chest pain or palpitations  GASTROINTESTINAL: No abdominal or epigastric pain. No nausea, vomiting, or hematemesis; No diarrhea or constipation. No melena or hematochezia.  GENITOURINARY: No dysuria, frequency or hematuria  NEUROLOGICAL: No numbness or weakness  SKIN: No itching, burning, rashes, or lesions   All other review of systems is negative unless indicated above.    Physical Exam    General: WN/WD NAD  Neurology: A&Ox3, nonfocal, NAVARRETE x 4  Respiratory: CTA B/L  CV: RRR, S1S2, no murmurs, rubs or gallops  Abdominal: Soft, NT, ND +BS, Last BM  Extremities: No edema, + peripheral pulses      Allergies  No Known Allergies      Health Issues  Type 2 diabetes mellitus with hyperglycemia    DM (diabetes mellitus)    HLD (hyperlipidemia)    CAD (coronary artery disease)    Dementia        Vitals  T(F): 98.7 (07-14-25 @ 05:05), Max: 98.7 (07-14-25 @ 05:05)  HR: 86 (07-14-25 @ 05:05) (86 - 91)  BP: 129/76 (07-14-25 @ 05:05) (106/65 - 137/86)  RR: 20 (07-14-25 @ 05:05) (17 - 20)  SpO2: 96% (07-14-25 @ 05:05) (96% - 99%)  Wt(kg): --  CAPILLARY BLOOD GLUCOSE      POCT Blood Glucose.: 86 mg/dL (14 Jul 2025 07:48)      Labs                          9.5    5.34  )-----------( 314      ( 14 Jul 2025 06:05 )             28.7       07-14    140  |  109[H]  |  28[H]  ----------------------------<  72  3.6   |  27  |  1.34[H]    Ca    9.1      14 Jul 2025 06:05              Radiology Results      Meds    MEDICATIONS  (STANDING):  aspirin  chewable 81 milliGRAM(s) Oral daily  atorvastatin 20 milliGRAM(s) Oral at bedtime  dextrose 5%. 1000 milliLiter(s) (50 mL/Hr) IV Continuous <Continuous>  dextrose 5%. 1000 milliLiter(s) (100 mL/Hr) IV Continuous <Continuous>  dextrose 50% Injectable 25 Gram(s) IV Push once  dextrose 50% Injectable 12.5 Gram(s) IV Push once  dextrose 50% Injectable 25 Gram(s) IV Push once  divalproex  milliGRAM(s) Oral two times a day  ezetimibe 10 milliGRAM(s) Oral daily  glucagon  Injectable 1 milliGRAM(s) IntraMuscular once  heparin   Injectable 5000 Unit(s) SubCutaneous every 8 hours  insulin glargine Injectable (LANTUS) 14 Unit(s) SubCutaneous at bedtime  insulin lispro (ADMELOG) corrective regimen sliding scale   SubCutaneous three times a day before meals  insulin lispro (ADMELOG) corrective regimen sliding scale   SubCutaneous at bedtime  insulin lispro Injectable (ADMELOG) 8 Unit(s) SubCutaneous three times a day before meals  latanoprost 0.005% Ophthalmic Solution 1 Drop(s) Both EYES at bedtime  sodium chloride 0.9%. 1000 milliLiter(s) (50 mL/Hr) IV Continuous <Continuous>      MEDICATIONS  (PRN):  acetaminophen     Tablet .. 650 milliGRAM(s) Oral every 6 hours PRN Temp greater or equal to 38C (100.4F), Mild Pain (1 - 3)  aluminum hydroxide/magnesium hydroxide/simethicone Suspension 30 milliLiter(s) Oral every 4 hours PRN Dyspepsia  dextrose Oral Gel 15 Gram(s) Oral once PRN Blood Glucose LESS THAN 70 milliGRAM(s)/deciliter         pt seen in icu [  ], reg med floor [ x  ], bed [  ], chair at bedside [   ]  Asleep, laying in bed in NAD. Agitated and confused when awake  REVIEW OF SYSTEMS:    CONSTITUTIONAL: No weakness, fevers or chills  EYES/ENT: No visual changes;  No vertigo or throat pain   NECK: No pain or stiffness  RESPIRATORY: No cough, wheezing, hemoptysis; No shortness of breath  CARDIOVASCULAR: No chest pain or palpitations  GASTROINTESTINAL: No abdominal or epigastric pain. No nausea, vomiting, or hematemesis; No diarrhea or constipation. No melena or hematochezia.  GENITOURINARY: No dysuria, frequency or hematuria  NEUROLOGICAL: No numbness or weakness  SKIN: No itching, burning, rashes, or lesions   All other review of systems is negative unless indicated above.    Physical Exam    General: WN/WD NAD  Neurology: A&Ox3, nonfocal, NAVARRETE x 4  Respiratory: CTA B/L  CV: RRR, S1S2, no murmurs, rubs or gallops  Abdominal: Soft, NT, ND +BS, Last BM  Extremities: No edema, + peripheral pulses      Allergies  No Known Allergies      Health Issues  Type 2 diabetes mellitus with hyperglycemia    DM (diabetes mellitus)    HLD (hyperlipidemia)    CAD (coronary artery disease)    Dementia        Vitals  T(F): 98.7 (07-14-25 @ 05:05), Max: 98.7 (07-14-25 @ 05:05)  HR: 86 (07-14-25 @ 05:05) (86 - 91)  BP: 129/76 (07-14-25 @ 05:05) (106/65 - 137/86)  RR: 20 (07-14-25 @ 05:05) (17 - 20)  SpO2: 96% (07-14-25 @ 05:05) (96% - 99%)  Wt(kg): --  CAPILLARY BLOOD GLUCOSE      POCT Blood Glucose.: 86 mg/dL (14 Jul 2025 07:48)      Labs                          9.5    5.34  )-----------( 314      ( 14 Jul 2025 06:05 )             28.7       07-14    140  |  109[H]  |  28[H]  ----------------------------<  72  3.6   |  27  |  1.34[H]    Ca    9.1      14 Jul 2025 06:05              Radiology Results      Meds    MEDICATIONS  (STANDING):  aspirin  chewable 81 milliGRAM(s) Oral daily  atorvastatin 20 milliGRAM(s) Oral at bedtime  dextrose 5%. 1000 milliLiter(s) (50 mL/Hr) IV Continuous <Continuous>  dextrose 5%. 1000 milliLiter(s) (100 mL/Hr) IV Continuous <Continuous>  dextrose 50% Injectable 25 Gram(s) IV Push once  dextrose 50% Injectable 12.5 Gram(s) IV Push once  dextrose 50% Injectable 25 Gram(s) IV Push once  divalproex  milliGRAM(s) Oral two times a day  ezetimibe 10 milliGRAM(s) Oral daily  glucagon  Injectable 1 milliGRAM(s) IntraMuscular once  heparin   Injectable 5000 Unit(s) SubCutaneous every 8 hours  insulin glargine Injectable (LANTUS) 14 Unit(s) SubCutaneous at bedtime  insulin lispro (ADMELOG) corrective regimen sliding scale   SubCutaneous three times a day before meals  insulin lispro (ADMELOG) corrective regimen sliding scale   SubCutaneous at bedtime  insulin lispro Injectable (ADMELOG) 8 Unit(s) SubCutaneous three times a day before meals  latanoprost 0.005% Ophthalmic Solution 1 Drop(s) Both EYES at bedtime  sodium chloride 0.9%. 1000 milliLiter(s) (50 mL/Hr) IV Continuous <Continuous>      MEDICATIONS  (PRN):  acetaminophen     Tablet .. 650 milliGRAM(s) Oral every 6 hours PRN Temp greater or equal to 38C (100.4F), Mild Pain (1 - 3)  aluminum hydroxide/magnesium hydroxide/simethicone Suspension 30 milliLiter(s) Oral every 4 hours PRN Dyspepsia  dextrose Oral Gel 15 Gram(s) Oral once PRN Blood Glucose LESS THAN 70 milliGRAM(s)/deciliter

## 2025-07-24 RX ORDER — ASPIRIN 325 MG
0 TABLET ORAL
Refills: 0 | DISCHARGE

## 2025-07-24 RX ORDER — LATANOPROST PF 0.05 MG/ML
1 SOLUTION/ DROPS OPHTHALMIC
Refills: 0 | DISCHARGE

## 2025-07-24 RX ORDER — ATORVASTATIN CALCIUM 80 MG/1
1 TABLET, FILM COATED ORAL
Refills: 0 | DISCHARGE

## 2025-07-24 RX ORDER — ASPIRIN 325 MG
1 TABLET ORAL
Refills: 0 | DISCHARGE

## 2025-07-24 RX ORDER — PIOGLITAZONE HYDROCHLORIDE 15 MG/1
1 TABLET ORAL
Refills: 0 | DISCHARGE

## 2025-07-24 RX ORDER — INSULIN LISPRO 100 U/ML
0 INJECTION, SOLUTION INTRAVENOUS; SUBCUTANEOUS
Refills: 0 | DISCHARGE

## 2025-07-24 RX ORDER — EZETIMIBE 10 MG/1
1 TABLET ORAL
Refills: 0 | DISCHARGE

## 2025-07-24 RX ORDER — EMPAGLIFLOZIN 25 MG/1
1 TABLET, FILM COATED ORAL
Refills: 0 | DISCHARGE

## 2025-07-24 RX ORDER — ALOGLIPTIN 6.25 MG/1
1 TABLET, FILM COATED ORAL
Refills: 0 | DISCHARGE